# Patient Record
Sex: MALE | Race: BLACK OR AFRICAN AMERICAN | Employment: OTHER | URBAN - METROPOLITAN AREA
[De-identification: names, ages, dates, MRNs, and addresses within clinical notes are randomized per-mention and may not be internally consistent; named-entity substitution may affect disease eponyms.]

---

## 2017-05-16 ENCOUNTER — HOSPITAL ENCOUNTER (OUTPATIENT)
Dept: MRI IMAGING | Age: 72
Discharge: HOME | End: 2017-05-16
Attending: PHYSICAL MEDICINE & REHABILITATION
Payer: MEDICARE

## 2017-05-16 DIAGNOSIS — M47.816 LUMBAR SPONDYLOSIS: ICD-10-CM

## 2017-05-16 DIAGNOSIS — M54.41 ACUTE BACK PAIN WITH SCIATICA, RIGHT: ICD-10-CM

## 2017-05-16 PROCEDURE — 72148 MRI LUMBAR SPINE W/O DYE: CPT

## 2018-06-14 ENCOUNTER — OFFICE VISIT (OUTPATIENT)
Dept: SLEEP MEDICINE | Age: 73
End: 2018-06-14

## 2018-06-14 VITALS
WEIGHT: 262 LBS | HEART RATE: 72 BPM | DIASTOLIC BLOOD PRESSURE: 70 MMHG | BODY MASS INDEX: 38.8 KG/M2 | SYSTOLIC BLOOD PRESSURE: 132 MMHG | OXYGEN SATURATION: 97 % | HEIGHT: 69 IN

## 2018-06-14 DIAGNOSIS — G47.33 OSA (OBSTRUCTIVE SLEEP APNEA): Primary | ICD-10-CM

## 2018-06-14 PROBLEM — E66.01 SEVERE OBESITY (BMI 35.0-39.9): Status: ACTIVE | Noted: 2018-06-14

## 2018-06-14 RX ORDER — TRAMADOL HYDROCHLORIDE 200 MG/1
200 TABLET, EXTENDED RELEASE ORAL DAILY
COMMUNITY
End: 2018-09-04 | Stop reason: DRUGHIGH

## 2018-06-14 RX ORDER — FINASTERIDE 5 MG/1
TABLET, FILM COATED ORAL
COMMUNITY
Start: 2018-03-19 | End: 2018-09-04

## 2018-06-14 RX ORDER — METFORMIN HYDROCHLORIDE 850 MG/1
850 TABLET ORAL DAILY
COMMUNITY
Start: 2018-04-24 | End: 2018-12-07 | Stop reason: ALTCHOICE

## 2018-06-14 RX ORDER — GLIMEPIRIDE 4 MG/1
TABLET ORAL
COMMUNITY
Start: 2018-04-24 | End: 2018-09-04

## 2018-06-14 NOTE — PATIENT INSTRUCTIONS
Sleep Apnea: Care Instructions  Your Care Instructions    Sleep apnea means that you frequently stop breathing for 10 seconds or longer during sleep. It can be mild to severe, based on the number of times an hour that you stop breathing or have slowed breathing. Blocked or narrowed airways in your nose, mouth, or throat can cause sleep apnea. Your airway can become blocked when your throat muscles and tongue relax during sleep. You can treat sleep apnea at home by making lifestyle changes. You also can use a CPAP breathing machine that keeps tissues in the throat from blocking your airway. Or your doctor may suggest that you use a breathing device while you sleep. It helps keep your airway open. This could be a device that you put in your mouth. Other examples include strips or disks that you use on your nose. In some cases, surgery may be needed to remove enlarged tissues in the throat. Follow-up care is a key part of your treatment and safety. Be sure to make and go to all appointments, and call your doctor if you are having problems. It's also a good idea to know your test results and keep a list of the medicines you take. How can you care for yourself at home? · Lose weight, if needed. It may reduce the number of times you stop breathing or have slowed breathing. · Sleep on your side. It may stop mild apnea. If you tend to roll onto your back, sew a pocket in the back of your pajama top. Put a tennis ball into the pocket, and stitch the pocket shut. This will help keep you from sleeping on your back. · Avoid alcohol and medicines such as sleeping pills and sedatives before bed. · Do not smoke. Smoking can make sleep apnea worse. If you need help quitting, talk to your doctor about stop-smoking programs and medicines. These can increase your chances of quitting for good. · Prop up the head of your bed 4 to 6 inches by putting bricks under the legs of the bed.   · Treat breathing problems, such as a stuffy nose, caused by a cold or allergies. · Try a continuous positive airway pressure (CPAP) breathing machine if your doctor recommends it. The machine keeps your airway open when you sleep. · If CPAP does not work for you, ask your doctor if you can try other breathing machines. A bilevel positive airway pressure machine uses one type of air pressure for breathing in and another type for breathing out. Another device raises or lowers air pressure as needed while you breathe. · Talk to your doctor if:  ¨ Your nose feels dry or bleeds when you use one of these machines. You may need to increase moisture in the air. A humidifier may help. ¨ Your nose is runny or stuffy from using a breathing machine. Decongestants or a corticosteroid nasal spray may help. ¨ You are sleepy during the day and it gets in the way of the normal things you do. Do not drive when you are drowsy. When should you call for help? Watch closely for changes in your health, and be sure to contact your doctor if:  ? · You still have sleep apnea even though you have made lifestyle changes. ? · You are thinking of trying a device such as CPAP. ? · You are having problems using a CPAP or similar machine. Where can you learn more? Go to http://evita-everardo.info/. Enter H894 in the search box to learn more about \"Sleep Apnea: Care Instructions. \"  Current as of: May 12, 2017  Content Version: 11.4  © 6345-1035 [employer]. Care instructions adapted under license by [employer] (which disclaims liability or warranty for this information). If you have questions about a medical condition or this instruction, always ask your healthcare professional. [address] any warranty or liability for your use of this information.

## 2018-06-14 NOTE — PROGRESS NOTES
[address] [facility]. [place], [address]  Tel.  [phone]  Fax. [address] [facility]  [place], [address] [facility]  Tel.  [phone]  Fax. [phone] [address]  Tel.  [phone]  Fax. [phone]         Chief Complaint       Chief Complaint   Patient presents with    Sleep Problem     6 month follow up; pt had rashes due to mask and straps         HPI        Kimmy Keating is a right handed 74y.o. year old male seen for follow-up. He was evaluated with a sleep study which demonstrated obstructive sleep apnea characterized by an AHI of 74 per hour associated with arterial desaturations to 60%. Events more prominent in REM with the REM-related AHI of 102 per hour. CPAP increased to 16 cm with continued events. BIPAP study obtained. BIPAP started at 18/12 cm. He notes he had recently been experiencing a rash in the distribution of the mask. Had not been a problem in the past.    Compliance data downloaded and reviewed in detail with the patient today. During the past 30 days, BIPAP used during 18 days with the average daily use of 4.1 hours. CMS compliance criteria 30%. AHI 3.8 per hour. Elevated mask leak. He notes he is scheduled to have hip replacement surgery tomorrow. No Known Allergies    Current Outpatient Prescriptions   Medication Sig Dispense Refill    finasteride (PROSCAR) 5 mg tablet       glimepiride (AMARYL) 4 mg tablet       metFORMIN (GLUCOPHAGE) 850 mg tablet       traMADol (ULTRAM-ER) 200 mg tablet Take 200 mg by mouth daily. He  has no past medical history on file. He  has no past surgical history on file. He family history is not on file. He  reports that he has never smoked. He has never used smokeless tobacco. He reports that he does not drink alcohol or use illicit drugs.      Review of Systems:  Unchanged per patient      Objective:     Visit Vitals    /70    Pulse 72    Ht 5' 9\" (1.753 m)    Wt 262 lb (118.8 kg)    SpO2 97%    BMI 38.69 kg/m2     Body mass index is 38.69 kg/(m^2). Assessment:       ICD-10-CM ICD-9-CM    1. LUISANA (obstructive sleep apnea) G47.33 327.23       Severe sleep disordered breathing improving with BIPAP 18/12 cm. Reduced utilization secondary to recent mask-related rash. He will be fitted for an [address] [place] [address]. He will contact the office if rash continues. Plan:   No orders of the defined types were placed in this encounter. * Patient has a history and examination consistent with the diagnosis of sleep apnea. * Sleep testing was ordered for initial evaluation. * He was provided information on sleep apnea including coresponding risk factors and the importance of proper treatment. * Treatment options if indicated were reviewed today. Potential benefit of weight reduction was discussed. Weight reduction techniques reviewed.       Juanita Sanchez MD, Rufina Zapata  06/14/18

## 2018-08-14 ENCOUNTER — TELEPHONE (OUTPATIENT)
Dept: SLEEP MEDICINE | Age: 73
End: 2018-08-14

## 2018-08-20 NOTE — TELEPHONE ENCOUNTER
Patient called back. The reason he is not compliant is because he is sick since April. He said he as to get about 4/5 times due to diarreah. He said if he is not able to get supplies, then he will come and return device.

## 2018-08-22 ENCOUNTER — TELEPHONE (OUTPATIENT)
Dept: SLEEP MEDICINE | Age: 73
End: 2018-08-22

## 2018-08-22 ENCOUNTER — DOCUMENTATION ONLY (OUTPATIENT)
Dept: SLEEP MEDICINE | Age: 73
End: 2018-08-22

## 2018-08-22 DIAGNOSIS — G47.33 OBSTRUCTIVE SLEEP APNEA (ADULT) (PEDIATRIC): Primary | ICD-10-CM

## 2018-09-04 ENCOUNTER — HOSPITAL ENCOUNTER (INPATIENT)
Age: 73
LOS: 8 days | Discharge: HOME HEALTH | DRG: 330 | End: 2018-09-12
Attending: EMERGENCY MEDICINE | Admitting: SURGERY
Payer: MEDICARE

## 2018-09-04 ENCOUNTER — APPOINTMENT (OUTPATIENT)
Dept: CT IMAGING | Age: 73
DRG: 330 | End: 2018-09-04
Attending: PHYSICIAN ASSISTANT
Payer: MEDICARE

## 2018-09-04 DIAGNOSIS — D64.9 NORMOCYTIC ANEMIA: ICD-10-CM

## 2018-09-04 DIAGNOSIS — R10.9 ABDOMINAL PAIN, UNSPECIFIED ABDOMINAL LOCATION: Primary | ICD-10-CM

## 2018-09-04 DIAGNOSIS — N13.30 HYDRONEPHROSIS OF RIGHT KIDNEY: ICD-10-CM

## 2018-09-04 DIAGNOSIS — K59.1 FUNCTIONAL DIARRHEA: ICD-10-CM

## 2018-09-04 DIAGNOSIS — K63.89 COLONIC MASS: ICD-10-CM

## 2018-09-04 PROBLEM — R19.00 PELVIC MASS: Status: ACTIVE | Noted: 2018-09-04

## 2018-09-04 LAB
ALBUMIN SERPL-MCNC: 2.5 G/DL (ref 3.5–5)
ALBUMIN/GLOB SERPL: 0.5 {RATIO} (ref 1.1–2.2)
ALP SERPL-CCNC: 66 U/L (ref 45–117)
ALT SERPL-CCNC: 29 U/L (ref 12–78)
ANION GAP SERPL CALC-SCNC: 8 MMOL/L (ref 5–15)
AST SERPL-CCNC: 23 U/L (ref 15–37)
BASOPHILS # BLD: 0 K/UL (ref 0–0.1)
BASOPHILS NFR BLD: 0 % (ref 0–1)
BILIRUB SERPL-MCNC: 0.3 MG/DL (ref 0.2–1)
BUN SERPL-MCNC: 9 MG/DL (ref 6–20)
BUN/CREAT SERPL: 9 (ref 12–20)
CALCIUM SERPL-MCNC: 8.7 MG/DL (ref 8.5–10.1)
CHLORIDE SERPL-SCNC: 106 MMOL/L (ref 97–108)
CO2 SERPL-SCNC: 26 MMOL/L (ref 21–32)
CREAT SERPL-MCNC: 1.04 MG/DL (ref 0.7–1.3)
DIFFERENTIAL METHOD BLD: ABNORMAL
EOSINOPHIL # BLD: 0.1 K/UL (ref 0–0.4)
EOSINOPHIL NFR BLD: 1 % (ref 0–7)
ERYTHROCYTE [DISTWIDTH] IN BLOOD BY AUTOMATED COUNT: 14.6 % (ref 11.5–14.5)
GLOBULIN SER CALC-MCNC: 4.8 G/DL (ref 2–4)
GLUCOSE BLD STRIP.AUTO-MCNC: 97 MG/DL (ref 65–100)
GLUCOSE SERPL-MCNC: 123 MG/DL (ref 65–100)
HCT VFR BLD AUTO: 30.7 % (ref 36.6–50.3)
HGB BLD-MCNC: 9.4 G/DL (ref 12.1–17)
IMM GRANULOCYTES # BLD: 0 K/UL (ref 0–0.04)
IMM GRANULOCYTES NFR BLD AUTO: 1 % (ref 0–0.5)
LIPASE SERPL-CCNC: 68 U/L (ref 73–393)
LYMPHOCYTES # BLD: 1.1 K/UL (ref 0.8–3.5)
LYMPHOCYTES NFR BLD: 15 % (ref 12–49)
MCH RBC QN AUTO: 26.5 PG (ref 26–34)
MCHC RBC AUTO-ENTMCNC: 30.6 G/DL (ref 30–36.5)
MCV RBC AUTO: 86.5 FL (ref 80–99)
MONOCYTES # BLD: 0.8 K/UL (ref 0–1)
MONOCYTES NFR BLD: 11 % (ref 5–13)
NEUTS SEG # BLD: 5.1 K/UL (ref 1.8–8)
NEUTS SEG NFR BLD: 72 % (ref 32–75)
NRBC # BLD: 0 K/UL (ref 0–0.01)
NRBC BLD-RTO: 0 PER 100 WBC
PLATELET # BLD AUTO: 381 K/UL (ref 150–400)
PMV BLD AUTO: 9.6 FL (ref 8.9–12.9)
POTASSIUM SERPL-SCNC: 3.8 MMOL/L (ref 3.5–5.1)
PROT SERPL-MCNC: 7.3 G/DL (ref 6.4–8.2)
RBC # BLD AUTO: 3.55 M/UL (ref 4.1–5.7)
SERVICE CMNT-IMP: NORMAL
SODIUM SERPL-SCNC: 140 MMOL/L (ref 136–145)
WBC # BLD AUTO: 7.1 K/UL (ref 4.1–11.1)

## 2018-09-04 PROCEDURE — 74177 CT ABD & PELVIS W/CONTRAST: CPT

## 2018-09-04 PROCEDURE — 93970 EXTREMITY STUDY: CPT

## 2018-09-04 PROCEDURE — 82962 GLUCOSE BLOOD TEST: CPT

## 2018-09-04 PROCEDURE — 94762 N-INVAS EAR/PLS OXIMTRY CONT: CPT

## 2018-09-04 PROCEDURE — 85025 COMPLETE CBC W/AUTO DIFF WBC: CPT | Performed by: EMERGENCY MEDICINE

## 2018-09-04 PROCEDURE — 65270000029 HC RM PRIVATE

## 2018-09-04 PROCEDURE — 36415 COLL VENOUS BLD VENIPUNCTURE: CPT | Performed by: EMERGENCY MEDICINE

## 2018-09-04 PROCEDURE — 74011636320 HC RX REV CODE- 636/320: Performed by: RADIOLOGY

## 2018-09-04 PROCEDURE — 74011250636 HC RX REV CODE- 250/636: Performed by: PHYSICIAN ASSISTANT

## 2018-09-04 PROCEDURE — 83690 ASSAY OF LIPASE: CPT | Performed by: EMERGENCY MEDICINE

## 2018-09-04 PROCEDURE — 74011250636 HC RX REV CODE- 250/636: Performed by: EMERGENCY MEDICINE

## 2018-09-04 PROCEDURE — 99282 EMERGENCY DEPT VISIT SF MDM: CPT

## 2018-09-04 PROCEDURE — 81001 URINALYSIS AUTO W/SCOPE: CPT | Performed by: EMERGENCY MEDICINE

## 2018-09-04 PROCEDURE — 80053 COMPREHEN METABOLIC PANEL: CPT | Performed by: EMERGENCY MEDICINE

## 2018-09-04 RX ORDER — TRAMADOL HYDROCHLORIDE 50 MG/1
50 TABLET ORAL
COMMUNITY
End: 2018-09-12

## 2018-09-04 RX ORDER — FENTANYL CITRATE 50 UG/ML
50 INJECTION, SOLUTION INTRAMUSCULAR; INTRAVENOUS
Status: COMPLETED | OUTPATIENT
Start: 2018-09-04 | End: 2018-09-05

## 2018-09-04 RX ORDER — DEXTROSE 50 % IN WATER (D50W) INTRAVENOUS SYRINGE
25-50 AS NEEDED
Status: DISCONTINUED | OUTPATIENT
Start: 2018-09-04 | End: 2018-09-12 | Stop reason: HOSPADM

## 2018-09-04 RX ORDER — MAGNESIUM SULFATE 100 %
4 CRYSTALS MISCELLANEOUS AS NEEDED
Status: DISCONTINUED | OUTPATIENT
Start: 2018-09-04 | End: 2018-09-12 | Stop reason: HOSPADM

## 2018-09-04 RX ORDER — ASPIRIN 81 MG/1
81 TABLET ORAL DAILY
COMMUNITY
End: 2019-04-16

## 2018-09-04 RX ORDER — DIPHENHYDRAMINE HYDROCHLORIDE 50 MG/ML
12.5 INJECTION, SOLUTION INTRAMUSCULAR; INTRAVENOUS
Status: DISCONTINUED | OUTPATIENT
Start: 2018-09-04 | End: 2018-09-12 | Stop reason: HOSPADM

## 2018-09-04 RX ORDER — ACETAMINOPHEN 325 MG/1
650 TABLET ORAL
Status: DISCONTINUED | OUTPATIENT
Start: 2018-09-04 | End: 2018-09-12 | Stop reason: HOSPADM

## 2018-09-04 RX ORDER — HYDROMORPHONE HYDROCHLORIDE 2 MG/ML
0.5 INJECTION, SOLUTION INTRAMUSCULAR; INTRAVENOUS; SUBCUTANEOUS
Status: DISCONTINUED | OUTPATIENT
Start: 2018-09-04 | End: 2018-09-06

## 2018-09-04 RX ORDER — TAMSULOSIN HYDROCHLORIDE 0.4 MG/1
0.4 CAPSULE ORAL DAILY
COMMUNITY

## 2018-09-04 RX ORDER — FINASTERIDE 5 MG/1
5 TABLET, FILM COATED ORAL DAILY
Status: DISCONTINUED | OUTPATIENT
Start: 2018-09-05 | End: 2018-09-04

## 2018-09-04 RX ORDER — GLIMEPIRIDE 4 MG/1
4 TABLET ORAL
COMMUNITY
End: 2018-12-07 | Stop reason: ALTCHOICE

## 2018-09-04 RX ORDER — SODIUM CHLORIDE 9 MG/ML
100 INJECTION, SOLUTION INTRAVENOUS CONTINUOUS
Status: DISCONTINUED | OUTPATIENT
Start: 2018-09-04 | End: 2018-09-05

## 2018-09-04 RX ORDER — POTASSIUM CHLORIDE 20 MEQ/1
20 TABLET, EXTENDED RELEASE ORAL 2 TIMES DAILY
COMMUNITY
End: 2019-04-16

## 2018-09-04 RX ORDER — ENOXAPARIN SODIUM 100 MG/ML
40 INJECTION SUBCUTANEOUS EVERY 24 HOURS
Status: DISCONTINUED | OUTPATIENT
Start: 2018-09-04 | End: 2018-09-09

## 2018-09-04 RX ORDER — TAMSULOSIN HYDROCHLORIDE 0.4 MG/1
0.4 CAPSULE ORAL DAILY
Status: DISCONTINUED | OUTPATIENT
Start: 2018-09-05 | End: 2018-09-12 | Stop reason: HOSPADM

## 2018-09-04 RX ORDER — INSULIN LISPRO 100 [IU]/ML
INJECTION, SOLUTION INTRAVENOUS; SUBCUTANEOUS EVERY 6 HOURS
Status: DISCONTINUED | OUTPATIENT
Start: 2018-09-04 | End: 2018-09-12 | Stop reason: HOSPADM

## 2018-09-04 RX ORDER — NALOXONE HYDROCHLORIDE 0.4 MG/ML
0.4 INJECTION, SOLUTION INTRAMUSCULAR; INTRAVENOUS; SUBCUTANEOUS AS NEEDED
Status: DISCONTINUED | OUTPATIENT
Start: 2018-09-04 | End: 2018-09-12 | Stop reason: HOSPADM

## 2018-09-04 RX ORDER — SODIUM CHLORIDE 0.9 % (FLUSH) 0.9 %
5-10 SYRINGE (ML) INJECTION AS NEEDED
Status: DISCONTINUED | OUTPATIENT
Start: 2018-09-04 | End: 2018-09-12 | Stop reason: HOSPADM

## 2018-09-04 RX ORDER — SODIUM CHLORIDE 0.9 % (FLUSH) 0.9 %
5-10 SYRINGE (ML) INJECTION EVERY 8 HOURS
Status: DISCONTINUED | OUTPATIENT
Start: 2018-09-04 | End: 2018-09-12 | Stop reason: HOSPADM

## 2018-09-04 RX ADMIN — SODIUM CHLORIDE 1000 ML: 900 INJECTION, SOLUTION INTRAVENOUS at 17:25

## 2018-09-04 RX ADMIN — IOPAMIDOL 100 ML: 755 INJECTION, SOLUTION INTRAVENOUS at 16:37

## 2018-09-04 RX ADMIN — HYDROMORPHONE HYDROCHLORIDE 0.5 MG: 2 INJECTION, SOLUTION INTRAMUSCULAR; INTRAVENOUS; SUBCUTANEOUS at 17:36

## 2018-09-04 NOTE — ED NOTES
2:11 PM 
I have evaluated the patient as the Provider in Triage. I have reviewed His vital signs and the triage nurse assessment. I have talked with the patient and any available family and advised that I am the provider in triage and have ordered the appropriate study to initiate their work up based on the clinical presentation during my assessment. I have advised that the patient will be accommodated in the Main ED as soon as possible. I have also requested to contact the triage nurse or myself immediately if the patient experiences any changes in their condition during this brief waiting period. Pt with hx abdominal pain and dx with colon mass in august. Pain worsening,. No n/v. Dr Maria G Merino urologist tried to place stent this past week and cant see because they think mass swelled around kidney  Sent by dr Yuriy Traylor to admit Letty Muñiz MD

## 2018-09-04 NOTE — H&P
Surgery History and Physical 
 
Subjective:  
  
Mordecai Jeans is a 74 y.o. male who presented to the ED at direction of Dr. Mayank Arias with c/o diarrhea and abdominal pain. Patient has been having persistent diarrhea for 4-5 months. He was seen by GI and has had 2 colonoscopies which showed abnormal rectal and sigmoid mucosa and possible mass. Patient has been having worsening abdominal pain. Had CT which showed right hydronephrosis from extrinsic compression. His urologist, Dr. Evaristo Ochoa who was not able to pass ureteral stent. He called our office today reporting worsening symptoms and was directed here for further eval.  
 
He c/o lower abdominal pain that is worse with movement. Denies N/V. States if he eats \"it runs right through me\". He reports fecal incontinence. He estimates a 75# unintentional weight loss over the last 6 months. Past Medical History:  
Diagnosis Date  Diabetes mellitus ([facility]  Prostate cancer ([facility]  Sleep apnea No past surgical history on file. No family history on file. Social History Social History  Marital status: SINGLE Spouse name: N/A  
 Number of children: N/A  
 Years of education: N/A Social History Main Topics  Smoking status: Never Smoker  Smokeless tobacco: Never Used  Alcohol use No  
 Drug use: No  
 Sexual activity: Not on file Other Topics Concern  Not on file Social History Narrative Current Facility-Administered Medications Medication Dose Route Frequency  fentaNYL citrate (PF) injection 50 mcg  50 mcg IntraVENous NOW  sodium chloride 0.9 % bolus infusion 1,000 mL  1,000 mL IntraVENous NOW  sodium chloride (NS) flush 5-10 mL  5-10 mL IntraVENous Q8H  
 sodium chloride (NS) flush 5-10 mL  5-10 mL IntraVENous PRN  
 HYDROmorphone (PF) (DILAUDID) injection 0.5 mg  0.5 mg IntraVENous Q3H PRN  
 naloxone (NARCAN) injection 0.4 mg  0.4 mg IntraVENous PRN  
  diphenhydrAMINE (BENADRYL) injection 12.5 mg  12.5 mg IntraVENous Q4H PRN  
 acetaminophen (TYLENOL) tablet 650 mg  650 mg Oral Q4H PRN  
 enoxaparin (LOVENOX) injection 40 mg  40 mg SubCUTAneous Q24H  
 0.9% sodium chloride infusion  100 mL/hr IntraVENous CONTINUOUS  
 glucose chewable tablet 16 g  4 Tab Oral PRN  
 dextrose (D50W) injection syrg 12.5-25 g  25-50 mL IntraVENous PRN  
 glucagon (GLUCAGEN) injection 1 mg  1 mg IntraMUSCular PRN  
 insulin lispro (HUMALOG) injection   SubCUTAneous Q6H  
 [START ON 2018] finasteride (PROSCAR) tablet 5 mg  5 mg Oral DAILY Current Outpatient Prescriptions Medication Sig  
 finasteride (PROSCAR) 5 mg tablet  glimepiride (AMARYL) 4 mg tablet  metFORMIN (GLUCOPHAGE) 850 mg tablet  traMADol (ULTRAM-ER) 200 mg tablet Take 200 mg by mouth daily. No Known Allergies Review of Systems:REVIEW OF SYSTEMS:   
 []     Unable to obtain  ROS due to  []    mental status change  []    sedated   []    intubated 
 []    Total of 12 systems reviewed as follows: 
 
Constitutional: + weight loss, malaise neg for fevers, chills, Eyes: negative for blurry vision Ears, nose, mouth, throat, and face: negative for sore throat Respiratory: negative for SOB Cardiovascular: negative for CP Gastrointestinal: + abdominal pain, diarrhea, negative for nausea, vomiting, constipation, melena, hematochezia Genitourinary: negative for dysuria Integument/breast: neg for skin rash Hematologic/lymphatic: neg for bruising Musculoskeletal: negative for muscle aches Neurological: no dizziness or h/a 
 
Objective:  
 
  
Patient Vitals for the past 8 hrs: 
 BP Temp Pulse Resp SpO2 Weight 18 1410 130/62 98.4 °F (36.9 °C) 89 15 98 % 117.9 kg (260 lb) Temp (24hrs), Av.4 °F (36.9 °C), Min:98.4 °F (36.9 °C), Max:98.4 °F (36.9 °C) Physical Exam: 
General:  Alert, cooperative, no distress, appears stated age. Eyes:  Conjunctivae/corneas clear. Nose: Nares normal. Septum midline Mouth/Throat: Lips, mucosa, and tongue normal.   
Neck: Supple, symmetrical, trachea midline Lungs:   Clear to auscultation bilaterally. Heart:  Regular rate and rhythm Abdomen:   Soft, mild diffuse tenderness with rebound or guarding, non-distended, normal bowel sounds Extremities: Extremities normal, atraumatic, no cyanosis or edema. Skin: Skin color, texture, turgor normal. No rashes or lesions Neuro: Alert, oriented, speech clear Labs:  
Recent Labs  
   09/04/18 
 1423 WBC  7.1 HGB  9.4* HCT  30.7* PLT  381 Recent Labs  
   09/04/18 
 1423 NA  140  
K  3.8 CL  106 CO2  26 GLU  123* BUN  9  
CREA  1.04  
CA  8.7 ALB  2.5* TBILI  0.3 SGOT  23 ALT  29 No results for input(s): INR in the last 72 hours. No lab exists for component: INREXT, INREXT Assessment and Plan:  
 
Pelvic mass/Abdominal pain Concern is for malignancy causing ureteral obstruction and abdominal pain. At time of my exam CT abd/pelvis is pending in ED. Admit to Dr. Nguyễn Beatty service, NPO, IVF, pain control. Further plan pending review of CT. Dr. Nguyễn Beatty updated. Plan discussed. Signed By: CRISELDA Berry September 4, 2018

## 2018-09-04 NOTE — ED TRIAGE NOTES
Patient with known abdominal mass per family, was sent by surgery for admission and testing. Pt with c/o abdominal pain.

## 2018-09-04 NOTE — ED PROVIDER NOTES
HPI Comments: 74 y.o. male with past medical history significant for DM and chronic renal disease who presents from home with chief complaint of abdominal pain. Pt reports abdominal pain for 5 months. Pt notes recently his abdominal pain is worsening. Pt also c/o diarrhea, unexpected weight loss, and bilateral lower extremity edema. Pt's relative states he was sent to the ED after visiting Dr. Radha Tomlin yesterday, who recommended admission. Pt was diagnosed with a mass in his colon 1 month ago. Pt's relative states Dr. Charmayne Meter (Urology) tried to place a stent this past week and was unable to perform the procedure d/t the mass swelling around the Pt's kidney. Pt's relative states the Pt's mass is now pressing against his urethra, which is causing decreased urine and retention. Pt's relative reports the Pt's right kidney has degraded. Pt's relative notes the Pt was prescribed Tramadol for urinary pain, which did not provide relief. Pt denies fever, diaphoresis, N/V, and SOB. There are no other acute medical concerns at this time. PCP: Fabricio Peng MD 
 
Note written by Erwin Patel, as dictated by Anuja Ruiz, [address] 3:52 PM 
 
 
The history is provided by the patient and a relative. No past medical history on file. No past surgical history on file. No family history on file. Social History Social History  Marital status: SINGLE Spouse name: N/A  
 Number of children: N/A  
 Years of education: N/A Occupational History  Not on file. Social History Main Topics  Smoking status: Never Smoker  Smokeless tobacco: Never Used  Alcohol use No  
 Drug use: No  
 Sexual activity: Not on file Other Topics Concern  Not on file Social History Narrative  No narrative on file ALLERGIES: Review of patient's allergies indicates no known allergies. Review of Systems Constitutional: Positive for unexpected weight change.  Negative for diaphoresis and fever. Respiratory: Negative for shortness of breath. Cardiovascular: Positive for leg swelling. Gastrointestinal: Positive for abdominal pain and diarrhea. Negative for nausea and vomiting. Genitourinary: Positive for decreased urine volume. All other systems reviewed and are negative. Vitals:  
 09/04/18 1410 BP: 130/62 Pulse: 89 Resp: 15 Temp: 98.4 °F (36.9 °C) SpO2: 98% Weight: 117.9 kg (260 lb) Physical Exam  
Constitutional: He is oriented to person, place, and time. He appears well-developed and well-nourished. No distress. Well appearing AAM in NAD HENT:  
Head: Normocephalic and atraumatic. Right Ear: External ear normal.  
Left Ear: External ear normal.  
Nose: Nose normal.  
Mouth/Throat: Oropharynx is clear and moist. No oropharyngeal exudate. Eyes: Conjunctivae and EOM are normal. Pupils are equal, round, and reactive to light. Right eye exhibits no discharge. Left eye exhibits no discharge. Neck: Normal range of motion. Neck supple. Cardiovascular: Normal rate, regular rhythm and normal heart sounds. Pulmonary/Chest: Effort normal and breath sounds normal. He has no wheezes. He has no rales. Abdominal: Soft. Bowel sounds are normal. He exhibits no distension. There is tenderness (diffuse). There is no guarding. Musculoskeletal: Normal range of motion. Lymphadenopathy:  
  He has no cervical adenopathy. Neurological: He is alert and oriented to person, place, and time. No cranial nerve deficit. Skin: Skin is warm and dry. He is not diaphoretic. Psychiatric: He has a normal mood and affect. His behavior is normal.  
Nursing note and vitals reviewed. MDM Number of Diagnoses or Management Options Diagnosis management comments: 73 yo AAM with known abdominal mass referred to ED for admission. Basic labs reviewed. [facility] 
 
[facility] Hamida Concepcion in ED, surgery, in ED to eval and admit patient.  Anuja JC CRISELDA Guerrero Pt seen and evaluated independently by Dr. Denver Cherry. [facility], [address] Amount and/or Complexity of Data Reviewed Clinical lab tests: ordered and reviewed Tests in the radiology section of CPT®: ordered ED Course Procedures

## 2018-09-04 NOTE — ED NOTES
TRANSFER - OUT REPORT: 
 
Verbal report given to Breonna Berger(name) on Efren Montano  being transferred to [facility] (unit) for routine progression of care Report consisted of patients Situation, Background, Assessment and  
Recommendations(SBAR). Information from the following report(s) SBAR, ED Summary, [facility] and Recent Results was reviewed with the receiving nurse. Lines:  
Peripheral IV 09/04/18 Left Antecubital (Active) Site Assessment Clean, dry, & intact 9/4/2018  2:45 PM  
Phlebitis Assessment 0 9/4/2018  2:45 PM  
Infiltration Assessment 0 9/4/2018  2:45 PM  
Dressing Status Clean, dry, & intact 9/4/2018  2:45 PM  
Dressing Type Tape;Transparent 9/4/2018  2:45 PM  
Hub Color/Line Status Pink;Flushed;Patent 9/4/2018  2:45 PM  
Action Taken Blood drawn 9/4/2018  2:45 PM  
  
 
Opportunity for questions and clarification was provided. Patient transported with: 
 Monitor

## 2018-09-04 NOTE — IP AVS SNAPSHOT
303 [facility] [address] 
[phone] Patient: Anjali Villatoro MRN: [id number] LAP:1/60/3806 About your hospitalization You were admitted on:  September 4, 2018 You last received care in the:  SF 4M POST SURG ORT 1 You were discharged on:  September 12, 2018 Why you were hospitalized Your primary diagnosis was:  Not on File Your diagnoses also included:  Pelvic Mass Follow-up Information Follow up With Details Comments Contact Info Mj Weiss MD Go on 9/17/2018 appt at 10 am [address] [place], [place] [id number] [address] [phone] 2449 [facility], [address] [facility] [address] Skye Wagner MD Schedule an appointment as soon as possible for a visit in 2 weeks  2000 [facility] [address] [phone] Giovanni Morocho MD Schedule an appointment as soon as possible for a visit  [address] [place] [address] 
[phone] Teresa Villafana MD   55[address] [facility] [id number] [phone] Your Scheduled Appointments Monday September 17, 2018 10:00 AM EDT HOSPITAL FOLLOW-UP with Mj Weiss MD  
[facility] [address] [place] [address], [address] [place] [address] [address] [phone] Discharge Orders None A check nemo indicates which time of day the medication should be taken. My Medications START taking these medications Instructions Each Dose to Equal  
 Morning Noon Evening Bedtime HYDROcodone-acetaminophen 5-325 mg per tablet Commonly known as:  Rolinda Doles Your last dose was: Was not given in the hospital   
Your next dose is:  As needed Take 1 Tab by mouth every four (4) hours as needed for Pain.  Max Daily Amount: 6 Tabs. 1 Tab CONTINUE taking these medications Instructions Each Dose to Equal  
 Morning Noon Evening Bedtime  
 aspirin delayed-release 81 mg tablet Your last dose was: Was not given in the hospital   
   
 Take 81 mg by mouth daily. 81 mg  
    
   
   
   
  
 glimepiride 4 mg tablet Commonly known as:  AMARYL Your last dose was: Was not given in the hospital   
   
 Take 4 mg by mouth every morning. 4 mg  
    
   
   
   
  
 metFORMIN 850 mg tablet Commonly known as:  GLUCOPHAGE Your last dose was: Was not given in the hospital   
   
 Take 850 mg by mouth daily. 850 mg  
    
   
   
   
  
 potassium chloride 20 mEq tablet Commonly known as:  K-DUR, KLOR-CON Your next dose is:  Was not given in the hospital   
   
 Take 20 mEq by mouth two (2) times a day. 20 mEq  
    
   
   
   
  
 tamsulosin 0.4 mg capsule Commonly known as:  FLOMAX Your last dose was: Today at 8:13 am   
Your next dose is:  Tomorrow morning Take 0.4 mg by mouth daily. 0.4 mg  
    
   
   
   
  
  
STOP taking these medications   
 traMADol 50 mg tablet Commonly known as:  Damaris Henry your doctor about these medications Instructions Each Dose to Equal  
 Morning Noon Evening Bedtime  
 traMADol 200 mg tablet Commonly known as:  ULTRAM-ER Your last dose was: Was not given in the hospital   
Your next dose is:  As needed Take 200 mg by mouth daily. 200 mg Where to Get Your Medications Information on where to get these meds will be given to you by the nurse or doctor. ! Ask your nurse or doctor about these medications HYDROcodone-acetaminophen 5-325 mg per tablet Opioid Education  Prescription Opioids: What You Need to Know: 
 
Prescription opioids can be used to help relieve moderate-to-severe pain and are often prescribed following a surgery or injury, or for certain health conditions. These medications can be an important part of treatment but also come with serious risks. Opioids are strong pain medicines. Examples include hydrocodone, oxycodone, fentanyl, and morphine. Heroin is an example of an illegal opioid. It is important to work with your health care provider to make sure you are getting the safest, most effective care. WHAT ARE THE RISKS AND SIDE EFFECTS OF OPIOID USE? Prescription opioids carry serious risks of addiction and overdose, especially with prolonged use. An opioid overdose, often marked by slow breathing, can cause sudden death. The use of prescription opioids can have a number of side effects as well, even when taken as directed. · Tolerance-meaning you might need to take more of a medication for the same pain relief · Physical dependence-meaning you have symptoms of withdrawal when the medication is stopped. Withdrawal symptoms can include nausea, sweating, chills, diarrhea, stomach cramps, and muscle aches. Withdrawal can last up to several weeks, depending on which drug you took and how long you took it. · Increased sensitivity to pain · Constipation · Nausea, vomiting, and dry mouth · Sleepiness and dizziness · Confusion · Depression · Low levels of testosterone that can result in lower sex drive, energy, and strength · Itching and sweating RISKS ARE GREATER WITH:      
· History of drug misuse, substance use disorder, or overdose · Mental health conditions (such as depression or anxiety) · Sleep apnea · Older age (72 years or older) · Pregnancy Avoid alcohol while taking prescription opioids. Also, unless specifically advised by your health care provider, medications to avoid include: · Benzodiazepines (such as Xanax or Valium) · Muscle relaxants (such as Soma or Flexeril) · Hypnotics (such as Ambien or Lunesta) · Other prescription opioids KNOW YOUR OPTIONS Talk to your health care provider about ways to manage your pain that don't involve prescription opioids. Some of these options may actually work better and have fewer risks and side effects. Options may include: 
· Pain relievers such as acetaminophen, ibuprofen, and naproxen · Some medications that are also used for depression or seizures · Physical therapy and exercise · Counseling to help patients learn how to cope better with triggers of pain and stress. · Application of heat or cold compress · Massage therapy · Relaxation techniques Be Informed Make sure you know the name of your medication, how much and how often to take it, and its potential risks & side effects. IF YOU ARE PRESCRIBED OPIOIDS FOR PAIN: 
· Never take opioids in greater amounts or more often than prescribed. Remember the goal is not to be pain-free but to manage your pain at a tolerable level. · Follow up with your primary care provider to: · Work together to create a plan on how to manage your pain. · Talk about ways to help manage your pain that don't involve prescription opioids. · Talk about any and all concerns and side effects. · Help prevent misuse and abuse. · Never sell or share prescription opioids · Help prevent misuse and abuse. · Store prescription opioids in a secure place and out of reach of others (this may include visitors, children, friends, and family). · Safely dispose of unused/unwanted prescription opioids: Find your community drug take-back program or your pharmacy mail-back program, or flush them down the toilet, following guidance from the Food and Drug Administration (www.fda.gov/Drugs/ResourcesForYou). · Visit www.cdc.gov/drugoverdose to learn about the risks of opioid abuse and overdose. · If you believe you may be struggling with addiction, tell your health care provider and ask for guidance or call [employer] at 5-975-476-ZYCS. Discharge Instructions Patient Discharge Instructions Ramakrishna Gomes / [id number] : 1945 Admitted 2018 Discharged: 2018 Take Home Medications · It is important that you take the medication exactly as they are prescribed. · Keep your medication in the bottles provided by the pharmacist and keep a list of the medication names, dosages, and times to be taken in your wallet. · Do not take other medications without consulting your doctor. · Do not drive, drink alcohol, or operate machinery when taking sedating pain medication. · Take colace daily while on pain medication to help prevent constipation. You may take over the counter laxatives such as Dulcolax or Miralax as needed for constipation What to do at [facility] Recommended diet: Diabetic diet Recommended activity: No heavy lifting or strenuous activity for 6 weeks. You may shower. You may drive once pain has improved and you no longer require pain medication. Follow up with Dr. Bryce Ramirez in 2 weeks. Call [facility] to make an appointment. Follow up with Dr. Sapna Pepe as scheduled. Follow up with Dr. Edwardo Koenig. Call Dr. Bryce Ramirez or go to the ER if you develop worsening pain, fever, vomiting, or any other symptoms that concern you. Introducing [facility]! Eunice Mejia introduces [employer] patient portal. Now you can access parts of your medical record, email your doctor's office, and request medication refills online. 1. In your internet browser, go to [email or web address] 2. Click on the First Time User? Click Here link in the Sign In box. You will see the New Member Sign Up page. 3. Enter your [employer] Access Code exactly as it appears below. You will not need to use this code after youve completed the sign-up process.  If you do not sign up before the expiration date, you must request a new code. · [employer] Access Code: [email or web address] Expires: 12/3/2018  2:09 PM 
 
4. Enter the last four digits of your Social Security Number (xxxx) and Date of Birth (mm/dd/yyyy) as indicated and click Submit. You will be taken to the next sign-up page. 5. Create a [employer] ID. This will be your [employer] login ID and cannot be changed, so think of one that is secure and easy to remember. 6. Create a [employer] password. You can change your password at any time. 7. Enter your Password Reset Question and Answer. This can be used at a later time if you forget your password. 8. Enter your e-mail address. You will receive e-mail notification when new information is available in [address] 9. Click Sign Up. You can now view and download portions of your medical record. 10. Click the Download Summary menu link to download a portable copy of your medical information. If you have questions, please visit the Frequently Asked Questions section of the [employer] website. Remember, [employer] is NOT to be used for urgent needs. For medical emergencies, dial 911. Now available from your iPhone and Android! Introducing Jonh Knight As a [employer] patient, I wanted to make you aware of our electronic visit tool called Jonh Knight. [employer] 24/7 allows you to connect within minutes with a medical provider 24 hours a day, seven days a week via a mobile device or tablet or logging into a secure website from your computer. You can access Jonh Knight from anywhere in the [place].  
 
A virtual visit might be right for you when you have a simple condition and feel like you just dont want to get out of bed, or cant get away from work for an appointment, when your regular [employer] provider is not available (evenings, weekends or holidays), or when youre out of town and need minor care. Electronic visits cost only $49 and if the [email or web address] 24/7 provider determines a prescription is needed to treat your condition, one can be electronically transmitted to a nearby pharmacy*. Please take a moment to enroll today if you have not already done so. The enrollment process is free and takes just a few minutes. To enroll, please download the [employer] 24/7 adria to your tablet or phone, or visit [email or web address] to enroll on your computer. And, as an [address] [place] [address] patient with a [employer] account, the results of your visits will be scanned into your electronic medical record and your primary care provider will be able to view the scanned results. We urge you to continue to see your regular [employer] provider for your ongoing medical care. And while your primary care provider may not be the one available when you seek a [facility] virtual visit, the peace of mind you get from getting a real diagnosis real time can be priceless. For more information on [employer], view our Frequently Asked Questions (FAQs) at [email or web address]. Sincerely, 
 
Britney Lopez MD 
Chief Medical Officer [employer] *:  certain medications cannot be prescribed via [employer] Providers Seen During Your Hospitalization Provider Specialty Primary office phone Nadine Ng MD Emergency Medicine [phone] Zane Roy MD Emergency Medicine [phone] Skye Wagner MD General Surgery [phone] Your Primary Care Physician (PCP) Primary Care Physician Office Phone Office Fax Everardo Reed [phone] You are allergic to the following No active allergies Recent Documentation Height Weight BMI Smoking Status 1.753 m 98 kg 31.9 kg/m2 Never Smoker Emergency Contacts Name Discharge Info Relation Home Work Mobile Constantino Callahan  Child [2] [phone] SindyAretha DISCHARGE CAREGIVER [3] Daughter [21]   [phone] Patient Belongings The following personal items are in your possession at time of discharge: 
  Dental Appliances: None  Visual Aid: Glasses   Hearing Aids/Status: Does not own  Home Medications: None   Jewelry: None  Clothing: At bedside    Other Valuables: None Please provide this summary of care documentation to your next provider. Signatures-by signing, you are acknowledging that this After Visit Summary has been reviewed with you and you have received a copy. Patient Signature:  ____________________________________________________________ Date:  ____________________________________________________________  
  
[facility] Provider Signature:  ____________________________________________________________ Date:  ____________________________________________________________

## 2018-09-04 NOTE — PROGRESS NOTES
BSI: MED RECONCILIATION Comments/Recommendations: Discussed current PTA medication list with  patient and his daughter. Reviewed drug allergies and recent changes to PTA medications. The patient was questioned regarding recent use of other prescription and nonprescription medications not listed on their current medication list. 
? Patient reports that he has taken his daily medications this morning with the exception of aspirin 81 mg, metformin 850 mg, and glimepiride 4 mg. He was instructed to hold these medications prior to a procedure per his doctor. ? He fills his medications at [facility] 
? He does not report any medication allergies Medications added: · Aspirin 81 mg EC daily- Patient has not taken this medication in a week and reports this is on hold by his doctor prior to a procedure · Metformin 850 mg daily- Patient's daughter has confirmed the dose. This medication has been on hold for 1 week. · Glimepiride 4 mg daily- Patient reports that this is a current medication and that this medication has been on hold for 1 week · Tramadol 50 mg immediate release every 8 hours as needed for pain · Tamsulosin 0.4 mg daily- Patient was recently changed to this from finasteride 5 mg daily Medications removed: · Finasteride 5 mg daily Information obtained from: Patient, Daughter, Ebenezer Mason, Outpatient MD notes Allergies: Review of patient's allergies indicates no known allergies. Prior to Admission Medications:  
 
Medication Documentation Review Audit Reviewed by Yogesh Oneal PHARMD (Pharmacist) on 09/04/18 at 3683 Medication Sig Documenting Provider Last Dose Status Taking?  
 
 aspirin delayed-release 81 mg tablet Take 81 mg by mouth daily. Historical Provider 8/28/2018 Unknown time Active Yes  
 glimepiride (AMARYL) 4 mg tablet Take 4 mg by mouth every morning. Historical Provider 8/28/2018 Unknown time Active Yes metFORMIN (GLUCOPHAGE) 850 mg tablet Take 850 mg by mouth daily. Historical Provider 8/28/2018 Unknown time Active Yes Med Note (Zari Singh Sep 4, 2018  5:09 PM):   
  
 potassium chloride (K-DUR, KLOR-CON) 20 mEq tablet Take 20 mEq by mouth two (2) times a day. Historical Provider 9/4/2018 AM Active Yes  
 tamsulosin (FLOMAX) 0.4 mg capsule Take 0.4 mg by mouth daily. Historical Provider 9/4/2018 AM Active Yes  
 traMADol (ULTRAM) 50 mg tablet Take 50 mg by mouth every eight (8) hours as needed for Pain. Historical Provider 9/4/2018 AM Active Yes Trinity Parker, PHARMD   Contact: [phone]

## 2018-09-04 NOTE — PROCEDURES
[facility]  *** FINAL REPORT ***    Name: Shikha Dunne  MRN: [id number]    Inpatient  : 18 Aug 1945  HIS Order #: [id number]  [address] [facility] Visit #: [id number]  Date: 04 Sep 2018    TYPE OF TEST: Peripheral Venous Testing    REASON FOR TEST  Limb swelling    Right Leg:-  Deep venous thrombosis:           No  Superficial venous thrombosis:    No  Deep venous insufficiency:        Not examined  Superficial venous insufficiency: No    Left Leg:-  Deep venous thrombosis:           No  Superficial venous thrombosis:    No  Deep venous insufficiency:        No  Superficial venous insufficiency: No      INTERPRETATION/FINDINGS  PROCEDURE:  BILATERAL LE VENOUS DUPLEX. Evaluation of lower extremity veins with ultrasound (B-mode imaging,  pulsed Doppler, color Doppler). Includes the common femoral, deep  femoral, femoral, popliteal, posterior tibial, peroneal, and great  saphenous veins. FINDINGS:  Sydelle Boatman scale and color flow duplex images of the veins in  both lower extremities demonstrate normal compressibility, spontaneous   and augmented flow profiles, and absence of filling defects  throughout the deep and superficial veins in both lower extremities. CONCLUSION: Bilateral lower extremity venous duplex negative for deep  venous thrombosis or thrombophlebitis. Multiple enlarged lymph nodes  noted in the right groin, the largest measuring 3.86 x 1.08 cm. ADDITIONAL COMMENTS    I have personally reviewed the data relevant to the interpretation of  this  study.     TECHNOLOGIST: Jonny Grubbs RDCS  Signed: 2018 05:17 PM    PHYSICIAN: Kit Hatch MD  Signed: 2018 07:12 AM

## 2018-09-05 ENCOUNTER — APPOINTMENT (OUTPATIENT)
Dept: CT IMAGING | Age: 73
DRG: 330 | End: 2018-09-05
Attending: NURSE PRACTITIONER
Payer: MEDICARE

## 2018-09-05 LAB
ANION GAP SERPL CALC-SCNC: 8 MMOL/L (ref 5–15)
APPEARANCE UR: CLEAR
BACTERIA URNS QL MICRO: NEGATIVE /HPF
BILIRUB UR QL: NEGATIVE
BUN SERPL-MCNC: 8 MG/DL (ref 6–20)
BUN/CREAT SERPL: 8 (ref 12–20)
CALCIUM SERPL-MCNC: 8.6 MG/DL (ref 8.5–10.1)
CEA SERPL-MCNC: 1.1 NG/ML
CHLORIDE SERPL-SCNC: 111 MMOL/L (ref 97–108)
CO2 SERPL-SCNC: 27 MMOL/L (ref 21–32)
COLOR UR: NORMAL
CREAT SERPL-MCNC: 1 MG/DL (ref 0.7–1.3)
EPITH CASTS URNS QL MICRO: NORMAL /LPF
ERYTHROCYTE [DISTWIDTH] IN BLOOD BY AUTOMATED COUNT: 14.5 % (ref 11.5–14.5)
FERRITIN SERPL-MCNC: 213 NG/ML (ref 26–388)
FOLATE SERPL-MCNC: 9.5 NG/ML (ref 5–21)
GLUCOSE BLD STRIP.AUTO-MCNC: 107 MG/DL (ref 65–100)
GLUCOSE BLD STRIP.AUTO-MCNC: 205 MG/DL (ref 65–100)
GLUCOSE BLD STRIP.AUTO-MCNC: 90 MG/DL (ref 65–100)
GLUCOSE BLD STRIP.AUTO-MCNC: 97 MG/DL (ref 65–100)
GLUCOSE SERPL-MCNC: 94 MG/DL (ref 65–100)
GLUCOSE UR STRIP.AUTO-MCNC: NEGATIVE MG/DL
HCT VFR BLD AUTO: 30.4 % (ref 36.6–50.3)
HGB BLD-MCNC: 9.1 G/DL (ref 12.1–17)
HGB UR QL STRIP: NEGATIVE
HYALINE CASTS URNS QL MICRO: NORMAL /LPF (ref 0–5)
IRON SATN MFR SERPL: 15 % (ref 20–50)
IRON SERPL-MCNC: 25 UG/DL (ref 35–150)
KETONES UR QL STRIP.AUTO: NEGATIVE MG/DL
LEUKOCYTE ESTERASE UR QL STRIP.AUTO: NEGATIVE
MCH RBC QN AUTO: 26.1 PG (ref 26–34)
MCHC RBC AUTO-ENTMCNC: 29.9 G/DL (ref 30–36.5)
MCV RBC AUTO: 87.1 FL (ref 80–99)
NITRITE UR QL STRIP.AUTO: NEGATIVE
NRBC # BLD: 0 K/UL (ref 0–0.01)
NRBC BLD-RTO: 0 PER 100 WBC
PH UR STRIP: 5.5 [PH] (ref 5–8)
PLATELET # BLD AUTO: 381 K/UL (ref 150–400)
PMV BLD AUTO: 10.2 FL (ref 8.9–12.9)
POTASSIUM SERPL-SCNC: 3.4 MMOL/L (ref 3.5–5.1)
PROT UR STRIP-MCNC: NEGATIVE MG/DL
RBC # BLD AUTO: 3.49 M/UL (ref 4.1–5.7)
RBC #/AREA URNS HPF: NORMAL /HPF (ref 0–5)
RETICS # AUTO: 0.03 M/UL (ref 0.03–0.1)
RETICS/RBC NFR AUTO: 0.8 % (ref 0.7–2.1)
SERVICE CMNT-IMP: ABNORMAL
SERVICE CMNT-IMP: ABNORMAL
SERVICE CMNT-IMP: NORMAL
SERVICE CMNT-IMP: NORMAL
SODIUM SERPL-SCNC: 146 MMOL/L (ref 136–145)
SP GR UR REFRACTOMETRY: 1.01 (ref 1–1.03)
TIBC SERPL-MCNC: 172 UG/DL (ref 250–450)
UR CULT HOLD, URHOLD: NORMAL
UROBILINOGEN UR QL STRIP.AUTO: 1 EU/DL (ref 0.2–1)
VIT B12 SERPL-MCNC: 726 PG/ML (ref 193–986)
WBC # BLD AUTO: 6.1 K/UL (ref 4.1–11.1)
WBC URNS QL MICRO: NORMAL /HPF (ref 0–4)

## 2018-09-05 PROCEDURE — 83540 ASSAY OF IRON: CPT | Performed by: NURSE PRACTITIONER

## 2018-09-05 PROCEDURE — 74011636320 HC RX REV CODE- 636/320: Performed by: RADIOLOGY

## 2018-09-05 PROCEDURE — 71260 CT THORAX DX C+: CPT

## 2018-09-05 PROCEDURE — 94762 N-INVAS EAR/PLS OXIMTRY CONT: CPT

## 2018-09-05 PROCEDURE — 82378 CARCINOEMBRYONIC ANTIGEN: CPT | Performed by: NURSE PRACTITIONER

## 2018-09-05 PROCEDURE — 74011250637 HC RX REV CODE- 250/637: Performed by: PHYSICIAN ASSISTANT

## 2018-09-05 PROCEDURE — 82607 VITAMIN B-12: CPT | Performed by: NURSE PRACTITIONER

## 2018-09-05 PROCEDURE — 82728 ASSAY OF FERRITIN: CPT | Performed by: NURSE PRACTITIONER

## 2018-09-05 PROCEDURE — 82962 GLUCOSE BLOOD TEST: CPT

## 2018-09-05 PROCEDURE — 36415 COLL VENOUS BLD VENIPUNCTURE: CPT | Performed by: PHYSICIAN ASSISTANT

## 2018-09-05 PROCEDURE — 82746 ASSAY OF FOLIC ACID SERUM: CPT | Performed by: NURSE PRACTITIONER

## 2018-09-05 PROCEDURE — 80048 BASIC METABOLIC PNL TOTAL CA: CPT | Performed by: PHYSICIAN ASSISTANT

## 2018-09-05 PROCEDURE — 85045 AUTOMATED RETICULOCYTE COUNT: CPT | Performed by: NURSE PRACTITIONER

## 2018-09-05 PROCEDURE — 65270000029 HC RM PRIVATE

## 2018-09-05 PROCEDURE — 85027 COMPLETE CBC AUTOMATED: CPT | Performed by: PHYSICIAN ASSISTANT

## 2018-09-05 PROCEDURE — 74011250636 HC RX REV CODE- 250/636: Performed by: PHYSICIAN ASSISTANT

## 2018-09-05 PROCEDURE — 74011250636 HC RX REV CODE- 250/636: Performed by: EMERGENCY MEDICINE

## 2018-09-05 PROCEDURE — 74011250637 HC RX REV CODE- 250/637: Performed by: SPECIALIST

## 2018-09-05 PROCEDURE — 74011636637 HC RX REV CODE- 636/637: Performed by: PHYSICIAN ASSISTANT

## 2018-09-05 RX ORDER — MAGNESIUM CITRATE
296 SOLUTION, ORAL ORAL
Status: COMPLETED | OUTPATIENT
Start: 2018-09-06 | End: 2018-09-06

## 2018-09-05 RX ORDER — POTASSIUM CHLORIDE 750 MG/1
20 TABLET, FILM COATED, EXTENDED RELEASE ORAL
Status: COMPLETED | OUTPATIENT
Start: 2018-09-05 | End: 2018-09-05

## 2018-09-05 RX ORDER — MAGNESIUM CITRATE
296 SOLUTION, ORAL ORAL
Status: COMPLETED | OUTPATIENT
Start: 2018-09-05 | End: 2018-09-05

## 2018-09-05 RX ADMIN — MAGESIUM CITRATE 296 ML: 1.75 LIQUID ORAL at 20:57

## 2018-09-05 RX ADMIN — TAMSULOSIN HYDROCHLORIDE 0.4 MG: 0.4 CAPSULE ORAL at 08:37

## 2018-09-05 RX ADMIN — HYDROMORPHONE HYDROCHLORIDE 0.5 MG: 2 INJECTION, SOLUTION INTRAMUSCULAR; INTRAVENOUS; SUBCUTANEOUS at 17:10

## 2018-09-05 RX ADMIN — IOPAMIDOL 100 ML: 612 INJECTION, SOLUTION INTRAVENOUS at 17:26

## 2018-09-05 RX ADMIN — FENTANYL CITRATE 50 MCG: 50 INJECTION, SOLUTION INTRAMUSCULAR; INTRAVENOUS at 01:31

## 2018-09-05 RX ADMIN — POTASSIUM CHLORIDE 20 MEQ: 750 TABLET, EXTENDED RELEASE ORAL at 12:52

## 2018-09-05 RX ADMIN — INSULIN LISPRO 3 UNITS: 100 INJECTION, SOLUTION INTRAVENOUS; SUBCUTANEOUS at 12:51

## 2018-09-05 NOTE — PROGRESS NOTES
General Surgery Daily Progress Note Patient: Fran Beard MRN: [id number]  SSN: [id number] YOB: 1945  Age: 74 y.o. Sex: male Admit Date: 9/4/2018 Subjective:  
Symptoms unchanged. Continues to have frequent diarrhea and fecal incontinence. Current Facility-Administered Medications Medication Dose Route Frequency  sodium chloride (NS) flush 5-10 mL  5-10 mL IntraVENous Q8H  
 sodium chloride (NS) flush 5-10 mL  5-10 mL IntraVENous PRN  
 HYDROmorphone (PF) (DILAUDID) injection 0.5 mg  0.5 mg IntraVENous Q3H PRN  
 naloxone (NARCAN) injection 0.4 mg  0.4 mg IntraVENous PRN  
 diphenhydrAMINE (BENADRYL) injection 12.5 mg  12.5 mg IntraVENous Q4H PRN  
 acetaminophen (TYLENOL) tablet 650 mg  650 mg Oral Q4H PRN  
 enoxaparin (LOVENOX) injection 40 mg  40 mg SubCUTAneous Q24H  
 0.9% sodium chloride infusion  100 mL/hr IntraVENous CONTINUOUS  
 glucose chewable tablet 16 g  4 Tab Oral PRN  
 dextrose (D50W) injection syrg 12.5-25 g  25-50 mL IntraVENous PRN  
 glucagon (GLUCAGEN) injection 1 mg  1 mg IntraMUSCular PRN  
 insulin lispro (HUMALOG) injection   SubCUTAneous Q6H  
 tamsulosin (FLOMAX) capsule 0.4 mg  0.4 mg Oral DAILY Objective:  
09/05 0701 - 09/05 1900 In: 0 Out: 600 [Urine:600] 09/03 1901 - 09/05 0700 In: -  
Out: 8837 [facility] Patient Vitals for the past 8 hrs: 
 BP Temp Pulse Resp SpO2 Weight 09/05/18 0803 - - - - - 98.1 kg (216 lb 4.3 oz) 09/05/18 0402 147/82 97.8 °F (36.6 °C) 80 16 98 % - Physical Exam: 
General: Alert, cooperative, NAD Lungs: Unlabored Heart:  Regular rate and  rhythm Abdomen: Soft, non-tender, non-distended. Extremities: Warm, moves all, no edema Skin:  Warm and dry, no rash Labs: Recent Labs  
   09/05/18 
 7666 WBC  6.1 HGB  9.1*  
HCT  30.4* PLT  381 Recent Labs  
   09/05/18 
 0549  09/04/18 
 1423 NA  146*  140  
K  3.4*  3.8 CL  111*  106 CO2  27  26 GLU  94  123* BUN  8  9 CREA  1.00  1.04  
CA  8.6  8.7 ALB   --   2.5* TBILI   --   0.3 SGOT   --   23 ALT   --   29 Assessment / Plan: · Sigmoid mass · Right hydronephrosis · CT shows sigmoid mass involving posterior wall of bladder and causing right hydronephrosis · Urology and oncology consult · May have diet · SSI · UA pending · Further surgical plan to follow

## 2018-09-05 NOTE — CONSULTS
[facility]  MR#: [id number]  : 1945  ACCOUNT #: [de-identified]   DATE OF SERVICE: 2018    REFERRING PHYSICIAN:  Dave Ch MD    REASON FOR EVALUATION:  1. Right hydronephrosis. 2.  Colon mass encroaching upon posterior bladder wall. HISTORY OF PRESENT ILLNESS:  The patient is a 40-year-old black male who presented to the [facility] Emergency Department at the direction of Dr. Jessica Bartholomew after he has been having issues with diarrhea and abdominal pain for 4-5 months. GI had done 2 colonoscopies which showed abnormal rectal and sigmoid mucosa and possible mass. CT demonstrated the aforementioned mass as well as right hydronephrosis secondary to extrinsic compression of the ureter from said mass. Dr. Amrita Tobar, his urologist, performed cystoscopy and attempted right ureteral stent placement without success. He was admitted to Dr. Andrew Mg service for further evaluation and urology is consulted. The problem is right hydronephrosis, location is the right kidney. Timing is constant, duration is several months, it is moderate severity. Associated factors are that there is a sigmoid mass encroaching on the posterior wall of the bladder and causing external compression of the right ureter. PAST MEDICAL HISTORY:  Significant for prostate cancer, diabetes mellitus and sleep apnea. PAST SURGICAL HISTORY:  For surgery, he has had a radioactive seed implantation on the prostate. HOME MEDICATIONS:  Include finasteride 5 mg daily, Amaryl 4 mg daily, Glucophage 850 mg and tramadol 200 mg daily. REVIEW OF SYSTEMS:  Significant for abdominal pain and diarrhea. It is also positive for weight loss. Positive for nausea and vomiting as well. Negative for dysuria, negative for skin rash, negative for bruising, negative for muscle aches. Denied any dizziness or headaches. Denied any blurry vision.   No sore throat or cough or chest pain or shortness of breath. ALLERGIES:  HE DOES NOT APPEAR TO HAVE ANY ALLERGIES. PHYSICAL EXAMINATION:  GENERAL:  Exam reveals an elderly black male who appears his stated age. NEUROLOGIC:  He is alert and oriented x3. VITAL SIGNS:  His temperature is 97.8, blood pressure is 147/82, pulse 80, respiratory rate is 16. HEENT:  Normocephalic, atraumatic. Extraocular muscles are intact. NECK:  There is no jugular venous distention. Sclerae are anicteric. CARDIOVASCULAR:  Regular rate. PULMONARY:  Clear. ABDOMEN:  Soft. There is left lower quadrant tenderness. It is mildly distended. SKIN:  Warm and dry. EXTREMITIES:  Demonstrate no clubbing, cyanosis or edema. Muscular strength is 5/5. NEUROLOGIC:  Cranial nerves II-XII are grossly intact. LABORATORIES:  Show a white blood cell count of 6, H and H of 9 and 30, platelets of 726. BMP shows a sodium of 146, potassium 3.4, chloride 112, bicarb 27, BUN 8, creatinine 1.0 and glucose of 94. CT scan was personally reviewed. There is a sigmoid colon mass that is impinging upon the posterior wall of the bladder, but it is difficult to see if it is just abutting and causing impingement of the posterior wall of the bladder versus actual invasion. There does appear to be a plane between the two. It also demonstrates significant right-sided hydronephrosis. ASSESSMENT:  1. Right-sided hydronephrosis. 2.  Sigmoid mass encroaching upon the posterior wall of the bladder. PLAN:  Creatinine is not too compromised at the moment. However, if need be, a right percutaneous nephrostomy tube can be placed, possibly internal stent, by interventional radiology to aid with kidney function. In terms of the sigmoid mass, depending upon treatment plan. We can see about having one of the urologists who operates on a regular basis at [facility] be available if need be during the procedure if the mass is actually invasive into the bladder wall.   Can have the office kind of coordinate those schedules.       MD ANYI Logan / CONY  D: 09/05/2018 14:37     T: 09/05/2018 15:24  JOB #: [id number]  CC: Den Campbell MD

## 2018-09-05 NOTE — CONSULTS
Rocky Garcia. Cole Wilcox MD  [phone] office  [phone] voicemail   Gastroenterology Consultation Note      Admit Date: 9/4/2018  Consult Date: 9/5/2018   I greatly appreciate your asking me to see Mimi Egan, thank you very much for the opportunity to participate in his care. Narrative Assessment and Plan   · Abnormal colon - both colonoscopy done this year fail to reveal a definitive diagnosis - just described as an ulcer and biopsies non diagnostic but prep was very poor. CT suggests colon neoplasm. I've been asked to repeat colonoscopy for diagnosis and potentially stent the region if a stenosis is noted endoscopically. I've discussed the indications, risks, benefits and alternatives and he's agreed to proceed  Mag citrate tonight and in AM  Tap water enema until clear    Subjective:     Chief Complaint: Abdominal pain and incontinence. History of Present Illness: Abdominal pain, crampy diffuse, after meals, severe, associated with incontinence. Colon x2 shows diffuse rectal ulcer but biopsies non diagnostic. CT shows mass lesion in sigmoid with involvement of bladder and ureter. Need tissue diagnosis, need relief of pain, need resolution of incontinence. PCP:  Bard Kanner, MD    Past Medical History:   Diagnosis Date    Diabetes mellitus ([facility] [address]     Prostate cancer ([facility] [address]     Sleep apnea         History reviewed. No pertinent surgical history. Social History   Substance Use Topics    Smoking status: Never Smoker    Smokeless tobacco: Never Used    Alcohol use No        History reviewed. No pertinent family history. No Known Allergies         Home Medications:  Prior to Admission Medications   Prescriptions Last Dose Informant Patient Reported? Taking?   aspirin delayed-release 81 mg tablet 8/28/2018 at Unknown time Self Yes Yes   Sig: Take 81 mg by mouth daily.    glimepiride (AMARYL) 4 mg tablet 8/28/2018 at Unknown time Self Yes Yes   Sig: Take 4 mg by mouth every morning. metFORMIN (GLUCOPHAGE) 850 mg tablet 8/28/2018 at Unknown time Self Yes Yes   Sig: Take 850 mg by mouth daily. potassium chloride (K-DUR, KLOR-CON) 20 mEq tablet 9/4/2018 at AM Self Yes Yes   Sig: Take 20 mEq by mouth two (2) times a day. tamsulosin (FLOMAX) 0.4 mg capsule 9/4/2018 at AM Self Yes Yes   Sig: Take 0.4 mg by mouth daily. traMADol (ULTRAM) 50 mg tablet 9/4/2018 at AM Self Yes Yes   Sig: Take 50 mg by mouth every eight (8) hours as needed for Pain.       Facility-Administered Medications: None       Hospital Medications:  Current Facility-Administered Medications   Medication Dose Route Frequency    iopamidol (ISOVUE 300) 61 % contrast injection 100 mL  100 mL IntraVENous RAD ONCE    peg 3350-electrolytes (COLYTE) 4000 mL  2,000 mL Oral ONCE    [START ON 9/6/2018] peg 3350-electrolytes (COLYTE) 4000 mL  2,000 mL Oral ONCE    sodium chloride (NS) flush 5-10 mL  5-10 mL IntraVENous Q8H    sodium chloride (NS) flush 5-10 mL  5-10 mL IntraVENous PRN    HYDROmorphone (PF) (DILAUDID) injection 0.5 mg  0.5 mg IntraVENous Q3H PRN    naloxone (NARCAN) injection 0.4 mg  0.4 mg IntraVENous PRN    diphenhydrAMINE (BENADRYL) injection 12.5 mg  12.5 mg IntraVENous Q4H PRN    acetaminophen (TYLENOL) tablet 650 mg  650 mg Oral Q4H PRN    enoxaparin (LOVENOX) injection 40 mg  40 mg SubCUTAneous Q24H    glucose chewable tablet 16 g  4 Tab Oral PRN    dextrose (D50W) injection syrg 12.5-25 g  25-50 mL IntraVENous PRN    glucagon (GLUCAGEN) injection 1 mg  1 mg IntraMUSCular PRN    insulin lispro (HUMALOG) injection   SubCUTAneous Q6H    tamsulosin (FLOMAX) capsule 0.4 mg  0.4 mg Oral DAILY       Review of Systems: Admission ROS by Cindy Farr MD from 9/4/2018 were reviewed with the patient and changes (other than per HPI) include: none      Objective:     Physical Exam:  Visit Vitals    /62 (BP 1 Location: Right arm, BP Patient Position: At rest)    Pulse 75    Temp 97.8 °F (36.6 °C)    Resp 16    Wt 98.1 kg (216 lb 4.3 oz)    SpO2 95%    BMI 31.94 kg/m2     SpO2 Readings from Last 6 Encounters:   09/05/18 95%   06/14/18 97%          Intake/Output Summary (Last 24 hours) at 09/05/18 1704  Last data filed at 09/05/18 0839   Gross per 24 hour   Intake                0 ml   Output             1900 ml   Net            -1900 ml      General: no distress, comfortable  Skin:  No rash or palpable dermatologic mass lesions  HEENT: Pupils equal, sclera anicteric, oropharynx with no gross lesions  Cardiovascular: No abnormal audible heart sounds, well perfused, no edema  Respiratory:  No abnormal audible breath sounds, normal respiratory effort, no throacic deformity  GI:  Abdomen nondistended, diffuse mild tenderness, no mass, no free fluid, no rebound or guarding ++ umbilical hernia  Musculoskeletal:  No skeletal deformity nor acute arthritis noted.   Neurological:  Motor and sensory function intact in upper extremeties  Psychiatric:  Normal affect, memory intact, appears to have insight into current illness  Lymphatic:  No cervical, supraclavicular, or periumbilic lymphadenopathy    Laboratory:    Recent Results (from the past 24 hour(s))   GLUCOSE, POC    Collection Time: 09/04/18 10:06 PM   Result Value Ref Range    Glucose (POC) 97 65 - 100 mg/dL    Performed by Peter Keys (PCT)    GLUCOSE, POC    Collection Time: 09/05/18 12:45 AM   Result Value Ref Range    Glucose (POC) 97 65 - 100 mg/dL    Performed by Peter Keys (PCT)    CBC W/O DIFF    Collection Time: 09/05/18  5:49 AM   Result Value Ref Range    WBC 6.1 4.1 - 11.1 K/uL    RBC 3.49 (L) 4.10 - 5.70 M/uL    HGB 9.1 (L) 12.1 - 17.0 g/dL    HCT 30.4 (L) 36.6 - 50.3 %    MCV 87.1 80.0 - 99.0 FL    MCH 26.1 26.0 - 34.0 PG    MCHC 29.9 (L) 30.0 - 36.5 g/dL    RDW 14.5 11.5 - 14.5 %    PLATELET 436 404 - 565 K/uL    MPV 10.2 8.9 - 12.9 FL    NRBC 0.0 0  WBC    ABSOLUTE NRBC 0.00 0.00 - 0.92 K/uL   METABOLIC PANEL, BASIC    Collection Time: 09/05/18  5:49 AM   Result Value Ref Range    Sodium 146 (H) 136 - 145 mmol/L    Potassium 3.4 (L) 3.5 - 5.1 mmol/L    Chloride 111 (H) 97 - 108 mmol/L    CO2 27 21 - 32 mmol/L    Anion gap 8 5 - 15 mmol/L    Glucose 94 65 - 100 mg/dL    BUN 8 6 - 20 MG/DL    Creatinine 1.00 0.70 - 1.30 MG/DL    BUN/Creatinine ratio 8 (L) 12 - 20      GFR est AA >60 >60 ml/min/1.73m2    GFR est non-AA >60 >60 ml/min/1.73m2    Calcium 8.6 8.5 - 10.1 MG/DL   GLUCOSE, POC    Collection Time: 09/05/18  6:13 AM   Result Value Ref Range    Glucose (POC) 90 65 - 100 mg/dL    Performed by Adrienne Hernandez (PCT)    GLUCOSE, POC    Collection Time: 09/05/18 11:50 AM   Result Value Ref Range    Glucose (POC) 205 (H) 65 - 100 mg/dL    Performed by Mitchell Alvarado (PCT)    RETICULOCYTE COUNT    Collection Time: 09/05/18 12:54 PM   Result Value Ref Range    Reticulocyte count 0.8 0.7 - 2.1 %    Absolute Retic Cnt. 0.0303 0.0260 - 0.0950 M/ul         Assessment/Plan:     Active Problems:    Pelvic mass (9/4/2018)         See above narrative for full detail.

## 2018-09-05 NOTE — PROGRESS NOTES
Reason for Admission:   Pelvic Mass RRAT Score:     5 Plan for utilizing home health:     Has not had it in the past and should not need it at this time. Likelihood of Readmission:  Low/green Transition of Care Plan: MSW met with the patient who is alert and oriented. Address confirmed. He lives alone in a one story home with a ramp that was built for his wife. His wife passed away one year ago. They were  for 52 years. He verbalized how much he still misses her. He has a son/dtr-in-law that moved back to [place] to help him out. He also has a sister that is good support. He reported that he has been independent but been ill with diarrhea for the last 5 months. He lost 75lbs. He has had 2 hip replacements in the past so has 2 walkers, canes, portable commode and transport wheelchair but does not uses any medical equipment to get around. His son or sister has been getting his groceries for him and taking him to any appts. His PCP is Dr. Kavon Davies. He has a prescription plan and uses [facility] at [facility]. Plan is for the patient to go home. A family member can transport him. No known discharge needs at this time. Care Management Interventions PCP Verified by CM: Yes (Dr. Kavon Davies) Transition of Care Consult (CM Consult): Discharge Planning Current Support Network: Lives Alone Confirm Follow Up Transport: Family Plan discussed with Pt/Family/Caregiver: Yes Discharge Location Discharge Placement: Home TISH Martinez  Rei Bardales i

## 2018-09-05 NOTE — CONSULTS
[id number] [facility]cology at [facility]  [phone]    Hematology / Oncology Consult    Reason for Visit:   Fran Beard is a 74 y.o. male who is seen in consultation at the request of Dr. Sandra Chew for evaluation of sigmoid mass. History of Present Illness:   Mr. Filipe James is a 73 y/o male admitted with persistent diarrhea, weight loss now found to have a sigmoid colon mass. Patient reports that he has had diarrhea and weight loss for the past 4-5 months. He reports undergoing 2 recent colonoscopies (one in July and one in Aug 2018) by Dr. Daysi Abdi at [facility]. He states the first colonoscopy was not clear due to poor prep. The second colonoscopy reportedly showed abnormal rectal and sigmoid mucosa with a possible mass. This was biopsied and was benign. The patient then underwent a CT scan which was notable for R-sided hydronephrosis from extrinsic compression of the right ureter. The patient's urologist, Dr. Douglas Colvin, attempted to pass a ureteral stent but was unsuccessful. Now, the patient returns to the hospital with weakness, persistent diarrhea and lower abdominal pain. He states he has seen mild blood in urine after the recent procedures, but no significant blood loss in his stools. He reportedly has lost over 50-70 lbs in the past 4 months. He has not eaten much in the past 3 weeks due to the food running straight through him. He is up walking around at home, but gets tired easily which he attributes to his poor nutrition. Since admission, he underwent abd/pelvis CT which shows a distal sigmoid mass with possible invasion of the posterior bladder wall as well as R hydronephrosis. For type II DM, the patient takes oral medications. He also has a remote h/o prostate cancer treated with seed implants. Past Medical History:   Diagnosis Date    Diabetes mellitus ([facility] [address]     Prostate cancer ([facility] [address]     Sleep apnea       History reviewed. No pertinent surgical history.    Social History Substance Use Topics    Smoking status: Never Smoker    Smokeless tobacco: Never Used    Alcohol use No      History reviewed. No pertinent family history. Current Facility-Administered Medications   Medication Dose Route Frequency    potassium chloride SR (KLOR-CON 10) tablet 20 mEq  20 mEq Oral NOW    sodium chloride (NS) flush 5-10 mL  5-10 mL IntraVENous Q8H    sodium chloride (NS) flush 5-10 mL  5-10 mL IntraVENous PRN    HYDROmorphone (PF) (DILAUDID) injection 0.5 mg  0.5 mg IntraVENous Q3H PRN    naloxone (NARCAN) injection 0.4 mg  0.4 mg IntraVENous PRN    diphenhydrAMINE (BENADRYL) injection 12.5 mg  12.5 mg IntraVENous Q4H PRN    acetaminophen (TYLENOL) tablet 650 mg  650 mg Oral Q4H PRN    enoxaparin (LOVENOX) injection 40 mg  40 mg SubCUTAneous Q24H    glucose chewable tablet 16 g  4 Tab Oral PRN    dextrose (D50W) injection syrg 12.5-25 g  25-50 mL IntraVENous PRN    glucagon (GLUCAGEN) injection 1 mg  1 mg IntraMUSCular PRN    insulin lispro (HUMALOG) injection   SubCUTAneous Q6H    tamsulosin (FLOMAX) capsule 0.4 mg  0.4 mg Oral DAILY      No Known Allergies     Review of Systems: A complete review of systems was obtained, negative except as described above. Physical Exam:     Visit Vitals    /62 (BP 1 Location: Right arm, BP Patient Position: At rest)    Pulse 75    Temp 97.8 °F (36.6 °C)    Resp 16    Wt 216 lb 4.3 oz (98.1 kg)  Comment: patient stated he was 124 lbs when weighed at home    SpO2 95%    BMI 31.94 kg/m2     ECOG PS: 2  General: No distress  Eyes: PERRLA, anicteric sclerae  HENT: Atraumatic with normal appearance of ears and nose; OP clear  Neck: Supple; no thyromegaly   Lymphatic: No cervical, supraclavicular, or inguinal adenopathy  Respiratory: CTAB, normal respiratory effort  CV: Normal rate, regular rhythm, no murmurs, no peripheral edema  GI: Soft, nondistended, no masses, no hepatomegaly, no splenomegaly.  TTP in suprapubic region  MS: Normal gait and station. Digits without clubbing or cyanosis. Skin: No rashes, ecchymoses, or petechiae. Normal temperature, turgor, and texture. Neuro/Psych: Alert, oriented, appropriate affect, normal judgment/insight      Results:     Lab Results   Component Value Date/Time    WBC 6.1 09/05/2018 05:49 AM    HGB 9.1 (L) 09/05/2018 05:49 AM    HCT 30.4 (L) 09/05/2018 05:49 AM    PLATELET 334 59/76/4332 05:49 AM    MCV 87.1 09/05/2018 05:49 AM    ABS. NEUTROPHILS 5.1 09/04/2018 02:23 PM     Lab Results   Component Value Date/Time    Sodium 146 (H) 09/05/2018 05:49 AM    Potassium 3.4 (L) 09/05/2018 05:49 AM    Chloride 111 (H) 09/05/2018 05:49 AM    CO2 27 09/05/2018 05:49 AM    Glucose 94 09/05/2018 05:49 AM    BUN 8 09/05/2018 05:49 AM    Creatinine 1.00 09/05/2018 05:49 AM    GFR est AA >60 09/05/2018 05:49 AM    GFR est non-AA >60 09/05/2018 05:49 AM    Calcium 8.6 09/05/2018 05:49 AM    Glucose (POC) 205 (H) 09/05/2018 11:50 AM     Lab Results   Component Value Date/Time    Bilirubin, total 0.3 09/04/2018 02:23 PM    ALT (SGPT) 29 09/04/2018 02:23 PM    AST (SGOT) 23 09/04/2018 02:23 PM    Alk. phosphatase 66 09/04/2018 02:23 PM    Protein, total 7.3 09/04/2018 02:23 PM    Albumin 2.5 (L) 09/04/2018 02:23 PM    Globulin 4.8 (H) 09/04/2018 02:23 PM     9/4/18 CT abd/pelvis:  FINDINGS:   LUNG BASES: Clear. INCIDENTALLY IMAGED HEART AND MEDIASTINUM: Unremarkable. LIVER: Numerous hepatic cysts are noted with a reference 2.3 cm cyst in the  dome. No hepatic mass. GALLBLADDER: Unremarkable. SPLEEN: No mass. PANCREAS: No mass or ductal dilatation. ADRENALS: Unremarkable. KIDNEYS: There is moderate right hydroureteronephrosis to the level of the  distal ureter, related to the colon mass. There is a delayed nephrogram. There  is a 2.4 cm left renal cyst.  STOMACH: Unremarkable. SMALL BOWEL: No dilatation or wall thickening. COLON: There is a mass lesion involving the distal sigmoid colon.  There is  concern for involvement of the posterior bladder wall. APPENDIX: Unremarkable. PERITONEUM: No ascites or pneumoperitoneum. RETROPERITONEUM: No lymphadenopathy or aortic aneurysm. REPRODUCTIVE ORGANS: Prostate seed implants are noted. URINARY BLADDER: No mass or calculus. BONES: The patient is status post bilateral total hip replacement. Degenerative  changes are seen in the lumbar spine. ADDITIONAL COMMENTS: There is a small periumbilical hernia containing only fat.     IMPRESSION:  Distal sigmoid colon mass lesion with potential for involvement of the posterior  bladder wall. This is causing right hydroureteronephrosis and a delayed  nephrogram. Hepatic and left renal cysts. Small ventral hernia containing only  fat. Assessment and Recommendations:   Mordecai Jeans is a 74 y.o. male comes in with diarrhea and weight loss, found to have sigmoid colon mass, R hydronephrosis. 1. Sigmoid colon mass: Unclear why this was not see on colonoscopy. One consideration is to call GI here and perform a sigmoidoscopy tomorrow to confirm sigmoid mass and obtain biopsy. Also, it is unclear whether the sigmoid mass in invading the bladder or not. Dr. Mayank Arias in General Surgery has evaluated patient and is considering diverting loop colostomy with biopsy of sigmoid mass vs sigmoidectomy with colostomy placement. If adequate surgical resection of his mass cannot be performed at this time due to possible invasion of the bladder, it is reasonable to consider neoadjuvant chemotherapy (with FOLFOX regimen) for 2-3 months, followed by repeat imaging and proceeding with surgical resection at that time. -- Checking CEA  -- Chest CT ordered for staging  -- Discussing surgical resection with sigmoidectomy vs diverting loop colostomy with biopsy with Dr. Mayank Arias  -- Recommend sigmoidoscopy by GI here for possible biopsy of mass. Per discussion between Dr. Marvella Fabry and Dr. Mayank Arias, patient may benefit from sigmoid stent as well.     2. Right hydronephrosis: 2/2 extrinsic compression of the R distal ureter from the sigmoid mass. Prior attempt to place a stent was unsuccessful. Dr. Santos Robertson may call Urology here to discuss re-attempting stent vs placing percutaneously by IR. Creatinine is normal and if sigmoidectomy were performed, the obstruction would be cleared. 3. Persistent diarrhea / Weight loss: 2/2 sigmoid mass. Recommend nutrition consultation. 4. Normocytic anemia: Likely a combination of anemia of chronic disease and possible iron deficiency. -- Checking iron profile, ferritin, VitB12, folate, retic    5. Type II DM: On Glimepiride and Metformin. 6. H/o prostate cancer: s/p seed implants. Patient seen in conjunction with Matt Trent NP.     Signed By: Lamberto Yeager MD     September 5, 2018

## 2018-09-05 NOTE — WOUND CARE
Ostomy nurse visit to introduce ostomy nurse role. Will plan to meet with patient in AM to start education and nemo abdomen for surgery. Patient aware of plan of care.  
Rosalva Dumas RN [facility], [facility].

## 2018-09-05 NOTE — PROGRESS NOTES
Patient seen and examined and chart reviewed. Full consult dictated. Colon mass encroaching on the posterior wall of the bladder. Difficult to determine if it abutting the bladder or more invasive. Images reviewed. Delayed images do not include pelvis. If surgery planned can have office coordinate schedules with one of our physicians who operate at  [id number] on a regular basis.

## 2018-09-05 NOTE — PROGRESS NOTES
Bedside shift change report given to Castro Tan RN (oncoming nurse) by Anika Portillo RN (offgoing nurse). Report included the following information SBAR, Kardex and MAR.

## 2018-09-05 NOTE — PROGRESS NOTES
Bedside shift change report given to Linda Hartman RN (oncoming nurse) by Carrie Nobles RN (offgoing nurse). Report included the following information SBAR, Kardex and MAR.

## 2018-09-06 ENCOUNTER — ANESTHESIA (OUTPATIENT)
Dept: ENDOSCOPY | Age: 73
DRG: 330 | End: 2018-09-06
Payer: MEDICARE

## 2018-09-06 ENCOUNTER — ANESTHESIA EVENT (OUTPATIENT)
Dept: SURGERY | Age: 73
DRG: 330 | End: 2018-09-06
Payer: MEDICARE

## 2018-09-06 ENCOUNTER — ANESTHESIA EVENT (OUTPATIENT)
Dept: ENDOSCOPY | Age: 73
DRG: 330 | End: 2018-09-06
Payer: MEDICARE

## 2018-09-06 ENCOUNTER — APPOINTMENT (OUTPATIENT)
Dept: GENERAL RADIOLOGY | Age: 73
DRG: 330 | End: 2018-09-06
Attending: SPECIALIST
Payer: MEDICARE

## 2018-09-06 LAB
ABO + RH BLD: NORMAL
ANION GAP SERPL CALC-SCNC: 13 MMOL/L (ref 5–15)
BLOOD GROUP ANTIBODIES SERPL: NORMAL
BUN SERPL-MCNC: 10 MG/DL (ref 6–20)
BUN/CREAT SERPL: 11 (ref 12–20)
CALCIUM SERPL-MCNC: 8.4 MG/DL (ref 8.5–10.1)
CHLORIDE SERPL-SCNC: 108 MMOL/L (ref 97–108)
CO2 SERPL-SCNC: 24 MMOL/L (ref 21–32)
CREAT SERPL-MCNC: 0.94 MG/DL (ref 0.7–1.3)
ERYTHROCYTE [DISTWIDTH] IN BLOOD BY AUTOMATED COUNT: 14.7 % (ref 11.5–14.5)
GLUCOSE BLD STRIP.AUTO-MCNC: 116 MG/DL (ref 65–100)
GLUCOSE BLD STRIP.AUTO-MCNC: 127 MG/DL (ref 65–100)
GLUCOSE BLD STRIP.AUTO-MCNC: 131 MG/DL (ref 65–100)
GLUCOSE BLD STRIP.AUTO-MCNC: 132 MG/DL (ref 65–100)
GLUCOSE BLD STRIP.AUTO-MCNC: 151 MG/DL (ref 65–100)
GLUCOSE SERPL-MCNC: 128 MG/DL (ref 65–100)
HCT VFR BLD AUTO: 30.5 % (ref 36.6–50.3)
HGB BLD-MCNC: 9.2 G/DL (ref 12.1–17)
MCH RBC QN AUTO: 26.1 PG (ref 26–34)
MCHC RBC AUTO-ENTMCNC: 30.2 G/DL (ref 30–36.5)
MCV RBC AUTO: 86.4 FL (ref 80–99)
NRBC # BLD: 0 K/UL (ref 0–0.01)
NRBC BLD-RTO: 0 PER 100 WBC
PLATELET # BLD AUTO: 401 K/UL (ref 150–400)
PMV BLD AUTO: 10.2 FL (ref 8.9–12.9)
POTASSIUM SERPL-SCNC: 3.8 MMOL/L (ref 3.5–5.1)
RBC # BLD AUTO: 3.53 M/UL (ref 4.1–5.7)
SERVICE CMNT-IMP: ABNORMAL
SODIUM SERPL-SCNC: 145 MMOL/L (ref 136–145)
SPECIMEN EXP DATE BLD: NORMAL
WBC # BLD AUTO: 7.3 K/UL (ref 4.1–11.1)

## 2018-09-06 PROCEDURE — 36415 COLL VENOUS BLD VENIPUNCTURE: CPT | Performed by: PHYSICIAN ASSISTANT

## 2018-09-06 PROCEDURE — 76040000019: Performed by: SPECIALIST

## 2018-09-06 PROCEDURE — 88305 TISSUE EXAM BY PATHOLOGIST: CPT | Performed by: SPECIALIST

## 2018-09-06 PROCEDURE — 0DBP8ZX EXCISION OF RECTUM, VIA NATURAL OR ARTIFICIAL OPENING ENDOSCOPIC, DIAGNOSTIC: ICD-10-PCS | Performed by: SPECIALIST

## 2018-09-06 PROCEDURE — 88341 IMHCHEM/IMCYTCHM EA ADD ANTB: CPT | Performed by: SPECIALIST

## 2018-09-06 PROCEDURE — 74011250636 HC RX REV CODE- 250/636: Performed by: PHYSICIAN ASSISTANT

## 2018-09-06 PROCEDURE — 74011250636 HC RX REV CODE- 250/636: Performed by: SPECIALIST

## 2018-09-06 PROCEDURE — 94760 N-INVAS EAR/PLS OXIMETRY 1: CPT

## 2018-09-06 PROCEDURE — 74011250636 HC RX REV CODE- 250/636

## 2018-09-06 PROCEDURE — 82962 GLUCOSE BLOOD TEST: CPT

## 2018-09-06 PROCEDURE — 85027 COMPLETE CBC AUTOMATED: CPT | Performed by: PHYSICIAN ASSISTANT

## 2018-09-06 PROCEDURE — 65270000029 HC RM PRIVATE

## 2018-09-06 PROCEDURE — 76060000031 HC ANESTHESIA FIRST 0.5 HR: Performed by: SPECIALIST

## 2018-09-06 PROCEDURE — 86900 BLOOD TYPING SEROLOGIC ABO: CPT | Performed by: PHYSICIAN ASSISTANT

## 2018-09-06 PROCEDURE — 80048 BASIC METABOLIC PNL TOTAL CA: CPT | Performed by: PHYSICIAN ASSISTANT

## 2018-09-06 PROCEDURE — 88342 IMHCHEM/IMCYTCHM 1ST ANTB: CPT | Performed by: SPECIALIST

## 2018-09-06 PROCEDURE — 74011250637 HC RX REV CODE- 250/637: Performed by: SPECIALIST

## 2018-09-06 PROCEDURE — 74011250637 HC RX REV CODE- 250/637: Performed by: PHYSICIAN ASSISTANT

## 2018-09-06 PROCEDURE — 77030009426 HC FCPS BIOP ENDOSC BSC -B: Performed by: SPECIALIST

## 2018-09-06 RX ORDER — FENTANYL CITRATE 50 UG/ML
25 INJECTION, SOLUTION INTRAMUSCULAR; INTRAVENOUS AS NEEDED
Status: DISCONTINUED | OUTPATIENT
Start: 2018-09-06 | End: 2018-09-06 | Stop reason: HOSPADM

## 2018-09-06 RX ORDER — SODIUM CHLORIDE 9 MG/ML
INJECTION, SOLUTION INTRAVENOUS
Status: DISCONTINUED | OUTPATIENT
Start: 2018-09-06 | End: 2018-09-06 | Stop reason: HOSPADM

## 2018-09-06 RX ORDER — MIDAZOLAM HYDROCHLORIDE 1 MG/ML
.25-5 INJECTION, SOLUTION INTRAMUSCULAR; INTRAVENOUS AS NEEDED
Status: DISCONTINUED | OUTPATIENT
Start: 2018-09-06 | End: 2018-09-06 | Stop reason: HOSPADM

## 2018-09-06 RX ORDER — FLUMAZENIL 0.1 MG/ML
0.2 INJECTION INTRAVENOUS
Status: DISCONTINUED | OUTPATIENT
Start: 2018-09-06 | End: 2018-09-06 | Stop reason: HOSPADM

## 2018-09-06 RX ORDER — LIDOCAINE HYDROCHLORIDE 20 MG/ML
INJECTION, SOLUTION EPIDURAL; INFILTRATION; INTRACAUDAL; PERINEURAL AS NEEDED
Status: DISCONTINUED | OUTPATIENT
Start: 2018-09-06 | End: 2018-09-06 | Stop reason: HOSPADM

## 2018-09-06 RX ORDER — PROPOFOL 10 MG/ML
INJECTION, EMULSION INTRAVENOUS
Status: DISCONTINUED | OUTPATIENT
Start: 2018-09-06 | End: 2018-09-06 | Stop reason: HOSPADM

## 2018-09-06 RX ORDER — HYDROMORPHONE HYDROCHLORIDE 2 MG/ML
0.5 INJECTION, SOLUTION INTRAMUSCULAR; INTRAVENOUS; SUBCUTANEOUS
Status: DISCONTINUED | OUTPATIENT
Start: 2018-09-06 | End: 2018-09-12 | Stop reason: HOSPADM

## 2018-09-06 RX ORDER — PROPOFOL 10 MG/ML
INJECTION, EMULSION INTRAVENOUS AS NEEDED
Status: DISCONTINUED | OUTPATIENT
Start: 2018-09-06 | End: 2018-09-06 | Stop reason: HOSPADM

## 2018-09-06 RX ORDER — SODIUM CHLORIDE 9 MG/ML
50 INJECTION, SOLUTION INTRAVENOUS CONTINUOUS
Status: DISPENSED | OUTPATIENT
Start: 2018-09-06 | End: 2018-09-06

## 2018-09-06 RX ORDER — DEXTROMETHORPHAN/PSEUDOEPHED 2.5-7.5/.8
1.2 DROPS ORAL
Status: DISCONTINUED | OUTPATIENT
Start: 2018-09-06 | End: 2018-09-06 | Stop reason: HOSPADM

## 2018-09-06 RX ORDER — NALOXONE HYDROCHLORIDE 0.4 MG/ML
0.4 INJECTION, SOLUTION INTRAMUSCULAR; INTRAVENOUS; SUBCUTANEOUS
Status: DISCONTINUED | OUTPATIENT
Start: 2018-09-06 | End: 2018-09-06 | Stop reason: HOSPADM

## 2018-09-06 RX ORDER — SODIUM CHLORIDE 9 MG/ML
100 INJECTION, SOLUTION INTRAVENOUS CONTINUOUS
Status: DISCONTINUED | OUTPATIENT
Start: 2018-09-07 | End: 2018-09-08

## 2018-09-06 RX ORDER — HYDROMORPHONE HYDROCHLORIDE 1 MG/ML
0.5 INJECTION, SOLUTION INTRAMUSCULAR; INTRAVENOUS; SUBCUTANEOUS
Status: DISCONTINUED | OUTPATIENT
Start: 2018-09-06 | End: 2018-09-06 | Stop reason: CLARIF

## 2018-09-06 RX ADMIN — SODIUM CHLORIDE 50 ML/HR: 900 INJECTION, SOLUTION INTRAVENOUS at 14:49

## 2018-09-06 RX ADMIN — HYDROMORPHONE HYDROCHLORIDE 0.5 MG: 1 INJECTION, SOLUTION INTRAMUSCULAR; INTRAVENOUS; SUBCUTANEOUS at 12:39

## 2018-09-06 RX ADMIN — Medication 10 ML: at 00:32

## 2018-09-06 RX ADMIN — MAGESIUM CITRATE 296 ML: 1.75 LIQUID ORAL at 04:57

## 2018-09-06 RX ADMIN — Medication 10 ML: at 05:52

## 2018-09-06 RX ADMIN — LIDOCAINE HYDROCHLORIDE 60 MG: 20 INJECTION, SOLUTION EPIDURAL; INFILTRATION; INTRACAUDAL; PERINEURAL at 13:29

## 2018-09-06 RX ADMIN — Medication 10 ML: at 23:42

## 2018-09-06 RX ADMIN — PROPOFOL 100 MCG/KG/MIN: 10 INJECTION, EMULSION INTRAVENOUS at 13:29

## 2018-09-06 RX ADMIN — Medication 10 ML: at 14:49

## 2018-09-06 RX ADMIN — TAMSULOSIN HYDROCHLORIDE 0.4 MG: 0.4 CAPSULE ORAL at 08:57

## 2018-09-06 RX ADMIN — SODIUM CHLORIDE: 9 INJECTION, SOLUTION INTRAVENOUS at 13:22

## 2018-09-06 RX ADMIN — PROPOFOL 50 MG: 10 INJECTION, EMULSION INTRAVENOUS at 13:29

## 2018-09-06 NOTE — PERIOP NOTES
36 TRANSFER - IN REPORT: 
 
Verbal report received from Hermila Stanford rn on Betito Pickup  being received from [id number] for ordered procedure Report consisted of patients Situation, Background, Assessment and  
Recommendations(SBAR). Information from the following report(s) SBAR and MAR was reviewed with the receiving nurse. Opportunity for questions and clarification was provided. Assessment completed upon patients arrival to unit and care assumed. 700 [facility] Anesthesia staff at patient's bedside administering anesthesia and monitoring patients vital signs throughout procedure. See anesthesia note. 380 [facility] Endoscope was pre-cleaned at bedside immediately following procedure by naveen Holly tech. 1403 [facility] Patient tolerated procedure without problems. Abdomen soft and patient arousable and voices no complaints Report received from CRNA, see anesthesia note. Patient transported to endoscopy recovery area. Report given to [facility].  
 
 
9142 TRANSFER - OUT REPORT: 
 
Verbal report given to Betty Espinal rn on Betito Pickup  being transferred to [facility] for routine progression of care Report consisted of patients Situation, Background, Assessment and  
Recommendations(SBAR). Information from the following report(s) SBAR and Procedure Summary was reviewed with the receiving nurse. Lines:  
Peripheral IV 09/04/18 Left Antecubital (Active) Site Assessment Clean, dry, & intact 9/6/2018 12:11 PM  
Phlebitis Assessment 0 9/6/2018 12:11 PM  
Infiltration Assessment 0 9/6/2018 12:11 PM  
Dressing Status Clean, dry, & intact 9/6/2018 12:11 PM  
Dressing Type Other (comments); Tape 9/6/2018 12:11 PM  
Hub Color/Line Status Pink;Flushed; Infusing 9/6/2018 12:11 PM  
Action Taken Open ports on tubing capped 9/6/2018 12:11 PM  
Alcohol Cap Used Yes 9/6/2018 12:11 PM  
  
 
Opportunity for questions and clarification was provided. Patient transported with: 
 Pt chart.

## 2018-09-06 NOTE — PROGRESS NOTES
Bedside shift change report given to Brionna Roberts RN (oncoming nurse) by Ellen Grove RN (offgoing nurse). Report included the following information SBAR, Kardex and MAR.

## 2018-09-06 NOTE — CONSULTS
[id number] [facility] at [facility] [address]  [phone]    Hematology / Oncology Progress Note    Reason for Visit:   Onalee Nissen is a 74 y.o. male is being seen for f/u of sigmoid mass. History of Present Illness:   Mr. Darlyn Hightower is a 75 y/o male admitted with persistent diarrhea, weight loss now found to have a sigmoid colon mass. Patient reports that he has had diarrhea and weight loss for the past 4-5 months. He reports undergoing 2 recent colonoscopies (one in July and one in Aug 2018) by Dr. Ivette Jason at [facility]. He states the first colonoscopy was not clear due to poor prep. The second colonoscopy reportedly showed abnormal rectal and sigmoid mucosa with a possible mass. This was biopsied and was benign. The patient then underwent a CT scan which was notable for R-sided hydronephrosis from extrinsic compression of the right ureter. The patient's urologist, Dr. Reno Laurent, attempted to pass a ureteral stent but was unsuccessful. Now, the patient returns to the hospital with weakness, persistent diarrhea and lower abdominal pain. He states he has seen mild blood in urine after the recent procedures, but no significant blood loss in his stools. He reportedly has lost over 50-70 lbs in the past 4 months. He has not eaten much in the past 3 weeks due to the food running straight through him. He is up walking around at home, but gets tired easily which he attributes to his poor nutrition. Since admission, he underwent abd/pelvis CT which shows a distal sigmoid mass with possible invasion of the posterior bladder wall as well as R hydronephrosis. For type II DM, the patient takes oral medications. He also has a remote h/o prostate cancer treated with seed implants. Patient has been evaluated by Dr. Stefan Mercado in GI. He is planned for colonoscopy today. Patient has had several bowel movements last night and this morning.  He continues to have lower abdominal pain, but he does not request pain medicines even when asked. No n/v. No blood in the stool. Past Medical History:   Diagnosis Date    Diabetes mellitus ([facility]     Prostate cancer ([facility]     Sleep apnea       History reviewed. No pertinent surgical history. Social History   Substance Use Topics    Smoking status: Never Smoker    Smokeless tobacco: Never Used    Alcohol use No      History reviewed. No pertinent family history. Current Facility-Administered Medications   Medication Dose Route Frequency    sodium chloride (NS) flush 5-10 mL  5-10 mL IntraVENous Q8H    sodium chloride (NS) flush 5-10 mL  5-10 mL IntraVENous PRN    HYDROmorphone (PF) (DILAUDID) injection 0.5 mg  0.5 mg IntraVENous Q3H PRN    naloxone (NARCAN) injection 0.4 mg  0.4 mg IntraVENous PRN    diphenhydrAMINE (BENADRYL) injection 12.5 mg  12.5 mg IntraVENous Q4H PRN    acetaminophen (TYLENOL) tablet 650 mg  650 mg Oral Q4H PRN    enoxaparin (LOVENOX) injection 40 mg  40 mg SubCUTAneous Q24H    glucose chewable tablet 16 g  4 Tab Oral PRN    dextrose (D50W) injection syrg 12.5-25 g  25-50 mL IntraVENous PRN    glucagon (GLUCAGEN) injection 1 mg  1 mg IntraMUSCular PRN    insulin lispro (HUMALOG) injection   SubCUTAneous Q6H    tamsulosin (FLOMAX) capsule 0.4 mg  0.4 mg Oral DAILY      No Known Allergies     Review of Systems: A complete review of systems was obtained, negative except as described above.     Physical Exam:     Visit Vitals    /72 (BP 1 Location: Left arm, BP Patient Position: Sitting)    Pulse 92    Temp 98.3 °F (36.8 °C)    Resp 20    Wt 216 lb 4.3 oz (98.1 kg)  Comment: patient stated he was 124 lbs when weighed at home    SpO2 99%    BMI 31.94 kg/m2     ECOG PS: 2  General: No distress  Eyes: PERRLA, anicteric sclerae  HENT: Atraumatic with normal appearance of ears and nose; OP clear  Neck: Supple; no thyromegaly   Lymphatic: No cervical, supraclavicular, or inguinal adenopathy  Respiratory: CTAB, normal respiratory effort  CV: Normal rate, regular rhythm, no murmurs, no peripheral edema  GI: Soft, nondistended, no masses, no hepatomegaly, no splenomegaly. TTP in suprapubic region  MS: Normal gait and station. Digits without clubbing or cyanosis. Skin: No rashes, ecchymoses, or petechiae. Normal temperature, turgor, and texture. Neuro/Psych: Alert, oriented, appropriate affect, normal judgment/insight      Results:     Lab Results   Component Value Date/Time    WBC 7.3 09/06/2018 05:18 AM    HGB 9.2 (L) 09/06/2018 05:18 AM    HCT 30.5 (L) 09/06/2018 05:18 AM    PLATELET 809 (H) 34/13/8643 05:18 AM    MCV 86.4 09/06/2018 05:18 AM    ABS. NEUTROPHILS 5.1 09/04/2018 02:23 PM     Lab Results   Component Value Date/Time    Sodium 145 09/06/2018 05:18 AM    Potassium 3.8 09/06/2018 05:18 AM    Chloride 108 09/06/2018 05:18 AM    CO2 24 09/06/2018 05:18 AM    Glucose 128 (H) 09/06/2018 05:18 AM    BUN 10 09/06/2018 05:18 AM    Creatinine 0.94 09/06/2018 05:18 AM    GFR est AA >60 09/06/2018 05:18 AM    GFR est non-AA >60 09/06/2018 05:18 AM    Calcium 8.4 (L) 09/06/2018 05:18 AM    Glucose (POC) 127 (H) 09/06/2018 06:42 AM     Lab Results   Component Value Date/Time    Bilirubin, total 0.3 09/04/2018 02:23 PM    ALT (SGPT) 29 09/04/2018 02:23 PM    AST (SGOT) 23 09/04/2018 02:23 PM    Alk. phosphatase 66 09/04/2018 02:23 PM    Protein, total 7.3 09/04/2018 02:23 PM    Albumin 2.5 (L) 09/04/2018 02:23 PM    Globulin 4.8 (H) 09/04/2018 02:23 PM     9/4/18 CT abd/pelvis:  FINDINGS:   LUNG BASES: Clear. INCIDENTALLY IMAGED HEART AND MEDIASTINUM: Unremarkable. LIVER: Numerous hepatic cysts are noted with a reference 2.3 cm cyst in the  dome. No hepatic mass. GALLBLADDER: Unremarkable. SPLEEN: No mass. PANCREAS: No mass or ductal dilatation. ADRENALS: Unremarkable. KIDNEYS: There is moderate right hydroureteronephrosis to the level of the  distal ureter, related to the colon mass.  There is a delayed nephrogram. There  is a 2.4 cm left renal cyst.  STOMACH: Unremarkable. SMALL BOWEL: No dilatation or wall thickening. COLON: There is a mass lesion involving the distal sigmoid colon. There is  concern for involvement of the posterior bladder wall. APPENDIX: Unremarkable. PERITONEUM: No ascites or pneumoperitoneum. RETROPERITONEUM: No lymphadenopathy or aortic aneurysm. REPRODUCTIVE ORGANS: Prostate seed implants are noted. URINARY BLADDER: No mass or calculus. BONES: The patient is status post bilateral total hip replacement. Degenerative  changes are seen in the lumbar spine. ADDITIONAL COMMENTS: There is a small periumbilical hernia containing only fat.     IMPRESSION:  Distal sigmoid colon mass lesion with potential for involvement of the posterior  bladder wall. This is causing right hydroureteronephrosis and a delayed  nephrogram. Hepatic and left renal cysts. Small ventral hernia containing only  Fat. Chest CT 9/5/18:   FINDINGS:  THYROID: No nodule. MEDIASTINUM: No mass or lymphadenopathy. MICHELE: No mass or lymphadenopathy. THORACIC AORTA: No dissection or aneurysm. MAIN PULMONARY ARTERY: Normal in caliber. TRACHEA/BRONCHI: Patent. ESOPHAGUS: No wall thickening or dilatation. HEART: Normal in size. PLEURA: No effusion or pneumothorax. LUNGS: No nodule, mass, or airspace disease. INCIDENTALLY IMAGED UPPER ABDOMEN: Multiple low-attenuation liver lesions are  again noted. BONES: No destructive bone lesion. DISH throughout the thoracic spine. IMPRESSION:  No evidence of metastatic disease in the chest.       Assessment and Recommendations:   Rodolfo Dunne is a 74 y.o. male comes in with diarrhea and weight loss, found to have sigmoid colon mass, R hydronephrosis. 1. Sigmoid colon mass: Unclear why this was not see on colonoscopy. One consideration is to call GI here and perform a sigmoidoscopy tomorrow to confirm sigmoid mass and obtain biopsy.  Also, it is unclear whether the sigmoid mass in invading the bladder or not. Dr. Brandon De Los Santos in General Surgery has evaluated patient and is considering diverting loop colostomy with biopsy of sigmoid mass vs sigmoidectomy with colostomy placement. If adequate surgical resection of his mass cannot be performed at this time due to possible invasion of the bladder, it is reasonable to consider neoadjuvant chemotherapy (with FOLFOX regimen) for 2-3 months, followed by repeat imaging and proceeding with surgical resection at that time. However, we must obtain tissue for a pathological diagnosis first. CEA normal. Chest CT negative. -- Discussed surgical resection with sigmoidectomy vs diverting loop colostomy with biopsy with Dr. Brandon De Los Santos. My partner, Dr. Ebenezer Ball, will see patient tomorrow in my absence. -- Sigmoidoscopy vs colonoscopy today by Dr. Sutherland Payment:   -- If mass is identified and biopsied, will await pathology and if colorectal adenocarcinoma is confirmed, it is reasonable to plan on neoadjuvant chemotherapy in an effort to shrink sigmoid tumor away from the bladder wall. -- If no mass identified during endoscopy today, I recommend discussing with Urology whether patient needs cystoscopy vs pursuing a surgical biopsy of the sigmoid mass. 2. Right hydronephrosis: 2/2 extrinsic compression of the R distal ureter from the sigmoid mass. Prior attempt to place a stent was unsuccessful. Dr. Brandon De Los Santos may call Urology here to discuss re-attempting stent vs placing percutaneously by IR. Creatinine is normal and if sigmoidectomy were performed, the obstruction would be cleared. However, R ureter is at risk. 3. Persistent diarrhea / Weight loss: 2/2 sigmoid mass. Recommend nutrition consultation. 4. Normocytic anemia: Likely a combination of anemia of chronic disease due to underlying malignancy. No evidence of iron deficiency. Normal VitB12, folate. 5. Type II DM: On Glimepiride and Metformin.      6. H/o prostate cancer: Diagnosed 2-3 yrs ago per patient, s/p seed implants. Followed by Dr. Cesia Patel with PSA low in 1.1 range per patient at last follow up. Patient seen in conjunction with Amado Lewis NP.     Signed By: Gertrude Rose MD     September 6, 2018

## 2018-09-06 NOTE — ANESTHESIA POSTPROCEDURE EVALUATION
Post-Anesthesia Evaluation and Assessment Patient: Alberto Foster MRN: [id number]  SSN: [id number] YOB: 1945  Age: 74 y.o. Sex: male Cardiovascular Function/Vital Signs Visit Vitals  /75  Pulse 97  Temp 36.9 °C (98.4 °F)  Resp 14  
 Ht 5' 9\" (1.753 m)  Wt 98 kg (216 lb)  SpO2 100%  BMI 31.9 kg/m2 Patient is status post MAC anesthesia for Procedure(s): 
COLON BIOPSY COLONOSCOPY. Nausea/Vomiting: None Postoperative hydration reviewed and adequate. Pain: 
Pain Scale 1: Numeric (0 - 10) (09/06/18 1403) Pain Intensity 1: 0 (09/06/18 1403) Managed Neurological Status: At baseline Mental Status and Level of Consciousness: Arousable Pulmonary Status:  
O2 Device: Room air (09/06/18 1403) Adequate oxygenation and airway patent Complications related to anesthesia: None Post-anesthesia assessment completed. No concerns Signed By: Alex Gorman MD   
 September 6, 2018

## 2018-09-06 NOTE — PROGRESS NOTES
Mimi  8/51/6022 
[id number] Situation: 
Verbal report received from:   Gilda Ocasio Procedure: Procedure(s): 
COLONOSCOPY with fluro COLON BIOPSY Background: 
 
Preoperative diagnosis: colon mass Postoperative diagnosis: * No post-op diagnosis entered * :  Dr. Cole Wilcox Assistant(s): Endoscopy Technician-1: Gerardo Nicolas Endoscopy RN-1: Susan Arroyo RN Specimens:  
ID Type Source Tests Collected by Time Destination 1 : sigmoid ulcer biopsy Preservative Sigmoid  Kael Ott MD 9/6/2018 1332 Pathology H. Pylori  no Assessment: 
Intra-procedure medications Anesthesia gave intra-procedure sedation and medications, see anesthesia flow sheet yes Intravenous fluids: NS@ Jackie Goon Vital signs stable   yes Abdominal assessment: round and soft   yes Recommendation: 
Discharge patient per MD order  Inpatient . Return to floor  yes Family or Friend   None present Permission to share finding with family or friend yes

## 2018-09-06 NOTE — PROGRESS NOTES
General Surgery Daily Progress Note Patient: Gaye Xavier MRN: [id number]  SSN: [id number] YOB: 1945  Age: 74 y.o. Sex: male Admit Date: 9/4/2018 Subjective: No events overnight. Continues to have abdominal pain and diarrhea. Current Facility-Administered Medications Medication Dose Route Frequency  sodium chloride (NS) flush 5-10 mL  5-10 mL IntraVENous Q8H  
 sodium chloride (NS) flush 5-10 mL  5-10 mL IntraVENous PRN  
 HYDROmorphone (PF) (DILAUDID) injection 0.5 mg  0.5 mg IntraVENous Q3H PRN  
 naloxone (NARCAN) injection 0.4 mg  0.4 mg IntraVENous PRN  
 diphenhydrAMINE (BENADRYL) injection 12.5 mg  12.5 mg IntraVENous Q4H PRN  
 acetaminophen (TYLENOL) tablet 650 mg  650 mg Oral Q4H PRN  
 enoxaparin (LOVENOX) injection 40 mg  40 mg SubCUTAneous Q24H  
 glucose chewable tablet 16 g  4 Tab Oral PRN  
 dextrose (D50W) injection syrg 12.5-25 g  25-50 mL IntraVENous PRN  
 glucagon (GLUCAGEN) injection 1 mg  1 mg IntraMUSCular PRN  
 insulin lispro (HUMALOG) injection   SubCUTAneous Q6H  
 tamsulosin (FLOMAX) capsule 0.4 mg  0.4 mg Oral DAILY Objective:  
  
09/04 1901 - 09/06 0700 In: 0 Out: 1900 [PIW:[id number]] Patient Vitals for the past 8 hrs: 
 BP Temp Pulse Resp SpO2  
09/06/18 1142 135/72 98.3 °F (36.8 °C) 92 20 99 % 09/06/18 0748 147/68 98.1 °F (36.7 °C) 88 20 100 % Physical Exam: 
General: Alert, cooperative, NAD Lungs: Unlabored Heart:  Regular rate and  rhythm Abdomen: Soft, non-tender, non-distended. Extremities: Warm, moves all, no edema Skin:  Warm and dry, no rash Labs: Recent Labs  
   09/06/18 0518 WBC  7.3 HGB  9.2* HCT  30.5* PLT  401* Recent Labs  
   09/06/18 0518   09/04/18 
 1423 NA  145   < >  140  
K  3.8   < >  3.8 CL  108   < >  106 CO2  24   < >  26 GLU  128*   < >  123* BUN  10   < >  9  
CREA  0.94   < >  1.04  
CA  8.4*   < >  8.7 ALB   --    --   2.5* TBILI   --    --   0.3 SGOT   --    --   23 ALT   --    --   29  
 < > = values in this interval not displayed. Assessment / Plan: · Sigmoid colon mass · Plan for colonoscopy and possible stenting today by GI 
· Further plan pending results of colonoscopy and further discussion with oncology.

## 2018-09-06 NOTE — PROGRESS NOTES
Spiritual Care Partner Volunteer visited patient in [facility] on 9/6/18. Documented by: Trudi Zepeda [address] [place] [address] ([id number])

## 2018-09-06 NOTE — PROCEDURES
[address] [facility] ALEXEY Mcclednon MD  [phone]      2018    Colonoscopy Procedure Note  Ceil Pac  :    [facility] Medical Record Number: [id number]    Indications:     Abnormal CT scan  PCP:  Melanie Paredes MD  Anesthesia/Sedation: Conscious Sedation/Moderate Sedation  Endoscopist:  Dr. Armando Whatley  Complications:  None  Estimated Blood Loss:  None    Permit:  The indications, risks, benefits and alternatives were reviewed with the patient or their decision maker who was provided an opportunity to ask questions and all questions were answered. The specific risks of colonoscopy with conscious sedation were reviewed, including but not limited to anesthetic complication, bleeding, adverse drug reaction, missed lesion, infection, IV site reactions, and intestinal perforation which would lead to the need for surgical repair. Alternatives to colonoscopy including radiographic imaging, observation without testing, or laboratory testing were reviewed including the limitations of those alternatives. After considering the options and having all their questions answered, the patient or their decision maker provided both verbal and written consent to proceed. Procedure in Detail:  After obtaining informed consent, positioning of the patient in the left lateral decubitus position, and conduction of a pre-procedure pause or \"time out\" the endoscope was introduced into the anus and advanced to the descending colon. The quality of the colonic preparation was poor. A careful inspection was made as the colonoscope was withdrawn, findings and interventions are described below. Findings: On rectal exam there is palpable abnormal firm rectum at the anal verge. On endoscopic exam there is copious residual stool. There is a circumferential ulceration with heaped up neoplastic appearing edges. The ulcerated/abnormal appearing mucosa appears to arise at the distal rectum/anal verge and ends at about 20cm. I took numerous biopsies from the neoplastic appearing edge of the ulcer and several from the necrotic central portion. I discussed findings with Dr. Brandon De Los Santos. His continence is an issue, in my opinion, because this lesion is affecting the anal muscular function. I suggest colostomy for control of continence. Specimens:    See above    Complications:   None; patient tolerated the procedure well. 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 Recommendations:     - Await pathology.     -His continence is an issue, in my opinion, because this lesion is affecting the anal muscular function. I suggest colostomy for control of continence; while we await biopsy results that if positive would suggest oncologic therapy be considered. Thank you for entrusting me with this patient's care. Please do not hesitate to contact me with any questions or if I can be of assistance with any of your other patients' GI needs.     Signed By: Solange Quiroga MD                        September 6, 2018      Surgical assistant none

## 2018-09-06 NOTE — PERIOP NOTES
Anjali Shauna [birth year] 
[id number] Situation: 
 
Scheduled Procedure: Procedure(s): 
COLONOSCOPY with fluro Verbal report received from: Elisabeth Menon RN 
Preoperative diagnosis: colon mass Background: 
 
Procedure: Procedure(s): 
COLONOSCOPY with fluro Physician performing procedure; Dr. Sutherland Payment SBAR QUESTIONS FLOOR TO ENDO RN 
 
NPO Status/Last PO Intake: midnight Pregnancy Test:Not applicable If yes, result: none Is the patient taking Blood Thinners: NO If yes, list:  and last taken Is the patient diabetic:yes If yes, what was the last BS:  127  Time taken?  0642  Anything given? no          
Does the patient have a Pacemaker/Defibrillator in place?: no  
Does the patient need antibiotics before/during/after procedure: no If the patient is having a colon, How much prep was drank? all What were the Colon prep results? Liquid. Tap water enema to be administered prior to procedure. Does the patient have SCD in place:no Is patient on CONTACT precautions:no If yes, what kind of CONTACT precautions:  
 
Assessment: 
Are the vital signs stable prior to patient coming to ENDO?  yes Is the patient alert/oriented and able to sign consent for the procedures:yes Does the patient have a patient IV in place? yes Recommendation: 
Family or Friend present no Permission to share finding with Family or Friend n/a

## 2018-09-06 NOTE — PROGRESS NOTES
Bedside shift change report given to Carlyle Grover RN (oncoming nurse) by Radha Manrique RN (offgoing nurse). Report included the following information SBAR, Kardex and MAR.

## 2018-09-06 NOTE — ANESTHESIA PREPROCEDURE EVALUATION
Anesthetic History No history of anesthetic complications Review of Systems / Medical History Patient summary reviewed, nursing notes reviewed and pertinent labs reviewed Pulmonary Within defined limits Neuro/Psych Within defined limits Cardiovascular Within defined limits GI/Hepatic/Renal 
Within defined limits Endo/Other Within defined limits Diabetes Other Findings Physical Exam 
 
Airway Mallampati: II 
TM Distance: 4 - 6 cm Neck ROM: normal range of motion Mouth opening: Normal 
 
 Cardiovascular Rhythm: regular Rate: normal 
 
 
 
 Dental 
No notable dental hx Pulmonary Breath sounds clear to auscultation Abdominal 
 
 
 
 Other Findings Anesthetic Plan ASA: 3 Anesthesia type: MAC Anesthetic plan and risks discussed with: Patient

## 2018-09-06 NOTE — WOUND CARE
Ostomy nurse follow up to nemo patient for possible colostomy placement. Patient currently being prepped for colonoscopy. Discussed with Poornima ABURTO - will hold off on education and marking until definitive plans are made. Will follow Reconsult if needed.  
Christina Hilton

## 2018-09-07 ENCOUNTER — ANESTHESIA (OUTPATIENT)
Dept: SURGERY | Age: 73
DRG: 330 | End: 2018-09-07
Payer: MEDICARE

## 2018-09-07 ENCOUNTER — APPOINTMENT (OUTPATIENT)
Dept: INTERVENTIONAL RADIOLOGY/VASCULAR | Age: 73
DRG: 330 | End: 2018-09-07
Attending: PHYSICIAN ASSISTANT
Payer: MEDICARE

## 2018-09-07 ENCOUNTER — APPOINTMENT (OUTPATIENT)
Dept: ULTRASOUND IMAGING | Age: 73
DRG: 330 | End: 2018-09-07
Attending: RADIOLOGY
Payer: MEDICARE

## 2018-09-07 LAB
ANION GAP SERPL CALC-SCNC: 8 MMOL/L (ref 5–15)
BUN SERPL-MCNC: 10 MG/DL (ref 6–20)
BUN/CREAT SERPL: 10 (ref 12–20)
CALCIUM SERPL-MCNC: 8.1 MG/DL (ref 8.5–10.1)
CHLORIDE SERPL-SCNC: 110 MMOL/L (ref 97–108)
CO2 SERPL-SCNC: 29 MMOL/L (ref 21–32)
CREAT SERPL-MCNC: 1 MG/DL (ref 0.7–1.3)
ERYTHROCYTE [DISTWIDTH] IN BLOOD BY AUTOMATED COUNT: 15 % (ref 11.5–14.5)
GLUCOSE BLD STRIP.AUTO-MCNC: 108 MG/DL (ref 65–100)
GLUCOSE BLD STRIP.AUTO-MCNC: 117 MG/DL (ref 65–100)
GLUCOSE BLD STRIP.AUTO-MCNC: 129 MG/DL (ref 65–100)
GLUCOSE BLD STRIP.AUTO-MCNC: 153 MG/DL (ref 65–100)
GLUCOSE BLD STRIP.AUTO-MCNC: 164 MG/DL (ref 65–100)
GLUCOSE BLD STRIP.AUTO-MCNC: 96 MG/DL (ref 65–100)
GLUCOSE SERPL-MCNC: 120 MG/DL (ref 65–100)
HCT VFR BLD AUTO: 30.4 % (ref 36.6–50.3)
HGB BLD-MCNC: 9.2 G/DL (ref 12.1–17)
MCH RBC QN AUTO: 26.5 PG (ref 26–34)
MCHC RBC AUTO-ENTMCNC: 30.3 G/DL (ref 30–36.5)
MCV RBC AUTO: 87.6 FL (ref 80–99)
NRBC # BLD: 0 K/UL (ref 0–0.01)
NRBC BLD-RTO: 0 PER 100 WBC
PLATELET # BLD AUTO: 362 K/UL (ref 150–400)
PMV BLD AUTO: 9.8 FL (ref 8.9–12.9)
POTASSIUM SERPL-SCNC: 3.4 MMOL/L (ref 3.5–5.1)
RBC # BLD AUTO: 3.47 M/UL (ref 4.1–5.7)
SERVICE CMNT-IMP: ABNORMAL
SERVICE CMNT-IMP: NORMAL
SODIUM SERPL-SCNC: 147 MMOL/L (ref 136–145)
WBC # BLD AUTO: 8.1 K/UL (ref 4.1–11.1)

## 2018-09-07 PROCEDURE — 0T9330Z DRAINAGE OF RIGHT KIDNEY PELVIS WITH DRAINAGE DEVICE, PERCUTANEOUS APPROACH: ICD-10-PCS | Performed by: RADIOLOGY

## 2018-09-07 PROCEDURE — 76210000016 HC OR PH I REC 1 TO 1.5 HR: Performed by: SURGERY

## 2018-09-07 PROCEDURE — 74011000250 HC RX REV CODE- 250: Performed by: SURGERY

## 2018-09-07 PROCEDURE — 77030011640 HC PAD GRND REM COVD -A: Performed by: SURGERY

## 2018-09-07 PROCEDURE — 82962 GLUCOSE BLOOD TEST: CPT

## 2018-09-07 PROCEDURE — 74011250636 HC RX REV CODE- 250/636: Performed by: ANESTHESIOLOGY

## 2018-09-07 PROCEDURE — C1769 GUIDE WIRE: HCPCS

## 2018-09-07 PROCEDURE — 77030035048 HC TRCR ENDOSC OPTCL COVD -B: Performed by: SURGERY

## 2018-09-07 PROCEDURE — 77030034628 HC LIGASURE LAP SEAL DIV MD COVD -F: Performed by: SURGERY

## 2018-09-07 PROCEDURE — 77030010541: Performed by: SURGERY

## 2018-09-07 PROCEDURE — 85027 COMPLETE CBC AUTOMATED: CPT | Performed by: PHYSICIAN ASSISTANT

## 2018-09-07 PROCEDURE — 77030003666 HC NDL SPINAL BD -A

## 2018-09-07 PROCEDURE — 77030013079 HC BLNKT BAIR HGGR 3M -A: Performed by: NURSE ANESTHETIST, CERTIFIED REGISTERED

## 2018-09-07 PROCEDURE — 77030026438 HC STYL ET INTUB CARD -A: Performed by: NURSE ANESTHETIST, CERTIFIED REGISTERED

## 2018-09-07 PROCEDURE — 76010000153 HC OR TIME 1.5 TO 2 HR: Performed by: SURGERY

## 2018-09-07 PROCEDURE — 74011250636 HC RX REV CODE- 250/636

## 2018-09-07 PROCEDURE — 77030039266 HC ADH SKN EXOFIN S2SG -A: Performed by: SURGERY

## 2018-09-07 PROCEDURE — 77030018836 HC SOL IRR NACL ICUM -A: Performed by: SURGERY

## 2018-09-07 PROCEDURE — 74011250636 HC RX REV CODE- 250/636: Performed by: PHYSICIAN ASSISTANT

## 2018-09-07 PROCEDURE — 77030035051: Performed by: SURGERY

## 2018-09-07 PROCEDURE — 74011250637 HC RX REV CODE- 250/637: Performed by: PHYSICIAN ASSISTANT

## 2018-09-07 PROCEDURE — 77030002986 HC SUT PROL J&J -A

## 2018-09-07 PROCEDURE — 99152 MOD SED SAME PHYS/QHP 5/>YRS: CPT

## 2018-09-07 PROCEDURE — 77030008771 HC TU NG SALEM SUMP -A: Performed by: NURSE ANESTHETIST, CERTIFIED REGISTERED

## 2018-09-07 PROCEDURE — 77030020263 HC SOL INJ SOD CL0.9% LFCR 1000ML: Performed by: SURGERY

## 2018-09-07 PROCEDURE — 76060000034 HC ANESTHESIA 1.5 TO 2 HR: Performed by: SURGERY

## 2018-09-07 PROCEDURE — 74011250636 HC RX REV CODE- 250/636: Performed by: SURGERY

## 2018-09-07 PROCEDURE — 76775 US EXAM ABDO BACK WALL LIM: CPT

## 2018-09-07 PROCEDURE — 77030005208 HC CATH HLDR GLWC -A

## 2018-09-07 PROCEDURE — C1894 INTRO/SHEATH, NON-LASER: HCPCS

## 2018-09-07 PROCEDURE — 74011636320 HC RX REV CODE- 636/320

## 2018-09-07 PROCEDURE — C1729 CATH, DRAINAGE: HCPCS

## 2018-09-07 PROCEDURE — 77030008756 HC TU IRR SUC STRY -B: Performed by: SURGERY

## 2018-09-07 PROCEDURE — 0D1N4Z4 BYPASS SIGMOID COLON TO CUTANEOUS, PERCUTANEOUS ENDOSCOPIC APPROACH: ICD-10-PCS | Performed by: SURGERY

## 2018-09-07 PROCEDURE — 65270000029 HC RM PRIVATE

## 2018-09-07 PROCEDURE — 77030032490 HC SLV COMPR SCD KNE COVD -B: Performed by: SURGERY

## 2018-09-07 PROCEDURE — 77030008684 HC TU ET CUF COVD -B: Performed by: NURSE ANESTHETIST, CERTIFIED REGISTERED

## 2018-09-07 PROCEDURE — 77030019908 HC STETH ESOPH SIMS -A: Performed by: NURSE ANESTHETIST, CERTIFIED REGISTERED

## 2018-09-07 PROCEDURE — 74011000250 HC RX REV CODE- 250

## 2018-09-07 PROCEDURE — 36415 COLL VENOUS BLD VENIPUNCTURE: CPT | Performed by: PHYSICIAN ASSISTANT

## 2018-09-07 PROCEDURE — 77030031139 HC SUT VCRL2 J&J -A: Performed by: SURGERY

## 2018-09-07 PROCEDURE — 77030003580 HC NDL INSUF VERES J&J -B: Performed by: SURGERY

## 2018-09-07 PROCEDURE — 77030020747 HC TU INSUF ENDOSC TELE -A: Performed by: SURGERY

## 2018-09-07 PROCEDURE — 77030002933 HC SUT MCRYL J&J -A: Performed by: SURGERY

## 2018-09-07 PROCEDURE — 74011250636 HC RX REV CODE- 250/636: Performed by: RADIOLOGY

## 2018-09-07 PROCEDURE — 80048 BASIC METABOLIC PNL TOTAL CA: CPT | Performed by: PHYSICIAN ASSISTANT

## 2018-09-07 RX ORDER — SODIUM CHLORIDE, SODIUM LACTATE, POTASSIUM CHLORIDE, CALCIUM CHLORIDE 600; 310; 30; 20 MG/100ML; MG/100ML; MG/100ML; MG/100ML
INJECTION, SOLUTION INTRAVENOUS
Status: DISCONTINUED | OUTPATIENT
Start: 2018-09-07 | End: 2018-09-07 | Stop reason: HOSPADM

## 2018-09-07 RX ORDER — GLYCOPYRROLATE 0.2 MG/ML
INJECTION INTRAMUSCULAR; INTRAVENOUS AS NEEDED
Status: DISCONTINUED | OUTPATIENT
Start: 2018-09-07 | End: 2018-09-07 | Stop reason: HOSPADM

## 2018-09-07 RX ORDER — PEPPERMINT OIL
SPIRIT ORAL ONCE
Status: DISCONTINUED | OUTPATIENT
Start: 2018-09-07 | End: 2018-09-07 | Stop reason: HOSPADM

## 2018-09-07 RX ORDER — FENTANYL CITRATE 50 UG/ML
12.5-2 INJECTION, SOLUTION INTRAMUSCULAR; INTRAVENOUS
Status: DISCONTINUED | OUTPATIENT
Start: 2018-09-07 | End: 2018-09-12 | Stop reason: HOSPADM

## 2018-09-07 RX ORDER — SODIUM CHLORIDE, SODIUM LACTATE, POTASSIUM CHLORIDE, CALCIUM CHLORIDE 600; 310; 30; 20 MG/100ML; MG/100ML; MG/100ML; MG/100ML
125 INJECTION, SOLUTION INTRAVENOUS CONTINUOUS
Status: DISCONTINUED | OUTPATIENT
Start: 2018-09-07 | End: 2018-09-07 | Stop reason: HOSPADM

## 2018-09-07 RX ORDER — SODIUM CHLORIDE 0.9 % (FLUSH) 0.9 %
5-10 SYRINGE (ML) INJECTION EVERY 8 HOURS
Status: DISCONTINUED | OUTPATIENT
Start: 2018-09-07 | End: 2018-09-07 | Stop reason: HOSPADM

## 2018-09-07 RX ORDER — FENTANYL CITRATE 50 UG/ML
INJECTION, SOLUTION INTRAMUSCULAR; INTRAVENOUS AS NEEDED
Status: DISCONTINUED | OUTPATIENT
Start: 2018-09-07 | End: 2018-09-07 | Stop reason: HOSPADM

## 2018-09-07 RX ORDER — CEFAZOLIN SODIUM/WATER 2 G/20 ML
2 SYRINGE (ML) INTRAVENOUS ONCE
Status: ACTIVE | OUTPATIENT
Start: 2018-09-07 | End: 2018-09-08

## 2018-09-07 RX ORDER — HYDROMORPHONE HYDROCHLORIDE 1 MG/ML
.25-1 INJECTION, SOLUTION INTRAMUSCULAR; INTRAVENOUS; SUBCUTANEOUS
Status: DISCONTINUED | OUTPATIENT
Start: 2018-09-07 | End: 2018-09-07 | Stop reason: HOSPADM

## 2018-09-07 RX ORDER — ROCURONIUM BROMIDE 10 MG/ML
INJECTION, SOLUTION INTRAVENOUS AS NEEDED
Status: DISCONTINUED | OUTPATIENT
Start: 2018-09-07 | End: 2018-09-07 | Stop reason: HOSPADM

## 2018-09-07 RX ORDER — PROPOFOL 10 MG/ML
INJECTION, EMULSION INTRAVENOUS AS NEEDED
Status: DISCONTINUED | OUTPATIENT
Start: 2018-09-07 | End: 2018-09-07 | Stop reason: HOSPADM

## 2018-09-07 RX ORDER — ONDANSETRON 2 MG/ML
INJECTION INTRAMUSCULAR; INTRAVENOUS AS NEEDED
Status: DISCONTINUED | OUTPATIENT
Start: 2018-09-07 | End: 2018-09-07 | Stop reason: HOSPADM

## 2018-09-07 RX ORDER — SUCCINYLCHOLINE CHLORIDE 20 MG/ML
INJECTION INTRAMUSCULAR; INTRAVENOUS AS NEEDED
Status: DISCONTINUED | OUTPATIENT
Start: 2018-09-07 | End: 2018-09-07 | Stop reason: HOSPADM

## 2018-09-07 RX ORDER — FLUMAZENIL 0.1 MG/ML
0.2 INJECTION INTRAVENOUS
Status: DISCONTINUED | OUTPATIENT
Start: 2018-09-07 | End: 2018-09-07 | Stop reason: HOSPADM

## 2018-09-07 RX ORDER — BUPIVACAINE HYDROCHLORIDE 5 MG/ML
INJECTION, SOLUTION EPIDURAL; INTRACAUDAL AS NEEDED
Status: DISCONTINUED | OUTPATIENT
Start: 2018-09-07 | End: 2018-09-07 | Stop reason: HOSPADM

## 2018-09-07 RX ORDER — MIDAZOLAM HYDROCHLORIDE 1 MG/ML
INJECTION, SOLUTION INTRAMUSCULAR; INTRAVENOUS AS NEEDED
Status: DISCONTINUED | OUTPATIENT
Start: 2018-09-07 | End: 2018-09-07 | Stop reason: HOSPADM

## 2018-09-07 RX ORDER — SODIUM CHLORIDE 0.9 % (FLUSH) 0.9 %
5-10 SYRINGE (ML) INJECTION AS NEEDED
Status: DISCONTINUED | OUTPATIENT
Start: 2018-09-07 | End: 2018-09-07 | Stop reason: HOSPADM

## 2018-09-07 RX ORDER — LIDOCAINE HYDROCHLORIDE 20 MG/ML
INJECTION, SOLUTION EPIDURAL; INFILTRATION; INTRACAUDAL; PERINEURAL AS NEEDED
Status: DISCONTINUED | OUTPATIENT
Start: 2018-09-07 | End: 2018-09-07 | Stop reason: HOSPADM

## 2018-09-07 RX ORDER — CEFAZOLIN SODIUM/WATER 2 G/20 ML
2 SYRINGE (ML) INTRAVENOUS ONCE
Status: COMPLETED | OUTPATIENT
Start: 2018-09-07 | End: 2018-09-07

## 2018-09-07 RX ORDER — LIDOCAINE HYDROCHLORIDE 10 MG/ML
0.1 INJECTION, SOLUTION EPIDURAL; INFILTRATION; INTRACAUDAL; PERINEURAL AS NEEDED
Status: DISCONTINUED | OUTPATIENT
Start: 2018-09-07 | End: 2018-09-07 | Stop reason: HOSPADM

## 2018-09-07 RX ORDER — LIDOCAINE HYDROCHLORIDE 10 MG/ML
100 INJECTION INFILTRATION; PERINEURAL
Status: DISCONTINUED | OUTPATIENT
Start: 2018-09-07 | End: 2018-09-07

## 2018-09-07 RX ORDER — NALOXONE HYDROCHLORIDE 0.4 MG/ML
0.2 INJECTION, SOLUTION INTRAMUSCULAR; INTRAVENOUS; SUBCUTANEOUS
Status: DISCONTINUED | OUTPATIENT
Start: 2018-09-07 | End: 2018-09-07 | Stop reason: HOSPADM

## 2018-09-07 RX ORDER — CEFAZOLIN SODIUM/WATER 2 G/20 ML
SYRINGE (ML) INTRAVENOUS
Status: COMPLETED
Start: 2018-09-07 | End: 2018-09-07

## 2018-09-07 RX ORDER — LIDOCAINE HYDROCHLORIDE 10 MG/ML
10-20 INJECTION INFILTRATION; PERINEURAL
Status: DISCONTINUED | OUTPATIENT
Start: 2018-09-07 | End: 2018-09-12 | Stop reason: HOSPADM

## 2018-09-07 RX ORDER — MIDAZOLAM HYDROCHLORIDE 1 MG/ML
.5-1 INJECTION, SOLUTION INTRAMUSCULAR; INTRAVENOUS
Status: DISCONTINUED | OUTPATIENT
Start: 2018-09-07 | End: 2018-09-12 | Stop reason: HOSPADM

## 2018-09-07 RX ORDER — DIPHENHYDRAMINE HYDROCHLORIDE 50 MG/ML
12.5 INJECTION, SOLUTION INTRAMUSCULAR; INTRAVENOUS AS NEEDED
Status: DISCONTINUED | OUTPATIENT
Start: 2018-09-07 | End: 2018-09-07 | Stop reason: HOSPADM

## 2018-09-07 RX ORDER — NEOSTIGMINE METHYLSULFATE 1 MG/ML
INJECTION INTRAVENOUS AS NEEDED
Status: DISCONTINUED | OUTPATIENT
Start: 2018-09-07 | End: 2018-09-07 | Stop reason: HOSPADM

## 2018-09-07 RX ADMIN — FENTANYL CITRATE 25 MCG: 50 INJECTION, SOLUTION INTRAMUSCULAR; INTRAVENOUS at 12:13

## 2018-09-07 RX ADMIN — MIDAZOLAM 1 MG: 1 INJECTION INTRAMUSCULAR; INTRAVENOUS at 15:38

## 2018-09-07 RX ADMIN — FENTANYL CITRATE 150 MCG: 50 INJECTION, SOLUTION INTRAMUSCULAR; INTRAVENOUS at 10:49

## 2018-09-07 RX ADMIN — SUCCINYLCHOLINE CHLORIDE 100 MG: 20 INJECTION INTRAMUSCULAR; INTRAVENOUS at 10:49

## 2018-09-07 RX ADMIN — LIDOCAINE HYDROCHLORIDE 60 MG: 20 INJECTION, SOLUTION EPIDURAL; INFILTRATION; INTRACAUDAL; PERINEURAL at 10:49

## 2018-09-07 RX ADMIN — SODIUM CHLORIDE 100 ML/HR: 900 INJECTION, SOLUTION INTRAVENOUS at 01:58

## 2018-09-07 RX ADMIN — Medication 2 G: at 15:37

## 2018-09-07 RX ADMIN — SODIUM CHLORIDE 100 ML/HR: 900 INJECTION, SOLUTION INTRAVENOUS at 05:46

## 2018-09-07 RX ADMIN — Medication 2 G: at 11:00

## 2018-09-07 RX ADMIN — IOPAMIDOL 15 ML: 755 INJECTION, SOLUTION INTRAVENOUS at 15:48

## 2018-09-07 RX ADMIN — FENTANYL CITRATE 25 MCG: 50 INJECTION, SOLUTION INTRAMUSCULAR; INTRAVENOUS at 15:44

## 2018-09-07 RX ADMIN — Medication 10 ML: at 21:24

## 2018-09-07 RX ADMIN — ENOXAPARIN SODIUM 40 MG: 40 INJECTION, SOLUTION INTRAVENOUS; SUBCUTANEOUS at 21:24

## 2018-09-07 RX ADMIN — FENTANYL CITRATE 25 MCG: 50 INJECTION, SOLUTION INTRAMUSCULAR; INTRAVENOUS at 15:38

## 2018-09-07 RX ADMIN — FENTANYL CITRATE 100 MCG: 50 INJECTION, SOLUTION INTRAMUSCULAR; INTRAVENOUS at 11:40

## 2018-09-07 RX ADMIN — SODIUM CHLORIDE, SODIUM LACTATE, POTASSIUM CHLORIDE, AND CALCIUM CHLORIDE: 600; 310; 30; 20 INJECTION, SOLUTION INTRAVENOUS at 11:53

## 2018-09-07 RX ADMIN — ROCURONIUM BROMIDE 40 MG: 10 INJECTION, SOLUTION INTRAVENOUS at 11:16

## 2018-09-07 RX ADMIN — SODIUM CHLORIDE, SODIUM LACTATE, POTASSIUM CHLORIDE, CALCIUM CHLORIDE: 600; 310; 30; 20 INJECTION, SOLUTION INTRAVENOUS at 10:49

## 2018-09-07 RX ADMIN — FENTANYL CITRATE 25 MCG: 50 INJECTION, SOLUTION INTRAMUSCULAR; INTRAVENOUS at 12:11

## 2018-09-07 RX ADMIN — NEOSTIGMINE METHYLSULFATE 3 MG: 1 INJECTION INTRAVENOUS at 11:59

## 2018-09-07 RX ADMIN — FENTANYL CITRATE 50 MCG: 50 INJECTION, SOLUTION INTRAMUSCULAR; INTRAVENOUS at 12:03

## 2018-09-07 RX ADMIN — LIDOCAINE HYDROCHLORIDE 8 ML: 10 INJECTION, SOLUTION INFILTRATION; PERINEURAL at 15:38

## 2018-09-07 RX ADMIN — TAMSULOSIN HYDROCHLORIDE 0.4 MG: 0.4 CAPSULE ORAL at 08:43

## 2018-09-07 RX ADMIN — MIDAZOLAM 1 MG: 1 INJECTION INTRAMUSCULAR; INTRAVENOUS at 15:45

## 2018-09-07 RX ADMIN — MIDAZOLAM HYDROCHLORIDE 2 MG: 1 INJECTION, SOLUTION INTRAMUSCULAR; INTRAVENOUS at 10:42

## 2018-09-07 RX ADMIN — ROCURONIUM BROMIDE 10 MG: 10 INJECTION, SOLUTION INTRAVENOUS at 10:49

## 2018-09-07 RX ADMIN — HYDROMORPHONE HYDROCHLORIDE 0.5 MG: 1 INJECTION, SOLUTION INTRAMUSCULAR; INTRAVENOUS; SUBCUTANEOUS at 13:27

## 2018-09-07 RX ADMIN — SODIUM CHLORIDE, SODIUM LACTATE, POTASSIUM CHLORIDE, AND CALCIUM CHLORIDE 125 ML/HR: 600; 310; 30; 20 INJECTION, SOLUTION INTRAVENOUS at 09:00

## 2018-09-07 RX ADMIN — PROPOFOL 150 MG: 10 INJECTION, EMULSION INTRAVENOUS at 10:49

## 2018-09-07 RX ADMIN — FENTANYL CITRATE 25 MCG: 50 INJECTION, SOLUTION INTRAMUSCULAR; INTRAVENOUS at 12:06

## 2018-09-07 RX ADMIN — ONDANSETRON 4 MG: 2 INJECTION INTRAMUSCULAR; INTRAVENOUS at 12:23

## 2018-09-07 RX ADMIN — GLYCOPYRROLATE 0.4 MG: 0.2 INJECTION INTRAMUSCULAR; INTRAVENOUS at 11:59

## 2018-09-07 RX ADMIN — FENTANYL CITRATE 25 MCG: 50 INJECTION, SOLUTION INTRAMUSCULAR; INTRAVENOUS at 12:09

## 2018-09-07 RX ADMIN — LIDOCAINE HYDROCHLORIDE 100 MG: 20 INJECTION, SOLUTION EPIDURAL; INFILTRATION; INTRACAUDAL; PERINEURAL at 12:35

## 2018-09-07 RX ADMIN — FENTANYL CITRATE 100 MCG: 50 INJECTION, SOLUTION INTRAMUSCULAR; INTRAVENOUS at 11:15

## 2018-09-07 NOTE — BRIEF OP NOTE
BRIEF OPERATIVE NOTE Date of Procedure: 9/7/2018 Preoperative Diagnosis: COLON MASS Postoperative Diagnosis: COLON MASS Procedure(s): LAPAROSCOPY, sigmoid loop COLOSTOMY Surgeon(s) and Role: Jody Bentley MD - Primary Surgical Assistant:  
 
Surgical Staff: 
Circ-1: Sam Vazquez RN Scrub Tech-1: Santa Postal Surg Asst-1: Lynette Pena Staff: Jennyfer Bell Event Time In Incision Start 1119 Incision Close Anesthesia: General  
Estimated Blood Loss: minimal 
Specimens: * No specimens in log * Findings: sigmoid colon adherent to the pelvic side wall Complications: none Implants: * No implants in log *

## 2018-09-07 NOTE — PROGRESS NOTES
9/7/2018 
7:59 PM 
CM reviewed EMR. No discharge service needs indicated at this time, but pt may potentially require HH. CM will continue to monitor.

## 2018-09-07 NOTE — PROGRESS NOTES
GI Note Patient currently off floor in preop holding Colonoscopy 9/6/18 (Dr. Laura Blair) Impression: 
Ulcerated malignant appearing lesion in the rectum and distal sigmoid. Path pending Appears that he is scheduled for colostomy today. Oncology involved and also waiting on path results for final recommendations. GI on call available as needed over weekend. Genny Munroe PA-C 
09/07/18 
9:28 AM 
 
----- 
I'm interested to see path results which are as of yet not available. Call if needed this weekend. I return Monday.  
Noe Alan MD

## 2018-09-07 NOTE — PERIOP NOTES
TRANSFER - OUT REPORT: 
 
Verbal report given to [address], Rn on Lucita Gamez  being transferred to [facility] for routine post - op Report consisted of patients Situation, Background, Assessment and  
Recommendations(SBAR). Information from the following report(s) SBAR, Intake/Output and MAR was reviewed with the receiving nurse. Lines:  
Peripheral IV 09/07/18 Left Wrist (Active) Site Assessment Clean, dry, & intact 9/7/2018  9:00 AM  
Phlebitis Assessment 0 9/7/2018  9:00 AM  
Infiltration Assessment 0 9/7/2018  9:00 AM  
Dressing Status Clean, dry, & intact 9/7/2018  9:00 AM  
Dressing Type Transparent 9/7/2018  9:00 AM  
Hub Color/Line Status Pink 9/7/2018  9:00 AM  
Alcohol Cap Used Yes 9/7/2018  9:00 AM  
  
 
Opportunity for questions and clarification was provided. Patient transported with: 
 Registered Nurse

## 2018-09-07 NOTE — PROGRESS NOTES
[address] at [address] [place] [address] [phone] Hematology / Oncology Progress Note Reason for Visit:  
Marianne Lane is a 74 y.o. male is being seen for f/u of sigmoid mass. History of Present Illness: Mr. Tammie Johnson is a 75 y/o male admitted with persistent diarrhea, weight loss now found to have a sigmoid colon mass. Patient reports that he has had diarrhea and weight loss for the past 4-5 months. He reports undergoing 2 recent colonoscopies (one in July and one in Aug 2018) by Dr. Charliene Homans at [facility]. He states the first colonoscopy was not clear due to poor prep. The second colonoscopy reportedly showed abnormal rectal and sigmoid mucosa with a possible mass. This was biopsied and was benign. The patient then underwent a CT scan which was notable for R-sided hydronephrosis from extrinsic compression of the right ureter. The patient's urologist, Dr. Shiva Quiroz, attempted to pass a ureteral stent but was unsuccessful. Now, the patient returns to the hospital with weakness, persistent diarrhea and lower abdominal pain. He states he has seen mild blood in urine after the recent procedures, but no significant blood loss in his stools. He reportedly has lost over 50-70 lbs in the past 4 months. He has not eaten much in the past 3 weeks due to the food running straight through him. He is up walking around at home, but gets tired easily which he attributes to his poor nutrition. Since admission, he underwent abd/pelvis CT which shows a distal sigmoid mass with possible invasion of the posterior bladder wall as well as R hydronephrosis. For type II DM, the patient takes oral medications. He also has a remote h/o prostate cancer treated with seed implants. Patient has been evaluated by Dr. Nayely Walton in GI. He is planned for colonoscopy today. Patient has had several bowel movements last night and this morning.  He continues to have lower abdominal pain, but he does not request pain medicines even when asked. No n/v. No blood in the stool. Interval History: On his way down for procedure; was up a lot during the night with stool incontinence. Denies any N/V. Denies any SOB. No family at bedside. Past Medical History:  
Diagnosis Date  Diabetes mellitus ([facility]  Prostate cancer ([facility]  Sleep apnea Past Surgical History:  
Procedure Laterality Date 2021 Luis Orona N/A 9/6/2018 SIGMOIDOSCOPY FLEXIBLE performed by Gavin Sierra MD at [address] [place] [address] Social History Substance Use Topics  Smoking status: Never Smoker  Smokeless tobacco: Never Used  Alcohol use No  
  
History reviewed. No pertinent family history. Current Facility-Administered Medications Medication Dose Route Frequency  lidocaine (PF) (XYLOCAINE) 10 mg/mL (1 %) injection 0.1 mL  0.1 mL SubCUTAneous PRN  
 lactated Ringers infusion  125 mL/hr IntraVENous CONTINUOUS  
 sodium chloride (NS) flush 5-10 mL  5-10 mL IntraVENous Q8H  
 sodium chloride (NS) flush 5-10 mL  5-10 mL IntraVENous PRN  
 naloxone (NARCAN) injection 0.2 mg  0.2 mg IntraVENous EVERY 2 MINUTES AS NEEDED  flumazenil (ROMAZICON) 0.1 mg/mL injection 0.2 mg  0.2 mg IntraVENous Multiple  ceFAZolin (ANCEF) 2 g/20 mL in sterile water IV syringe  2 g IntraVENous ONCE  
 HYDROmorphone (PF) (DILAUDID) injection 0.5 mg  0.5 mg IntraVENous Q3H PRN  
 0.9% sodium chloride infusion  100 mL/hr IntraVENous CONTINUOUS  
 sodium chloride (NS) flush 5-10 mL  5-10 mL IntraVENous Q8H  
 sodium chloride (NS) flush 5-10 mL  5-10 mL IntraVENous PRN  
 naloxone (NARCAN) injection 0.4 mg  0.4 mg IntraVENous PRN  
 diphenhydrAMINE (BENADRYL) injection 12.5 mg  12.5 mg IntraVENous Q4H PRN  
 acetaminophen (TYLENOL) tablet 650 mg  650 mg Oral Q4H PRN  
 enoxaparin (LOVENOX) injection 40 mg  40 mg SubCUTAneous Q24H  
 glucose chewable tablet 16 g  4 Tab Oral PRN  
  dextrose (D50W) injection syrg 12.5-25 g  25-50 mL IntraVENous PRN  
 glucagon (GLUCAGEN) injection 1 mg  1 mg IntraMUSCular PRN  
 insulin lispro (HUMALOG) injection   SubCUTAneous Q6H  
 tamsulosin (FLOMAX) capsule 0.4 mg  0.4 mg Oral DAILY No Known Allergies Review of Systems: A complete review of systems was obtained, negative except as described above. Physical Exam:  
 
Visit Vitals  /76 (BP 1 Location: Right arm, BP Patient Position: At rest)  Pulse (!) 105  Temp 97.9 °F (36.6 °C)  Resp 16  
 Ht 5' 9\" (1.753 m)  Wt 98 kg (216 lb)  SpO2 99%  BMI 31.9 kg/m2 ECOG PS: 2 General: No distress Eyes: PERRLA, anicteric sclerae HENT: Atraumatic with normal appearance of ears and nose; OP clear Neck: Supple; no thyromegaly Respiratory: CTAB, normal respiratory effort CV: Normal rate, regular rhythm, no murmurs, no peripheral edema GI: Soft, nondistended, no masses, no hepatomegaly, no splenomegaly. TTP in suprapubic region MS: Normal gait and station. Digits without clubbing or cyanosis. Skin: No rashes, ecchymoses, or petechiae. Normal temperature, turgor, and texture. Neuro/Psych: Alert, oriented, appropriate affect, normal judgment/insight Results:  
 
Lab Results Component Value Date/Time WBC 8.1 09/07/2018 08:50 AM  
 HGB 9.2 (L) 09/07/2018 08:50 AM  
 HCT 30.4 (L) 09/07/2018 08:50 AM  
 PLATELET 638 29/58/8235 08:50 AM  
 MCV 87.6 09/07/2018 08:50 AM  
 ABS. NEUTROPHILS 5.1 09/04/2018 02:23 PM  
 
Lab Results Component Value Date/Time  Sodium 147 (H) 09/07/2018 08:50 AM  
 Potassium 3.4 (L) 09/07/2018 08:50 AM  
 Chloride 110 (H) 09/07/2018 08:50 AM  
 CO2 29 09/07/2018 08:50 AM  
 Glucose 120 (H) 09/07/2018 08:50 AM  
 BUN 10 09/07/2018 08:50 AM  
 Creatinine 1.00 09/07/2018 08:50 AM  
 GFR est AA >60 09/07/2018 08:50 AM  
 GFR est non-AA >60 09/07/2018 08:50 AM  
 Calcium 8.1 (L) 09/07/2018 08:50 AM  
 Glucose (POC) 108 (H) 09/07/2018 05:48 AM  
 
Lab Results Component Value Date/Time Bilirubin, total 0.3 09/04/2018 02:23 PM  
 ALT (SGPT) 29 09/04/2018 02:23 PM  
 AST (SGOT) 23 09/04/2018 02:23 PM  
 Alk. phosphatase 66 09/04/2018 02:23 PM  
 Protein, total 7.3 09/04/2018 02:23 PM  
 Albumin 2.5 (L) 09/04/2018 02:23 PM  
 Globulin 4.8 (H) 09/04/2018 02:23 PM  
 
9/4/18 CT abd/pelvis: 
FINDINGS:  
LUNG BASES: Clear. INCIDENTALLY IMAGED HEART AND MEDIASTINUM: Unremarkable. LIVER: Numerous hepatic cysts are noted with a reference 2.3 cm cyst in the 
dome. No hepatic mass. GALLBLADDER: Unremarkable. SPLEEN: No mass. PANCREAS: No mass or ductal dilatation. ADRENALS: Unremarkable. KIDNEYS: There is moderate right hydroureteronephrosis to the level of the 
distal ureter, related to the colon mass. There is a delayed nephrogram. There 
is a 2.4 cm left renal cyst. 
STOMACH: Unremarkable. SMALL BOWEL: No dilatation or wall thickening. COLON: There is a mass lesion involving the distal sigmoid colon. There is 
concern for involvement of the posterior bladder wall. APPENDIX: Unremarkable. PERITONEUM: No ascites or pneumoperitoneum. RETROPERITONEUM: No lymphadenopathy or aortic aneurysm. REPRODUCTIVE ORGANS: Prostate seed implants are noted. URINARY BLADDER: No mass or calculus. BONES: The patient is status post bilateral total hip replacement. Degenerative 
changes are seen in the lumbar spine. ADDITIONAL COMMENTS: There is a small periumbilical hernia containing only fat. 
  
IMPRESSION: 
Distal sigmoid colon mass lesion with potential for involvement of the posterior 
bladder wall. This is causing right hydroureteronephrosis and a delayed 
nephrogram. Hepatic and left renal cysts. Small ventral hernia containing only Fat. Chest CT 9/5/18:  
FINDINGS: 
THYROID: No nodule. MEDIASTINUM: No mass or lymphadenopathy. MICHELE: No mass or lymphadenopathy. THORACIC AORTA: No dissection or aneurysm. MAIN PULMONARY ARTERY: Normal in caliber. TRACHEA/BRONCHI: Patent. ESOPHAGUS: No wall thickening or dilatation. HEART: Normal in size. PLEURA: No effusion or pneumothorax. LUNGS: No nodule, mass, or airspace disease. INCIDENTALLY IMAGED UPPER ABDOMEN: Multiple low-attenuation liver lesions are 
again noted. BONES: No destructive bone lesion. DISH throughout the thoracic spine. IMPRESSION: 
No evidence of metastatic disease in the chest. 
  
9/06/2018 Colonoscopy/ Aj Mandujano Impression: 
Ulcerated malignant appearing lesion in the rectum and distal sigmoid: path pending Assessment and Recommendations:  
Milind Matta is a 74 y.o. male comes in with diarrhea and weight loss, found to have sigmoid colon mass, R hydronephrosis. 1. Sigmoid colon mass Consistent on CT and colonoscopy (9/6) with locally advanced rectal cancer: path pending Planning for diverting loop colostomy for symptom management due to possible invasion of the bladder, then  neoadjuvant chemotherapy (with FOLFOX regimen) for 2-3 months, followed by repeat imaging and proceeding with surgical resection at that time. However, we must obtain tissue for a pathological diagnosis first. CEA normal. Chest CT negative.  
-- (9/7) Scheduled for diverting loop colostomy for symptom management with biopsy /Dr. Wally Liu. --  will await pathology and if colorectal adenocarcinoma is confirmed,  plan on neoadjuvant chemotherapy in an effort to shrink sigmoid tumor away from the bladder wall. --will need port placed 2. Right hydronephrosis: 2/2 extrinsic compression of the R distal ureter from the sigmoid mass. Prior attempt to place a stent was unsuccessful. / Del President; concerned about loss of renal function if hydronephrosis not addressed: recommending placement of percutaneous nephrostomy with IR  
 
 
3. Persistent diarrhea / Weight loss: 2/2 sigmoid mass. --General surgery planning for diverting ostomy today (9/7) for symptom control 4. Normocytic anemia: Likely a combination of anemia of chronic disease due to underlying malignancy. No evidence of iron deficiency. Normal VitB12, folate. 5. Type II DM: On Glimepiride and Metformin. 6. H/o prostate cancer: Diagnosed 2-3 yrs ago per patient, s/p seed implants. Followed by Dr. Alfredo Heath with PSA low in 1.1 range per patient at last follow up. Plan reviewed with Dr Bel Carney Signed By: Alessandro Quiroga NP September 7, 2018

## 2018-09-07 NOTE — ANESTHESIA PREPROCEDURE EVALUATION
Anesthetic History No history of anesthetic complications Review of Systems / Medical History Patient summary reviewed, nursing notes reviewed and pertinent labs reviewed Pulmonary Sleep apnea: CPAP Comments: Unable to use CPAP Neuro/Psych Within defined limits Cardiovascular Within defined limits Exercise tolerance: >4 METS 
  
GI/Hepatic/Renal 
  
 
 
Renal disease: CRI Comments: Colon Mass Endo/Other Diabetes: well controlled, type 2 Obesity and cancer Comments: Prostate cancer Other Findings Comments: 75 lb unintentional weight loss over six months Physical Exam 
 
Airway Mallampati: II 
TM Distance: 4 - 6 cm Neck ROM: normal range of motion Mouth opening: Normal 
 
 Cardiovascular Rhythm: regular Rate: normal 
 
 
 
 Dental 
No notable dental hx Pulmonary Breath sounds clear to auscultation Abdominal 
 
 
 
 Other Findings Anesthetic Plan ASA: 3 Anesthesia type: general 
 
 
 
 
Induction: Intravenous Anesthetic plan and risks discussed with: Patient Informed consent obtained.

## 2018-09-07 NOTE — PROGRESS NOTES
Bedside shift change report given to SARITA Sandhu (oncoming nurse) by Celestine Nicholson RN (offgoing nurse). Report included the following information SBAR, Kardex and MAR.

## 2018-09-07 NOTE — OP NOTES
[address] REPORT    Yi Preciado  MR#: [id number]  : 1945  ACCOUNT #: [de-identified]   DATE OF SERVICE: 2018    PREOPERATIVE DIAGNOSIS:  Rectosigmoid carcinoma. POSTOPERATIVE DIAGNOSIS:  Rectosigmoid carcinoma. PROCEDURE PERFORMED:  Laparoscopy, sigmoid loop colostomy. SURGEON:  Mayank Palomares MD.    ASSISTANTS:  SA    ANESTHESIA:  General.    ANESTHESIOLOGIST:  Dr. Jodi Loera. ANTIBIOTICS:  The patient was given 2 grams of Ancef at the time of anesthetic induction. FINDINGS:  The patient had a loop of bowel that was adherent to the left lateral pelvic wall. This was easily mobilized and brought up to the anterior abdominal wall. Patient also had an adherent omentum in the umbilical hernia that was freed. COMPLICATIONS:  none         IMPLANTS:  none     PROCEDURE:  Under general anesthesia and the patient supine on the operating table, the abdomen was cleaned, prepped and draped. The laparoscopic camera and CO2 insufflation apparatus affixed to the drapes in the usual fashion. Through the right upper quadrant incision using Ba's point and a 5 mm Optiview trocar, the peritoneal cavity was entered under vision. Pneumoperitoneum was created and maintained at 15 mmHg. Under direct vision, two 5 mm ports were positioned, 1 in the right lower quadrant, another in the right flank. The patient was positioned in Trendelenburg with a tilt to the right. The left lower quadrant was examined and the sigmoid colon was clearly visualized. This was mobilized off from the lateral pelvic wall. The avascular line of Toldt was noted and mobilization of the sigmoid colon medially and laterally was performed using the LigaSure. This allowed the sigmoid colon to be brought up to the anterior abdominal wall with relative ease. A circular incision was made in the left lower quadrant and deepened through subcutaneous fat.   The fascia was then opened with a cruciate incision, the rectus muscle was divided and the loop of sigmoid colon was retrieved through this incision. The colonic serosa was sutured to the fascia with interrupted 2-0 Vicryl. A Penrose drain was brought through the mesentery and sutured in place to hold the colon. The colostomy was then matured by opening the colon along the tenia coli. The colon was then sutured to the skin with interrupted 3-0 Monocryl. Proximal and distal loops of bowel were clearly visualized and a finger inserted. Lumen was patent. The colostomy bag was applied over the stoma. Port sites were closed with subcuticular 4-0 Monocryl and Dermabond. Marcaine, lidocaine and sodium bicarbonate was injected at each port site and the left lower quadrant to a total volume of 50 mL. ESTIMATED BLOOD LOSS:  Minimal.    SPECIMENS REMOVED:   None. Counts were correct at the completion of surgery.       MD MILAN Yanez / CONY  D: 09/07/2018 12:18     T: 09/07/2018 13:03  JOB #: [id number]

## 2018-09-07 NOTE — PROGRESS NOTES
Bedside shift change report given to Nicole Muller RN (oncoming nurse) by Adwoa Mackay RN (offgoing nurse). Report included the following information SBAR, Kardex and MAR.

## 2018-09-07 NOTE — PROGRESS NOTES
Shift Summary [id number] [address] Bedside and Verbal shift change report given to Mian Pineda RN (oncoming nurse) by Lizett Jeter RN (offgoing nurse). Report included the following information SBAR, Kardex, Procedure Summary, MAR and Recent Results. TRANSFER - IN REPORT: 
 
Verbal report received from Garth NVAA RN(name) on Doll Sammy  being received from [facility]) for routine post - op Report consisted of patients Situation, Background, Assessment and  
Recommendations(SBAR). Information from the following report(s) SBAR, Kardex, Procedure Summary, MAR and Recent Results was reviewed with the receiving nurse. Opportunity for questions and clarification was provided. Assessment completed upon patients arrival to unit and care assumed. Patient back to OR for nephrostomy placement. PA at bedside. Consent received. TRANSFER - OUT REPORT: 
 
Verbal report given to ELIAZAR Decker(name) on Doll Sammy  being transferred to OR(unit) for ordered procedure Report consisted of patients Situation, Background, Assessment and  
Recommendations(SBAR). Information from the following report(s) SBAR, Kardex, [facility], Recent Results and Cardiac Rhythm   was reviewed with the receiving nurse. Lines:  
Peripheral IV 09/07/18 Left Wrist (Active) Site Assessment Clean, dry, & intact 9/7/2018  8:47 PM  
Phlebitis Assessment 0 9/7/2018  8:47 PM  
Infiltration Assessment 0 9/7/2018  8:47 PM  
Dressing Status Clean, dry, & intact 9/7/2018  8:47 PM  
Dressing Type Transparent 9/7/2018  8:47 PM  
Hub Color/Line Status Pink;Capped 9/7/2018  8:47 PM  
Action Taken Open ports on tubing capped 9/7/2018  6:00 PM  
Alcohol Cap Used Yes 9/7/2018  8:47 PM  
   
Peripheral IV 09/07/18 Right Hand (Active) Site Assessment Clean, dry, & intact 9/7/2018  8:47 PM  
Phlebitis Assessment 0 9/7/2018  8:47 PM  
Infiltration Assessment 0 9/7/2018  8:47 PM  
Dressing Status Clean, dry, & intact 9/7/2018  8:47 PM  
 Dressing Type Transparent 9/7/2018  8:47 PM  
Hub Color/Line Status Green;Capped 9/7/2018  8:47 PM  
Action Taken Open ports on tubing capped 9/7/2018  6:00 PM  
Alcohol Cap Used Yes 9/7/2018  8:47 PM  
  
 
Opportunity for questions and clarification was provided. Patient transported with: 
 Registered Nurse TRANSFER - IN REPORT: 
 
Verbal report received from BECKIE Archibald RN(name) on [place]  being received from [facility]) for routine post - op Report consisted of patients Situation, Background, Assessment and  
Recommendations(SBAR). Information from the following report(s) SBAR, Kardex, Procedure Summary and Recent Results was reviewed with the receiving nurse. Opportunity for questions and clarification was provided. Assessment completed upon patients arrival to unit and care assumed. Patient back to room A&O. Requested clears. No Nausea. [address] Bedside and Verbal shift change report given to GERONIMO Amaya (oncoming nurse) by Armando Hill RN (offgoing nurse). Report included the following information SBAR, Kardex, Procedure Summary, MAR and Recent Results.

## 2018-09-07 NOTE — PROGRESS NOTES
1555 
 
TRANSFER - IN REPORT: 
 
Verbal report received from [facility] (name) on Gaye Xavier  being received from angio (unit) for routine progression of care Report consisted of patients Situation, Background, Assessment and  
Recommendations(SBAR). Information from the following report(s) SBAR was reviewed with the receiving nurse. Opportunity for questions and clarification was provided. Assessment completed upon patients arrival to unit and care assumed. [address] Pt voices understanding of post procedure bedrest instructions. [address] TRANSFER - OUT REPORT: 
 
Verbal report given to Richa Braden 4f RN(name) on Gaye Xavier  being transferred to 409(unit) for routine progression of care Report consisted of patients Situation, Background, Assessment and  
Recommendations(SBAR). Information from the following report(s) SBAR was reviewed with the receiving nurse. Lines:  
Peripheral IV 09/07/18 Left Wrist (Active) Site Assessment Clean, dry, & intact 9/7/2018  2:00 PM  
Phlebitis Assessment 0 9/7/2018  2:00 PM  
Infiltration Assessment 0 9/7/2018  2:00 PM  
Dressing Status Clean, dry, & intact 9/7/2018  2:00 PM  
Dressing Type Transparent;Tape 9/7/2018  2:00 PM  
Hub Color/Line Status Patent;Capped;Pink 9/7/2018  2:00 PM  
Alcohol Cap Used Yes 9/7/2018  2:00 PM  
   
Peripheral IV 09/07/18 Right Hand (Active) Site Assessment Clean, dry, & intact 9/7/2018  2:00 PM  
Phlebitis Assessment 0 9/7/2018  2:00 PM  
Infiltration Assessment 0 9/7/2018  2:00 PM  
Dressing Status Clean, dry, & intact 9/7/2018  2:00 PM  
Dressing Type Transparent;Tape 9/7/2018  2:00 PM  
Hub Color/Line Status Patent;Capped;Green 9/7/2018  2:00 PM  
Alcohol Cap Used Yes 9/7/2018  2:00 PM  
  
 
Opportunity for questions and clarification was provided. Patient transported with: 
 Registered Nurse

## 2018-09-07 NOTE — ANESTHESIA POSTPROCEDURE EVALUATION
Post-Anesthesia Evaluation and Assessment Patient: Mae Mccrary MRN: [id number]  SSN: [id number] YOB: 1945  Age: 74 y.o. Sex: male Cardiovascular Function/Vital Signs Visit Vitals  /72  Pulse 88  Temp 36.7 °C (98 °F)  Resp 16  
 Ht 5' 9\" (1.753 m)  Wt 98 kg (216 lb)  SpO2 98%  BMI 31.9 kg/m2 Patient is status post general anesthesia for Procedure(s): LAPAROSCOPY, POSSIBLE COLOSTOMY. Nausea/Vomiting: None Postoperative hydration reviewed and adequate. Pain: 
Pain Scale 1: Numeric (0 - 10) (09/07/18 1326) Pain Intensity 1: 4 (09/07/18 1326) Managed Neurological Status:  
Neuro (WDL): Exceptions to WDL (09/07/18 1246) Neuro Neurologic State: Drowsy; Eyes open to stimulus (09/07/18 1258) LUE Motor Response: Purposeful (09/07/18 1258) LLE Motor Response: Purposeful (09/07/18 1258) RUE Motor Response: Purposeful (09/07/18 1258) RLE Motor Response: Purposeful (09/07/18 1258) At baseline Mental Status and Level of Consciousness: Arousable Pulmonary Status:  
O2 Device: Room air (09/07/18 1258) Adequate oxygenation and airway patent Complications related to anesthesia: None Post-anesthesia assessment completed. No concerns Signed By: Dov Smalls MD   
 September 7, 2018

## 2018-09-07 NOTE — PROGRESS NOTES
[facility] 
[phone] Full note to follow. Briefly, my recommendations are as follows: 
 
1) Rectal Cancer CT and colonoscopy are consistent with a locally advanced rectal cancer. Pathology from colonoscopy is pending. Tentative plan is for neoadjuvant chemotherapy with FOLFOX, followed by a definitive resection, pending pathology results. He will need a port for chemotherapy, we will ask surgery to place. Chemotherapy will be administered as an outpatient once he has recovered from his hospital stay. 2) Incontinence Discussed with GI (Dr. Lis Larose). He recommends a diverting ostomy, based on his colonoscopy findings. Surgery is following, await their recommendations regarding timing of surgery. 3) Hydronephrosis Discussed with  (Dr. Rin Barroso). Renal function is ok currently, but he is concerned about loss of renal function if the hydro is left unaddressed for several months while administering chemotherapy. He recommends we proceed soon with PCN with IR. Will need to figure out timing in relation to diverting ostomy surgery.

## 2018-09-08 LAB
ANION GAP SERPL CALC-SCNC: 9 MMOL/L (ref 5–15)
BUN SERPL-MCNC: 7 MG/DL (ref 6–20)
BUN/CREAT SERPL: 7 (ref 12–20)
CALCIUM SERPL-MCNC: 8.2 MG/DL (ref 8.5–10.1)
CHLORIDE SERPL-SCNC: 112 MMOL/L (ref 97–108)
CO2 SERPL-SCNC: 27 MMOL/L (ref 21–32)
CREAT SERPL-MCNC: 1.03 MG/DL (ref 0.7–1.3)
ERYTHROCYTE [DISTWIDTH] IN BLOOD BY AUTOMATED COUNT: 15 % (ref 11.5–14.5)
GLUCOSE BLD STRIP.AUTO-MCNC: 112 MG/DL (ref 65–100)
GLUCOSE BLD STRIP.AUTO-MCNC: 124 MG/DL (ref 65–100)
GLUCOSE BLD STRIP.AUTO-MCNC: 185 MG/DL (ref 65–100)
GLUCOSE BLD STRIP.AUTO-MCNC: 95 MG/DL (ref 65–100)
GLUCOSE SERPL-MCNC: 115 MG/DL (ref 65–100)
HCT VFR BLD AUTO: 30.7 % (ref 36.6–50.3)
HGB BLD-MCNC: 9.2 G/DL (ref 12.1–17)
MCH RBC QN AUTO: 26.1 PG (ref 26–34)
MCHC RBC AUTO-ENTMCNC: 30 G/DL (ref 30–36.5)
MCV RBC AUTO: 87.2 FL (ref 80–99)
NRBC # BLD: 0 K/UL (ref 0–0.01)
NRBC BLD-RTO: 0 PER 100 WBC
PLATELET # BLD AUTO: 363 K/UL (ref 150–400)
PMV BLD AUTO: 9.7 FL (ref 8.9–12.9)
POTASSIUM SERPL-SCNC: 3.5 MMOL/L (ref 3.5–5.1)
RBC # BLD AUTO: 3.52 M/UL (ref 4.1–5.7)
SERVICE CMNT-IMP: ABNORMAL
SERVICE CMNT-IMP: NORMAL
SODIUM SERPL-SCNC: 148 MMOL/L (ref 136–145)
WBC # BLD AUTO: 8.6 K/UL (ref 4.1–11.1)

## 2018-09-08 PROCEDURE — 74011250637 HC RX REV CODE- 250/637: Performed by: PHYSICIAN ASSISTANT

## 2018-09-08 PROCEDURE — 36415 COLL VENOUS BLD VENIPUNCTURE: CPT | Performed by: PHYSICIAN ASSISTANT

## 2018-09-08 PROCEDURE — 80048 BASIC METABOLIC PNL TOTAL CA: CPT | Performed by: PHYSICIAN ASSISTANT

## 2018-09-08 PROCEDURE — 74011250636 HC RX REV CODE- 250/636: Performed by: PHYSICIAN ASSISTANT

## 2018-09-08 PROCEDURE — 85027 COMPLETE CBC AUTOMATED: CPT | Performed by: PHYSICIAN ASSISTANT

## 2018-09-08 PROCEDURE — 65270000029 HC RM PRIVATE

## 2018-09-08 PROCEDURE — 82962 GLUCOSE BLOOD TEST: CPT

## 2018-09-08 PROCEDURE — 74011636637 HC RX REV CODE- 636/637: Performed by: PHYSICIAN ASSISTANT

## 2018-09-08 RX ADMIN — TAMSULOSIN HYDROCHLORIDE 0.4 MG: 0.4 CAPSULE ORAL at 09:21

## 2018-09-08 RX ADMIN — Medication 10 ML: at 22:00

## 2018-09-08 RX ADMIN — ENOXAPARIN SODIUM 40 MG: 40 INJECTION, SOLUTION INTRAVENOUS; SUBCUTANEOUS at 21:35

## 2018-09-08 RX ADMIN — Medication 10 ML: at 13:51

## 2018-09-08 RX ADMIN — ACETAMINOPHEN 650 MG: 325 TABLET ORAL at 15:54

## 2018-09-08 RX ADMIN — INSULIN LISPRO 2 UNITS: 100 INJECTION, SOLUTION INTRAVENOUS; SUBCUTANEOUS at 12:40

## 2018-09-08 RX ADMIN — HYDROMORPHONE HYDROCHLORIDE 0.5 MG: 2 INJECTION, SOLUTION INTRAMUSCULAR; INTRAVENOUS; SUBCUTANEOUS at 07:08

## 2018-09-08 RX ADMIN — HYDROMORPHONE HYDROCHLORIDE 0.5 MG: 2 INJECTION, SOLUTION INTRAMUSCULAR; INTRAVENOUS; SUBCUTANEOUS at 16:51

## 2018-09-08 RX ADMIN — HYDROMORPHONE HYDROCHLORIDE 0.5 MG: 2 INJECTION, SOLUTION INTRAMUSCULAR; INTRAVENOUS; SUBCUTANEOUS at 21:35

## 2018-09-08 RX ADMIN — HYDROMORPHONE HYDROCHLORIDE 0.5 MG: 2 INJECTION, SOLUTION INTRAMUSCULAR; INTRAVENOUS; SUBCUTANEOUS at 13:45

## 2018-09-08 RX ADMIN — Medication 10 ML: at 06:18

## 2018-09-08 NOTE — PROGRESS NOTES
Bedside shift change report given to Holly Hawley (oncoming nurse) by Rosa Elena Flores (offgoing nurse). Report included the following information SBAR, Kardex, Procedure Summary, Intake/Output, MAR and Recent Results.

## 2018-09-08 NOTE — PROGRESS NOTES
Problem: Falls - Risk of 
Goal: *Absence of Falls Document Itzel Myrna Fall Risk and appropriate interventions in the flowsheet. Outcome: Progressing Towards Goal 
Fall Risk Interventions: 
  
Call bell is with in reach, side rails are up x 3, bed wheels are locked and bed is in its lowest position. Hourly rounds performed for patient safety. Medication Interventions: Teach patient to arise slowly Elimination Interventions: Call light in reach Problem: Pressure Injury - Risk of 
Goal: *Prevention of pressure injury Document Horacio Scale and appropriate interventions in the flowsheet. Outcome: Progressing Towards Goal 
Pressure Injury Interventions: Will continue to monitor skin integrity during shift and encourage patient to turn every two hours. Moisture Interventions: Absorbent underpads Activity Interventions: Pressure redistribution bed/mattress(bed type), PT/OT evaluation, Increase time out of bed Mobility Interventions: HOB 30 degrees or less, PT/OT evaluation Nutrition Interventions: Document food/fluid/supplement intake

## 2018-09-08 NOTE — PROGRESS NOTES
Progress Note Patient: Milind Matta MRN: [id number]  SSN: [id number] YOB: 1945  Age: 74 y.o. Sex: male ADMITTED:  2018 to Cindy Farr MD  for Pelvic mass 
colon mass COLON MASS 
COLON CANCER Milind Matta was admitted for Pelvic mass 
colon mass COLON MASS 
COLON CANCER . R hydro Vitals:  Temp (24hrs), Av.3 °F (36.8 °C), Min:97.6 °F (36.4 °C), Max:98.7 °F (37.1 °C) Blood pressure 157/72, pulse 86, temperature 97.6 °F (36.4 °C), resp. rate 16, height 5' 9\" (1.753 m), weight 98 kg (216 lb), SpO2 96 %. I&O's:   1901 -  0700 In: 5448.3 [P.O.:650; I.V.:4798.3] Out:  [Urine:] Exam:   NAD. Labs:  
Recent Labs  
   18 
 0340  18 
 0850  18 
 0518 WBC  8.6  8.1  7.3 HGB  9.2*  9.2*  9.2* HCT  30.7*  30.4*  30.5* PLT  363  362  401* Recent Labs  
   18 
 0340  18 
 0850  18 
 0518 NA  148*  147*  145  
K  3.5  3.4*  3.8 CL  112*  110*  108 CO2  27  29  24 GLU  115*  120*  128* BUN  7  10  10 CREA  1.03  1.00  0.94  
CA  8.2*  8.1*  8.4* Cultures:     
Imaging:    
 
Assessment: - Active Problems: 
  Pelvic mass (2018) Plan: - R PCN Monday. Will need to hold lovenox  Signed By: Nikole Fuller MD - 2018

## 2018-09-08 NOTE — PROGRESS NOTES
General Surgery Daily Progress Note Patient: Mimi Egan MRN: [id number]  SSN: [id number] YOB: 1945  Age: 74 y.o. Sex: male Admit Date: 9/4/2018 Subjective:  
Patient feels better and his pain is controlled. He does not have any fecal incontinence now. Jose Beauchamp Current Facility-Administered Medications Medication Dose Route Frequency  fentaNYL citrate (PF) injection 12.5-200 mcg  12.5-200 mcg IntraVENous Multiple  midazolam (VERSED) injection 0.5-10 mg  0.5-10 mg IntraVENous Multiple  lidocaine (XYLOCAINE) 10 mg/mL (1 %) injection 10-20 mL  10-20 mL IntraDERMal Multiple  HYDROmorphone (PF) (DILAUDID) injection 0.5 mg  0.5 mg IntraVENous Q3H PRN  
 0.9% sodium chloride infusion  100 mL/hr IntraVENous CONTINUOUS  
 sodium chloride (NS) flush 5-10 mL  5-10 mL IntraVENous Q8H  
 sodium chloride (NS) flush 5-10 mL  5-10 mL IntraVENous PRN  
 naloxone (NARCAN) injection 0.4 mg  0.4 mg IntraVENous PRN  
 diphenhydrAMINE (BENADRYL) injection 12.5 mg  12.5 mg IntraVENous Q4H PRN  
 acetaminophen (TYLENOL) tablet 650 mg  650 mg Oral Q4H PRN  
 enoxaparin (LOVENOX) injection 40 mg  40 mg SubCUTAneous Q24H  
 glucose chewable tablet 16 g  4 Tab Oral PRN  
 dextrose (D50W) injection syrg 12.5-25 g  25-50 mL IntraVENous PRN  
 glucagon (GLUCAGEN) injection 1 mg  1 mg IntraMUSCular PRN  
 insulin lispro (HUMALOG) injection   SubCUTAneous Q6H  
 tamsulosin (FLOMAX) capsule 0.4 mg  0.4 mg Oral DAILY Objective:  
  
09/06 1901 - 09/08 0700 In: 5448.3 [P.O.:650; I.V.:4798.3] Out: 2050 [Urine:2050] Patient Vitals for the past 8 hrs: 
 BP Temp Pulse Resp SpO2  
09/08/18 0740 157/72 97.6 °F (36.4 °C) 86 16 96 % 09/08/18 0437 143/75 98.7 °F (37.1 °C) 86 16 97 % Physical Exam: 
General: Alert, cooperative, no distress, appears stated age.  
Neck:  Supple, symmetrical, trachea midline, no adenopathy, thyroid: no enlargement/tenderness/nodules, no carotid bruit and no JVD. Lungs: Clear to auscultation bilaterally. Heart:  Regular rate and rhythm, S1, S2 normal, no murmur, click, rub or gallop. Abdomen: Stoma healthy Soft, non-tender. Bowel sounds normal. No masses,  No organomegaly. Extremities: Extremities normal, atraumatic, no cyanosis or edema. Skin:  Skin color, texture, turgor normal. No rashes or lesions Labs: Recent Labs  
   09/08/18 
 0340 WBC  8.6 HGB  9.2* HCT  30.7* PLT  363 Recent Labs  
   09/08/18 
 0340 NA  148* K  3.5 CL  112* CO2  27 GLU  115* BUN  7  
CREA  1.03  
CA  8.2* X-ray Assessment / Plan: · S/p colostomy · Advance to full liquids Active Problems: 
  Pelvic mass (9/4/2018)

## 2018-09-08 NOTE — PROGRESS NOTES
Bedside and Verbal shift change report given to [facility] (oncoming nurse) by Zina Campbell RN (offgoing nurse). Report included the following information SBAR, Kardex, ED Summary, OR Summary, Intake/Output, MAR and Recent Results.

## 2018-09-09 LAB
ANION GAP SERPL CALC-SCNC: 8 MMOL/L (ref 5–15)
BUN SERPL-MCNC: 7 MG/DL (ref 6–20)
BUN/CREAT SERPL: 8 (ref 12–20)
CALCIUM SERPL-MCNC: 8.3 MG/DL (ref 8.5–10.1)
CHLORIDE SERPL-SCNC: 109 MMOL/L (ref 97–108)
CO2 SERPL-SCNC: 28 MMOL/L (ref 21–32)
CREAT SERPL-MCNC: 0.9 MG/DL (ref 0.7–1.3)
ERYTHROCYTE [DISTWIDTH] IN BLOOD BY AUTOMATED COUNT: 15 % (ref 11.5–14.5)
GLUCOSE BLD STRIP.AUTO-MCNC: 114 MG/DL (ref 65–100)
GLUCOSE BLD STRIP.AUTO-MCNC: 122 MG/DL (ref 65–100)
GLUCOSE BLD STRIP.AUTO-MCNC: 125 MG/DL (ref 65–100)
GLUCOSE BLD STRIP.AUTO-MCNC: 146 MG/DL (ref 65–100)
GLUCOSE SERPL-MCNC: 108 MG/DL (ref 65–100)
HCT VFR BLD AUTO: 29.3 % (ref 36.6–50.3)
HGB BLD-MCNC: 8.9 G/DL (ref 12.1–17)
MCH RBC QN AUTO: 26.5 PG (ref 26–34)
MCHC RBC AUTO-ENTMCNC: 30.4 G/DL (ref 30–36.5)
MCV RBC AUTO: 87.2 FL (ref 80–99)
NRBC # BLD: 0 K/UL (ref 0–0.01)
NRBC BLD-RTO: 0 PER 100 WBC
PLATELET # BLD AUTO: 347 K/UL (ref 150–400)
PMV BLD AUTO: 9.7 FL (ref 8.9–12.9)
POTASSIUM SERPL-SCNC: 3 MMOL/L (ref 3.5–5.1)
RBC # BLD AUTO: 3.36 M/UL (ref 4.1–5.7)
SERVICE CMNT-IMP: ABNORMAL
SODIUM SERPL-SCNC: 145 MMOL/L (ref 136–145)
WBC # BLD AUTO: 7.8 K/UL (ref 4.1–11.1)

## 2018-09-09 PROCEDURE — 77030027138 HC INCENT SPIROMETER -A

## 2018-09-09 PROCEDURE — 74011250637 HC RX REV CODE- 250/637: Performed by: PHYSICIAN ASSISTANT

## 2018-09-09 PROCEDURE — 82962 GLUCOSE BLOOD TEST: CPT

## 2018-09-09 PROCEDURE — 74011250636 HC RX REV CODE- 250/636: Performed by: UROLOGY

## 2018-09-09 PROCEDURE — 65270000029 HC RM PRIVATE

## 2018-09-09 PROCEDURE — 85027 COMPLETE CBC AUTOMATED: CPT | Performed by: SURGERY

## 2018-09-09 PROCEDURE — 74011250636 HC RX REV CODE- 250/636: Performed by: PHYSICIAN ASSISTANT

## 2018-09-09 PROCEDURE — 74011636637 HC RX REV CODE- 636/637: Performed by: PHYSICIAN ASSISTANT

## 2018-09-09 PROCEDURE — 80048 BASIC METABOLIC PNL TOTAL CA: CPT | Performed by: SURGERY

## 2018-09-09 PROCEDURE — 36415 COLL VENOUS BLD VENIPUNCTURE: CPT | Performed by: SURGERY

## 2018-09-09 RX ORDER — ENOXAPARIN SODIUM 100 MG/ML
40 INJECTION SUBCUTANEOUS EVERY 24 HOURS
Status: DISCONTINUED | OUTPATIENT
Start: 2018-09-09 | End: 2018-09-10

## 2018-09-09 RX ADMIN — ENOXAPARIN SODIUM 40 MG: 40 INJECTION, SOLUTION INTRAVENOUS; SUBCUTANEOUS at 09:40

## 2018-09-09 RX ADMIN — Medication 10 ML: at 13:08

## 2018-09-09 RX ADMIN — TAMSULOSIN HYDROCHLORIDE 0.4 MG: 0.4 CAPSULE ORAL at 08:51

## 2018-09-09 RX ADMIN — INSULIN LISPRO 2 UNITS: 100 INJECTION, SOLUTION INTRAVENOUS; SUBCUTANEOUS at 13:04

## 2018-09-09 RX ADMIN — HYDROMORPHONE HYDROCHLORIDE 0.5 MG: 2 INJECTION, SOLUTION INTRAMUSCULAR; INTRAVENOUS; SUBCUTANEOUS at 13:05

## 2018-09-09 NOTE — PROGRESS NOTES
Bedside and Verbal shift change report given to [facility] (oncoming nurse) by Celestina Garcia RN (offgoing nurse). Report included the following information SBAR, Kardex, ED Summary, OR Summary, Procedure Summary, Intake/Output, MAR and Recent Results.

## 2018-09-09 NOTE — PROGRESS NOTES
General Surgery Daily Progress Note Patient: Alli Garza MRN: [id number]  SSN: [id number] YOB: 1945  Age: 74 y.o. Sex: male Admit Date: 9/4/2018 Subjective:  
Patient pain improving no nausea or vomiting. . 
 
Current Facility-Administered Medications Medication Dose Route Frequency  enoxaparin (LOVENOX) injection 40 mg  40 mg SubCUTAneous Q24H  
 fentaNYL citrate (PF) injection 12.5-200 mcg  12.5-200 mcg IntraVENous Multiple  midazolam (VERSED) injection 0.5-10 mg  0.5-10 mg IntraVENous Multiple  lidocaine (XYLOCAINE) 10 mg/mL (1 %) injection 10-20 mL  10-20 mL IntraDERMal Multiple  HYDROmorphone (PF) (DILAUDID) injection 0.5 mg  0.5 mg IntraVENous Q3H PRN  
 sodium chloride (NS) flush 5-10 mL  5-10 mL IntraVENous Q8H  
 sodium chloride (NS) flush 5-10 mL  5-10 mL IntraVENous PRN  
 naloxone (NARCAN) injection 0.4 mg  0.4 mg IntraVENous PRN  
 diphenhydrAMINE (BENADRYL) injection 12.5 mg  12.5 mg IntraVENous Q4H PRN  
 acetaminophen (TYLENOL) tablet 650 mg  650 mg Oral Q4H PRN  
 glucose chewable tablet 16 g  4 Tab Oral PRN  
 dextrose (D50W) injection syrg 12.5-25 g  25-50 mL IntraVENous PRN  
 glucagon (GLUCAGEN) injection 1 mg  1 mg IntraMUSCular PRN  
 insulin lispro (HUMALOG) injection   SubCUTAneous Q6H  
 tamsulosin (FLOMAX) capsule 0.4 mg  0.4 mg Oral DAILY Objective:  
  
09/07 1901 - 09/09 0700 In: 2648.3 [I.V.:2648.3] Out: 5762 [Urine:2550] Patient Vitals for the past 8 hrs: 
 BP Temp Pulse Resp SpO2  
09/09/18 0803 142/81 98.3 °F (36.8 °C) 78 16 97 % 09/09/18 0411 149/79 98 °F (36.7 °C) 80 15 97 % Physical Exam: 
General: Alert, cooperative, no distress, appears stated age. Neck:  Supple, symmetrical, trachea midline, no adenopathy, thyroid: no                           enlargement/tenderness/nodules, no carotid bruit and no JVD. Lungs: Clear to auscultation bilaterally. Heart:  Regular rate and rhythm, S1, S2 normal, no murmur, click, rub or gallop. Abdomen: Stoma working Soft, non-tender. Bowel sounds normal. No masses,  No organomegaly. Extremities: Extremities normal, atraumatic, no cyanosis or edema. Skin:  Skin color, texture, turgor normal. No rashes or lesions Labs: Recent Labs  
   09/09/18 
 0534 WBC  7.8 HGB  8.9* HCT  29.3*  
PLT  347 Recent Labs  
   09/09/18 
 0534 NA  145  
K  3.0*  
CL  109* CO2  28 GLU  108* BUN  7  
CREA  0.90  
CA  8.3* X-ray Assessment / Plan:  
· Plan for port on Tuesday · Ureteral stent on Monday · Stoma education · Dc plan and then start chemo as out patient · Discussed all of this with patient and family Active Problems: 
  Pelvic mass (9/4/2018)

## 2018-09-09 NOTE — PROGRESS NOTES
Discussed with jonathan Pt had r pcn placed fri evening, he plans port Tuesday IR may internalize stent tomorrow. Will make npo after midnight in case IR plans procedure

## 2018-09-09 NOTE — PROGRESS NOTES
Bedside and Verbal shift change report given to Bertin Araiza (oncoming nurse) by Georgia Horta (offgoing nurse). Report included the following information SBAR, Kardex, OR Summary, Intake/Output, MAR and Recent Results.

## 2018-09-10 LAB
ANION GAP SERPL CALC-SCNC: 7 MMOL/L (ref 5–15)
BUN SERPL-MCNC: 8 MG/DL (ref 6–20)
BUN/CREAT SERPL: 9 (ref 12–20)
CALCIUM SERPL-MCNC: 8.1 MG/DL (ref 8.5–10.1)
CHLORIDE SERPL-SCNC: 106 MMOL/L (ref 97–108)
CO2 SERPL-SCNC: 29 MMOL/L (ref 21–32)
CREAT SERPL-MCNC: 0.91 MG/DL (ref 0.7–1.3)
ERYTHROCYTE [DISTWIDTH] IN BLOOD BY AUTOMATED COUNT: 14.8 % (ref 11.5–14.5)
GLUCOSE BLD STRIP.AUTO-MCNC: 129 MG/DL (ref 65–100)
GLUCOSE BLD STRIP.AUTO-MCNC: 135 MG/DL (ref 65–100)
GLUCOSE BLD STRIP.AUTO-MCNC: 136 MG/DL (ref 65–100)
GLUCOSE BLD STRIP.AUTO-MCNC: 165 MG/DL (ref 65–100)
GLUCOSE SERPL-MCNC: 133 MG/DL (ref 65–100)
HCT VFR BLD AUTO: 29.5 % (ref 36.6–50.3)
HGB BLD-MCNC: 9.1 G/DL (ref 12.1–17)
MAGNESIUM SERPL-MCNC: 2.1 MG/DL (ref 1.6–2.4)
MCH RBC QN AUTO: 26.5 PG (ref 26–34)
MCHC RBC AUTO-ENTMCNC: 30.8 G/DL (ref 30–36.5)
MCV RBC AUTO: 86 FL (ref 80–99)
NRBC # BLD: 0 K/UL (ref 0–0.01)
NRBC BLD-RTO: 0 PER 100 WBC
PLATELET # BLD AUTO: 334 K/UL (ref 150–400)
PMV BLD AUTO: 9.5 FL (ref 8.9–12.9)
POTASSIUM SERPL-SCNC: 3.3 MMOL/L (ref 3.5–5.1)
RBC # BLD AUTO: 3.43 M/UL (ref 4.1–5.7)
SERVICE CMNT-IMP: ABNORMAL
SODIUM SERPL-SCNC: 142 MMOL/L (ref 136–145)
WBC # BLD AUTO: 7.2 K/UL (ref 4.1–11.1)

## 2018-09-10 PROCEDURE — 77030012856

## 2018-09-10 PROCEDURE — 82962 GLUCOSE BLOOD TEST: CPT

## 2018-09-10 PROCEDURE — 85027 COMPLETE CBC AUTOMATED: CPT | Performed by: PHYSICIAN ASSISTANT

## 2018-09-10 PROCEDURE — 83735 ASSAY OF MAGNESIUM: CPT | Performed by: PHYSICIAN ASSISTANT

## 2018-09-10 PROCEDURE — 74011250636 HC RX REV CODE- 250/636: Performed by: PHYSICIAN ASSISTANT

## 2018-09-10 PROCEDURE — 36415 COLL VENOUS BLD VENIPUNCTURE: CPT | Performed by: PHYSICIAN ASSISTANT

## 2018-09-10 PROCEDURE — 77030010541

## 2018-09-10 PROCEDURE — 80048 BASIC METABOLIC PNL TOTAL CA: CPT | Performed by: PHYSICIAN ASSISTANT

## 2018-09-10 PROCEDURE — 77030011641 HC PASTE OST ADH BMS -A

## 2018-09-10 PROCEDURE — 65270000029 HC RM PRIVATE

## 2018-09-10 PROCEDURE — 74011250637 HC RX REV CODE- 250/637: Performed by: PHYSICIAN ASSISTANT

## 2018-09-10 PROCEDURE — 77030010522

## 2018-09-10 PROCEDURE — 74011636637 HC RX REV CODE- 636/637: Performed by: PHYSICIAN ASSISTANT

## 2018-09-10 RX ORDER — POTASSIUM CHLORIDE 750 MG/1
40 TABLET, FILM COATED, EXTENDED RELEASE ORAL
Status: COMPLETED | OUTPATIENT
Start: 2018-09-10 | End: 2018-09-10

## 2018-09-10 RX ADMIN — TAMSULOSIN HYDROCHLORIDE 0.4 MG: 0.4 CAPSULE ORAL at 10:27

## 2018-09-10 RX ADMIN — HYDROMORPHONE HYDROCHLORIDE 0.5 MG: 2 INJECTION, SOLUTION INTRAMUSCULAR; INTRAVENOUS; SUBCUTANEOUS at 16:17

## 2018-09-10 RX ADMIN — ACETAMINOPHEN 650 MG: 325 TABLET ORAL at 16:18

## 2018-09-10 RX ADMIN — INSULIN LISPRO 2 UNITS: 100 INJECTION, SOLUTION INTRAVENOUS; SUBCUTANEOUS at 14:10

## 2018-09-10 RX ADMIN — POTASSIUM CHLORIDE 40 MEQ: 750 TABLET, FILM COATED, EXTENDED RELEASE ORAL at 18:40

## 2018-09-10 RX ADMIN — HYDROMORPHONE HYDROCHLORIDE 0.5 MG: 2 INJECTION, SOLUTION INTRAMUSCULAR; INTRAVENOUS; SUBCUTANEOUS at 03:57

## 2018-09-10 NOTE — ROUTINE PROCESS
Received consult for PCN. Dr. Maurice Carrillo is IR physician today, does not perform PCN. Dr. Fede Rob will perform tomorrow. Please keep patient NPO after 12 M and hold lovenox in AM. Notified Anahi Hilton RN of all the above. Notes indicate that patient needs port placement. Will just need order for port placement and that can also be done tomorrow in IR with Dr. Fede Rob.

## 2018-09-10 NOTE — PROGRESS NOTES
RN spoke with Afshan Lopez in IR who stated the procedure scheduled for today (nephro stent placement) will occur tomorrow. Patient can eat today, will be NPO after midnight tonight.

## 2018-09-10 NOTE — PROGRESS NOTES
General Surgery Daily Progress Note Patient: Dae Dumas MRN: [id number]  SSN: [id number] YOB: 1945  Age: 74 y.o. Sex: male Admit Date: 9/4/2018 Subjective:  
Feeling ok, pain controlled, tolerating diet. Ostomy functioning. Current Facility-Administered Medications Medication Dose Route Frequency  fentaNYL citrate (PF) injection 12.5-200 mcg  12.5-200 mcg IntraVENous Multiple  midazolam (VERSED) injection 0.5-10 mg  0.5-10 mg IntraVENous Multiple  lidocaine (XYLOCAINE) 10 mg/mL (1 %) injection 10-20 mL  10-20 mL IntraDERMal Multiple  HYDROmorphone (PF) (DILAUDID) injection 0.5 mg  0.5 mg IntraVENous Q3H PRN  
 sodium chloride (NS) flush 5-10 mL  5-10 mL IntraVENous Q8H  
 sodium chloride (NS) flush 5-10 mL  5-10 mL IntraVENous PRN  
 naloxone (NARCAN) injection 0.4 mg  0.4 mg IntraVENous PRN  
 diphenhydrAMINE (BENADRYL) injection 12.5 mg  12.5 mg IntraVENous Q4H PRN  
 acetaminophen (TYLENOL) tablet 650 mg  650 mg Oral Q4H PRN  
 glucose chewable tablet 16 g  4 Tab Oral PRN  
 dextrose (D50W) injection syrg 12.5-25 g  25-50 mL IntraVENous PRN  
 glucagon (GLUCAGEN) injection 1 mg  1 mg IntraMUSCular PRN  
 insulin lispro (HUMALOG) injection   SubCUTAneous Q6H  
 tamsulosin (FLOMAX) capsule 0.4 mg  0.4 mg Oral DAILY Objective:  
09/10 0701 - 09/10 1900 In: 240 [P.O.:240] Out: 375 [Urine:375] 09/08 1901 - 09/10 0700 In: -  
Out: 2280 [Urine:2250] Patient Vitals for the past 8 hrs: 
 BP Temp Pulse Resp SpO2  
09/10/18 1230 155/86 98.2 °F (36.8 °C) 82 16 100 % 09/10/18 0750 146/72 98.3 °F (36.8 °C) 78 16 97 % Physical Exam: 
General: Alert, cooperative, NAD Lungs: Unlabored Heart:  Regular rate and  rhythm Abdomen: Soft, ATTP, ostomy functioning. Incisions c/d/i Extremities: Warm, moves all, no edema Skin:  Warm and dry, no rash Labs:  
Recent Labs  
   09/10/18 
 1048 WBC  7.2 HGB  9.1*  
HCT  29.5*  
PLT  334 Recent Labs 09/10/18 
 1048 NA  142  
K  3.3*  
CL  106 CO2  29 GLU  133* BUN  8  
CREA  0.91  
CA  8.1*  
MG  2.1 Assessment / Plan: · Sigmoid colon mass · POD#3 lap loop sigmoid colostomy · Diet as tolerated · Wound care for ostomy teaching · Plan for IR to internalize R ureteral stent tomorrow. Possible port at the same time if indicated.

## 2018-09-10 NOTE — PROGRESS NOTES
Bedside and Verbal shift change report given to [id number] [facility] (oncoming nurse) by Amanda Artis (offgoing nurse). Report included the following information SBAR, Kardex, OR Summary, Intake/Output, MAR and Recent Results.

## 2018-09-10 NOTE — PROGRESS NOTES
[employer] [facility] at [facility] [phone] Hematology / Oncology Progress Note Reason for Visit:  
Mordecai Jeans is a 74 y.o. male is being seen for f/u of sigmoid mass. History of Present Illness: Mr. Mohamud Jung is a 73 y/o male admitted with persistent diarrhea, weight loss now found to have a sigmoid colon mass. Patient reports that he has had diarrhea and weight loss for the past 4-5 months. He reports undergoing 2 recent colonoscopies (one in July and one in Aug 2018) by Dr. Jennifer Patel at [facility]. He states the first colonoscopy was not clear due to poor prep. The second colonoscopy reportedly showed abnormal rectal and sigmoid mucosa with a possible mass. This was biopsied and was benign. The patient then underwent a CT scan which was notable for R-sided hydronephrosis from extrinsic compression of the right ureter. The patient's urologist, Dr. Evaristo Ochoa, attempted to pass a ureteral stent but was unsuccessful. Now, the patient returns to the hospital with weakness, persistent diarrhea and lower abdominal pain. He states he has seen mild blood in urine after the recent procedures, but no significant blood loss in his stools. He reportedly has lost over 50-70 lbs in the past 4 months. He has not eaten much in the past 3 weeks due to the food running straight through him. He is up walking around at home, but gets tired easily which he attributes to his poor nutrition. Since admission, he underwent abd/pelvis CT which shows a distal sigmoid mass with possible invasion of the posterior bladder wall as well as R hydronephrosis. For type II DM, the patient takes oral medications. He also has a remote h/o prostate cancer treated with seed implants. Patient has been evaluated by Dr. Marvella Fabry in GI. He is planned for colonoscopy today. Patient has had several bowel movements last night and this morning.  He continues to have lower abdominal pain, but he does not request pain medicines even when asked. No n/v. No blood in the stool. Interval History:  
 Denies any pain; tolerating diet; no complaints at present. Waiting for stent placement tomorrow. Hoping to get up later and walk in the hallways. No family at bedside. Past Medical History:  
Diagnosis Date  Diabetes mellitus ([facility] [address]  Prostate cancer ([facility] [address]  Sleep apnea Past Surgical History:  
Procedure Laterality Date 2021 Luis Orona N/A 9/6/2018 SIGMOIDOSCOPY FLEXIBLE performed by Joao Payne MD at [address] [place] [address] Social History Substance Use Topics  Smoking status: Never Smoker  Smokeless tobacco: Never Used  Alcohol use No  
  
History reviewed. No pertinent family history. Current Facility-Administered Medications Medication Dose Route Frequency  enoxaparin (LOVENOX) injection 40 mg  40 mg SubCUTAneous Q24H  
 fentaNYL citrate (PF) injection 12.5-200 mcg  12.5-200 mcg IntraVENous Multiple  midazolam (VERSED) injection 0.5-10 mg  0.5-10 mg IntraVENous Multiple  lidocaine (XYLOCAINE) 10 mg/mL (1 %) injection 10-20 mL  10-20 mL IntraDERMal Multiple  HYDROmorphone (PF) (DILAUDID) injection 0.5 mg  0.5 mg IntraVENous Q3H PRN  
 sodium chloride (NS) flush 5-10 mL  5-10 mL IntraVENous Q8H  
 sodium chloride (NS) flush 5-10 mL  5-10 mL IntraVENous PRN  
 naloxone (NARCAN) injection 0.4 mg  0.4 mg IntraVENous PRN  
 diphenhydrAMINE (BENADRYL) injection 12.5 mg  12.5 mg IntraVENous Q4H PRN  
 acetaminophen (TYLENOL) tablet 650 mg  650 mg Oral Q4H PRN  
 glucose chewable tablet 16 g  4 Tab Oral PRN  
 dextrose (D50W) injection syrg 12.5-25 g  25-50 mL IntraVENous PRN  
 glucagon (GLUCAGEN) injection 1 mg  1 mg IntraMUSCular PRN  
 insulin lispro (HUMALOG) injection   SubCUTAneous Q6H  
 tamsulosin (FLOMAX) capsule 0.4 mg  0.4 mg Oral DAILY No Known Allergies Review of Systems: A complete review of systems was obtained, negative except as described above. Physical Exam:  
 
Visit Vitals  /86 (BP 1 Location: Left arm, BP Patient Position: Head of bed elevated (Comment degrees))  Pulse 82  Temp 98.2 °F (36.8 °C)  Resp 16  
 Ht 5' 9\" (1.753 m)  Wt 98 kg (216 lb)  SpO2 100%  BMI 31.9 kg/m2 ECOG PS: 2 General: No distress Eyes: PERRLA, anicteric sclerae HENT: Atraumatic with normal appearance of ears and nose; OP clear Neck: Supple; no thyromegaly Respiratory: CTAB, normal respiratory effort CV: Normal rate, regular rhythm, no murmurs, no peripheral edema GI: colostomy noted; Soft, nondistended, no masses, no hepatomegaly, no splenomegaly. : Rt PCN noted MS:  Digits without clubbing or cyanosis. Skin: No rashes, ecchymoses, or petechiae. Normal temperature, turgor, and texture. Neuro/Psych: Alert, oriented, appropriate affect, normal judgment/insight Results:  
 
Lab Results Component Value Date/Time WBC 7.2 09/10/2018 10:48 AM  
 HGB 9.1 (L) 09/10/2018 10:48 AM  
 HCT 29.5 (L) 09/10/2018 10:48 AM  
 PLATELET 753 53/80/7843 10:48 AM  
 MCV 86.0 09/10/2018 10:48 AM  
 ABS. NEUTROPHILS 5.1 09/04/2018 02:23 PM  
 
Lab Results Component Value Date/Time Sodium 142 09/10/2018 10:48 AM  
 Potassium 3.3 (L) 09/10/2018 10:48 AM  
 Chloride 106 09/10/2018 10:48 AM  
 CO2 29 09/10/2018 10:48 AM  
 Glucose 133 (H) 09/10/2018 10:48 AM  
 BUN 8 09/10/2018 10:48 AM  
 Creatinine 0.91 09/10/2018 10:48 AM  
 GFR est AA >60 09/10/2018 10:48 AM  
 GFR est non-AA >60 09/10/2018 10:48 AM  
 Calcium 8.1 (L) 09/10/2018 10:48 AM  
 Glucose (POC) 165 (H) 09/10/2018 12:27 PM  
 
Lab Results Component Value Date/Time Bilirubin, total 0.3 09/04/2018 02:23 PM  
 ALT (SGPT) 29 09/04/2018 02:23 PM  
 AST (SGOT) 23 09/04/2018 02:23 PM  
 Alk.  phosphatase 66 09/04/2018 02:23 PM  
 Protein, total 7.3 09/04/2018 02:23 PM  
 Albumin 2.5 (L) 09/04/2018 02:23 PM  
 Globulin 4.8 (H) 09/04/2018 02:23 PM  
 
 9/4/18 CT abd/pelvis: 
FINDINGS:  
LUNG BASES: Clear. INCIDENTALLY IMAGED HEART AND MEDIASTINUM: Unremarkable. LIVER: Numerous hepatic cysts are noted with a reference 2.3 cm cyst in the 
dome. No hepatic mass. GALLBLADDER: Unremarkable. SPLEEN: No mass. PANCREAS: No mass or ductal dilatation. ADRENALS: Unremarkable. KIDNEYS: There is moderate right hydroureteronephrosis to the level of the 
distal ureter, related to the colon mass. There is a delayed nephrogram. There 
is a 2.4 cm left renal cyst. 
STOMACH: Unremarkable. SMALL BOWEL: No dilatation or wall thickening. COLON: There is a mass lesion involving the distal sigmoid colon. There is 
concern for involvement of the posterior bladder wall. APPENDIX: Unremarkable. PERITONEUM: No ascites or pneumoperitoneum. RETROPERITONEUM: No lymphadenopathy or aortic aneurysm. REPRODUCTIVE ORGANS: Prostate seed implants are noted. URINARY BLADDER: No mass or calculus. BONES: The patient is status post bilateral total hip replacement. Degenerative 
changes are seen in the lumbar spine. ADDITIONAL COMMENTS: There is a small periumbilical hernia containing only fat. 
  
IMPRESSION: 
Distal sigmoid colon mass lesion with potential for involvement of the posterior 
bladder wall. This is causing right hydroureteronephrosis and a delayed 
nephrogram. Hepatic and left renal cysts. Small ventral hernia containing only Fat. Chest CT 9/5/18:  
FINDINGS: 
THYROID: No nodule. MEDIASTINUM: No mass or lymphadenopathy. MICHELE: No mass or lymphadenopathy. THORACIC AORTA: No dissection or aneurysm. MAIN PULMONARY ARTERY: Normal in caliber. TRACHEA/BRONCHI: Patent. ESOPHAGUS: No wall thickening or dilatation. HEART: Normal in size. PLEURA: No effusion or pneumothorax. LUNGS: No nodule, mass, or airspace disease. INCIDENTALLY IMAGED UPPER ABDOMEN: Multiple low-attenuation liver lesions are 
again noted. BONES: No destructive bone lesion. DISH throughout the thoracic spine. IMPRESSION: 
No evidence of metastatic disease in the chest. 
  
9/06/2018 Colonoscopy/ Birder Hemming Impression: 
Ulcerated malignant appearing lesion in the rectum and distal sigmoid: path pending Assessment and Recommendations:  
Doris Avendano is a 74 y.o. male comes in with diarrhea and weight loss, found to have sigmoid colon mass, R hydronephrosis. 1. Sigmoid colon mass: Was suggestive of locally advanced colon cancer based on CT and colonoscopy 9/6/18. However, preliminary path is suggestive of prostate cancer. If this is confirmed, unclear whether this is surgically resectable or not. Given persistent diarrhea and risk of obstruction, patient underwent diverting loop colostomy for symptom management. CEA normal. Chest CT negative. Will await final pathology. -- Will await final pathology (prelim is more consistent with prostate cancer than colon cancer. ) -- -- Will cancel port for now and decide after reviewing pathology with patient and team.  
-- If prostate cancer confirmed, patient will need bone scan. 2. Right hydronephrosis: 2/2 extrinsic compression of the R distal ureter from the sigmoid mass. Prior attempt to place a stent was unsuccessful. 9/7 IR placed PCN. -- IR to place ureteral stent tomorrow (9/11) 3. Persistent diarrhea / Weight loss: 2/2 sigmoid mass. Improved with placement of diverting ostomy (9/7) for symptom control by general surgery 4. Normocytic anemia: Likely a combination of anemia of chronic disease due to underlying malignancy. No evidence of iron deficiency. Normal VitB12, folate. 5. Type II DM: On Glimepiride and Metformin. 6. H/o prostate cancer: Diagnosed 2-3 yrs ago per patient, s/p seed implants. Followed by Dr. Nany Abarca with PSA low in 1.1 range per patient at last follow up. Patient seen in conjunction with Capri Moore NP.  
 
Signed By: Paulina Tapia MD   
 September 10, 2018

## 2018-09-11 ENCOUNTER — APPOINTMENT (OUTPATIENT)
Dept: NUCLEAR MEDICINE | Age: 73
DRG: 330 | End: 2018-09-11
Attending: UROLOGY
Payer: MEDICARE

## 2018-09-11 LAB
ANION GAP SERPL CALC-SCNC: 6 MMOL/L (ref 5–15)
BUN SERPL-MCNC: 8 MG/DL (ref 6–20)
BUN/CREAT SERPL: 10 (ref 12–20)
CALCIUM SERPL-MCNC: 8.3 MG/DL (ref 8.5–10.1)
CHLORIDE SERPL-SCNC: 106 MMOL/L (ref 97–108)
CO2 SERPL-SCNC: 27 MMOL/L (ref 21–32)
CREAT SERPL-MCNC: 0.8 MG/DL (ref 0.7–1.3)
ERYTHROCYTE [DISTWIDTH] IN BLOOD BY AUTOMATED COUNT: 14.7 % (ref 11.5–14.5)
GLUCOSE BLD STRIP.AUTO-MCNC: 116 MG/DL (ref 65–100)
GLUCOSE BLD STRIP.AUTO-MCNC: 121 MG/DL (ref 65–100)
GLUCOSE BLD STRIP.AUTO-MCNC: 130 MG/DL (ref 65–100)
GLUCOSE BLD STRIP.AUTO-MCNC: 135 MG/DL (ref 65–100)
GLUCOSE SERPL-MCNC: 114 MG/DL (ref 65–100)
HCT VFR BLD AUTO: 29 % (ref 36.6–50.3)
HGB BLD-MCNC: 8.8 G/DL (ref 12.1–17)
MCH RBC QN AUTO: 25.9 PG (ref 26–34)
MCHC RBC AUTO-ENTMCNC: 30.3 G/DL (ref 30–36.5)
MCV RBC AUTO: 85.3 FL (ref 80–99)
NRBC # BLD: 0 K/UL (ref 0–0.01)
NRBC BLD-RTO: 0 PER 100 WBC
PLATELET # BLD AUTO: 344 K/UL (ref 150–400)
PMV BLD AUTO: 9.8 FL (ref 8.9–12.9)
POTASSIUM SERPL-SCNC: 3.4 MMOL/L (ref 3.5–5.1)
PSA SERPL-MCNC: 1.7 NG/ML (ref 0.01–4)
RBC # BLD AUTO: 3.4 M/UL (ref 4.1–5.7)
SERVICE CMNT-IMP: ABNORMAL
SODIUM SERPL-SCNC: 139 MMOL/L (ref 136–145)
WBC # BLD AUTO: 6.6 K/UL (ref 4.1–11.1)

## 2018-09-11 PROCEDURE — 84153 ASSAY OF PSA TOTAL: CPT | Performed by: NURSE PRACTITIONER

## 2018-09-11 PROCEDURE — 77030010541

## 2018-09-11 PROCEDURE — 82962 GLUCOSE BLOOD TEST: CPT

## 2018-09-11 PROCEDURE — 94762 N-INVAS EAR/PLS OXIMTRY CONT: CPT

## 2018-09-11 PROCEDURE — 65270000029 HC RM PRIVATE

## 2018-09-11 PROCEDURE — 77030010522

## 2018-09-11 PROCEDURE — 85027 COMPLETE CBC AUTOMATED: CPT | Performed by: PHYSICIAN ASSISTANT

## 2018-09-11 PROCEDURE — 36415 COLL VENOUS BLD VENIPUNCTURE: CPT | Performed by: PHYSICIAN ASSISTANT

## 2018-09-11 PROCEDURE — 74011250637 HC RX REV CODE- 250/637: Performed by: PHYSICIAN ASSISTANT

## 2018-09-11 PROCEDURE — 80048 BASIC METABOLIC PNL TOTAL CA: CPT | Performed by: PHYSICIAN ASSISTANT

## 2018-09-11 PROCEDURE — A9503 TC99M MEDRONATE: HCPCS

## 2018-09-11 PROCEDURE — 74011250636 HC RX REV CODE- 250/636: Performed by: PHYSICIAN ASSISTANT

## 2018-09-11 RX ORDER — SODIUM CHLORIDE 0.9 % (FLUSH) 0.9 %
5-10 SYRINGE (ML) INJECTION EVERY 8 HOURS
Status: DISCONTINUED | OUTPATIENT
Start: 2018-09-11 | End: 2018-09-11

## 2018-09-11 RX ORDER — POTASSIUM CHLORIDE 750 MG/1
40 TABLET, FILM COATED, EXTENDED RELEASE ORAL
Status: COMPLETED | OUTPATIENT
Start: 2018-09-11 | End: 2018-09-11

## 2018-09-11 RX ORDER — SODIUM CHLORIDE 0.9 % (FLUSH) 0.9 %
5-10 SYRINGE (ML) INJECTION AS NEEDED
Status: DISCONTINUED | OUTPATIENT
Start: 2018-09-11 | End: 2018-09-11

## 2018-09-11 RX ORDER — NALOXONE HYDROCHLORIDE 0.4 MG/ML
0.2 INJECTION, SOLUTION INTRAMUSCULAR; INTRAVENOUS; SUBCUTANEOUS
Status: DISCONTINUED | OUTPATIENT
Start: 2018-09-11 | End: 2018-09-11

## 2018-09-11 RX ORDER — LIDOCAINE HYDROCHLORIDE 10 MG/ML
0.1 INJECTION, SOLUTION EPIDURAL; INFILTRATION; INTRACAUDAL; PERINEURAL AS NEEDED
Status: DISCONTINUED | OUTPATIENT
Start: 2018-09-11 | End: 2018-09-11

## 2018-09-11 RX ORDER — DIPHENHYDRAMINE HYDROCHLORIDE 50 MG/ML
12.5 INJECTION, SOLUTION INTRAMUSCULAR; INTRAVENOUS AS NEEDED
Status: DISCONTINUED | OUTPATIENT
Start: 2018-09-11 | End: 2018-09-11

## 2018-09-11 RX ORDER — FLUMAZENIL 0.1 MG/ML
0.2 INJECTION INTRAVENOUS
Status: DISCONTINUED | OUTPATIENT
Start: 2018-09-11 | End: 2018-09-11

## 2018-09-11 RX ORDER — SODIUM CHLORIDE, SODIUM LACTATE, POTASSIUM CHLORIDE, CALCIUM CHLORIDE 600; 310; 30; 20 MG/100ML; MG/100ML; MG/100ML; MG/100ML
125 INJECTION, SOLUTION INTRAVENOUS CONTINUOUS
Status: DISCONTINUED | OUTPATIENT
Start: 2018-09-11 | End: 2018-09-11

## 2018-09-11 RX ORDER — HYDROMORPHONE HYDROCHLORIDE 1 MG/ML
.25-1 INJECTION, SOLUTION INTRAMUSCULAR; INTRAVENOUS; SUBCUTANEOUS
Status: DISCONTINUED | OUTPATIENT
Start: 2018-09-11 | End: 2018-09-11

## 2018-09-11 RX ADMIN — HYDROMORPHONE HYDROCHLORIDE 0.5 MG: 2 INJECTION, SOLUTION INTRAMUSCULAR; INTRAVENOUS; SUBCUTANEOUS at 07:10

## 2018-09-11 RX ADMIN — POTASSIUM CHLORIDE 40 MEQ: 750 TABLET, FILM COATED, EXTENDED RELEASE ORAL at 10:07

## 2018-09-11 RX ADMIN — TAMSULOSIN HYDROCHLORIDE 0.4 MG: 0.4 CAPSULE ORAL at 10:07

## 2018-09-11 NOTE — PROGRESS NOTES
[address] [facility] at [facility] [phone] Hematology / Oncology Progress Note Reason for Visit:  
Mordecai Jeans is a 74 y.o. male is being seen for f/u of sigmoid mass. History of Present Illness: Mr. Mohamud Jung is a 75 y/o male admitted with persistent diarrhea, weight loss now found to have a sigmoid colon mass. Patient reports that he has had diarrhea and weight loss for the past 4-5 months. He reports undergoing 2 recent colonoscopies (one in July and one in Aug 2018) by Dr. Jennifer Patel at [facility]. He states the first colonoscopy was not clear due to poor prep. The second colonoscopy reportedly showed abnormal rectal and sigmoid mucosa with a possible mass. This was biopsied and was benign. The patient then underwent a CT scan which was notable for R-sided hydronephrosis from extrinsic compression of the right ureter. The patient's urologist, Dr. Evaristo Ochoa, attempted to pass a ureteral stent but was unsuccessful. Now, the patient returns to the hospital with weakness, persistent diarrhea and lower abdominal pain. He states he has seen mild blood in urine after the recent procedures, but no significant blood loss in his stools. He reportedly has lost over 50-70 lbs in the past 4 months. He has not eaten much in the past 3 weeks due to the food running straight through him. He is up walking around at home, but gets tired easily which he attributes to his poor nutrition. Since admission, he underwent abd/pelvis CT which shows a distal sigmoid mass with possible invasion of the posterior bladder wall as well as R hydronephrosis. For type II DM, the patient takes oral medications. He also has a remote h/o prostate cancer treated with seed implants. Patient has been evaluated by Dr. Marvella Fabry in GI. He is planned for colonoscopy today. Patient has had several bowel movements last night and this morning.  He continues to have lower abdominal pain, but he does not request pain medicines even when asked. No n/v. No blood in the stool. Interval History:  
 Denies any pain; no complaints at present. Voices needs to follow up with urologist.  
 
No family at bedside. Past Medical History:  
Diagnosis Date  Diabetes mellitus ([facility]  Prostate cancer ([facility]  Sleep apnea Past Surgical History:  
Procedure Laterality Date 2021 Luis Orona N/A 9/6/2018 SIGMOIDOSCOPY FLEXIBLE performed by Itz Medina MD at [address] [place] [address] Social History Substance Use Topics  Smoking status: Never Smoker  Smokeless tobacco: Never Used  Alcohol use No  
  
History reviewed. No pertinent family history. Current Facility-Administered Medications Medication Dose Route Frequency  lidocaine (PF) (XYLOCAINE) 10 mg/mL (1 %) injection 0.1 mL  0.1 mL SubCUTAneous PRN  
 lactated Ringers infusion  125 mL/hr IntraVENous CONTINUOUS  
 sodium chloride (NS) flush 5-10 mL  5-10 mL IntraVENous Q8H  
 sodium chloride (NS) flush 5-10 mL  5-10 mL IntraVENous PRN  
 naloxone (NARCAN) injection 0.2 mg  0.2 mg IntraVENous EVERY 2 MINUTES AS NEEDED  flumazenil (ROMAZICON) 0.1 mg/mL injection 0.2 mg  0.2 mg IntraVENous Multiple  lactated Ringers infusion  125 mL/hr IntraVENous CONTINUOUS  
 sodium chloride (NS) flush 5-10 mL  5-10 mL IntraVENous PRN  
 HYDROmorphone (PF) (DILAUDID) injection 0.25-1 mg  0.25-1 mg IntraVENous Q10MIN PRN  
 diphenhydrAMINE (BENADRYL) injection 12.5 mg  12.5 mg IntraVENous PRN  
 fentaNYL citrate (PF) injection 12.5-200 mcg  12.5-200 mcg IntraVENous Multiple  midazolam (VERSED) injection 0.5-10 mg  0.5-10 mg IntraVENous Multiple  lidocaine (XYLOCAINE) 10 mg/mL (1 %) injection 10-20 mL  10-20 mL IntraDERMal Multiple  HYDROmorphone (PF) (DILAUDID) injection 0.5 mg  0.5 mg IntraVENous Q3H PRN  
 sodium chloride (NS) flush 5-10 mL  5-10 mL IntraVENous Q8H  
  sodium chloride (NS) flush 5-10 mL  5-10 mL IntraVENous PRN  
 naloxone (NARCAN) injection 0.4 mg  0.4 mg IntraVENous PRN  
 diphenhydrAMINE (BENADRYL) injection 12.5 mg  12.5 mg IntraVENous Q4H PRN  
 acetaminophen (TYLENOL) tablet 650 mg  650 mg Oral Q4H PRN  
 glucose chewable tablet 16 g  4 Tab Oral PRN  
 dextrose (D50W) injection syrg 12.5-25 g  25-50 mL IntraVENous PRN  
 glucagon (GLUCAGEN) injection 1 mg  1 mg IntraMUSCular PRN  
 insulin lispro (HUMALOG) injection   SubCUTAneous Q6H  
 tamsulosin (FLOMAX) capsule 0.4 mg  0.4 mg Oral DAILY No Known Allergies Review of Systems: A complete review of systems was obtained, negative except as described above. Physical Exam:  
 
Visit Vitals  /76 (BP 1 Location: Left arm, BP Patient Position: At rest)  Pulse 82  Temp 98 °F (36.7 °C)  Resp 19  
 Ht 5' 9\" (1.753 m)  Wt 98 kg (216 lb)  SpO2 97%  BMI 31.9 kg/m2 ECOG PS: 2 General: No distress Eyes: PERRLA, anicteric sclerae HENT: Atraumatic with normal appearance of ears and nose; OP clear Neck: Supple; no thyromegaly Respiratory: CTAB, normal respiratory effort CV: Normal rate, regular rhythm, no murmurs, no peripheral edema GI: colostomy noted; Soft, nondistended, no masses, no hepatomegaly, no splenomegaly. : Rt PCN noted MS:  Digits without clubbing or cyanosis. Skin: No rashes, ecchymoses, or petechiae. Normal temperature, turgor, and texture. Neuro/Psych: Alert, oriented, appropriate affect, normal judgment/insight Results:  
 
Lab Results Component Value Date/Time WBC 6.6 09/11/2018 04:32 AM  
 HGB 8.8 (L) 09/11/2018 04:32 AM  
 HCT 29.0 (L) 09/11/2018 04:32 AM  
 PLATELET 859 01/86/0705 04:32 AM  
 MCV 85.3 09/11/2018 04:32 AM  
 ABS. NEUTROPHILS 5.1 09/04/2018 02:23 PM  
 
Lab Results Component Value Date/Time  Sodium 139 09/11/2018 04:32 AM  
 Potassium 3.4 (L) 09/11/2018 04:32 AM  
 Chloride 106 09/11/2018 04:32 AM  
 CO2 27 09/11/2018 04:32 AM  
 Glucose 114 (H) 09/11/2018 04:32 AM  
 BUN 8 09/11/2018 04:32 AM  
 Creatinine 0.80 09/11/2018 04:32 AM  
 GFR est AA >60 09/11/2018 04:32 AM  
 GFR est non-AA >60 09/11/2018 04:32 AM  
 Calcium 8.3 (L) 09/11/2018 04:32 AM  
 Glucose (POC) 121 (H) 09/11/2018 11:34 AM  
 
Lab Results Component Value Date/Time Bilirubin, total 0.3 09/04/2018 02:23 PM  
 ALT (SGPT) 29 09/04/2018 02:23 PM  
 AST (SGOT) 23 09/04/2018 02:23 PM  
 Alk. phosphatase 66 09/04/2018 02:23 PM  
 Protein, total 7.3 09/04/2018 02:23 PM  
 Albumin 2.5 (L) 09/04/2018 02:23 PM  
 Globulin 4.8 (H) 09/04/2018 02:23 PM  
 
9/4/18 CT abd/pelvis: 
FINDINGS:  
LUNG BASES: Clear. INCIDENTALLY IMAGED HEART AND MEDIASTINUM: Unremarkable. LIVER: Numerous hepatic cysts are noted with a reference 2.3 cm cyst in the 
dome. No hepatic mass. GALLBLADDER: Unremarkable. SPLEEN: No mass. PANCREAS: No mass or ductal dilatation. ADRENALS: Unremarkable. KIDNEYS: There is moderate right hydroureteronephrosis to the level of the 
distal ureter, related to the colon mass. There is a delayed nephrogram. There 
is a 2.4 cm left renal cyst. 
STOMACH: Unremarkable. SMALL BOWEL: No dilatation or wall thickening. COLON: There is a mass lesion involving the distal sigmoid colon. There is 
concern for involvement of the posterior bladder wall. APPENDIX: Unremarkable. PERITONEUM: No ascites or pneumoperitoneum. RETROPERITONEUM: No lymphadenopathy or aortic aneurysm. REPRODUCTIVE ORGANS: Prostate seed implants are noted. URINARY BLADDER: No mass or calculus. BONES: The patient is status post bilateral total hip replacement. Degenerative 
changes are seen in the lumbar spine. ADDITIONAL COMMENTS: There is a small periumbilical hernia containing only fat. 
  
IMPRESSION: 
Distal sigmoid colon mass lesion with potential for involvement of the posterior 
bladder wall. This is causing right hydroureteronephrosis and a delayed nephrogram. Hepatic and left renal cysts. Small ventral hernia containing only Fat. Chest CT 9/5/18:  
FINDINGS: 
THYROID: No nodule. MEDIASTINUM: No mass or lymphadenopathy. MICHELE: No mass or lymphadenopathy. THORACIC AORTA: No dissection or aneurysm. MAIN PULMONARY ARTERY: Normal in caliber. TRACHEA/BRONCHI: Patent. ESOPHAGUS: No wall thickening or dilatation. HEART: Normal in size. PLEURA: No effusion or pneumothorax. LUNGS: No nodule, mass, or airspace disease. INCIDENTALLY IMAGED UPPER ABDOMEN: Multiple low-attenuation liver lesions are 
again noted. BONES: No destructive bone lesion. DISH throughout the thoracic spine. IMPRESSION: 
No evidence of metastatic disease in the chest. 
  
9/06/2018 Colonoscopy/ Rosie Armijo Impression: 
Ulcerated malignant appearing lesion in the rectum and distal sigmoid: path pending Assessment and Recommendations:  
Fran Beard is a 74 y.o. male comes in with diarrhea and weight loss, found to have sigmoid colon mass, R hydronephrosis. 1. Sigmoid colon mass: Was suggestive of locally advanced colon cancer based on CT and colonoscopy 9/6/18. However, preliminary path is suggestive of prostate cancer. If this is confirmed, unclear whether this is surgically resectable or not. Given persistent diarrhea and risk of obstruction, patient underwent diverting loop colostomy for symptom management. CEA normal. Chest CT negative. Will await final pathology. -- Will await final pathology (prelim is more consistent with prostate cancer than colon cancer. ) -- -- Will cancel port for now and decide after reviewing pathology with patient and team.  
-- 9/11 Bone scan pending 
--Follow up with Dr Vincent Winslow 99/17) established and placed in discharge summary. 2. Right hydronephrosis: 2/2 extrinsic compression of the R distal ureter from the sigmoid mass. Prior attempt to place a stent was unsuccessful. 9/7 IR placed PCN. - Urology holding on placement of stent pending final path report 3. Persistent diarrhea / Weight loss: 2/2 sigmoid mass. Improved with placement of diverting ostomy (9/7) for symptom control by general surgery 4. Normocytic anemia: Likely a combination of anemia of chronic disease due to underlying malignancy. No evidence of iron deficiency. Normal VitB12, folate. 5. Type II DM: On Glimepiride and Metformin. 6. H/o prostate cancer: Diagnosed 2-3 yrs ago per patient, s/p seed implants. Followed by Dr. Alfredo Heath with PSA low in 1.1 range per patient at last follow up. Repeat PSA pending:  
 
Plan reviewed with Dr Nile Humphries. Signed By: Alessandro Quiroga NP September 11, 2018

## 2018-09-11 NOTE — PROGRESS NOTES
GI note Awaiting pathology results. Hgb stable S/P colostomy It seems pathology may reveal this rectal and pelvic process is prostate adenocarcinoma but stains and confirmatory evaluation are still pending. This might explain the unusual presentation and the difficulty in obtaining a histologic diagnosis. Treatment of this condition - local extension of prostate adenoCA is outside my realm of experience. I'll see again if needed, don't hesitate to call if so. For now, GI will sign off.  
Kayli Redding MD

## 2018-09-11 NOTE — PROGRESS NOTES
See oncology note. If path is prostate cancer, I would not place stent. May cause pain / bleeding Ordered bone scan, bones normal on recent ct C/A/P 
 
PSA low according to Dr. Anthony Dia his urologist. Will get records in am 
 
Consider hormonal blockade. Will discuss with our urologic oncologist / Dr. Anthony Dia next best line of therapy.

## 2018-09-11 NOTE — PROGRESS NOTES
General Surgery Daily Progress Note Patient: Morgan Perkins MRN: [id number]  SSN: [id number] YOB: 1945  Age: 74 y.o. Sex: male Admit Date: 9/4/2018 Subjective:  
Feeling ok, pain controlled, tolerating diet. Ostomy functioning. Current Facility-Administered Medications Medication Dose Route Frequency  lidocaine (PF) (XYLOCAINE) 10 mg/mL (1 %) injection 0.1 mL  0.1 mL SubCUTAneous PRN  
 lactated Ringers infusion  125 mL/hr IntraVENous CONTINUOUS  
 sodium chloride (NS) flush 5-10 mL  5-10 mL IntraVENous Q8H  
 sodium chloride (NS) flush 5-10 mL  5-10 mL IntraVENous PRN  
 naloxone (NARCAN) injection 0.2 mg  0.2 mg IntraVENous EVERY 2 MINUTES AS NEEDED  flumazenil (ROMAZICON) 0.1 mg/mL injection 0.2 mg  0.2 mg IntraVENous Multiple  lactated Ringers infusion  125 mL/hr IntraVENous CONTINUOUS  
 sodium chloride (NS) flush 5-10 mL  5-10 mL IntraVENous PRN  
 HYDROmorphone (PF) (DILAUDID) injection 0.25-1 mg  0.25-1 mg IntraVENous Q10MIN PRN  
 diphenhydrAMINE (BENADRYL) injection 12.5 mg  12.5 mg IntraVENous PRN  
 fentaNYL citrate (PF) injection 12.5-200 mcg  12.5-200 mcg IntraVENous Multiple  midazolam (VERSED) injection 0.5-10 mg  0.5-10 mg IntraVENous Multiple  lidocaine (XYLOCAINE) 10 mg/mL (1 %) injection 10-20 mL  10-20 mL IntraDERMal Multiple  HYDROmorphone (PF) (DILAUDID) injection 0.5 mg  0.5 mg IntraVENous Q3H PRN  
 sodium chloride (NS) flush 5-10 mL  5-10 mL IntraVENous Q8H  
 sodium chloride (NS) flush 5-10 mL  5-10 mL IntraVENous PRN  
 naloxone (NARCAN) injection 0.4 mg  0.4 mg IntraVENous PRN  
 diphenhydrAMINE (BENADRYL) injection 12.5 mg  12.5 mg IntraVENous Q4H PRN  
 acetaminophen (TYLENOL) tablet 650 mg  650 mg Oral Q4H PRN  
 glucose chewable tablet 16 g  4 Tab Oral PRN  
 dextrose (D50W) injection syrg 12.5-25 g  25-50 mL IntraVENous PRN  
 glucagon (GLUCAGEN) injection 1 mg  1 mg IntraMUSCular PRN  
  insulin lispro (HUMALOG) injection   SubCUTAneous Q6H  
 tamsulosin (FLOMAX) capsule 0.4 mg  0.4 mg Oral DAILY Objective:  
09/11 0701 - 09/11 1900 In: -  
Out: 825 [Urine:825] 09/09 1901 - 09/11 0700 In: 240 [P.O.:240] Out: 945 [Urine:925] Patient Vitals for the past 8 hrs: 
 BP Temp Pulse Resp SpO2  
09/11/18 1206 141/70 98.7 °F (37.1 °C) 82 18 98 % 09/11/18 0750 137/76 98 °F (36.7 °C) 82 19 97 % Physical Exam: 
General: Alert, cooperative, NAD Lungs: Unlabored Heart:  Regular rate and  rhythm Abdomen: Soft, ATTP, ostomy functioning. Incisions c/d/i. Nephrostomy right side. Extremities: Warm, moves all, no edema Skin:  Warm and dry, no rash Labs:  
Recent Labs  
   09/11/18 
 8529 WBC  6.6 HGB  8.8* HCT  29.0*  
PLT  344 Recent Labs  
   09/11/18 
 0432  09/10/18 
 1048 NA  139  142  
K  3.4*  3.3*  
CL  106  106 CO2  27  29 GLU  114*  133* BUN  8  8 CREA  0.80  0.91  
CA  8.3*  8.1*  
MG   --   2.1 Assessment / Plan: · Sigmoid colon mass · POD#4 lap loop sigmoid colostomy · Diet as tolerated · Preliminary path from biopsy shows prostate cancer. Port and IR ureteral stent canceled. He is getting bone scan today per urology. · Wound care for ostomy teaching · Anticipate discharge tomorrow.

## 2018-09-11 NOTE — PROGRESS NOTES
Received call from Dr Rosalinda Rebolledo, Interventional Radiologist regarding placing a ureteral stent and a port. Dr Rosalinda Rebolledo had spoken to Dr Yamilka Cheung and both procedures are no longer needed. Called floor and spoke to [facility] to inform her of changes.

## 2018-09-11 NOTE — PROGRESS NOTES
Nutrition Assessment: 
 
RECOMMENDATIONS/INTERVENTION(S):  
Continue CCD diet- changed to low fiber, 2200 kcal. 
Monitor PO intakes, BG, weight, ostomy output No ONS needed at this time. ASSESSMENT:  
9/11: Pt postop day #4 lap loop sigmoid colostomy. Pt screened for LOS. Pt diet education given. MNT for ostomy- low fiber diet. Pt tolerating PO currently. Eating small frequent meals. Pt states his stomach has shrunk 2/2 not being able to eat much with constant diarrhea. MST not completed, no consult. Pt has lost 46 lbs (18%) in the last < 3 months. States he's lost 75 lbs total, likely closer to 6-10 months, significant for time frame. Pt had hip replacement in July and couldn't finish PT due to diarrhea keeping him at home. Nausea but no vomiting. Pt feeling better at this time. BG controlled. Pt asking appropriate questions. Pt states he's likely to d/c tomorrow. Meets Criteria for Severe Acute Malnutrition as evidenced by: 
 [] Moderate muscle wasting, loss of subcutaneous fat 
 [x] Nutritional intake of <50% of recommended intake for >5 days [x] Weight loss of >1-2% in 1 week, >5% in 1 month, >7.5% in 3 months, or >10% in 6 months 
 [] Moderate-severe edema SUBJECTIVE/OBJECTIVE:  
Diet Order:  CCD 2400 kcal.  
% Eaten:  Patient Vitals for the past 168 hrs: 
 % Diet Eaten 09/10/18 1042 100 % 09/06/18 1956 25 % Pertinent Medications: [x] Reviewed Past Medical History:  
Diagnosis Date  Diabetes mellitus ([facility] [address]  Prostate cancer ([facility] [address] [address]  Sleep apnea Chemistries: 
Lab Results Component Value Date/Time  Sodium 139 09/11/2018 04:32 AM  
 Potassium 3.4 (L) 09/11/2018 04:32 AM  
 Chloride 106 09/11/2018 04:32 AM  
 CO2 27 09/11/2018 04:32 AM  
 Anion gap 6 09/11/2018 04:32 AM  
 Glucose 114 (H) 09/11/2018 04:32 AM  
 BUN 8 09/11/2018 04:32 AM  
 Creatinine 0.80 09/11/2018 04:32 AM  
 BUN/Creatinine ratio 10 (L) 09/11/2018 04:32 AM  
 GFR est AA >60 09/11/2018 04:32 AM  
 GFR est non-AA >60 09/11/2018 04:32 AM  
 Calcium 8.3 (L) 09/11/2018 04:32 AM  
 AST (SGOT) 23 09/04/2018 02:23 PM  
 Alk. phosphatase 66 09/04/2018 02:23 PM  
 Protein, total 7.3 09/04/2018 02:23 PM  
 Albumin 2.5 (L) 09/04/2018 02:23 PM  
 Globulin 4.8 (H) 09/04/2018 02:23 PM  
 A-G Ratio 0.5 (L) 09/04/2018 02:23 PM  
 ALT (SGPT) 29 09/04/2018 02:23 PM  
  
Anthropometrics: Height: 5' 9\" (175.3 cm) Weight: 98 kg (216 lb) IBW (%IBW):   ( ) UBW (%UBW):   (  %) BMI: Body mass index is 31.9 kg/(m^2). This BMI is indicative of: 
 
 [] Underweight    [] Normal    [] Overweight    [x]  Obesity    []  Extreme Obesity (BMI>40) Estimated Nutrition Needs (Based on): 2217 kcal BMR(1706x1.3) ,   98-118g/day (1.0-1.2g/kg) Protein Carbohydrate: At Least 130 g/day  Fluids: 2200 mL/day (1mL/kg rounded to 50 mL) Last BM: 9/11- watery  [x]Active     []Hyperactive  []Hypoactive       [] Absent   BS Skin:    [x] Intact   [] Incision  [] Breakdown   [] DTI   [] Tears/Excoriation/Abrasion  []Edema [] Other: Wt Readings from Last 30 Encounters:  
09/06/18 98 kg (216 lb)  
06/14/18 118.8 kg (262 lb) NUTRITION DIAGNOSES:  
Problem:      altered GI function Etiology: related to     alteration in Gi tract structure/function Signs/Symptoms: as evidenced by    new colostomy, mass removal, diarrhea x 4-5 months PTA. NUTRITION INTERVENTIONS: 
              general nutrition management, Oral nutrition supplements GOAL:  
 Pt will consume >50% of meals without difficulty with 3-5 days Cultural, Tenriism, or Ethnic Dietary Needs:   none LEARNING NEEDS (Diet, Food/Nutrient-Drug Interaction):  
 [] None Identified 
 [] Identified and Education Provided/Documented 
 [] Identified and Pt declined/was not appropriate [x] Interdisciplinary Care Plan Reviewed/Documented  
 [x] Discharge Needs:    TBD [] No Nutrition Related Discharge Needs NUTRITION RISK:  
Pt Is At Nutrition Risk  [x] No Nutrition Risk Identified  [] PT SEEN FOR:  
 []  MD Consult: []Calorie Count []Diabetic Diet Education []Diet Education []Electrolyte Management []General Nutrition Management and Supplements []Management of Tube Feeding []TPN Recommendations []  RN Referral:  []MST score >=2 
   []Enteral/Parenteral Nutrition PTA []Pregnant: Gestational DM or Multigestation  
              [] Pressure Ulcer 
   
[]  Low BMI      [x]  Length of Stay       [] Dysphagia Diet     [] Ventilator      [] Follow-Up Previous Recommendations: 
 [] Implemented          [] Not Implemented          [x] Not Applicable Previous Goal: 
 [] Met              [] Progressing Towards Goal              [] Not Progressing Towards Goal   [x] Not Applicable Pradeep Bray RD Pager: [phone] Office: [phone]

## 2018-09-11 NOTE — WOUND CARE
Ostomy nurse follow up- plans to discharge tomorrow with [facility] to follow. Discussed plans to change appliance at bedside with daughter in law in the am prior to discharge. Patient will review education this pm. 
MD order to remove the red june prior to release. Ostomy appliance intact with small amount of brown liquid, passing gas. Follow up 9/12 to change appliance prior to discharge.  
Wendy Peters

## 2018-09-11 NOTE — PROGRESS NOTES
9/11/2018 2:15 PM 
CM consult for home health noted. CM completed referral to [facility], who accepted.

## 2018-09-11 NOTE — PROGRESS NOTES
Dr. Alexandra Smart called through pager system to clarify orders on whether or not the patient needs to remain NPO due to the procedures being cancelled today.

## 2018-09-12 VITALS
WEIGHT: 216 LBS | RESPIRATION RATE: 18 BRPM | OXYGEN SATURATION: 95 % | TEMPERATURE: 98.4 F | HEIGHT: 69 IN | DIASTOLIC BLOOD PRESSURE: 70 MMHG | BODY MASS INDEX: 31.99 KG/M2 | HEART RATE: 90 BPM | SYSTOLIC BLOOD PRESSURE: 142 MMHG

## 2018-09-12 LAB
GLUCOSE BLD STRIP.AUTO-MCNC: 130 MG/DL (ref 65–100)
GLUCOSE BLD STRIP.AUTO-MCNC: 139 MG/DL (ref 65–100)
GLUCOSE BLD STRIP.AUTO-MCNC: 151 MG/DL (ref 65–100)
SERVICE CMNT-IMP: ABNORMAL

## 2018-09-12 PROCEDURE — 74011250637 HC RX REV CODE- 250/637: Performed by: PHYSICIAN ASSISTANT

## 2018-09-12 PROCEDURE — 77030011641 HC PASTE OST ADH BMS -A

## 2018-09-12 PROCEDURE — 77030012856

## 2018-09-12 PROCEDURE — 74011250636 HC RX REV CODE- 250/636: Performed by: PHYSICIAN ASSISTANT

## 2018-09-12 PROCEDURE — 82962 GLUCOSE BLOOD TEST: CPT

## 2018-09-12 PROCEDURE — 77030010541

## 2018-09-12 RX ORDER — HYDROCODONE BITARTRATE AND ACETAMINOPHEN 5; 325 MG/1; MG/1
1 TABLET ORAL
Qty: 30 TAB | Refills: 0 | Status: SHIPPED | OUTPATIENT
Start: 2018-09-12 | End: 2018-12-07 | Stop reason: ALTCHOICE

## 2018-09-12 RX ADMIN — TAMSULOSIN HYDROCHLORIDE 0.4 MG: 0.4 CAPSULE ORAL at 08:13

## 2018-09-12 RX ADMIN — ACETAMINOPHEN 650 MG: 325 TABLET ORAL at 08:12

## 2018-09-12 RX ADMIN — Medication 5 ML: at 06:00

## 2018-09-12 RX ADMIN — HYDROMORPHONE HYDROCHLORIDE 0.5 MG: 2 INJECTION, SOLUTION INTRAMUSCULAR; INTRAVENOUS; SUBCUTANEOUS at 08:14

## 2018-09-12 NOTE — DISCHARGE SUMMARY
Surgery Discharge Summary     Patient ID:  Maggi Shoemaker  [id number]  30 y.o.  1945    Admit date: 9/4/2018    Discharge date and time: 9/12/2018  3:50 PM     Admission Diagnoses:    Patient Active Problem List   Diagnosis Code    Severe obesity (BMI 35.0-39.9) ([facility] E66.01    Pelvic mass R19.00       Discharge Diagnoses: There are no discharge diagnoses documented for the most recent discharge. Patient Active Problem List   Diagnosis Code    Severe obesity (BMI 35.0-39.9) ([facility] E66.01    Pelvic mass R19.00       Procedures for this admission: Procedure(s):  LAPAROSCOPY, POSSIBLE COLOSTOMY    Hospital Course: Mr. Clarita Portillo presented to the ED on DOA with c/o abdominal pain, diarrhea, and fecal incontinence. CT showed evidence of sigmoid mass with resulting right hydronephrosis. He was admitted. GI, urology, and oncology were consulted. Patient underwent colonoscopy which showed large sigmoid mass. Initial plan was for neoadjuvant chemotherapy for presumed colon ca so patient underwent diverting loop colostomy for symptoms control. IR placed perc nephrostomy tube to address hydronephrosis at recommendation of urology as prior cystoscopy and stent placement had failed. Preliminary pathology showed sigmoid mass was likely prostate malignancy so plan for port placement and neoadjuvant chemo therapy is now on hold. Patient was discharged home with nephrostomy in place and [facility] care arranged for ostomy care. He will follow up with urology, oncology, and surgery as outpatient for continued management of his malignancy.      Disposition: d/c home    Discharged Condition : stable                  Signed:  CRISELDA Whitaker  9/12/2018  4:41 PM

## 2018-09-12 NOTE — ROUTINE PROCESS
Verbal shift change report given to [id number] (oncoming nurse) by Senia Draper  
rn (offgoing nurse). Report included the following information SBAR, Kardex, Intake/Output, MAR and Recent Results.

## 2018-09-12 NOTE — PROGRESS NOTES
CRISELDA Tena clarified that patient is to go home with nephro tube in place. Patient demonstrated understanding: to empty the nephrostomy tube collection bag frequently and at least whenever it is half full or more, to keep the bag clean and off of the floor, below the level of his bladder and free of kinks and dependent loops. Patient's sister who lives with him was at the bedside as well. Site is c/d/i at discharge. He verbalized understanding to call his physician if there is no or decreased output out of the tube or if the output changes in color or clarity.

## 2018-09-12 NOTE — PROGRESS NOTES
9/12/2018 
1:31 PM 
Pt discharge noted. CM reviewed EMR. Pt will be followed by [facility]. Pt's family will provide transport upon discharge. No additional DC service needs indicated.

## 2018-09-12 NOTE — WOUND CARE
Ostomy nurse follow up to continue ostomy education with patient and sister who will assist with care at home. [facility] to follow at home. All educational material and appliances reviewed, step by step process reviewed and questions answered. Patient able to visualize with mirror, sister involved with hands on application. Ostomy appliance removed - small amount tan drainage in appliance. Stoma is red and budded- red rubber catheter removed per MD order. Evelyn stomal skin intact. 2 piece appliance 2 3/4 Janet applied, skin prep and small amount of stoma paste. Tolerated well. Supplies gathered for discharge. Plans to DC today.   
Fermin Amor RN [facility]

## 2018-09-12 NOTE — DISCHARGE INSTRUCTIONS
Patient Discharge Instructions    Mushtaq Farley / [id number] : 1945    Admitted 2018 Discharged: 2018     Take Home Medications       · It is important that you take the medication exactly as they are prescribed. · Keep your medication in the bottles provided by the pharmacist and keep a list of the medication names, dosages, and times to be taken in your wallet. · Do not take other medications without consulting your doctor. · Do not drive, drink alcohol, or operate machinery when taking sedating pain medication. · Take colace daily while on pain medication to help prevent constipation. You may take over the counter laxatives such as Dulcolax or Miralax as needed for constipation      What to do at Home    Recommended diet: Diabetic diet     Recommended activity: No heavy lifting or strenuous activity for 6 weeks. You may shower. You may drive once pain has improved and you no longer require pain medication. Follow up with Dr. Levada Brittle in 2 weeks. Call [facility] to make an appointment. Follow up with Dr. Jack Gutiérrez as scheduled. Follow up with Dr. Km Salas. Call Dr. Levada Brittle or go to the ER if you develop worsening pain, fever, vomiting, or any other symptoms that concern you.

## 2018-09-12 NOTE — PROGRESS NOTES
General Surgery Daily Progress Note Patient: Efren Montano MRN: [id number]  SSN: [id number] YOB: 1945  Age: 74 y.o. Sex: male Admit Date: 9/4/2018 Subjective:  
Feeling ok, pain controlled, tolerating diet. Ostomy functioning. Current Facility-Administered Medications Medication Dose Route Frequency  fentaNYL citrate (PF) injection 12.5-200 mcg  12.5-200 mcg IntraVENous Multiple  midazolam (VERSED) injection 0.5-10 mg  0.5-10 mg IntraVENous Multiple  lidocaine (XYLOCAINE) 10 mg/mL (1 %) injection 10-20 mL  10-20 mL IntraDERMal Multiple  HYDROmorphone (PF) (DILAUDID) injection 0.5 mg  0.5 mg IntraVENous Q3H PRN  
 sodium chloride (NS) flush 5-10 mL  5-10 mL IntraVENous Q8H  
 sodium chloride (NS) flush 5-10 mL  5-10 mL IntraVENous PRN  
 naloxone (NARCAN) injection 0.4 mg  0.4 mg IntraVENous PRN  
 diphenhydrAMINE (BENADRYL) injection 12.5 mg  12.5 mg IntraVENous Q4H PRN  
 acetaminophen (TYLENOL) tablet 650 mg  650 mg Oral Q4H PRN  
 glucose chewable tablet 16 g  4 Tab Oral PRN  
 dextrose (D50W) injection syrg 12.5-25 g  25-50 mL IntraVENous PRN  
 glucagon (GLUCAGEN) injection 1 mg  1 mg IntraMUSCular PRN  
 insulin lispro (HUMALOG) injection   SubCUTAneous Q6H  
 tamsulosin (FLOMAX) capsule 0.4 mg  0.4 mg Oral DAILY Objective:  
09/12 0701 - 09/12 1900 In: 120 [P.O.:120] Out: 0  
09/10 1901 - 09/12 0700 In: -  
Out: [address] [[facility]:2754] Patient Vitals for the past 8 hrs: 
 BP Temp Pulse Resp SpO2  
09/12/18 1133 169/88 98.4 °F (36.9 °C) 90 18 95 % 09/12/18 0757 - - - - 95 % 09/12/18 0752 139/66 98.6 °F (37 °C) 88 19 95 % Physical Exam: 
General: Alert, cooperative, NAD Lungs: Unlabored Heart:  Regular rate and  rhythm Abdomen: Soft, ATTP, ostomy functioning. Incisions c/d/i. Nephrostomy right side. Extremities: Warm, moves all, no edema Skin:  Warm and dry, no rash Labs:  
Recent Labs  
   09/11/18 
 5239 WBC  6.6 HGB  8.8*  
 HCT  29.0*  
PLT  344 Recent Labs  
   09/11/18 
 0432  09/10/18 
 1048 NA  139  142  
K  3.4*  3.3*  
CL  106  106 CO2  27  29 GLU  114*  133* BUN  8  8 CREA  0.80  0.91  
CA  8.3*  8.1*  
MG   --   2.1 Assessment / Plan: · Sigmoid colon mass · POD#5 lap loop sigmoid colostomy · S/p right nephrostomy · Diet as tolerated · Preliminary path shows prostate cancer. · Wound care for ostomy teaching · Discharge today. F/u with urology, oncology, surgery

## 2018-09-17 ENCOUNTER — DOCUMENTATION ONLY (OUTPATIENT)
Dept: ONCOLOGY | Age: 73
End: 2018-09-17

## 2018-09-17 ENCOUNTER — OFFICE VISIT (OUTPATIENT)
Dept: ONCOLOGY | Age: 73
End: 2018-09-17

## 2018-09-17 VITALS
SYSTOLIC BLOOD PRESSURE: 117 MMHG | OXYGEN SATURATION: 92 % | TEMPERATURE: 101.3 F | DIASTOLIC BLOOD PRESSURE: 90 MMHG | HEIGHT: 69 IN | BODY MASS INDEX: 32.47 KG/M2 | WEIGHT: 219.2 LBS | HEART RATE: 85 BPM

## 2018-09-17 DIAGNOSIS — D63.8 ANEMIA OF CHRONIC DISEASE: ICD-10-CM

## 2018-09-17 DIAGNOSIS — C61 PROSTATE CANCER (HCC): Primary | ICD-10-CM

## 2018-09-17 DIAGNOSIS — Z93.3 COLOSTOMY IN PLACE (HCC): ICD-10-CM

## 2018-09-17 DIAGNOSIS — N13.30 HYDRONEPHROSIS, RIGHT: ICD-10-CM

## 2018-09-17 RX ORDER — CEPHALEXIN 500 MG/1
CAPSULE ORAL
Refills: 0 | COMMUNITY
Start: 2018-08-17 | End: 2018-12-07 | Stop reason: ALTCHOICE

## 2018-09-17 NOTE — MR AVS SNAPSHOT
Robinson Peters 
 
 
 [address] [place] [address], [address] [facility] 
[phone] Patient: Marisela Brody MRN: [id number] FNK:[id number] Visit Information Date & Time Provider Department Dept. Phone Encounter #  
 9/17/2018 10:00 AM MD Luis Carlos Martins Oncology at [address] [facility] [id number] Follow-up Instructions Return for Prostate cancer f/u, Cherri. Your Appointments 10/29/2018 11:00 AM  
ESTABLISHED PATIENT with MD Luis Carlos Martins Oncology at [address] [facility]) Appt Note: 6 wk fuCherri [address] [place] [address], [address] [place] [address]  
[phone]  
  
   
 [address] [place] [address], [address] [place] [address]  
  
    
 12/13/2018 10:20 AM  
Any with Liz Adams MD  
[address] [employer] [facility] ([facility]) Appt Note: yearly f/ up  
 [address] [facility] [address] [address] [facility] [address] Upcoming Health Maintenance Date Due DTaP/Tdap/Td series (1 - Tdap) 8/18/1964 FOBT Q 1 YEAR AGE 50-75 8/18/1993 ZOSTER VACCINE AGE 60> 6/18/2003 GLAUCOMA SCREENING Q2Y 8/18/2008 Pneumococcal 65+ High/Highest Risk (1 of 2 - PCV13) 8/18/2008 MEDICARE YEARLY EXAM 3/20/2018 Influenza Age 5 to Adult 8/1/2018 Allergies as of 9/17/2018  Review Complete On: 9/17/2018 By: Tania Toledo RN No Known Allergies Current Immunizations  Never Reviewed No immunizations on file. Not reviewed this visit Vitals BP Pulse Temp Height(growth percentile) Weight(growth percentile) SpO2  
 117/90 (BP 1 Location: Left arm, BP Patient Position: Sitting) 85 (!) 101.3 °F (38.5 °C) (Oral) 5' 9\" (1.753 m) 219 lb 3.2 oz (99.4 kg) 92% BMI Smoking Status 32.37 kg/m2 Never Smoker BMI and BSA Data Body Mass Index Body Surface Area  
 32.37 kg/m 2 2.2 m 2 Preferred Pharmacy Pharmacy Name Phone [facility] [address] [place] [address] [phone] Your Updated Medication List  
  
   
This list is accurate as of 9/17/18 11:51 AM.  Always use your most recent med list.  
  
  
  
  
 aspirin delayed-release 81 mg tablet Take 81 mg by mouth daily. cephALEXin 500 mg capsule Commonly known as:  Donovan Bless TK 1 C PO BID  
  
 glimepiride 4 mg tablet Commonly known as:  AMARYL Take 4 mg by mouth every morning. HYDROcodone-acetaminophen 5-325 mg per tablet Commonly known as:  Eusebio Dolphin Take 1 Tab by mouth every four (4) hours as needed for Pain. Max Daily Amount: 6 Tabs. metFORMIN 850 mg tablet Commonly known as:  GLUCOPHAGE Take 850 mg by mouth daily. potassium chloride 20 mEq tablet Commonly known as:  K-DUR, KLOR-CON Take 20 mEq by mouth two (2) times a day. tamsulosin 0.4 mg capsule Commonly known as:  FLOMAX Take 0.4 mg by mouth daily. Follow-up Instructions Return for Prostate cancer f/uCherri. To-Do List   
 09/29/2018 11:30 AM  
  Appointment with [facility] MRI 2 at [address] [place] [address] MRI Department ([phone]) 1. Please bring a list or a bag of your current medications to your appointment 2. Please be sure to remove ALL hair clips, pins, extensions, etc., prior to arriving for your MRI procedure. 3. If you have any medical implants or devices, please bring associated medical card with you. 4. Bring any non [facility] films or CDs pertaining to the area being imaged with you on the day of appointment. 5. A written order with a valid diagnosis and Physicians  signature is required for all scheduled tests. 6. Check in at registration 30min before your appointment time unless you were instructed to do otherwise. Introducing [facility]! Lis Fulton introduces [employer] patient portal. Now you can access parts of your medical record, email your doctor's office, and request medication refills online. 1. In your internet browser, go to [email or web address]. [email or web address] 2. Click on the First Time User? Click Here link in the Sign In box. You will see the New Member Sign Up page. 3. Enter your [employer] Access Code exactly as it appears below. You will not need to use this code after youve completed the sign-up process. If you do not sign up before the expiration date, you must request a new code. · [employer] Access Code: [email or web address] Expires: 12/3/2018  2:09 PM 
 
4. Enter the last four digits of your Social Security Number (xxxx) and Date of Birth (mm/dd/yyyy) as indicated and click Submit. You will be taken to the next sign-up page. 5. Create a [employer] ID. This will be your [employer] login ID and cannot be changed, so think of one that is secure and easy to remember. 6. Create a [employer] password. You can change your password at any time. 7. Enter your Password Reset Question and Answer. This can be used at a later time if you forget your password. 8. Enter your e-mail address. You will receive e-mail notification when new information is available in [address] 9. Click Sign Up. You can now view and download portions of your medical record. 10. Click the Download Summary menu link to download a portable copy of your medical information. If you have questions, please visit the Frequently Asked Questions section of the [employer] website. Remember, [employer] is NOT to be used for urgent needs. For medical emergencies, dial 911. Now available from your iPhone and Android! Please provide this summary of care documentation to your next provider. Your primary care clinician is listed as Everardo Bain. If you have any questions after today's visit, please call [phone].

## 2018-09-17 NOTE — PROGRESS NOTES
[address] [facility] Oncology at [facility]  [phone]    Hematology / Oncology Established Visit    Reason for Visit:   Doris Avendano is a 74 y.o. male who comes in for hospital follow up of sigmoid mass / prostate cancer. Hematology Oncology Treatment History:     Diagnosis: Prosate cancer, diagnosed 1. Stage: Pending    Pathology: 9/6/18 Sigmoid colon ulcer, biopsy:   Adenocarcinoma, strongly favor prostate primary (see Comment). Prior Treatment: Brachytherapy and EBRT in 12/2015. Current Treatment: pending    History of Present Illness:   Mr. Bender is a 75 y/o male with h/o prostate cancer who comes in for hospital follow up after recently admission for persistent diarrhea, evidence of a possible sigmoid mass. He had been suffering with persistent diarrhea, 50-70-lb weight loss for approx 4-5 months. He underwent 2 recent colonoscopies (one in July and one in Aug 2018) by Dr. Deepthi Vazquez at [facility]. He states the first colonoscopy was not clear due to poor prep. The second colonoscopy reportedly showed abnormal rectal and sigmoid mucosa with a possible mass. This was biopsied and was benign. The patient then underwent a CT scan which was notable for R-sided hydronephrosis from extrinsic compression of the right ureter. The patient's urologist, Dr. Nany Abarca, attempted to pass a ureteral stent but was unsuccessful. The patient was then admitted to [facility] for weakness, persistent diarrhea and lower abdominal pain. Abd/pelvis CT on 9/4/18 showed a distal sigmoid mass with possible invasion of the posterior bladder wall as well as R hydronephrosis. He was evaluated by Dr. Alicia Almanzar in general surgery and underwent diverting loop colostomy given the symptoms caused by the sigmoid mass. He underwent colonoscopy by Dr. Sho Antonio in GI with finding of a large sigmoid mass, which was biopsied.  Pathology revealed an adenocarcinoma, but morphology was more consistent with prostate cancer than colorectal cancer. He was seen by Dr. Puneet Lassiter in Urology during hospital stay who recommended percutaneous nephrostomy tube, placed by IR on 9/7. Given the biopsy findings, Dr. Puneet Lassiter recommended further evaluation with prostate/pelvic MRI. Nuclear med bone scan negative for any suspicious bony lesions. For type II DM, the patient takes oral medications.      Today, patient states he has pain with urination. He also reports a significant amount of stool per rectum as well as colostomy bag the past 2 days. No fevers, chills, sweats. Past Medical History:   Diagnosis Date    Diabetes mellitus ([facility]     Prostate cancer ([facility]     Sleep apnea       Past Surgical History:   Procedure Laterality Date    FLEXIBLE SIGMOIDOSCOPY N/A 9/6/2018    SIGMOIDOSCOPY FLEXIBLE performed by Solange Quiroga MD [facility] History   Substance Use Topics    Smoking status: Never Smoker    Smokeless tobacco: Never Used    Alcohol use No      No family history on file. Current Outpatient Prescriptions   Medication Sig    HYDROcodone-acetaminophen (NORCO) 5-325 mg per tablet Take 1 Tab by mouth every four (4) hours as needed for Pain. Max Daily Amount: 6 Tabs.  aspirin delayed-release 81 mg tablet Take 81 mg by mouth daily.  potassium chloride (K-DUR, KLOR-CON) 20 mEq tablet Take 20 mEq by mouth two (2) times a day.  tamsulosin (FLOMAX) 0.4 mg capsule Take 0.4 mg by mouth daily.  glimepiride (AMARYL) 4 mg tablet Take 4 mg by mouth every morning.  metFORMIN (GLUCOPHAGE) 850 mg tablet Take 850 mg by mouth daily. No current facility-administered medications for this visit. No Known Allergies     Review of Systems: A complete review of systems was obtained, negative except as described above.     Physical Exam:     Visit Vitals    /90 (BP 1 Location: Left arm, BP Patient Position: Sitting)    Pulse 85    Temp (!) 101.3 °F (38.5 °C) (Oral)    Ht 5' 9\" (1.753 m)    Wt 219 lb 3.2 oz (99.4 kg)    SpO2 92%    BMI 32.37 kg/m2   Just drank hot coffee - therefore measured temp is inaccurate    ECOG PS: 0  General: Well developed, no acute distress  Eyes: PERRLA, EOMI, anicteric sclerae  HENT: Atraumatic, OP clear, TMs intact without erythema  Neck: Supple  Lymphatic: No cervical, supraclavicular, axillary or inguinal adenopathy  Respiratory: CTAB, normal respiratory effort  CV: Normal rate, regular rhythm, no murmurs, no peripheral edema  GI / : Soft, nontender, nondistended, no masses, no hepatomegaly, no splenomegaly. Has colostomy in LLQ - c/d/i. Nephrostomy tube in R lower back - c/d/i  MS: Slow gait and normal station. Digits without clubbing or cyanosis. Skin: No rashes, ecchymoses, or petechiae. Normal temperature, turgor, and texture. Neuro/Psych: Alert, oriented. 5/5 strength in all 4 extremities. Appropriate affect, normal judgment/insight. Results:     Lab Results   Component Value Date/Time    WBC 6.6 09/11/2018 04:32 AM    HGB 8.8 (L) 09/11/2018 04:32 AM    HCT 29.0 (L) 09/11/2018 04:32 AM    PLATELET 958 76/88/9358 04:32 AM    MCV 85.3 09/11/2018 04:32 AM    ABS. NEUTROPHILS 5.1 09/04/2018 02:23 PM     Lab Results   Component Value Date/Time    Sodium 139 09/11/2018 04:32 AM    Potassium 3.4 (L) 09/11/2018 04:32 AM    Chloride 106 09/11/2018 04:32 AM    CO2 27 09/11/2018 04:32 AM    Glucose 114 (H) 09/11/2018 04:32 AM    BUN 8 09/11/2018 04:32 AM    Creatinine 0.80 09/11/2018 04:32 AM    GFR est AA >60 09/11/2018 04:32 AM    GFR est non-AA >60 09/11/2018 04:32 AM    Calcium 8.3 (L) 09/11/2018 04:32 AM    Glucose (POC) 151 (H) 09/12/2018 01:54 PM     Lab Results   Component Value Date/Time    Bilirubin, total 0.3 09/04/2018 02:23 PM    ALT (SGPT) 29 09/04/2018 02:23 PM    AST (SGOT) 23 09/04/2018 02:23 PM    Alk.  phosphatase 66 09/04/2018 02:23 PM    Protein, total 7.3 09/04/2018 02:23 PM    Albumin 2.5 (L) 09/04/2018 02:23 PM    Globulin 4.8 (H) 09/04/2018 02:23 PM Lab Results   Component Value Date/Time    Iron 25 (L) 09/05/2018 12:54 PM    TIBC 172 (L) 09/05/2018 12:54 PM    Iron % saturation 15 (L) 09/05/2018 12:54 PM    Ferritin 213 09/05/2018 12:54 PM       Lab Results   Component Value Date/Time    Vitamin B12 726 09/05/2018 12:54 PM    Folate 9.5 09/05/2018 12:54 PM     No results found for: TSH, TSH2, TSH3, TSHP, TSHEXT    Lab Results   Component Value Date/Time    Prostate Specific Ag 1.7 09/11/2018 04:44 AM         Imaging:     Abd/pelvis CT 9/4/18:  FINDINGS:   LUNG BASES: Clear. INCIDENTALLY IMAGED HEART AND MEDIASTINUM: Unremarkable. LIVER: Numerous hepatic cysts are noted with a reference 2.3 cm cyst in the  dome. No hepatic mass. GALLBLADDER: Unremarkable. SPLEEN: No mass. PANCREAS: No mass or ductal dilatation. ADRENALS: Unremarkable. KIDNEYS: There is moderate right hydroureteronephrosis to the level of the  distal ureter, related to the colon mass. There is a delayed nephrogram. There  is a 2.4 cm left renal cyst.  STOMACH: Unremarkable. SMALL BOWEL: No dilatation or wall thickening. COLON: There is a mass lesion involving the distal sigmoid colon. There is  concern for involvement of the posterior bladder wall. APPENDIX: Unremarkable. PERITONEUM: No ascites or pneumoperitoneum. RETROPERITONEUM: No lymphadenopathy or aortic aneurysm. REPRODUCTIVE ORGANS: Prostate seed implants are noted. URINARY BLADDER: No mass or calculus. BONES: The patient is status post bilateral total hip replacement. Degenerative  changes are seen in the lumbar spine. ADDITIONAL COMMENTS: There is a small periumbilical hernia containing only fat. IMPRESSION:  Distal sigmoid colon mass lesion with potential for involvement of the posterior  bladder wall. This is causing right hydroureteronephrosis and a delayed  nephrogram. Hepatic and left renal cysts. Small ventral hernia containing only  fat. Bone scan:  IMPRESSION:   1.  No definite evidence of bony metastatic disease. Urinary bladder is distended  which limits evaluation of the pelvis. Patient is status post bilateral hip  replacements. There is focal increased activity adjacent to right femoral neck  may represent loosening and correlation would be helpful.      There is increased bony activity in the shoulders and sternoclavicular joints  and mid thoracic spine most likely degenerative.       Assessment & Plan:   Petr Mack is a 74 y.o. male with h/o prostate cancer comes in for hospital follow up.     1. Sigmoid mass / Prostate cancer recurrence: Was suggestive of locally advanced colon cancer based on CT and colonoscopy 9/6/18. However, path is suggestive of prostate cancer. PSA and PSAP stains negative and therefore unable to prove whether this is a colon cancer or prostate cancer. Given the fact that no clear mass was seen on first 2 colonoscopies and there was one biopsy which was normal, the overall clinical picture is concerning for a prostate cancer recurrence, causing extrinsice compression on the sigmoid colon and possibly invading posterior bladder wall. CEA normal. Chest CT negative. Patient clinically feels better after diverting loop colostomy on 9/7/18.  -- Pelvic MRI at [facility]. Carla's (9/291) and see Dr. Renato Felix in Uro-Oncology. Patient will likely need prostate biopsy. If prostate cancer confirmed, I will defer to initial management to Urology given local recurrence with invasion of colon and possible invasion in bladder. I agree that he may benefit from Lupron injections are first and then evaluation for surgery. I am happy to assist in anyway as needed. -- Return in 6 weeks for follow up.      2. Right hydronephrosis: 2/2 extrinsic compression of the R distal ureter from the prostate vs sigmoid mass. IR placed percutaneous nephrostomy tube on 9/7/18.   -- Follow up with Dr. Сергей Hall and Dr. Renato Felix     3. Persistent diarrhea / Weight loss: 2/2 prostate cancer invasion into sigmoid colon. Symptoms had initially improved with placement of diverting ostomy (9/7) for symptom control by Dr. Jt Flores in general surgery. Pt continues to have some loose stools through rectum, which is normal per Dr. Jt Flores. -- Follow up with Dr. Jt Flores as needed. Patient also has home health in place.      4. Normocytic anemia: Likely a combination of anemia of chronic disease due to underlying malignancy. No evidence of iron deficiency. Normal VitB12, folate.      5. Type II DM: On Glimepiride and Metformin.      6. H/o prostate cancer: Diagnosed 2015 with PSA 27 at time of diagnosis. Treated with brachytherapy and EBRT. PSA was 0.8 (6/2017), 1.5 (10/2017), then 1.9 (3/2018). Last saw Dr. Dwain Bull on 8/23/18. Repeat PSA was 1.7 on 9/11/18. I appreciate the opportunity to participate in Mr. Prosper Callahan's care. Signed By: Calvin Reis MD     September 17, 2018      [phone] - daughter-in-law.

## 2018-09-18 ENCOUNTER — TELEPHONE (OUTPATIENT)
Dept: ONCOLOGY | Age: 73
End: 2018-09-18

## 2018-09-18 NOTE — PROGRESS NOTES
Oncology Navigator  Psychosocial Assessment    Reason for Assessment:    []Depression  []Anxiety  []Caregiver [place]  []Maladaptive Coping with Serious Illness   [x]Other: initial assessment newly diagnosed sigmoid mass suggestive of prostate cancer    Sources of Information:    [x]Patient  [x]Family  [x]Staff  [x]Medical Record    Advance Care Planning:   Advance Care Planning 9/5/2018   Patient's Healthcare Decision Maker is: Legal Next of Kin   Confirm Advance Directive None       Mental Status:    [x]Alert  []Lethargic  []Unresponsive  Oriented to:  [x]Person  [x]Place  [x]Time  [x]Situation      Barriers to Learning:    []Language  []Developmental  [x]Cognitive  []Altered Mental Status  []Visual/Hearing Impairment  []Unable to Read/Write  []Motivational   []No Barriers Identified  []Other:     Relationship Status:  []Single  []  []Significant Other/Life Partner  []  []  [x]      Living Circumstances:  [x]Lives Alone  []Family/Significant Other in Household  []Roommates  []Children in the Home  []Paid Caregivers  []Assisted Living Facility/Group Home  []Skilled [address]  []Homeless  []Incarcerated  []Environmental/Care Concerns  []Other:    Support System:    []Strong  [x]Fair  []Limited    Financial/Legal Concerns:    []Uninsured  []Limited Income/Resources  []Non-Citizen  [x]No Concerns Identified  []Financial POA:    []Other:    Lutheran/Spiritual/Existential:  []Strong Sense of Spirituality  []Involved in [employer]  []Request  Visit  []Expressing Yaniv Cohen  [x]No Concerns Identified    Coping with Illness:         Patient: Family/Caregiver:   Understanding and Acceptance of Illness/Prognosis  [x] [x]   Strong Sense of Resilience [] []   Self Reflection [x] []   Engaged Support System [] []   Does not Readily Discuss Illness [] []   Denial of Terminal Status [] []   Anger [] []   Depression [x] []   Anxiety/Fear [] []   Bargaining [] [] Recent Diagnosis/Prognosis [x] []   Difficulties with Body Image [] []   Loss of Identity [] []   Excessive Substance Use [] []   Mental Health History [] []   Enmeshed Relationships [] []   History of Loss [x] []   Anticipatory Grief [] []   Concern for Complicated Grief [] []   Suicidal Ideation or Plan [] []   Unable to assess [] []                  Narrative: Patient here for initial assessment with Dr. Michiel Lefort. Meet with patient, his son and daughter in-law to introduce social work navigator role and supports. Patient lives alone in his private residence. Much of the conversation centered on caregiver resources as patient needs more support at home with ADLs. Patients daughter in-law and sister are his primary support at home however they advised they have having a difficult time balancing patient's needs with other responsibilities. They each live about 20 minutes from patient. Family reports patient is unable to manage colostomy, has some confusion, fall risk bc of weakness. Provided information about private duty caregiver agencies. Explained Medicare does not cover the cost of caregiver services. Discussed Medicaid long-term care. Offered supportive counseling as patient shared with me during his hospitalization he felt hopeless and wondered if he was \"worth\" the effort as he realized he was going to require a lot of assistance from his family. He tells me he no longer feels this way because of the love and support he has received form them. His son/daughter are very supportive of patients feelings and vocalize their love and care of patient. Patient tells me he agrees he has some confusion and is willing to accept help but is unrealistic with the level of help his family is able to offer. Called placed to [employer]ee[employer], patients nurse with [facility] [phone]. She tells me pt is receiving nursing visits, PT, OT, and social work visit scheduled for 9/19.  Pt does not RTC until 10/29 as such [facility] social work will assist with immediate caregiver resources. Referrals:     I. Transportation    Medicaid ([employer]) []   [facility] []                                    Regional organization  []                                      Financial Assistance/Medication Access    Patient assistance program (Care Card) []   Co-pay assistance  []                                    Leukemia & Lymphoma Society []   [address]  []   [address] []   CancerCare  []     Emotional support    Peer support group []   Local counseling []                                    Online support group []   Coordination of psychiatry consult []     Goals/Plan:  1. Supportive counseling provided to patient as he engages in self reflection re diagnosis. 2. Reviewed caregiver resources with patient and family including private duty and Medicaid long-term care   3. Dr. Lerma Balloon aware of ADL needs and concerns expressed by patient/family  4. [facility] as pt does not RTC until 10/29 and patient/family could benefit from additional community support.    5. Ongoing support as needed    -TISH Dee

## 2018-09-18 NOTE — TELEPHONE ENCOUNTER
[facility] DISTRICT  Social Work Navigator Encounter     Follow-up call placed to pt's daughter in-law, Radah Porter, to provide additional private duty care resources and advised that the  with [facility] will be out to see patient on 9/19. Radha Porter tells me patient had a fall yesterday afternoon when getting out of her car. Patient hit his head. They called EMS and he was transported to [facility] where he was admitted.  Advised this MSW would update Dr. Ye Hayes and team.

## 2018-09-29 ENCOUNTER — HOSPITAL ENCOUNTER (OUTPATIENT)
Dept: MRI IMAGING | Age: 73
Discharge: HOME | End: 2018-09-29
Attending: UROLOGY
Payer: MEDICARE

## 2018-09-29 DIAGNOSIS — C61 PROSTATE CANCER (HCC): ICD-10-CM

## 2018-09-29 PROCEDURE — 74011000258 HC RX REV CODE- 258: Performed by: UROLOGY

## 2018-09-29 PROCEDURE — A9585 GADOBUTROL INJECTION: HCPCS | Performed by: UROLOGY

## 2018-09-29 PROCEDURE — 74011250636 HC RX REV CODE- 250/636: Performed by: UROLOGY

## 2018-09-29 PROCEDURE — 72197 MRI PELVIS W/O & W/DYE: CPT

## 2018-09-29 RX ORDER — SODIUM CHLORIDE 0.9 % (FLUSH) 0.9 %
10 SYRINGE (ML) INJECTION
Status: COMPLETED | OUTPATIENT
Start: 2018-09-29 | End: 2018-09-29

## 2018-09-29 RX ADMIN — Medication 10 ML: at 12:00

## 2018-09-29 RX ADMIN — GADOBUTROL 10 ML: 604.72 INJECTION INTRAVENOUS at 12:00

## 2018-09-29 RX ADMIN — SODIUM CHLORIDE 100 ML: 900 INJECTION, SOLUTION INTRAVENOUS at 12:00

## 2018-10-29 ENCOUNTER — TELEPHONE (OUTPATIENT)
Dept: ONCOLOGY | Age: 73
End: 2018-10-29

## 2018-10-29 NOTE — TELEPHONE ENCOUNTER
Pts daughter called to cancel the appointment with Dr. Joshua Garcia today. She stated pt is seeing a different oncologist. Informed pts daughter I would let Dr. Joshua Garcia know.

## 2018-12-07 ENCOUNTER — OFFICE VISIT (OUTPATIENT)
Dept: ONCOLOGY | Age: 73
End: 2018-12-07

## 2018-12-07 VITALS
WEIGHT: 212.2 LBS | OXYGEN SATURATION: 99 % | SYSTOLIC BLOOD PRESSURE: 118 MMHG | HEART RATE: 70 BPM | TEMPERATURE: 96.8 F | BODY MASS INDEX: 31.43 KG/M2 | DIASTOLIC BLOOD PRESSURE: 56 MMHG | HEIGHT: 69 IN

## 2018-12-07 DIAGNOSIS — D63.8 ANEMIA OF CHRONIC DISEASE: ICD-10-CM

## 2018-12-07 DIAGNOSIS — C61 PROSTATE CANCER (HCC): Primary | ICD-10-CM

## 2018-12-07 DIAGNOSIS — N13.30 HYDRONEPHROSIS, RIGHT: ICD-10-CM

## 2018-12-07 DIAGNOSIS — Z93.3 COLOSTOMY IN PLACE (HCC): ICD-10-CM

## 2018-12-07 DIAGNOSIS — R60.0 BILATERAL LOWER EXTREMITY EDEMA: ICD-10-CM

## 2018-12-07 RX ORDER — TRAMADOL HYDROCHLORIDE 50 MG/1
50 TABLET ORAL
COMMUNITY
Start: 2017-10-03 | End: 2019-04-05

## 2018-12-07 RX ORDER — PREDNISONE 5 MG/1
5 TABLET ORAL DAILY
Qty: 90 TAB | Refills: 3 | Status: SHIPPED | OUTPATIENT
Start: 2018-12-07 | End: 2019-04-16

## 2018-12-07 RX ORDER — ABIRATERONE ACETATE 250 MG/1
1000 TABLET ORAL DAILY
Qty: 30 TAB | Refills: 3 | Status: SHIPPED | OUTPATIENT
Start: 2018-12-07 | End: 2018-12-12 | Stop reason: SDUPTHER

## 2018-12-07 RX ORDER — TRAZODONE HYDROCHLORIDE 50 MG/1
TABLET ORAL
Refills: 0 | COMMUNITY
Start: 2018-11-08 | End: 2019-04-05

## 2018-12-07 RX ORDER — BICALUTAMIDE 50 MG/1
TABLET, FILM COATED ORAL
Refills: 5 | COMMUNITY
Start: 2018-11-08 | End: 2019-04-05

## 2018-12-07 NOTE — PATIENT INSTRUCTIONS
Instructions on Prostate cancer medication: 1. Take Abirateron (Zytiga) 1000mg by mouth once daily which will be 4 of the 250mg tablets once daily 2. Take Prednisone 5mg by mouth once daily.

## 2018-12-07 NOTE — PROGRESS NOTES
[facility] DISTRICT Medical Oncology at [facility] [phone] Hematology / Oncology Established Visit Reason for Visit:  
Dami Diamond is a 74 y.o. male who comes in for hospital follow up of sigmoid mass / prostate cancer. Hematology Oncology Treatment History:  
 
Diagnosis: Prosate cancer, diagnosed 1. Stage: now metastatic Pathology: 9/6/18 Sigmoid colon ulcer, biopsy:  
Adenocarcinoma, strongly favor prostate primary (see Comment). Stains weakly for PSAP. Prior Treatment: Brachytherapy and EBRT in 12/2015. Current Treatment: Starting on Zytiga soon History of Present Illness: Mr. Mell Arroyo is a 73 y/o male with h/o prostate cancer who comes in for hospital follow up after recently admission for persistent diarrhea, evidence of a possible sigmoid mass. He had been suffering with persistent diarrhea, 50-70-lb weight loss for approx 4-5 months. He underwent 2 recent colonoscopies (one in July and one in Aug 2018) by Dr. Doris Mims at [facility]. He states the first colonoscopy was not clear due to poor prep. The second colonoscopy reportedly showed abnormal rectal and sigmoid mucosa with a possible mass. This was biopsied and was benign. The patient then underwent a CT scan which was notable for R-sided hydronephrosis from extrinsic compression of the right ureter. The patient's urologist, Dr. Hyacinth Blood, attempted to pass a ureteral stent but was unsuccessful. The patient was then admitted to [facility] for weakness, persistent diarrhea and lower abdominal pain. Abd/pelvis CT on 9/4/18 showed a distal sigmoid mass with possible invasion of the posterior bladder wall as well as R hydronephrosis. He was evaluated by Dr. Alice Camarena in general surgery and underwent diverting loop colostomy given the symptoms caused by the sigmoid mass. He underwent colonoscopy by Dr. Honey Grider in GI with finding of a large sigmoid mass, which was biopsied.  Pathology revealed an adenocarcinoma, but morphology was more consistent with prostate cancer than colorectal cancer. He was seen by Dr. Vera Ni in Urology during hospital stay who recommended percutaneous nephrostomy tube, placed by IR on 9/7. Given the biopsy findings, Dr. Vera Ni recommended further evaluation with prostate/pelvic MRI. Nuclear med bone scan negative for any suspicious bony lesions. After hospital discharge, he fell and was admitted to /[facility]. He stayed there for 3 days and was discharged to rehab, where he stayed for 4 weeks. Today, patient comes in for follow up. Since the last visit, he has met with Dr. Justyna Bojorquez from [facility]. Reportedly, patient had repeat prostate biopsy. Per his note, the pathology was reviewed by [facility]. It is felt that the biopsy is consistent with prostate cancer. Patient was started on bicalutamide and Lupron injection on approx 10/4/18. His PSA has declines from 3.7 down to 0.57 thought to be related to treatment response. He also had decreased in pelvic pressure, c/w with response. Dr. Morgan Bang plans to reimage in January to compare to prior films. The patient states the mucus from his rectum is persistent but now intermittent. He was working with PT up until 2 weeks ago, is doing well and no longer requires PT. He states he is up on his feet approx half the day, doing chores like laundry. He states his appetite is decreased but he is eating anyway. For type II DM, the patient takes oral medications. No fevers, chills, sweats. Past Medical History:  
Diagnosis Date  Diabetes mellitus ([address]  Prostate cancer ([address]  Sleep apnea Past Surgical History:  
Procedure Laterality Date 2021 [place] N/A 9/6/2018 SIGMOIDOSCOPY FLEXIBLE performed by Cheyanne Marcos MD at [address] [facility] [place] Left  HX HIP REPLACEMENT Right  HX OTHER SURGICAL    
 bladder stent Social History Tobacco Use  
  Smoking status: Never Smoker  Smokeless tobacco: Never Used Substance Use Topics  Alcohol use: No  
  
Family History Problem Relation Age of Onset  Cancer Mother   
     colon  Hypertension Mother  Heart Disease Mother  Cancer Father  Diabetes Sister  Cancer Brother  Diabetes Sister Current Outpatient Medications Medication Sig  
 bicalutamide (CASODEX) 50 mg tablet TK 1 T PO  D  
 traMADol (ULTRAM) 50 mg tablet Take  mg by mouth.  traZODone (DESYREL) 50 mg tablet TK 1 T PO  QHS  PRN  potassium chloride (K-DUR, KLOR-CON) 20 mEq tablet Take 20 mEq by mouth two (2) times a day.  tamsulosin (FLOMAX) 0.4 mg capsule Take 0.4 mg by mouth daily.  aspirin delayed-release 81 mg tablet Take 81 mg by mouth daily. No current facility-administered medications for this visit. No Known Allergies Review of Systems: A complete review of systems was obtained, negative except as described above. Physical Exam:  
 
Visit Vitals /56 (BP 1 Location: Left arm, BP Patient Position: Sitting) Pulse 70 Temp 96.8 °F (36 °C) (Temporal) Ht 5' 9\" (1.753 m) Wt 212 lb 3.2 oz (96.3 kg) SpO2 99% BMI 31.34 kg/m² ECOG PS: 0 General: Well developed, no acute distress Eyes: PERRLA, EOMI, anicteric sclerae HENT: Atraumatic, OP clear, TMs intact without erythema Neck: Supple Lymphatic: No cervical, supraclavicular, axillary or inguinal adenopathy Respiratory: CTAB, normal respiratory effort CV: Normal rate, regular rhythm, no murmurs, no peripheral edema GI / : Soft, nontender, nondistended, no masses, no hepatomegaly, no splenomegaly. Has colostomy in LLQ - c/d/i. Nephrostomy tube in R lower back - c/d/i 
MS: Slow gait and normal station. Digits without clubbing or cyanosis. Skin: No rashes, ecchymoses, or petechiae. Normal temperature, turgor, and texture. Neuro/Psych: Alert, oriented. 5/5 strength in all 4 extremities. Appropriate affect, normal judgment/insight. Results:  
 
Lab Results Component Value Date/Time WBC 6.6 09/11/2018 04:32 AM  
 HGB 8.8 (L) 09/11/2018 04:32 AM  
 HCT 29.0 (L) 09/11/2018 04:32 AM  
 PLATELET 775 96/61/8750 04:32 AM  
 MCV 85.3 09/11/2018 04:32 AM  
 ABS. NEUTROPHILS 5.1 09/04/2018 02:23 PM  
 
Lab Results Component Value Date/Time Sodium 139 09/11/2018 04:32 AM  
 Potassium 3.4 (L) 09/11/2018 04:32 AM  
 Chloride 106 09/11/2018 04:32 AM  
 CO2 27 09/11/2018 04:32 AM  
 Glucose 114 (H) 09/11/2018 04:32 AM  
 BUN 8 09/11/2018 04:32 AM  
 Creatinine 0.80 09/11/2018 04:32 AM  
 GFR est AA >60 09/11/2018 04:32 AM  
 GFR est non-AA >60 09/11/2018 04:32 AM  
 Calcium 8.3 (L) 09/11/2018 04:32 AM  
 Glucose (POC) 151 (H) 09/12/2018 01:54 PM  
 
Lab Results Component Value Date/Time Bilirubin, total 0.3 09/04/2018 02:23 PM  
 ALT (SGPT) 29 09/04/2018 02:23 PM  
 AST (SGOT) 23 09/04/2018 02:23 PM  
 Alk. phosphatase 66 09/04/2018 02:23 PM  
 Protein, total 7.3 09/04/2018 02:23 PM  
 Albumin 2.5 (L) 09/04/2018 02:23 PM  
 Globulin 4.8 (H) 09/04/2018 02:23 PM  
 
Lab Results Component Value Date/Time Iron 25 (L) 09/05/2018 12:54 PM  
 TIBC 172 (L) 09/05/2018 12:54 PM  
 Iron % saturation 15 (L) 09/05/2018 12:54 PM  
 Ferritin 213 09/05/2018 12:54 PM  
 
 
Lab Results Component Value Date/Time Vitamin B12 726 09/05/2018 12:54 PM  
 Folate 9.5 09/05/2018 12:54 PM  
 
No results found for: TSH, TSH2, TSH3, TSHP, TSHEXT, TSHEXT Lab Results Component Value Date/Time  
 Prostate Specific Ag 1.7 09/11/2018 04:44 AM  
 
 
 
Imaging:  
 
Abd/pelvis CT 9/4/18: 
FINDINGS:  
LUNG BASES: Clear. INCIDENTALLY IMAGED HEART AND MEDIASTINUM: Unremarkable. LIVER: Numerous hepatic cysts are noted with a reference 2.3 cm cyst in the 
dome. No hepatic mass. GALLBLADDER: Unremarkable. SPLEEN: No mass. PANCREAS: No mass or ductal dilatation. ADRENALS: Unremarkable. KIDNEYS: There is moderate right hydroureteronephrosis to the level of the 
distal ureter, related to the colon mass. There is a delayed nephrogram. There 
is a 2.4 cm left renal cyst. 
STOMACH: Unremarkable. SMALL BOWEL: No dilatation or wall thickening. COLON: There is a mass lesion involving the distal sigmoid colon. There is 
concern for involvement of the posterior bladder wall. APPENDIX: Unremarkable. PERITONEUM: No ascites or pneumoperitoneum. RETROPERITONEUM: No lymphadenopathy or aortic aneurysm. REPRODUCTIVE ORGANS: Prostate seed implants are noted. URINARY BLADDER: No mass or calculus. BONES: The patient is status post bilateral total hip replacement. Degenerative 
changes are seen in the lumbar spine. ADDITIONAL COMMENTS: There is a small periumbilical hernia containing only fat. IMPRESSION: 
Distal sigmoid colon mass lesion with potential for involvement of the posterior 
bladder wall. This is causing right hydroureteronephrosis and a delayed 
nephrogram. Hepatic and left renal cysts. Small ventral hernia containing only 
fat. Bone scan: 
IMPRESSION:  
1. No definite evidence of bony metastatic disease. Urinary bladder is distended which limits evaluation of the pelvis. Patient is status post bilateral hip 
replacements. There is focal increased activity adjacent to right femoral neck may represent loosening and correlation would be helpful. There is increased bony activity in the shoulders and sternoclavicular joints and mid thoracic spine most likely degenerative. 
  
Pelvic MRI 9/29/18: 
FINDINGS: The prostate gland measures 2.6 x 3.1 x 3.4 cm there are seeds in the prostate gland. The region of the prostate gland is somewhat obscured because of 
artifact from bilateral prosthetic hips. . A distinct peripheral zone is not identified. No evidence of prostate gland mass. No pathologic enhancement of the prostate gland. Diffusion imaging is markedly compromised because of the adjacent metallic implants and paramagnetic effect. . The seminal vesicles are present. The tissue plane between the seminal vesicles and the sigmoid mass are effaced. This may be involving the seminal vesicles. No 
definite involvement of the prostate gland itself is identified,.. Urinary bladder is displaced anteriorly by the bulky sigmoid mass. . Fat plane between 
the bulky mass and the posterior wall the urinary bladder not well defined. No lymphadenopathy. Bone marrow signal is within normal limits. Muscle signal is within normal limits. IMPRESSION:  
1. Bulky mass of the sigmoid colon which is again visualized. This has been described on the previous CT. Fat planes with the seminal vesicles as well as 
the posterior wall of the urinary bladder cannot be defined and there may be direct extension. 2. Prostate gland contains seeds. Fat planes surrounding the prostate are intact. There is no evidence of mass extending beyond the capsule. No definite 
mass is seen within the prostate gland, however evaluation is limited because of technical factors as described above. Assessment & Plan:  
Shira Tabor is a 74 y.o. male with h/o prostate cancer comes in for hospital follow up. 
  
1. Metastatic prostate cancer: Was localized at diagnosis, treated with brachytherapy in 12/2015. Now with metastatic disease which likely started in seminal vesicles and progressed with local extension into sigmoid colon. The pathology is more consistent with prostate adenocarcinoma than colon cancer and no mass seen on first 2 colonoscopies. CEA normal. Chest CT negative. Given large sigmoid mass at risk of causing obstruction, patient underwent diverting loop colostomy on 9/7/18. Although I defer to Urology on surgical management, I agree that attempting a resection of this metastatic disease is too extensive for this patient and has low likelihood of leading to a cure.  As patient has a bulky sigmoid mass causing hydronephrosis requiring utereral stent and colon obstruction, I feel that adding Zytiga to the Lupron may help obtain better and quicker response. Also based on the STAMPEDE trial, adding Zytiga to treatment in the first line setting improves overall survival. In the future, patient may be able to undergo ureteral stent placement and colostomy reversal. 
-- Continue Lupron every 3 months 
-- Could discontinue Bicalutamide now if Dr. Saroj Lawrence agrees. -- Start Zytiga (Abiraterone) 1000mg PO daily with Prednisone 5mg PO daily - it will likely take several weeks to obtain this medication 
-- CT scheduled for early Jan 2019 by Dr. Saroj Lawrence. This would be good to establish a baseline prior to starting on the [facility] -- Return in 4-6 weeks with CBC, CMP, PSA 
  
2. Right hydronephrosis: 2/2 extrinsic compression of the R distal ureter from the prostate vs sigmoid mass. IR placed percutaneous nephrostomy tube on 9/7/18.  
-- Follow up with Dr. Saroj Lawrence  
-- Repeat CT scheduled in early Jan 2019 
  
3. Persistent diarrhea / Weight loss: 2/2 prostate cancer invasion into sigmoid colon. Symptoms had initially improved with placement of diverting ostomy (9/7) for symptom control by Dr. Emerson Arroyo in general surgery. Pt continues to have some loose stools through rectum, which is normal per Dr. Emerson Arroyo. -- Follow up with Dr. Emerson Arroyo as needed. Patient also has home health in place.  
  
4. Normocytic anemia: Likely a combination of anemia of chronic disease due to underlying malignancy. No evidence of iron deficiency. Normal VitB12, folate.  
  
5. Type II DM: On Glimepiride and Metformin.  
  
6. H/o localized prostate cancer: Diagnosed 2015 with PSA 27 at time of diagnosis. Treated with brachytherapy and EBRT. PSA was 0.8 (6/2017), 1.5 (10/2017), then 1.9 (3/2018). Last saw Dr. Meri Sky on 8/23/18. Repeat PSA was 1.7 on 9/11/18. PSA 3.7 in Oct 2018 and 0.57 on 11/29/18. 7. BLE edema: Referring to lymphedema clinic. May need diuretic in the future as well. I appreciate the opportunity to participate in Mr. Thomas Callahan's care. Signed By: Osbaldo Scott MD   
 December 7, 2018   
 
[phone] - daughter-in-law.

## 2018-12-12 DIAGNOSIS — C61 PROSTATE CANCER (HCC): ICD-10-CM

## 2018-12-12 RX ORDER — ABIRATERONE ACETATE 250 MG/1
1000 TABLET ORAL DAILY
Qty: 120 TAB | Refills: 3 | Status: SHIPPED | OUTPATIENT
Start: 2018-12-12 | End: 2019-04-16

## 2018-12-12 NOTE — TELEPHONE ENCOUNTER
[address] at [place]  [phone]      12/12/18 8:18 AM Re-escribed zytiga to [facility] after provider review. [facility] had not received initial prescription that had been sent.

## 2018-12-20 ENCOUNTER — OFFICE VISIT (OUTPATIENT)
Dept: SLEEP MEDICINE | Age: 73
End: 2018-12-20

## 2018-12-20 ENCOUNTER — HOSPITAL ENCOUNTER (OUTPATIENT)
Dept: SLEEP MEDICINE | Age: 73
Discharge: HOME | End: 2018-12-20
Payer: MEDICARE

## 2018-12-20 VITALS
OXYGEN SATURATION: 100 % | RESPIRATION RATE: 16 BRPM | HEIGHT: 69 IN | WEIGHT: 207 LBS | DIASTOLIC BLOOD PRESSURE: 70 MMHG | SYSTOLIC BLOOD PRESSURE: 135 MMHG | HEART RATE: 68 BPM | BODY MASS INDEX: 30.66 KG/M2

## 2018-12-20 DIAGNOSIS — G47.33 OSA (OBSTRUCTIVE SLEEP APNEA): Primary | ICD-10-CM

## 2018-12-20 PROCEDURE — 95806 SLEEP STUDY UNATT&RESP EFFT: CPT | Performed by: SPECIALIST

## 2018-12-20 NOTE — PROGRESS NOTES
[address] [facility]. [place], [address]  Tel.  [phone]  Fax. 100 [facility] 60  [place], [address] [facility]  Tel.  [phone]  Fax. [phone] [address] [facility] [place]   Tel.  [phone]  Fax. [phone]         Chief Complaint       Chief Complaint   Patient presents with    Sleep Problem         HPI        Gio Gallegos is a  74 y.o.  male seen for follow-up. He was [facility] at [facility] with a sleep study which demonstrated obstructive sleep apnea characterized by an AHI of 74 per hour associated with arterial desaturations to 60%. Events more prominent in REM with the REM-related AHI of 102 per hour. CPAP increased to 16 cm with continued events. BIPAP study obtained. BIPAP started at 18/12 cm. Compliance data downloaded and reviewed in detail with the patient today. During the past 90 days, BIPAP used during 23 days with the average daily use of 2.85 hours. CMS compliance criteria 4%. AHI 3 per hour. He has continued to lose weight: History of prostatic and colon CA. No Known Allergies    Current Outpatient Medications   Medication Sig Dispense Refill    bicalutamide (CASODEX) 50 mg tablet TK 1 T PO  D  5    traMADol (ULTRAM) 50 mg tablet Take  mg by mouth.  traZODone (DESYREL) 50 mg tablet TK 1 T PO  QHS  PRN  0    potassium chloride (K-DUR, KLOR-CON) 20 mEq tablet Take 20 mEq by mouth two (2) times a day.  tamsulosin (FLOMAX) 0.4 mg capsule Take 0.4 mg by mouth daily.  abiraterone (ZYTIGA) 250 mg tab Take 4 Tabs by mouth daily. 120 Tab 3    predniSONE (DELTASONE) 5 mg tablet Take 1 Tab by mouth daily. 90 Tab 3    aspirin delayed-release 81 mg tablet Take 81 mg by mouth daily. He  has a past medical history of Diabetes mellitus ([address] Prostate cancer ([facility] [address] and Sleep apnea.     He  has a past surgical history that includes hx hip replacement (Left); hx hip replacement (Right); hx other surgical; LAPAROSCOPY, POSSIBLE COLOSTOMY (N/A, 9/7/2018); COLON BIOPSY (N/A, 9/6/2018); and SIGMOIDOSCOPY FLEXIBLE (N/A, 9/6/2018). He family history includes Cancer in his brother, father, and mother; Diabetes in his sister and sister; Heart Disease in his mother; Hypertension in his mother. He  reports that  has never smoked. he has never used smokeless tobacco. He reports that he does not drink alcohol or use drugs. Review of Systems:  Unchanged per patient      Objective:     Visit Vitals  /70 (BP 1 Location: Left arm, BP Patient Position: Sitting)   Pulse 68   Resp 16   Ht 5' 9\" (1.753 m)   Wt 207 lb (93.9 kg)   SpO2 100%   BMI 30.57 kg/m²     Body mass index is 30.57 kg/m². Assessment:       ICD-10-CM ICD-9-CM    1. LUISANA (obstructive sleep apnea) G47.33 327.23 SLEEP STUDY UNATTENDED, 4 CHANNEL     History of severe sleep disordered breathing. Ongoing weight loss with history of colon and prostatic CA. Varying utilization due to current medical issues. He will be reevaluated with a home sleep test.  The results will be reviewed with him. Plan:     Orders Placed This Encounter    SLEEP STUDY UNATTENDED, 4 CHANNEL     Order Specific Question:   Reason for Exam     Answer:   Severe sleep apnea: Reevaluation due to significant weight loss       * Patient has a history and examination consistent with the diagnosis of sleep apnea. *Home sleep testing was ordered for reevaluation. * He was provided information on sleep apnea including coresponding risk factors and the importance of proper treatment. * Treatment options if indicated were reviewed today. Potential benefit of weight reduction was discussed. Weight reduction techniques reviewed. Jina Russo MD, FAASM  Electronically signed 12/20/18        This note was created using voice recognition software. Despite editing, there may be syntax errors. This note will not be viewable in [address]

## 2018-12-20 NOTE — PATIENT INSTRUCTIONS

## 2018-12-20 NOTE — PROGRESS NOTES
[address] [place]. [place], [address]  Tel.  [phone]  Fax. 100 [facility] 60  [place], [address] [place]  Tel.  [phone]  Fax. [phone] [address] [place] [address]  Tel.  [phone]  Fax. [phone]       S>Yariel Beard is a 74 y.o. male seen today to receive a home sleep testing unit (HST). · Patient was educated on proper hookup and operation of the HST. · Instruction forms and documentation were reviewed and signed. · The patient demonstrated good understanding of the HST. O>    Visit Vitals  /70 (BP 1 Location: Left arm, BP Patient Position: Sitting)   Pulse 68   Resp 16   Ht 5' 9\" (1.753 m)   Wt 207 lb (93.9 kg)   SpO2 100%   BMI 30.57 kg/m²         A>  1. LUISANA (obstructive sleep apnea)          P>  · General information regarding operations and maintenance of the device was provided. · He was provided information on sleep apnea including coresponding risk factors and the importance of proper treatment. · Follow-up appointment was made to return the HST. He will be contacted once the results have been reviewed. · He was asked to contact our office for any problems regarding his home sleep test study.

## 2018-12-21 ENCOUNTER — DOCUMENTATION ONLY (OUTPATIENT)
Dept: SLEEP MEDICINE | Age: 73
End: 2018-12-21

## 2018-12-27 ENCOUNTER — TELEPHONE (OUTPATIENT)
Dept: SLEEP MEDICINE | Age: 73
End: 2018-12-27

## 2019-01-02 NOTE — TELEPHONE ENCOUNTER
HSAT obtained to re certify for CPAP. HSAT demonstrated sleep disordered breathing characterized by an AHI of 12.5/h associated with minimal SaO2 of 76%. Snoring during 5% of the study. Patient had past sleep study demonstrating AHI 74/h associated with arterial desaturations to 60%. Events more prominent in rem sleep with a REM-related AHI of 102/h.  REM. Recent compliance review did demonstrate AHI of 3/h with BiPAP. Recommendation: Would continue BiPAP at present settings. Consider HSAT reevaluation if patient's weight loss stabilizes. Follow-up appointment in 6 months. Chief Technologist: please review results with patient. Follow-up appointment in 6 months.

## 2019-01-09 ENCOUNTER — TELEPHONE (OUTPATIENT)
Dept: ONCOLOGY | Age: 74
End: 2019-01-09

## 2019-01-09 NOTE — TELEPHONE ENCOUNTER
Left message requesting a return call from patient to discuss denial decision from [id number] [facility] Patient [facility] for his Zytiga. Patient was denied due to his income being too high. Per [employer], patient or provider may submit a letter of hardship explaining his hardship for affording Zytiga. Since patient is uninsured, his estimated co-pay without any assistance is $11,296.31. Need to confirm patient's income for this year before submitting hardship letter.

## 2019-01-11 ENCOUNTER — HOSPITAL ENCOUNTER (OUTPATIENT)
Dept: INFUSION THERAPY | Age: 74
Discharge: HOME | End: 2019-01-11
Payer: MEDICARE

## 2019-01-11 ENCOUNTER — OFFICE VISIT (OUTPATIENT)
Dept: ONCOLOGY | Age: 74
End: 2019-01-11

## 2019-01-11 VITALS
SYSTOLIC BLOOD PRESSURE: 109 MMHG | HEIGHT: 69 IN | OXYGEN SATURATION: 99 % | HEART RATE: 62 BPM | BODY MASS INDEX: 31.9 KG/M2 | TEMPERATURE: 97.1 F | WEIGHT: 215.4 LBS | DIASTOLIC BLOOD PRESSURE: 61 MMHG

## 2019-01-11 VITALS
TEMPERATURE: 97.1 F | OXYGEN SATURATION: 99 % | SYSTOLIC BLOOD PRESSURE: 109 MMHG | DIASTOLIC BLOOD PRESSURE: 61 MMHG | HEART RATE: 62 BPM | RESPIRATION RATE: 18 BRPM

## 2019-01-11 DIAGNOSIS — R60.0 BILATERAL LOWER EXTREMITY EDEMA: ICD-10-CM

## 2019-01-11 DIAGNOSIS — Z93.3 COLOSTOMY IN PLACE (HCC): ICD-10-CM

## 2019-01-11 DIAGNOSIS — N13.30 HYDRONEPHROSIS, RIGHT: ICD-10-CM

## 2019-01-11 DIAGNOSIS — D63.8 ANEMIA OF CHRONIC DISEASE: ICD-10-CM

## 2019-01-11 DIAGNOSIS — C61 PROSTATE CANCER (HCC): Primary | ICD-10-CM

## 2019-01-11 LAB
ALBUMIN SERPL-MCNC: 3.4 G/DL (ref 3.5–5)
ALBUMIN/GLOB SERPL: 0.8 {RATIO} (ref 1.1–2.2)
ALP SERPL-CCNC: 70 U/L (ref 45–117)
ALT SERPL-CCNC: 18 U/L (ref 12–78)
ANION GAP SERPL CALC-SCNC: 10 MMOL/L (ref 5–15)
AST SERPL-CCNC: 20 U/L (ref 15–37)
BASOPHILS # BLD: 0 K/UL (ref 0–0.1)
BASOPHILS NFR BLD: 1 % (ref 0–1)
BILIRUB SERPL-MCNC: 0.4 MG/DL (ref 0.2–1)
BUN SERPL-MCNC: 22 MG/DL (ref 6–20)
BUN/CREAT SERPL: 26 (ref 12–20)
CALCIUM SERPL-MCNC: 9.3 MG/DL (ref 8.5–10.1)
CHLORIDE SERPL-SCNC: 105 MMOL/L (ref 97–108)
CO2 SERPL-SCNC: 27 MMOL/L (ref 21–32)
CREAT SERPL-MCNC: 0.85 MG/DL (ref 0.7–1.3)
DIFFERENTIAL METHOD BLD: ABNORMAL
EOSINOPHIL # BLD: 0.1 K/UL (ref 0–0.4)
EOSINOPHIL NFR BLD: 2 % (ref 0–7)
ERYTHROCYTE [DISTWIDTH] IN BLOOD BY AUTOMATED COUNT: 16.9 % (ref 11.5–14.5)
GLOBULIN SER CALC-MCNC: 4.3 G/DL (ref 2–4)
GLUCOSE SERPL-MCNC: 100 MG/DL (ref 65–100)
HCT VFR BLD AUTO: 32.3 % (ref 36.6–50.3)
HGB BLD-MCNC: 10 G/DL (ref 12.1–17)
IMM GRANULOCYTES # BLD AUTO: 0 K/UL (ref 0–0.04)
IMM GRANULOCYTES NFR BLD AUTO: 0 % (ref 0–0.5)
LYMPHOCYTES # BLD: 1.1 K/UL (ref 0.8–3.5)
LYMPHOCYTES NFR BLD: 24 % (ref 12–49)
MCH RBC QN AUTO: 26.1 PG (ref 26–34)
MCHC RBC AUTO-ENTMCNC: 31 G/DL (ref 30–36.5)
MCV RBC AUTO: 84.3 FL (ref 80–99)
MONOCYTES # BLD: 0.6 K/UL (ref 0–1)
MONOCYTES NFR BLD: 12 % (ref 5–13)
NEUTS SEG # BLD: 3 K/UL (ref 1.8–8)
NEUTS SEG NFR BLD: 62 % (ref 32–75)
NRBC # BLD: 0 K/UL (ref 0–0.01)
NRBC BLD-RTO: 0 PER 100 WBC
PLATELET # BLD AUTO: 257 K/UL (ref 150–400)
PMV BLD AUTO: 10.4 FL (ref 8.9–12.9)
POTASSIUM SERPL-SCNC: 3.9 MMOL/L (ref 3.5–5.1)
PROT SERPL-MCNC: 7.7 G/DL (ref 6.4–8.2)
PSA SERPL-MCNC: 2.1 NG/ML (ref 0.01–4)
RBC # BLD AUTO: 3.83 M/UL (ref 4.1–5.7)
SODIUM SERPL-SCNC: 142 MMOL/L (ref 136–145)
WBC # BLD AUTO: 4.8 K/UL (ref 4.1–11.1)

## 2019-01-11 PROCEDURE — 84153 ASSAY OF PSA TOTAL: CPT

## 2019-01-11 PROCEDURE — 85025 COMPLETE CBC W/AUTO DIFF WBC: CPT

## 2019-01-11 PROCEDURE — 36415 COLL VENOUS BLD VENIPUNCTURE: CPT

## 2019-01-11 PROCEDURE — 80053 COMPREHEN METABOLIC PANEL: CPT

## 2019-01-11 NOTE — PROGRESS NOTES
Your anemia is improving compared to prior. Your Hgb improved from 8.8 range to now 10, which is great!

## 2019-01-11 NOTE — TELEPHONE ENCOUNTER
2:01 PM 19  Verified patient's name and . Notified patient his anemia is improving, his hgb went from an 8-10 which is great. Patient verbalized understanding. No further questions at this time.

## 2019-01-11 NOTE — PROGRESS NOTES
[id number] [facility] at [facility]  [phone]    Hematology / Oncology Established Visit    Reason for Visit:   Preston Tan is a 74 y.o. male who comes in for hospital follow up of sigmoid mass / prostate cancer. Hematology Oncology Treatment History:     Diagnosis: Prosate cancer, diagnosed 1. Stage: now metastatic    Pathology: 9/6/18 Sigmoid colon ulcer, biopsy:   Adenocarcinoma, strongly favor prostate primary (see Comment). Stains weakly for PSAP. Prior Treatment: Brachytherapy and EBRT in 12/2015. Current Treatment: Starting on Zytiga soon    History of Present Illness:   Mr. Frances Gee is a 73 y/o male with h/o prostate cancer who comes in for hospital follow up after recently admission for persistent diarrhea, evidence of a possible sigmoid mass. He had been suffering with persistent diarrhea, 50-70-lb weight loss for approx 4-5 months. He underwent 2 recent colonoscopies (one in July and one in Aug 2018) by Dr. Usha Morris at [facility]. He states the first colonoscopy was not clear due to poor prep. The second colonoscopy reportedly showed abnormal rectal and sigmoid mucosa with a possible mass. This was biopsied and was benign. The patient then underwent a CT scan which was notable for R-sided hydronephrosis from extrinsic compression of the right ureter. The patient's urologist, Dr. Eloy Light, attempted to pass a ureteral stent but was unsuccessful. The patient was then admitted to [facility] for weakness, persistent diarrhea and lower abdominal pain. Abd/pelvis CT on 9/4/18 showed a distal sigmoid mass with possible invasion of the posterior bladder wall as well as R hydronephrosis. He was evaluated by Dr. Patricio Moon in general surgery and underwent diverting loop colostomy given the symptoms caused by the sigmoid mass. He underwent colonoscopy by Dr. Adam Priest in GI with finding of a large sigmoid mass, which was biopsied.  Pathology revealed an adenocarcinoma, but morphology was more consistent with prostate cancer than colorectal cancer. He was seen by Dr. Anupama Gold in Urology during hospital stay who recommended percutaneous nephrostomy tube, placed by IR on 9/7. Given the biopsy findings, Dr. Anupama Gold recommended further evaluation with prostate/pelvic MRI. Nuclear med bone scan negative for any suspicious bony lesions. After hospital discharge, he fell and was admitted to [facility]. He stayed there for 3 days and was discharged to rehab, where he stayed for 4 weeks. Interval history: Today, patient comes in for follow up. Since the last visit, patient has been doing quite well. He reports a good energy level. The patient states the mucus from his rectum is persistent but now intermittent. Saw one \"spot\" of blood in his depends, but otherwise no blood in stools. His prior loose stools have resolved. He states he is up on his feet approx half the day, doing chores like laundry. He is working out, walking, stationary bike and stretching. He states his appetite is decreased but he is eating anyway. He is eating at least 2 meals day, has increased weight from 207-215 lb. For type II DM, the patient was taken off metformin and glipizide because Dr. Shayan Park said his blood sugar was fine. No fevers, chills, sweats. Denies SOB or chest pain. No complaints of pain. Patient wondered if he should continue taking his aspirin, stopped taking it after the hospital admission.      Past Medical History:   Diagnosis Date    Diabetes mellitus ([facility]     Prostate cancer ([facility]     Sleep apnea       Past Surgical History:   Procedure Laterality Date    FLEXIBLE SIGMOIDOSCOPY N/A 9/6/2018    SIGMOIDOSCOPY FLEXIBLE performed by Delisa Sewell MD at [address] [facility] HX HIP REPLACEMENT Left     HX HIP REPLACEMENT Right     HX OTHER SURGICAL      bladder stent      Social History     Tobacco Use    Smoking status: Never Smoker    Smokeless tobacco: Never Used   Substance Use Topics  Alcohol use: No      Family History   Problem Relation Age of Onset   [facility] Cancer Mother         colon    Hypertension Mother     Heart Disease Mother     Cancer Father     Diabetes Sister     Cancer Brother     Diabetes Sister      Current Outpatient Medications   Medication Sig    abiraterone (ZYTIGA) 250 mg tab Take 4 Tabs by mouth daily.  bicalutamide (CASODEX) 50 mg tablet TK 1 T PO  D    traMADol (ULTRAM) 50 mg tablet Take  mg by mouth.  traZODone (DESYREL) 50 mg tablet TK 1 T PO  QHS  PRN    predniSONE (DELTASONE) 5 mg tablet Take 1 Tab by mouth daily.  aspirin delayed-release 81 mg tablet Take 81 mg by mouth daily.  potassium chloride (K-DUR, KLOR-CON) 20 mEq tablet Take 20 mEq by mouth two (2) times a day.  tamsulosin (FLOMAX) 0.4 mg capsule Take 0.4 mg by mouth daily. No current facility-administered medications for this visit. No Known Allergies     Review of Systems: A complete review of systems was obtained, negative except as described above. Physical Exam:     Visit Vitals  /61   Pulse 62   Temp 97.1 °F (36.2 °C)   Ht 5' 9\" (1.753 m)   Wt 215 lb 6.4 oz (97.7 kg)   SpO2 99%   BMI 31.81 kg/m²       ECOG PS: 0  General: Well developed, no acute distress  Eyes: PERRLA, EOMI, anicteric sclerae  HENT: Atraumatic, OP clear, TMs intact without erythema  Neck: Supple  Lymphatic: No cervical, supraclavicular, axillary or inguinal adenopathy  Respiratory: CTAB, normal respiratory effort  CV: Normal rate, regular rhythm, no murmurs, 1+ edema bilateral LE   GI / : Soft, nontender, nondistended, no masses, no hepatomegaly, no splenomegaly. Has colostomy in LLQ - c/d/i. Nephrostomy tube in R lower back - c/d/i  MS: Slow gait and normal station. Digits without clubbing or cyanosis. Skin: No rashes, ecchymoses, or petechiae. Normal temperature, turgor, and texture. Neuro/Psych: Alert, oriented. 5/5 strength in all 4 extremities.  Appropriate affect, normal judgment/insight. Results:     Lab Results   Component Value Date/Time    WBC 4.8 01/11/2019 09:59 AM    HGB 10.0 (L) 01/11/2019 09:59 AM    HCT 32.3 (L) 01/11/2019 09:59 AM    PLATELET 475 07/11/2131 09:59 AM    MCV 84.3 01/11/2019 09:59 AM    ABS. NEUTROPHILS 3.0 01/11/2019 09:59 AM     Lab Results   Component Value Date/Time    Sodium 142 01/11/2019 09:59 AM    Potassium 3.9 01/11/2019 09:59 AM    Chloride 105 01/11/2019 09:59 AM    CO2 27 01/11/2019 09:59 AM    Glucose 100 01/11/2019 09:59 AM    BUN 22 (H) 01/11/2019 09:59 AM    Creatinine 0.85 01/11/2019 09:59 AM    GFR est AA >60 01/11/2019 09:59 AM    GFR est non-AA >60 01/11/2019 09:59 AM    Calcium 9.3 01/11/2019 09:59 AM    Glucose (POC) 151 (H) 09/12/2018 01:54 PM     Lab Results   Component Value Date/Time    Bilirubin, total 0.4 01/11/2019 09:59 AM    ALT (SGPT) 18 01/11/2019 09:59 AM    AST (SGOT) 20 01/11/2019 09:59 AM    Alk. phosphatase 70 01/11/2019 09:59 AM    Protein, total 7.7 01/11/2019 09:59 AM    Albumin 3.4 (L) 01/11/2019 09:59 AM    Globulin 4.3 (H) 01/11/2019 09:59 AM     Lab Results   Component Value Date/Time    Iron 25 (L) 09/05/2018 12:54 PM    TIBC 172 (L) 09/05/2018 12:54 PM    Iron % saturation 15 (L) 09/05/2018 12:54 PM    Ferritin 213 09/05/2018 12:54 PM       Lab Results   Component Value Date/Time    Vitamin B12 726 09/05/2018 12:54 PM    Folate 9.5 09/05/2018 12:54 PM     No results found for: TSH, TSH2, TSH3, TSHP, TSHEXT, TSHEXT    Lab Results   Component Value Date/Time    Prostate Specific Ag 1.7 09/11/2018 04:44 AM     Date:   PSA:  2015 27 6/2017  0.8  10/2017 1.5  3/2018  1.9  9/11/18 1.7  10/2018  3.7  11/29/18 0.57      Imaging:     Abd/pelvis CT 9/4/18:  FINDINGS:   LUNG BASES: Clear. INCIDENTALLY IMAGED HEART AND MEDIASTINUM: Unremarkable. LIVER: Numerous hepatic cysts are noted with a reference 2.3 cm cyst in the  dome. No hepatic mass. GALLBLADDER: Unremarkable. SPLEEN: No mass.   PANCREAS: No mass or ductal dilatation. ADRENALS: Unremarkable. KIDNEYS: There is moderate right hydroureteronephrosis to the level of the  distal ureter, related to the colon mass. There is a delayed nephrogram. There  is a 2.4 cm left renal cyst.  STOMACH: Unremarkable. SMALL BOWEL: No dilatation or wall thickening. COLON: There is a mass lesion involving the distal sigmoid colon. There is  concern for involvement of the posterior bladder wall. APPENDIX: Unremarkable. PERITONEUM: No ascites or pneumoperitoneum. RETROPERITONEUM: No lymphadenopathy or aortic aneurysm. REPRODUCTIVE ORGANS: Prostate seed implants are noted. URINARY BLADDER: No mass or calculus. BONES: The patient is status post bilateral total hip replacement. Degenerative  changes are seen in the lumbar spine. ADDITIONAL COMMENTS: There is a small periumbilical hernia containing only fat. IMPRESSION:  Distal sigmoid colon mass lesion with potential for involvement of the posterior  bladder wall. This is causing right hydroureteronephrosis and a delayed  nephrogram. Hepatic and left renal cysts. Small ventral hernia containing only  fat. Bone scan:  IMPRESSION:   1. No definite evidence of bony metastatic disease. Urinary bladder is distended which limits evaluation of the pelvis. Patient is status post bilateral hip  replacements. There is focal increased activity adjacent to right femoral neck may represent loosening and correlation would be helpful. There is increased bony activity in the shoulders and sternoclavicular joints and mid thoracic spine most likely degenerative.     Pelvic MRI 9/29/18:  FINDINGS: The prostate gland measures 2.6 x 3.1 x 3.4 cm there are seeds in the prostate gland. The region of the prostate gland is somewhat obscured because of  artifact from bilateral prosthetic hips. . A distinct peripheral zone is not identified. No evidence of prostate gland mass. No pathologic enhancement of the prostate gland.  Diffusion imaging is markedly compromised because of the adjacent  metallic implants and paramagnetic effect. . The seminal vesicles are present. The tissue plane between the seminal vesicles and the sigmoid mass are effaced. This may be involving the seminal vesicles. No  definite involvement of the prostate gland itself is identified,.. Urinary bladder is displaced anteriorly by the bulky sigmoid mass. . Fat plane between  the bulky mass and the posterior wall the urinary bladder not well defined. No lymphadenopathy. Bone marrow signal is within normal limits. Muscle signal is within normal limits. IMPRESSION:   1. Bulky mass of the sigmoid colon which is again visualized. This has been described on the previous CT. Fat planes with the seminal vesicles as well as  the posterior wall of the urinary bladder cannot be defined and there may be direct extension. 2. Prostate gland contains seeds. Fat planes surrounding the prostate are intact. There is no evidence of mass extending beyond the capsule. No definite  mass is seen within the prostate gland, however evaluation is limited because of technical factors as described above. Assessment & Plan:   Ariana Louis is a 74 y.o. male with h/o prostate cancer comes in for hospital follow up.     1. Metastatic prostate cancer: Was localized at diagnosis, treated with brachytherapy in 12/2015. Now with metastatic disease which likely started in seminal vesicles and progressed with local extension into sigmoid colon. The pathology is more consistent with prostate adenocarcinoma than colon cancer and no mass seen on first 2 colonoscopies. CEA normal. Chest CT negative. Given large sigmoid mass at risk of causing obstruction, patient underwent diverting loop colostomy on 9/7/18. Although I defer to Urology on surgical management, I agree that attempting a resection of this metastatic disease is too extensive for this patient and has low likelihood of leading to a cure.  As patient has a bulky sigmoid mass causing hydronephrosis requiring utereral stent and colon obstruction, I feel that adding Zytiga to the Lupron may help obtain better and quicker response. Also based on the STAMPEDE trial, adding Zytiga to treatment in the first line setting improves overall survival (3yr OS 83% vs 76%). In the future, patient may be able to undergo ureteral stent placement and colostomy reversal.  -- Continue Lupron every 6 months, next injection scheduled for May.   -- Could discontinue Bicalutamide if Dr. Saadia Kaur agrees. -- Attempting to start patient on Zytiga (Abiraterone) 1000mg PO daily with Prednisone 5mg PO daily - but it comes with extremely high co-pay over $11,000. Denied assistance by [employer]ita Teran and [employer] Teran because his income is too high. He needs a letter of hardship to qualify, which we are assisting with. If he is unable to obtain Zytiga, the next option would be Docetaxel chemotherapy (CHAARTED trial). -- CT scheduled for 1/16/2019 by Dr. Saadia Kaur. This would be good to establish a baseline prior to starting on the Zytiga  -- Return in 4-6 weeks approx 2/8/19 CBC, CMP, PSA     2. Right hydronephrosis: 2/2 extrinsic compression of the R distal ureter from the prostate vs sigmoid mass. IR placed percutaneous nephrostomy tube on 9/7/18.   -- Follow up with Dr. Saadia Kaur   -- Repeat CT scheduled in early Jan 1/16/19.      3. Normocytic anemia: Improving, possibly due to response of his prostate cancer. Likely a combination of anemia of chronic disease due to underlying malignancy. No evidence of iron deficiency. Normal VitB12, folate.      4. Type II DM: Dr. Herrera Harsh his primary care doctor and was taken off all diabetes medication about 2 months ago. -- Can restart ASA      5. H/o localized prostate cancer: Diagnosed 2015 with PSA 27 at time of diagnosis. Treated with brachytherapy and EBRT. PSA was 0.8 (6/2017), 1.5 (10/2017), then 1.9 (3/2018). Last saw Dr. Laura Kramer on 8/23/18. Repeat PSA was 1.7 on 9/11/18.  PSA 3.7 in Oct 2018 and 0.57 on 11/29/18. 7. BLE edema: Referring to lymphedema clinic. May need diuretic in the future as well. I appreciate the opportunity to participate in Mr. Armando Callahan's care. Signed By: Nadege Jones MD     January 11, 2019      [phone] - daughter-in-law.

## 2019-01-11 NOTE — PROGRESS NOTES
[facility] Lab Visit: 
 
5040 Pt arrived ambulatory and in no distress, labs drawn in left AC 1 stick. per EW, . Departed [facility] ambulatory  At 1000 and in no distress. Total time in the lab was 10 min. Pt accidentally went upstairs to see the  Instead of coming to the lab. Visit Vitals /61 Pulse 62 Temp 97.1 °F (36.2 °C) Resp 18 SpO2 99% Labs available in CC once resulted.

## 2019-01-21 ENCOUNTER — TELEPHONE (OUTPATIENT)
Dept: ONCOLOGY | Age: 74
End: 2019-01-21

## 2019-01-21 NOTE — TELEPHONE ENCOUNTER
Left message requesting a return call from patient's daughter, Bayard Epley, to provide update on patient's [employer] Patient Assistance application for [employer]. I need to let her know that to date, [employer] is still reviewing patient's appeal and they are processing the appeals in the order they are received. No turnaround time was provided.

## 2019-01-25 ENCOUNTER — TELEPHONE (OUTPATIENT)
Dept: ONCOLOGY | Age: 74
End: 2019-01-25

## 2019-01-25 NOTE — TELEPHONE ENCOUNTER
Called patient's daughter, Alessio Munoz, to inform that Mr. Magnus Hines has been approved to receive Zytiga free of charge through [employer] and the prescription has been sent to their pharmacy, [facility] for processing and they will contact the patient for delivery soon. Provided [facility]'s phone number for Alessio Munoz to call to check status of order. Advised Aretha to call if [facility] needs anything from our office. She verbalized understanding.

## 2019-01-25 NOTE — TELEPHONE ENCOUNTER
[address] at [facility]  [phone]      01/25/19 1:42 PM Spoke with patient's daughter-in-law, Elza Marc (okay per PHI on file). Advised that patient should start Zytiga when he receives it tomorrow. Also reminded her that patient should also take prednisone 5mg once daily as well. Aretha verbalized understanding. No further questions or concerns at this time.

## 2019-01-25 NOTE — TELEPHONE ENCOUNTER
Pts daughter in law called stating pt is supposed to received his [employer] tomorrow and they would like to know when he is supposed to start it.  Call back number for Frankie Bolden is [phone]

## 2019-01-28 ENCOUNTER — HOSPITAL ENCOUNTER (OUTPATIENT)
Dept: PHYSICAL THERAPY | Age: 74
Discharge: HOME | End: 2019-01-28

## 2019-01-28 NOTE — PROGRESS NOTES
[address]  [address] [facility]  [place] [address]      Lymphedema Therapy      1/28/2019:  Kamilla Mazariegos was not seen on this date for physical therapy for the following reasons:    []         Patient called to cancel the visit for the following reasons:   [x]         Patient missed the visit and did not call to cancel.  No show for initial evaluation    BARRINGTON WhitlockT

## 2019-01-31 NOTE — PROGRESS NOTES
[id number] [facility] [place]  [phone]    Hematology / Oncology Established Visit    Reason for Visit:   Anabella Mccray is a 74 y.o. male who comes in for hospital follow up of sigmoid mass / prostate cancer. Hematology Oncology Treatment History:     Diagnosis: Prosate cancer, diagnosed 1. Stage: now metastatic    Pathology: 9/6/18 Sigmoid colon ulcer, biopsy:   Adenocarcinoma, strongly favor prostate primary (see Comment). Stains weakly for PSAP. Prior Treatment: Brachytherapy and EBRT in 12/2015. Current Treatment: Veldon Book started on 1/28/19. History of Present Illness:   Mr. Genoveva López is a 75 y/o male with h/o prostate cancer who comes in for hospital follow up after recently admission for persistent diarrhea, evidence of a possible sigmoid mass. He had been suffering with persistent diarrhea, 50-70-lb weight loss for approx 4-5 months. He underwent 2 recent colonoscopies (one in July and one in Aug 2018) by Dr. Anne-Marie Fregoso at [facility]. He states the first colonoscopy was not clear due to poor prep. The second colonoscopy reportedly showed abnormal rectal and sigmoid mucosa with a possible mass. This was biopsied and was benign. The patient then underwent a CT scan which was notable for R-sided hydronephrosis from extrinsic compression of the right ureter. The patient's urologist, Dr. Roger Esparza, attempted to pass a ureteral stent but was unsuccessful. The patient was then admitted to [place] for weakness, persistent diarrhea and lower abdominal pain. Abd/pelvis CT on 9/4/18 showed a distal sigmoid mass with possible invasion of the posterior bladder wall as well as R hydronephrosis. He was evaluated by Dr. Angelica Sloan in general surgery and underwent diverting loop colostomy given the symptoms caused by the sigmoid mass. He underwent colonoscopy by Dr. Shira Louis in GI with finding of a large sigmoid mass, which was biopsied.  Pathology revealed an adenocarcinoma, but morphology was more consistent with prostate cancer than colorectal cancer. He was seen by Dr. Moe Navarro in Urology during hospital stay who recommended percutaneous nephrostomy tube, placed by IR on 9/7. Given the biopsy findings, Dr. Moe Navarro recommended further evaluation with prostate/pelvic MRI. Nuclear med bone scan negative for any suspicious bony lesions. After hospital discharge, he fell and was admitted to /[facility]. He stayed there for 3 days and was discharged to rehab, where he stayed for 4 weeks. Interval history: Today, patient comes in for follow up. Since the last visit, patient has been doing quite well. He reports a good energy level. Nephrostomy tube replaced 2/1/19, which patient reports was a painful procedure. The patient states the mucus from his rectum is persistent but now intermittent. Reports \"spots\" of blood in his depends, but otherwise no blood in stools. He states he is up on his feet approx half the day, doing chores like laundry. He is working out, walking, stationary bike and stretching. He states his appetite is decreased but he is eating anyway. He is eating at least 2 meals day, has increased weight from 226 lb. He reports hot flashes and feels down. No fevers, chills, sweats. Denies SOB or chest pain. No complaints of pain.      Past Medical History:   Diagnosis Date    Diabetes mellitus ([address]     Prostate cancer [address] [address]     Sleep apnea       Past Surgical History:   Procedure Laterality Date    FLEXIBLE SIGMOIDOSCOPY N/A 9/6/2018    SIGMOIDOSCOPY FLEXIBLE performed by Asaf Burton MD at [address] [facility] HX HIP REPLACEMENT Left     HX HIP REPLACEMENT Right     HX OTHER SURGICAL      bladder stent      Social History     Tobacco Use    Smoking status: Never Smoker    Smokeless tobacco: Never Used   Substance Use Topics    Alcohol use: No      Family History   Problem Relation Age of Onset    Cancer Mother         colon    Hypertension Mother     Heart Disease Mother     Cancer Father     Diabetes Sister     Cancer Brother     Diabetes Sister      Current Outpatient Medications   Medication Sig    abiraterone (ZYTIGA) 250 mg tab Take 4 Tabs by mouth daily.  bicalutamide (CASODEX) 50 mg tablet TK 1 T PO  D    traMADol (ULTRAM) 50 mg tablet Take  mg by mouth.  traZODone (DESYREL) 50 mg tablet TK 1 T PO  QHS  PRN    predniSONE (DELTASONE) 5 mg tablet Take 1 Tab by mouth daily.  aspirin delayed-release 81 mg tablet Take 81 mg by mouth daily.  potassium chloride (K-DUR, KLOR-CON) 20 mEq tablet Take 20 mEq by mouth two (2) times a day.  tamsulosin (FLOMAX) 0.4 mg capsule Take 0.4 mg by mouth daily. No current facility-administered medications for this visit. No Known Allergies     Review of Systems: A complete review of systems was obtained, negative except as described above. Physical Exam:     Visit Vitals  /67   Pulse 71   Temp 98.2 °F (36.8 °C)   Ht 5' 9\" (1.753 m)   SpO2 100%   BMI 33.37 kg/m²       ECOG PS: 0  General: Well developed, no acute distress  Eyes: PERRLA, EOMI, anicteric sclerae  HENT: Atraumatic, OP clear, TMs intact without erythema  Neck: Supple  Lymphatic: No cervical, supraclavicular, axillary or inguinal adenopathy  Respiratory: CTAB, normal respiratory effort  CV: Normal rate, regular rhythm, no murmurs, 1+ edema bilateral LE   GI / : Soft, nontender, nondistended, no masses, no hepatomegaly, no splenomegaly. Has colostomy in LLQ pink stoma, mild protrusion of stoma and surrounding abdomen, non-tender to palpation - c/d/i. Nephrostomy tube in R lower back - c/d/i  MS: Slow gait and normal station. Digits without clubbing or cyanosis. Skin: No rashes, ecchymoses, or petechiae. Normal temperature, turgor, and texture. Neuro/Psych: Alert, oriented. 5/5 strength in all 4 extremities. Appropriate affect, normal judgment/insight.     Results:     Lab Results   Component Value Date/Time    WBC 4.8 01/11/2019 09:59 AM    HGB 10.0 (L) 01/11/2019 09:59 AM    HCT 32.3 (L) 01/11/2019 09:59 AM    PLATELET 046 35/01/1612 09:59 AM    MCV 84.3 01/11/2019 09:59 AM    ABS. NEUTROPHILS 3.0 01/11/2019 09:59 AM     Lab Results   Component Value Date/Time    Sodium 142 01/11/2019 09:59 AM    Potassium 3.9 01/11/2019 09:59 AM    Chloride 105 01/11/2019 09:59 AM    CO2 27 01/11/2019 09:59 AM    Glucose 100 01/11/2019 09:59 AM    BUN 22 (H) 01/11/2019 09:59 AM    Creatinine 0.85 01/11/2019 09:59 AM    GFR est AA >60 01/11/2019 09:59 AM    GFR est non-AA >60 01/11/2019 09:59 AM    Calcium 9.3 01/11/2019 09:59 AM    Glucose (POC) 151 (H) 09/12/2018 01:54 PM     Lab Results   Component Value Date/Time    Bilirubin, total 0.4 01/11/2019 09:59 AM    ALT (SGPT) 18 01/11/2019 09:59 AM    AST (SGOT) 20 01/11/2019 09:59 AM    Alk. phosphatase 70 01/11/2019 09:59 AM    Protein, total 7.7 01/11/2019 09:59 AM    Albumin 3.4 (L) 01/11/2019 09:59 AM    Globulin 4.3 (H) 01/11/2019 09:59 AM     Lab Results   Component Value Date/Time    Iron 25 (L) 09/05/2018 12:54 PM    TIBC 172 (L) 09/05/2018 12:54 PM    Iron % saturation 15 (L) 09/05/2018 12:54 PM    Ferritin 213 09/05/2018 12:54 PM       Lab Results   Component Value Date/Time    Vitamin B12 726 09/05/2018 12:54 PM    Folate 9.5 09/05/2018 12:54 PM     No results found for: TSH, TSH2, TSH3, TSHP, TSHEXT, TSHEXT    Lab Results   Component Value Date/Time    Prostate Specific Ag 2.1 01/11/2019 09:59 AM    Prostate Specific Ag 1.7 09/11/2018 04:44 AM     Date:   PSA:  2015 27 6/2017  0.8  10/2017 1.5  3/2018  1.9  9/11/18 1.7  10/2018  3.7  11/29/18 0.57  1/24/19:           2.9      Imaging:     Abd/pelvis CT 9/4/18:  FINDINGS:   LUNG BASES: Clear. INCIDENTALLY IMAGED HEART AND MEDIASTINUM: Unremarkable. LIVER: Numerous hepatic cysts are noted with a reference 2.3 cm cyst in the  dome. No hepatic mass. GALLBLADDER: Unremarkable. SPLEEN: No mass.   PANCREAS: No mass or ductal dilatation. ADRENALS: Unremarkable. KIDNEYS: There is moderate right hydroureteronephrosis to the level of the  distal ureter, related to the colon mass. There is a delayed nephrogram. There  is a 2.4 cm left renal cyst.  STOMACH: Unremarkable. SMALL BOWEL: No dilatation or wall thickening. COLON: There is a mass lesion involving the distal sigmoid colon. There is  concern for involvement of the posterior bladder wall. APPENDIX: Unremarkable. PERITONEUM: No ascites or pneumoperitoneum. RETROPERITONEUM: No lymphadenopathy or aortic aneurysm. REPRODUCTIVE ORGANS: Prostate seed implants are noted. URINARY BLADDER: No mass or calculus. BONES: The patient is status post bilateral total hip replacement. Degenerative  changes are seen in the lumbar spine. ADDITIONAL COMMENTS: There is a small periumbilical hernia containing only fat. IMPRESSION:  Distal sigmoid colon mass lesion with potential for involvement of the posterior  bladder wall. This is causing right hydroureteronephrosis and a delayed  nephrogram. Hepatic and left renal cysts. Small ventral hernia containing only  fat. Bone scan:  IMPRESSION:   1. No definite evidence of bony metastatic disease. Urinary bladder is distended which limits evaluation of the pelvis. Patient is status post bilateral hip  replacements. There is focal increased activity adjacent to right femoral neck may represent loosening and correlation would be helpful. There is increased bony activity in the shoulders and sternoclavicular joints and mid thoracic spine most likely degenerative.     Pelvic MRI 9/29/18:  FINDINGS: The prostate gland measures 2.6 x 3.1 x 3.4 cm there are seeds in the prostate gland. The region of the prostate gland is somewhat obscured because of  artifact from bilateral prosthetic hips. . A distinct peripheral zone is not identified. No evidence of prostate gland mass. No pathologic enhancement of the prostate gland.  Diffusion imaging is markedly compromised because of the adjacent  metallic implants and paramagnetic effect. . The seminal vesicles are present. The tissue plane between the seminal vesicles and the sigmoid mass are effaced. This may be involving the seminal vesicles. No  definite involvement of the prostate gland itself is identified,.. Urinary bladder is displaced anteriorly by the bulky sigmoid mass. . Fat plane between  the bulky mass and the posterior wall the urinary bladder not well defined. No lymphadenopathy. Bone marrow signal is within normal limits. Muscle signal is within normal limits. IMPRESSION:   1. Bulky mass of the sigmoid colon which is again visualized. This has been described on the previous CT. Fat planes with the seminal vesicles as well as  the posterior wall of the urinary bladder cannot be defined and there may be direct extension. 2. Prostate gland contains seeds. Fat planes surrounding the prostate are intact. There is no evidence of mass extending beyond the capsule. No definite  mass is seen within the prostate gland, however evaluation is limited because of technical factors as described above. CT 1/15/19:   Interval increase in concentric, masslike thickening involving the mid to distal sigmoid colon and rectum, with extracolonic extension and evidence of bladder wall invasion. Mild bilateral hydroureter, without significant hydronephrosis. Right nephrostomy tube remains in satisfactory position. Unchanged hepatic and renal cysts. Assessment & Plan:   Tono Haddad is a 74 y.o. male with h/o prostate cancer comes in for follow up.     1. Metastatic prostate cancer: Was localized at diagnosis, treated with brachytherapy in 12/2015. Now with metastatic disease which likely started in seminal vesicles and progressed with local extension into sigmoid colon. The pathology is more consistent with prostate adenocarcinoma than colon cancer and no mass seen on first 2 colonoscopies. CEA normal. Chest CT negative. Given large sigmoid mass at risk of causing obstruction, patient underwent diverting loop colostomy on 9/7/18. Although I defer to Urology on surgical management, I agree that attempting a resection of this metastatic disease is too extensive for this patient and has low likelihood of leading to a cure. As patient has a bulky sigmoid mass causing hydronephrosis requiring utereral stent and colon obstruction, I feel that adding Zytiga to the Lupron may help obtain better and quicker response. Also based on the STAMPEDE trial, adding Zytiga to treatment in the first line setting improves overall survival (3yr OS 83% vs 76%). In the future, patient may be able to undergo ureteral stent placement and colostomy reversal. Recent CT 1/15/19 shows evidence of castration resistant prostate cancer based on disease progression on Lupron and Bicalutamide. -- Continue Lupron every 6 months, next injection scheduled for May.   -- Discontinue Bicalutamide if ok with Dr. Ying Gavin. -- Continue Zytiga (Abiraterone) 1000mg PO daily (started 1/28/19) with Prednisone 5mg PO daily   -- Return in 4 weeks with CBC, CMP, PSA  -- Request CT imaging to be pushed to our location if possible  -- Plan on repeat CT and bone scan in 3 months (must ask for comparison with prior outside imaging in comments when ordering)     2. Right hydronephrosis: 2/2 extrinsic compression of the R distal ureter from the prostate vs sigmoid mass. IR placed percutaneous nephrostomy tube on 9/7/18.   -- Follow up with Dr. Ying Gavin   -- Replaced 2/1/19  -- Repeat CT scheduled in early Jan 1/16/19.      3. Normocytic anemia: Improving, possibly due to response of his prostate cancer. Likely a combination of anemia of chronic disease due to underlying malignancy. No evidence of iron deficiency. Normal VitB12, folate.      4. Type II DM: Dr. Rachna Olvera his primary care doctor and was taken off all diabetes medication about 2 months ago.   -- Can restart ASA      5. H/o localized prostate cancer: Diagnosed 2015 with PSA 27 at time of diagnosis. Treated with brachytherapy and EBRT. PSA was 0.8 (6/2017), 1.5 (10/2017), then 1.9 (3/2018). Last saw Dr. Kathryn Galdamez on 8/23/18. Repeat PSA was 1.7 on 9/11/18. PSA 3.7 in Oct 2018 and 0.57 on 11/29/18. 7. BLE edema: Referring to lymphedema clinic. May need diuretic in the future as well. Lymphedema specialist fitted for compression stockings. 8. Mood: Feeling a bit down per the patient. Daughter present and is trying to get patient to start support group. Will continue to monitor. Patient declined medication or meeting with Mark Betts this visit. I appreciate the opportunity to participate in Mr. Earle Callahan's care. Signed By: Roberta Armas MD     January 31, 2019      [phone] - daughter-in-law.

## 2019-02-01 ENCOUNTER — HOSPITAL ENCOUNTER (OUTPATIENT)
Dept: INTERVENTIONAL RADIOLOGY/VASCULAR | Age: 74
Discharge: HOME | End: 2019-02-01
Attending: UROLOGY | Admitting: UROLOGY
Payer: MEDICARE

## 2019-02-01 VITALS
RESPIRATION RATE: 11 BRPM | HEART RATE: 78 BPM | HEIGHT: 69 IN | OXYGEN SATURATION: 98 % | SYSTOLIC BLOOD PRESSURE: 121 MMHG | BODY MASS INDEX: 32.49 KG/M2 | TEMPERATURE: 98.3 F | DIASTOLIC BLOOD PRESSURE: 74 MMHG | WEIGHT: 219.36 LBS

## 2019-02-01 DIAGNOSIS — N13.30 HYDRONEPHROSIS: ICD-10-CM

## 2019-02-01 PROCEDURE — 74011636320 HC RX REV CODE- 636/320: Performed by: STUDENT IN AN ORGANIZED HEALTH CARE EDUCATION/TRAINING PROGRAM

## 2019-02-01 PROCEDURE — 74011000258 HC RX REV CODE- 258: Performed by: STUDENT IN AN ORGANIZED HEALTH CARE EDUCATION/TRAINING PROGRAM

## 2019-02-01 PROCEDURE — 74011250636 HC RX REV CODE- 250/636

## 2019-02-01 PROCEDURE — C1769 GUIDE WIRE: HCPCS

## 2019-02-01 PROCEDURE — 77030018719 HC DRSG PTCH ANTIMIC J&J -A

## 2019-02-01 PROCEDURE — 74011250636 HC RX REV CODE- 250/636: Performed by: STUDENT IN AN ORGANIZED HEALTH CARE EDUCATION/TRAINING PROGRAM

## 2019-02-01 PROCEDURE — 77030002986 HC SUT PROL J&J -A

## 2019-02-01 PROCEDURE — C1729 CATH, DRAINAGE: HCPCS

## 2019-02-01 PROCEDURE — 50435 EXCHANGE NEPHROSTOMY CATH: CPT

## 2019-02-01 RX ORDER — LIDOCAINE HYDROCHLORIDE 10 MG/ML
20 INJECTION INFILTRATION; PERINEURAL
Status: DISCONTINUED | OUTPATIENT
Start: 2019-02-01 | End: 2019-02-01 | Stop reason: HOSPADM

## 2019-02-01 RX ORDER — FENTANYL CITRATE 50 UG/ML
25-200 INJECTION, SOLUTION INTRAMUSCULAR; INTRAVENOUS
Status: DISCONTINUED | OUTPATIENT
Start: 2019-02-01 | End: 2019-02-01 | Stop reason: HOSPADM

## 2019-02-01 RX ORDER — MIDAZOLAM HYDROCHLORIDE 1 MG/ML
.5-1 INJECTION, SOLUTION INTRAMUSCULAR; INTRAVENOUS
Status: DISCONTINUED | OUTPATIENT
Start: 2019-02-01 | End: 2019-02-01 | Stop reason: HOSPADM

## 2019-02-01 RX ADMIN — LIDOCAINE HYDROCHLORIDE 20 ML: 10 INJECTION, SOLUTION INFILTRATION; PERINEURAL at 14:10

## 2019-02-01 RX ADMIN — IOPAMIDOL 8 ML: 755 INJECTION, SOLUTION INTRAVENOUS at 14:14

## 2019-02-01 RX ADMIN — FENTANYL CITRATE 50 MCG: 50 INJECTION, SOLUTION INTRAMUSCULAR; INTRAVENOUS at 14:14

## 2019-02-01 RX ADMIN — CEFTRIAXONE SODIUM 1 G: 1 INJECTION, POWDER, FOR SOLUTION INTRAMUSCULAR; INTRAVENOUS at 13:52

## 2019-02-01 RX ADMIN — FENTANYL CITRATE 50 MCG: 50 INJECTION, SOLUTION INTRAMUSCULAR; INTRAVENOUS at 14:09

## 2019-02-01 NOTE — PROGRESS NOTES
1155    Patient arrived. ID and allergies verified verbally with patient. Pt voices understanding of procedure to be performed. Consent obtained. Pt prepped for procedure. Nephrotube exchange     [id number]    TRANSFER - OUT REPORT:    Verbal report given to [address] RN(name) on [facility]  being transferred to angio (unit) for routine progression of care       Report consisted of patients Situation, Background, Assessment and   Recommendations(SBAR). Information from the following report(s) SBAR was reviewed with the receiving nurse. Lines:   Peripheral IV 02/01/19 Right Antecubital (Active)        Opportunity for questions and clarification was provided. Patient transported with:   Registered Nurse        [id number] Antonella Tripp REPORT:    Verbal report received from [facility] THE [facility] RT(name) on [facility]  being received from angio (unit) for routine progression of care      Report consisted of patients Situation, Background, Assessment and   Recommendations(SBAR). Information from the following report(s) SBAR was reviewed with the receiving nurse. Opportunity for questions and clarification was provided. Assessment completed upon patients arrival to unit and care assumed. 1455    Pt discharged via wheeled chair with family. Personal belongings with patient upon discharge.

## 2019-02-01 NOTE — PROGRESS NOTES
TRANSFER - OUT REPORT:    Verbal report given to Tamika Jin RN on [facility] being transferred to [facility] for routine progression of care       Report consisted of patient's Situation, Background, Assessment and   Recommendations(SBAR). Information from the following report(s) Procedure Summary was reviewed with the receiving nurse. Opportunity for questions and clarification was provided.       Patient transported with:   Registered Nurse  Tech

## 2019-02-07 ENCOUNTER — HOSPITAL ENCOUNTER (OUTPATIENT)
Dept: INFUSION THERAPY | Age: 74
Discharge: HOME | End: 2019-02-07
Payer: MEDICARE

## 2019-02-07 ENCOUNTER — HOSPITAL ENCOUNTER (OUTPATIENT)
Dept: PHYSICAL THERAPY | Age: 74
Discharge: HOME | End: 2019-02-07
Payer: MEDICARE

## 2019-02-07 ENCOUNTER — OFFICE VISIT (OUTPATIENT)
Dept: ONCOLOGY | Age: 74
End: 2019-02-07

## 2019-02-07 VITALS
WEIGHT: 226 LBS | HEART RATE: 82 BPM | SYSTOLIC BLOOD PRESSURE: 122 MMHG | OXYGEN SATURATION: 99 % | DIASTOLIC BLOOD PRESSURE: 64 MMHG | BODY MASS INDEX: 33.47 KG/M2 | HEIGHT: 69 IN

## 2019-02-07 VITALS
HEIGHT: 69 IN | OXYGEN SATURATION: 100 % | DIASTOLIC BLOOD PRESSURE: 67 MMHG | BODY MASS INDEX: 33.37 KG/M2 | TEMPERATURE: 98.2 F | SYSTOLIC BLOOD PRESSURE: 148 MMHG | HEART RATE: 71 BPM

## 2019-02-07 VITALS
OXYGEN SATURATION: 100 % | DIASTOLIC BLOOD PRESSURE: 67 MMHG | HEART RATE: 71 BPM | SYSTOLIC BLOOD PRESSURE: 148 MMHG | TEMPERATURE: 98.2 F

## 2019-02-07 DIAGNOSIS — D63.8 ANEMIA OF CHRONIC DISEASE: ICD-10-CM

## 2019-02-07 DIAGNOSIS — Z93.3 COLOSTOMY IN PLACE (HCC): ICD-10-CM

## 2019-02-07 DIAGNOSIS — C61 PROSTATE CANCER (HCC): Primary | ICD-10-CM

## 2019-02-07 DIAGNOSIS — N13.30 HYDRONEPHROSIS, RIGHT: ICD-10-CM

## 2019-02-07 DIAGNOSIS — Z51.11 CHEMOTHERAPY MANAGEMENT, ENCOUNTER FOR: ICD-10-CM

## 2019-02-07 DIAGNOSIS — R60.0 BILATERAL LOWER EXTREMITY EDEMA: ICD-10-CM

## 2019-02-07 LAB
ALBUMIN SERPL-MCNC: 3 G/DL (ref 3.5–5)
ALBUMIN/GLOB SERPL: 0.7 {RATIO} (ref 1.1–2.2)
ALP SERPL-CCNC: 70 U/L (ref 45–117)
ALT SERPL-CCNC: 12 U/L (ref 12–78)
ANION GAP SERPL CALC-SCNC: 9 MMOL/L (ref 5–15)
AST SERPL-CCNC: 26 U/L (ref 15–37)
BASOPHILS # BLD: 0 K/UL (ref 0–0.1)
BASOPHILS NFR BLD: 0 % (ref 0–1)
BILIRUB SERPL-MCNC: 0.4 MG/DL (ref 0.2–1)
BUN SERPL-MCNC: 18 MG/DL (ref 6–20)
BUN/CREAT SERPL: 12 (ref 12–20)
CALCIUM SERPL-MCNC: 8.9 MG/DL (ref 8.5–10.1)
CHLORIDE SERPL-SCNC: 103 MMOL/L (ref 97–108)
CO2 SERPL-SCNC: 30 MMOL/L (ref 21–32)
COMMENT, HOLDF: NORMAL
CREAT SERPL-MCNC: 1.45 MG/DL (ref 0.7–1.3)
DIFFERENTIAL METHOD BLD: ABNORMAL
EOSINOPHIL # BLD: 0.1 K/UL (ref 0–0.4)
EOSINOPHIL NFR BLD: 2 % (ref 0–7)
ERYTHROCYTE [DISTWIDTH] IN BLOOD BY AUTOMATED COUNT: 15.9 % (ref 11.5–14.5)
GLOBULIN SER CALC-MCNC: 4.4 G/DL (ref 2–4)
GLUCOSE SERPL-MCNC: 95 MG/DL (ref 65–100)
HCT VFR BLD AUTO: 30.3 % (ref 36.6–50.3)
HGB BLD-MCNC: 9.5 G/DL (ref 12.1–17)
IMM GRANULOCYTES # BLD AUTO: 0 K/UL (ref 0–0.04)
IMM GRANULOCYTES NFR BLD AUTO: 0 % (ref 0–0.5)
LYMPHOCYTES # BLD: 0.8 K/UL (ref 0.8–3.5)
LYMPHOCYTES NFR BLD: 13 % (ref 12–49)
MCH RBC QN AUTO: 27.1 PG (ref 26–34)
MCHC RBC AUTO-ENTMCNC: 31.4 G/DL (ref 30–36.5)
MCV RBC AUTO: 86.6 FL (ref 80–99)
MONOCYTES # BLD: 0.7 K/UL (ref 0–1)
MONOCYTES NFR BLD: 11 % (ref 5–13)
NEUTS SEG # BLD: 4.9 K/UL (ref 1.8–8)
NEUTS SEG NFR BLD: 75 % (ref 32–75)
NRBC # BLD: 0 K/UL (ref 0–0.01)
NRBC BLD-RTO: 0 PER 100 WBC
PLATELET # BLD AUTO: 248 K/UL (ref 150–400)
PMV BLD AUTO: 9.8 FL (ref 8.9–12.9)
POTASSIUM SERPL-SCNC: 3.7 MMOL/L (ref 3.5–5.1)
PROT SERPL-MCNC: 7.4 G/DL (ref 6.4–8.2)
RBC # BLD AUTO: 3.5 M/UL (ref 4.1–5.7)
SAMPLES BEING HELD,HOLD: NORMAL
SODIUM SERPL-SCNC: 142 MMOL/L (ref 136–145)
WBC # BLD AUTO: 6.5 K/UL (ref 4.1–11.1)

## 2019-02-07 PROCEDURE — 97530 THERAPEUTIC ACTIVITIES: CPT

## 2019-02-07 PROCEDURE — 80053 COMPREHEN METABOLIC PANEL: CPT

## 2019-02-07 PROCEDURE — 85025 COMPLETE CBC W/AUTO DIFF WBC: CPT

## 2019-02-07 PROCEDURE — 97161 PT EVAL LOW COMPLEX 20 MIN: CPT

## 2019-02-07 PROCEDURE — 36415 COLL VENOUS BLD VENIPUNCTURE: CPT

## 2019-02-07 NOTE — PROGRESS NOTES
[address]  [address]  [place] [address]      INITIAL EVALUATION    NAME: Rosalia Donaldson AGE: 74 y.o. GENDER: male  DATE: 2/7/2019  REFERRING PHYSICIAN: Naz Schroeder MD  HISTORY AND BACKGROUND:  Patient referred to clinic for evaluation of LE swelling. Patient has history of prostate cancer diagnosed in 2015, treated with brachytherapy 12/2015. Metastatic disease into sigmoid colon, with pathology more consistent with prostates adenocarcinoma than colon cancer. Decision has been made that surgical management would be too extensive for this patient when patient was last seen by oncologist 1/11/2019. Sigmoid mass causing hydronephrosis requiring ureteral stent, percutaneous nephrostomy tube placed 9/7/18 R lower back; colostomy LLQ. L THR 10 years ago, R THR last year. See further medical/surgical history as noted below. Primary Diagnosis:  · BLE lymphedema, secondary (I89.0)  Other Treatment Diagnoses:   Abnormality of gait (other abnormalities of gait and mobility R26.89)   Swelling not relieved by elevation ( R60.9 edema)   Prostate cancer, metastatic (C61)  Morbid Obesity (E66.01)  Date of Onset: 9/6/2018  Present Symptoms and Functional Limitations: overal reduced activity, lack of understanding regarding management of LE swelling.    Lymmphedema Life Impact Scale: 6/72; 9%; CI  Past Medical History:   Past Medical History:   Diagnosis Date    Diabetes mellitus ([address]     Prostate cancer ([address]     Sleep apnea      Past Surgical History:   Procedure Laterality Date    FLEXIBLE SIGMOIDOSCOPY N/A 9/6/2018    SIGMOIDOSCOPY FLEXIBLE performed by Reid Davis MD at [address] [facility] HX HIP REPLACEMENT Left     HX HIP REPLACEMENT Right     HX OTHER SURGICAL      bladder stent    IR CHANGE NEPHROSTOMY PYELOS TUBE RT  2/1/2019     Current Medications:    Current Outpatient Medications   Medication Sig    abiraterone (ZYTIGA) 250 mg tab Take 4 Tabs by mouth daily.  bicalutamide (CASODEX) 50 mg tablet TK 1 T PO  D    traMADol (ULTRAM) 50 mg tablet Take  mg by mouth.  traZODone (DESYREL) 50 mg tablet TK 1 T PO  QHS  PRN    predniSONE (DELTASONE) 5 mg tablet Take 1 Tab by mouth daily.  aspirin delayed-release 81 mg tablet Take 81 mg by mouth daily.  potassium chloride (K-DUR, KLOR-CON) 20 mEq tablet Take 20 mEq by mouth two (2) times a day.  tamsulosin (FLOMAX) 0.4 mg capsule Take 0.4 mg by mouth daily. No current facility-administered medications for this encounter. Allergies: No Known Allergies   Prior Level of Function/Social/Work History/Personal factors and/or comorbidities impacting plan of care: Patient reports previously active, exercising every day including outdoor walk at the park. States he has not been walking recently due to feeling weaker. Patient has cancer history as noted above. Reports he lost his wife last year. Living Situation: lives alone. Trainable Caregiver?: daughter in law brings patient to appt today. Self-care/ADLs: mod I, shower seat as needed. Dresses indep, cooks meals. Mobility: ambulates into clinic with cane, slow apolonia. Sleeping Arrangement:  bed   Adaptive Equipment Owned: cane, rolling walker, shower seat. Previous Therapy:  PT s/p hip replacement, s/p fall 9/2018. No previous treatment regarding LE edema. Compression/Lymphedema Equipment:  none    SUBJECTIVE:   \"I don't notice that my legs are swollen. \"  Patient reports that he feels weaker overall since cancer progression diagnosed. States he lives alone, with family members driving him to doctors appts. Does report that he does drive to the grocery, and is able to bring groceries into his home. Questions whether he can walk to and at the park. Pt reports he uses the cane \"because the doctors tell me to. \"  States he has started new medication, no reported side effects. Patients goals for therapy: evaluation to be completed regarding LE swelling. OBJECTIVE DATA SUMMARY:   EXAMINATION/PRESENTATION/DECISION MAKING:   Pain:  Pain Scale 1: Numeric (0 - 10)  Pain Intensity 1: 0     Self-Care and ADLs:       LB Dressing: Modified independent, additional time   Don/Doff Shoes/Socks: Modified independent, additional time. Skin and Tissue Assessment:  Dermal Status:  [x]   Intact []  Dry   []  Tenuous [] Flaky   []  Wound/lesion present []  Scars   []  Dermatitis Skin shiny, mildly taut lower legs. Texture/Consistency:  [x]  Boggy: mild lower legs, dorsal foot, L>R [x]  Pitting Edema: +2 L dorsal foot, +1 R dorsal foot. []  Brawny []  Combination   []  Fibrotic/Woody    Pigmentation/Color Change:  [x]  Normal []  Hemosiderin   []  Red []  Erythematous   []  Hyperpigmented []  Hyperlipodermatosclerosis   Anomalies:  []  Lymphorrhea []  Vesicles   []  Petechiae []  Warty Vercusis   []  Bullae []  Papilloma   Circulatory:  []  ANGELES [x]  Varicosities:   [x]  Pulses: dorsal pedal, posterior tibialis located with doppler. []  Vascular studies ruled out DVT in past   []  DVT History    Nails:  []   Normal  [x]   Fungus  Stemmers Sign: negative      Height:  Height: 5' 9\" (175.3 cm)  Weight:  Weight: 102.5 kg (226 lb)   BMI:  BMI (calculated): 33.4  (36 or greater: adversely affecting lymphedema)  Volumetric Measurements:   Right:  7910.64 mL Left:  7623.68 mL   % Difference: 3.63, R>L    (See scanned graph)  Range of Motion: bilateral LE grossly WFL AROM. Strength: bilateral LE grossly 5/5 with MMT. Sensation:  Intact to light touch  Mobility:  Bed/Chair Mobility:  Independent  Transfers: independent    Sitting Balance:  good Standing Balance:  Fair +   Gait:  Modified independent  Wheelchair Mobility:  (Other) na   Endurance:  Intact 100 feet in clinic with straight cane. Stairs:  Not assessed.          Safety:  Patient is alert and oriented:  yes   Safety awareness:  Patient resistant to recommendation that he have supervision for outdoor walking at park near his home. Otherwise, uses cane for ambulation as recommended. 1 fall reported in past 6 months. Fall Risk?:  Moderate, Kanchan 23/28. Patient given written fall prevention handout: Yes   Precautions:  Standard lymphedema precautions to include avoiding blood pressure readings, injections and IVs or other procedures/acts that could lead to broken skin on affected area, and avoiding excessive heat, resistive activity or altitude without compression garment    Functional Measure:   LLIS      Physical Therapy Evaluation Charge Determination   History Examination Presentation Decision-Making   HIGH Complexity :3+ comorbidities / personal factors will impact the outcome/ POC  MEDIUM Complexity : 3 Standardized tests and measures addressing body structure, function, activity limitation and / or participation in recreation  MEDIUM Complexity : Evolving with changing characteristics  Other outcome measures LLIS  LOW       Based on the above components, the patient evaluation is determined to be of the following complexity level: LOW     Evaluation Time: 9:25-9:45 am  20 minutes      TREATMENT PROVIDED:   1. Treatment description:  The patient was educated regarding the role and function of the lymphatic system, pathophysiology of secondary lymphedema, and instructed in the lymphedema management protocol of complete decongestive therapy (CDT). This includes skin care to prevent breakdown or infection, lymphedema exercises, custom compression therapy options (bandaging, compression garments, compression pump, [place] [place], [employer], [employer], etc. as needed), and decongestion with manual lymphatic drainage as indicated. We discussed the need for consistent compression system for lymphedema management.   Skin care education was performed,applying low pH lotion to extremity using upward strokes to stimulate lymphatic vessels. Discussed compression options with patient, and, due to mild edema noted today, decision made to proceed with order for RM Juzo 3511 knee high stockings, size II regular length. Discussed with patient that we will start with CCL 1 stocking for ease of donning, with potential need to increase to CCL 2 stocking as needed based on severity of swelling. Patient verbalizes understanding of instruction provided. Advised to return to clinic upon receiving stockings to allow garments to be fit in clinic, and for instruction in appropriate don/doff of garments. Patient advised that walking would be beneficial for him, however, recommend that he walk with a friend/family member, and use his cane for safety and to reduce fall risk. Patient resistant to considering not walking alone, with patient advised to discuss this with Dr. Alex Sanon. Advised that he can return to LE strengthening exercise program that he performed s/p THR, which he states he has at home. Patient is in agreement with plan at this time regarding management of LE lymphedema/edema. Treatment time:  9:46-10:20 am  Minutes: 34 minutes   Therapeutic activity 2    2    ASSESSMENT:   David Mancuso is a 74 y.o. male who presents with stage I lymphedema, mild, with pitting noted bilateral dorsal foot region, L>R, however, overall limb volume larger R LE. Patient expresses not noticing LE swelling, however, is able to see pitting both feet when identified by therapist.  Due to mild involvement noted, decision made to order and fit CCL 1 knee high stockings, Juzo 3511, size II. Patient may benefit from MLD in clinic has indicated based on response to daily wear of compression stockings. Patient may require assistance for don/doff of compression stockings, and donning aids to be demonstrated at time of garment fitting.  Patient will benefit from complete decongestive therapy (CDT) including manual lymphatic drainage (MLD) technique, short-stretch textile bandages/compression system to decongest limb, and kinesiotaping as appropriate. Patient will receive instruction in proper skin care to recognize signs/symptoms of and prevent infection, therapeutic exercise, and self-MLD for independent home program and restorative lymphatic performance. This care is medically necessary due to the infection risk with lymphedema, and to improve functional activities. CDT is necessary to resolve swelling to allow patient to return to wearing normal clothes/footwear, and prevent worsening of symptoms, such as venous stasis ulcerations, infections, or hospitalizations. Patient will be independent with home program strategies to allow improved ADL ability and mobility and to allow patient to return to greatest functional independence. Rehabilitation potential is considered to be good/fair. Factors which may influence rehabilitation potential include metastatic cancer with patient reporting overall reduction in mobility, moderate fall risk, potential need for assistance to don/doff compression garments and patient lives alone. Patient will benefit from 4-10 prn physical therapy visits over 12 weeks to optimize improvement in these areas. PLAN OF CARE:   Recommendations and Planned Interventions:  Manual lymph drainage/complete decongestive therapy  Multi-layer compression bandaging (short-stretch)  Compression garment fitting/provision  Lymphedema therapeutic exercise  AROM/PROM/Strength/Coordination  Self-care training  Functional mobility training  Education in skin care and lymphedema precautions  Self-MLD education per home program  Caregiver education as needed     GOALS  12 weeks  1. Instruct the patient to be independent with proper skin care to prevent future skin breakdown and decrease the potential risk for infections that are associated with Lymphedema.   2. Patient will be independent with a personal lymphedema exercise program to assist with the lymphatic flow and reduce limb volume  mL each LE.  3. Patient will understand the signs and symptoms of acute infection. 4.   Patient will have knowledge of the compression options and acquire a safe and   appropriate daytime and night time compression system to prevent    re-accumulation of fluid. 2/7/2019 order to be placed for RM Su[address] 3511 size II knee high compression stockings. 5.  Patient or family will be able to don/doff garments independently. Garment   system effectiveness will be evaluated prior to discharge for better long-term   management and outcomes. 6.   To transition patient to independent restorative phase of CDT. Patient has participated in goal setting and agrees to work toward plan of care. Patient was instructed to call if questions or concerns arise. Thank you for this referral.  David Espinoza, PT, CLT   Time Calculation: 55 mins    TREATMENT PLAN EFFECTIVE DATES:   2/7/2019 TO 4/30/2019  I have read the above plan of care for Mena Becker. I certify the above prescribed services are required by this patient and are medically necessary.   The above plan of care has been developed in conjunction with the lymphedema/physical therapist.       Physician Signature: ____________________________________Date:______________

## 2019-02-07 NOTE — PROGRESS NOTES
[facility] Progress Note Date: 2019 Name: Jonn Bloch MRN: [id number] : 1945 Mr. Callahan Arrived ambulatory and in no distress for lab draw. Mr. Delilah Paz vitals were reviewed. Visit Vitals /67 (BP 1 Location: Right arm, BP Patient Position: At rest;Sitting) Pulse 71 Temp 98.2 °F (36.8 °C) SpO2 100% Lab results were obtained and reviewed. Recent Results (from the past 12 hour(s)) METABOLIC PANEL, COMPREHENSIVE Collection Time: 19 10:45 AM  
Result Value Ref Range Sodium 142 136 - 145 mmol/L Potassium 3.7 3.5 - 5.1 mmol/L Chloride 103 97 - 108 mmol/L  
 CO2 30 21 - 32 mmol/L Anion gap 9 5 - 15 mmol/L Glucose 95 65 - 100 mg/dL BUN 18 6 - 20 MG/DL Creatinine 1.45 (H) 0.70 - 1.30 MG/DL  
 BUN/Creatinine ratio 12 12 - 20 GFR est AA 58 (L) >60 ml/min/1.73m2 GFR est non-AA 47 (L) >60 ml/min/1.73m2 Calcium 8.9 8.5 - 10.1 MG/DL Bilirubin, total 0.4 0.2 - 1.0 MG/DL  
 ALT (SGPT) 12 12 - 78 U/L  
 AST (SGOT) 26 15 - 37 U/L Alk. phosphatase 70 45 - 117 U/L Protein, total 7.4 6.4 - 8.2 g/dL Albumin 3.0 (L) 3.5 - 5.0 g/dL Globulin 4.4 (H) 2.0 - 4.0 g/dL A-G Ratio 0.7 (L) 1.1 - 2.2    
CBC WITH AUTOMATED DIFF Collection Time: 19 11:19 AM  
Result Value Ref Range WBC 6.5 4.1 - 11.1 K/uL  
 RBC 3.50 (L) 4.10 - 5.70 M/uL HGB 9.5 (L) 12.1 - 17.0 g/dL HCT 30.3 (L) 36.6 - 50.3 % MCV 86.6 80.0 - 99.0 FL  
 MCH 27.1 26.0 - 34.0 PG  
 MCHC 31.4 30.0 - 36.5 g/dL  
 RDW 15.9 (H) 11.5 - 14.5 % PLATELET 211 856 - 773 K/uL MPV 9.8 8.9 - 12.9 FL  
 NRBC 0.0 0  WBC ABSOLUTE NRBC 0.00 0.00 - 0.01 K/uL NEUTROPHILS 75 32 - 75 % LYMPHOCYTES 13 12 - 49 % MONOCYTES 11 5 - 13 % EOSINOPHILS 2 0 - 7 % BASOPHILS 0 0 - 1 % IMMATURE GRANULOCYTES 0 0.0 - 0.5 % ABS. NEUTROPHILS 4.9 1.8 - 8.0 K/UL  
 ABS. LYMPHOCYTES 0.8 0.8 - 3.5 K/UL  
 ABS. MONOCYTES 0.7 0.0 - 1.0 K/UL ABS. EOSINOPHILS 0.1 0.0 - 0.4 K/UL  
 ABS. BASOPHILS 0.0 0.0 - 0.1 K/UL  
 ABS. IMM. GRANS. 0.0 0.00 - 0.04 K/UL  
 DF AUTOMATED    
SAMPLES BEING HELD Collection Time: 02/07/19 11:19 AM  
Result Value Ref Range SAMPLES BEING HELD 1sst   
 COMMENT Add-on orders for these samples will be processed based on acceptable specimen integrity and analyte stability, which may vary by analyte. Mr. Sade Mclean was discharged from [place] [address] in stable condition at 1125. Keily Dave RN February 7, 2019

## 2019-02-15 DIAGNOSIS — R79.89 ELEVATED SERUM CREATININE: ICD-10-CM

## 2019-02-15 DIAGNOSIS — C61 PROSTATE CANCER (HCC): Primary | ICD-10-CM

## 2019-02-15 NOTE — TELEPHONE ENCOUNTER
02/15/19 3:35 PM Called patient and daughter to discuss patient's recent creatinine. No answer. Will try again on Monday morning.

## 2019-02-18 ENCOUNTER — TELEPHONE (OUTPATIENT)
Dept: ONCOLOGY | Age: 74
End: 2019-02-18

## 2019-02-18 NOTE — TELEPHONE ENCOUNTER
02/18/19 11:52 AM Verified ID x 2. Called patient to inform of elevated creatinine level, patient notified me he is currently inpatient. Will follow up with the patient later this week. No further questions at this time.

## 2019-03-07 ENCOUNTER — HOSPITAL ENCOUNTER (OUTPATIENT)
Dept: INFUSION THERAPY | Age: 74
Discharge: HOME | End: 2019-03-07
Payer: MEDICARE

## 2019-03-07 ENCOUNTER — HOSPITAL ENCOUNTER (OUTPATIENT)
Dept: PHYSICAL THERAPY | Age: 74
Discharge: HOME | End: 2019-03-07
Payer: MEDICARE

## 2019-03-07 ENCOUNTER — OFFICE VISIT (OUTPATIENT)
Dept: ONCOLOGY | Age: 74
End: 2019-03-07

## 2019-03-07 VITALS
BODY MASS INDEX: 34.36 KG/M2 | SYSTOLIC BLOOD PRESSURE: 155 MMHG | TEMPERATURE: 97.9 F | HEART RATE: 74 BPM | DIASTOLIC BLOOD PRESSURE: 76 MMHG | WEIGHT: 232 LBS | HEIGHT: 69 IN | OXYGEN SATURATION: 99 % | RESPIRATION RATE: 16 BRPM

## 2019-03-07 VITALS
HEART RATE: 74 BPM | DIASTOLIC BLOOD PRESSURE: 76 MMHG | RESPIRATION RATE: 18 BRPM | TEMPERATURE: 97.9 F | SYSTOLIC BLOOD PRESSURE: 155 MMHG | OXYGEN SATURATION: 100 %

## 2019-03-07 DIAGNOSIS — N13.30 HYDRONEPHROSIS, RIGHT: ICD-10-CM

## 2019-03-07 DIAGNOSIS — D63.8 ANEMIA OF CHRONIC DISEASE: ICD-10-CM

## 2019-03-07 DIAGNOSIS — R30.0 DYSURIA: ICD-10-CM

## 2019-03-07 DIAGNOSIS — C61 PROSTATE CANCER (HCC): Primary | ICD-10-CM

## 2019-03-07 DIAGNOSIS — Z51.11 CHEMOTHERAPY MANAGEMENT, ENCOUNTER FOR: ICD-10-CM

## 2019-03-07 DIAGNOSIS — Z93.3 COLOSTOMY IN PLACE (HCC): ICD-10-CM

## 2019-03-07 DIAGNOSIS — R60.0 BILATERAL LOWER EXTREMITY EDEMA: ICD-10-CM

## 2019-03-07 LAB
ALBUMIN SERPL-MCNC: 3.2 G/DL (ref 3.5–5)
ALBUMIN/GLOB SERPL: 0.7 {RATIO} (ref 1.1–2.2)
ALP SERPL-CCNC: 70 U/L (ref 45–117)
ALT SERPL-CCNC: 14 U/L (ref 12–78)
ANION GAP SERPL CALC-SCNC: 7 MMOL/L (ref 5–15)
AST SERPL-CCNC: 27 U/L (ref 15–37)
BASOPHILS # BLD: 0 K/UL (ref 0–0.1)
BASOPHILS NFR BLD: 0 % (ref 0–1)
BILIRUB SERPL-MCNC: 0.3 MG/DL (ref 0.2–1)
BUN SERPL-MCNC: 10 MG/DL (ref 6–20)
BUN/CREAT SERPL: 11 (ref 12–20)
CALCIUM SERPL-MCNC: 8.9 MG/DL (ref 8.5–10.1)
CHLORIDE SERPL-SCNC: 104 MMOL/L (ref 97–108)
CO2 SERPL-SCNC: 32 MMOL/L (ref 21–32)
CREAT SERPL-MCNC: 0.9 MG/DL (ref 0.7–1.3)
DIFFERENTIAL METHOD BLD: ABNORMAL
EOSINOPHIL # BLD: 0.2 K/UL (ref 0–0.4)
EOSINOPHIL NFR BLD: 4 % (ref 0–7)
ERYTHROCYTE [DISTWIDTH] IN BLOOD BY AUTOMATED COUNT: 15.6 % (ref 11.5–14.5)
GLOBULIN SER CALC-MCNC: 4.4 G/DL (ref 2–4)
GLUCOSE SERPL-MCNC: 90 MG/DL (ref 65–100)
HCT VFR BLD AUTO: 33.8 % (ref 36.6–50.3)
HGB BLD-MCNC: 10.2 G/DL (ref 12.1–17)
IMM GRANULOCYTES # BLD AUTO: 0 K/UL (ref 0–0.04)
IMM GRANULOCYTES NFR BLD AUTO: 0 % (ref 0–0.5)
LYMPHOCYTES # BLD: 1.1 K/UL (ref 0.8–3.5)
LYMPHOCYTES NFR BLD: 24 % (ref 12–49)
MCH RBC QN AUTO: 27 PG (ref 26–34)
MCHC RBC AUTO-ENTMCNC: 30.2 G/DL (ref 30–36.5)
MCV RBC AUTO: 89.4 FL (ref 80–99)
MONOCYTES # BLD: 0.6 K/UL (ref 0–1)
MONOCYTES NFR BLD: 12 % (ref 5–13)
NEUTS SEG # BLD: 2.9 K/UL (ref 1.8–8)
NEUTS SEG NFR BLD: 60 % (ref 32–75)
NRBC # BLD: 0 K/UL (ref 0–0.01)
NRBC BLD-RTO: 0 PER 100 WBC
PLATELET # BLD AUTO: 229 K/UL (ref 150–400)
PMV BLD AUTO: 10.6 FL (ref 8.9–12.9)
POTASSIUM SERPL-SCNC: 3.4 MMOL/L (ref 3.5–5.1)
PROT SERPL-MCNC: 7.6 G/DL (ref 6.4–8.2)
PSA SERPL-MCNC: 4.4 NG/ML (ref 0.01–4)
RBC # BLD AUTO: 3.78 M/UL (ref 4.1–5.7)
SODIUM SERPL-SCNC: 143 MMOL/L (ref 136–145)
WBC # BLD AUTO: 4.8 K/UL (ref 4.1–11.1)

## 2019-03-07 PROCEDURE — 97164 PT RE-EVAL EST PLAN CARE: CPT

## 2019-03-07 PROCEDURE — 80053 COMPREHEN METABOLIC PANEL: CPT

## 2019-03-07 PROCEDURE — 36415 COLL VENOUS BLD VENIPUNCTURE: CPT

## 2019-03-07 PROCEDURE — 85025 COMPLETE CBC W/AUTO DIFF WBC: CPT

## 2019-03-07 PROCEDURE — 97530 THERAPEUTIC ACTIVITIES: CPT

## 2019-03-07 PROCEDURE — 84153 ASSAY OF PSA TOTAL: CPT

## 2019-03-07 NOTE — PROGRESS NOTES
[id number] [facility] Oncology at [address] [place] [address]  [phone]    Hematology / Oncology Established Visit    Reason for Visit:   Rosalia Donaldson is a 74 y.o. male who comes in for hospital follow up of sigmoid mass / prostate cancer. Hematology Oncology Treatment History:     Diagnosis: Prosate cancer, diagnosed 1. Stage: now metastatic    Pathology: 9/6/18 Sigmoid colon ulcer, biopsy:   Adenocarcinoma, strongly favor prostate primary (see Comment). Stains weakly for PSAP. Prior Treatment: Brachytherapy and EBRT in 12/2015. Current Treatment: Rosia Leventhal started on 1/28/19. Lupron started     History of Present Illness:   Mr. Magnus Hines is a 75 y/o male with h/o prostate cancer who comes in for hospital follow up after recently admission for persistent diarrhea, evidence of a possible sigmoid mass. He had been suffering with persistent diarrhea, 50-70-lb weight loss for approx 4-5 months. He underwent 2 recent colonoscopies (one in July and one in Aug 2018) by Dr. Aurelio Milner at [facility]. He states the first colonoscopy was not clear due to poor prep. The second colonoscopy reportedly showed abnormal rectal and sigmoid mucosa with a possible mass. This was biopsied and was benign. The patient then underwent a CT scan which was notable for R-sided hydronephrosis from extrinsic compression of the right ureter. The patient's urologist, Dr. Steven Hurtado, attempted to pass a ureteral stent but was unsuccessful. The patient was then admitted to [address] [place] [address] for weakness, persistent diarrhea and lower abdominal pain. Abd/pelvis CT on 9/4/18 showed a distal sigmoid mass with possible invasion of the posterior bladder wall as well as R hydronephrosis. He was evaluated by Dr. Marcheta Angelucci in general surgery and underwent diverting loop colostomy given the symptoms caused by the sigmoid mass. He underwent colonoscopy by Dr. Kristel Sy in GI with finding of a large sigmoid mass, which was biopsied.  Pathology revealed an adenocarcinoma, but morphology was more consistent with prostate cancer than colorectal cancer. He was seen by Dr. Shirlene Pickard in Urology during hospital stay who recommended percutaneous nephrostomy tube, placed by IR on 9/7. Given the biopsy findings, Dr. Shirlene Pickard recommended further evaluation with prostate/pelvic MRI. Nuclear med bone scan negative for any suspicious bony lesions. After hospital discharge, he fell and was admitted to /[facility]. He stayed there for 3 days and was discharged to rehab, where he stayed for 4 weeks. Interval history: Today, patient comes in for follow up. Since the last visit, patient was hospitalized at [address] [place] [address]. Patient had a fall at home and lied on the floor for 2 days. He was hospitalized 2/8/29 - 2/12/19 He was told he had a bladder and/or kidney infection, he was treated with IVF and IV antibiotics. He went home with a PICC line to continue IV antibiotics, which is completed. Per patient's daughter, they tested for endocarditis, but stated DENIA was negative. Not currently on oral antibiotics. Because no urine was coming into his nephrostomy bag, he has his ureteral stent repositioned/replaced. He now has urine the bag and is able to urinate as well. He feels his strength is improving since his hospital visit. No fevers, or chills since discharge. He denies hematuria. Reports dysuria but reports hesitancy with voiding which he did not have in the past. The patient states the mucus from his rectum is persistent but now intermittent. Reports \"spots\" of blood in his depends, but otherwise no blood in stools. He states he is up on his feet approx half the day, doing chores like laundry. He states his appetite is decreased but he is eating anyway. He is eating at least 2 meals day, has increased weight from 226 lb to 232 lb. He reports hot flashes and feels down. No fevers, chills, sweats. Denies SOB or chest pain.      Past Medical History:   Diagnosis Date    Diabetes mellitus ([address]  Prostate cancer ([facility] [address]     Sleep apnea       Past Surgical History:   Procedure Laterality Date    FLEXIBLE SIGMOIDOSCOPY N/A 9/6/2018    SIGMOIDOSCOPY FLEXIBLE performed by Maximilian Cali MD at [address] [facility] HX HIP REPLACEMENT Left     HX HIP REPLACEMENT Right     HX OTHER SURGICAL      bladder stent    IR CHANGE NEPHROSTOMY PYELOS TUBE RT  2/1/2019      Social History     Tobacco Use    Smoking status: Never Smoker    Smokeless tobacco: Never Used   Substance Use Topics    Alcohol use: No      Family History   Problem Relation Age of Onset    Cancer Mother         colon    Hypertension Mother     Heart Disease Mother     Cancer Father     Diabetes Sister     Cancer Brother     Diabetes Sister      Current Outpatient Medications   Medication Sig    abiraterone (ZYTIGA) 250 mg tab Take 4 Tabs by mouth daily.  traMADol (ULTRAM) 50 mg tablet Take  mg by mouth.  traZODone (DESYREL) 50 mg tablet TK 1 T PO  QHS  PRN    predniSONE (DELTASONE) 5 mg tablet Take 1 Tab by mouth daily.  aspirin delayed-release 81 mg tablet Take 81 mg by mouth daily.  potassium chloride (K-DUR, KLOR-CON) 20 mEq tablet Take 20 mEq by mouth two (2) times a day.  tamsulosin (FLOMAX) 0.4 mg capsule Take 0.4 mg by mouth daily.  bicalutamide (CASODEX) 50 mg tablet TK 1 T PO  D     No current facility-administered medications for this visit. Allergies   Allergen Reactions    Ciprofloxacin Rash        Review of Systems: A complete review of systems was obtained, negative except as described above.     Physical Exam:     Visit Vitals  /76   Pulse 74   Temp 97.9 °F (36.6 °C) (Oral)   Resp 16   Ht 5' 9\" (1.753 m)   Wt 232 lb (105.2 kg)   SpO2 99%   BMI 34.26 kg/m²       ECOG PS: 0  General: Well developed, no acute distress  Eyes: PERRLA, EOMI, anicteric sclerae  HENT: Atraumatic, OP clear, TMs intact without erythema  Neck: Supple  Lymphatic: No cervical, supraclavicular, axillary or inguinal adenopathy  Respiratory: CTAB, normal respiratory effort  CV: Normal rate, regular rhythm, no murmurs, 2+ edema bilateral LE   GI / : Soft, nontender, nondistended, no masses, no hepatomegaly, no splenomegaly. Has colostomy in LLQ pink stoma, mild protrusion of stoma and surrounding abdomen, non-tender to palpation - c/d/i. Nephrostomy tube in R lower back - c/d/i  MS: Slow gait and normal station. Digits without clubbing or cyanosis. Skin: No rashes, ecchymoses, or petechiae. Normal temperature, turgor, and texture. Neuro/Psych: Alert, oriented. 5/5 strength in all 4 extremities. Appropriate affect, normal judgment/insight. Results:     Lab Results   Component Value Date/Time    WBC 4.8 03/07/2019 09:57 AM    HGB 10.2 (L) 03/07/2019 09:57 AM    HCT 33.8 (L) 03/07/2019 09:57 AM    PLATELET 807 03/97/7356 09:57 AM    MCV 89.4 03/07/2019 09:57 AM    ABS. NEUTROPHILS 2.9 03/07/2019 09:57 AM     Lab Results   Component Value Date/Time    Sodium 142 02/07/2019 10:45 AM    Potassium 3.7 02/07/2019 10:45 AM    Chloride 103 02/07/2019 10:45 AM    CO2 30 02/07/2019 10:45 AM    Glucose 95 02/07/2019 10:45 AM    BUN 18 02/07/2019 10:45 AM    Creatinine 1.45 (H) 02/07/2019 10:45 AM    GFR est AA 58 (L) 02/07/2019 10:45 AM    GFR est non-AA 47 (L) 02/07/2019 10:45 AM    Calcium 8.9 02/07/2019 10:45 AM    Glucose (POC) 151 (H) 09/12/2018 01:54 PM     Lab Results   Component Value Date/Time    Bilirubin, total 0.4 02/07/2019 10:45 AM    ALT (SGPT) 12 02/07/2019 10:45 AM    AST (SGOT) 26 02/07/2019 10:45 AM    Alk.  phosphatase 70 02/07/2019 10:45 AM    Protein, total 7.4 02/07/2019 10:45 AM    Albumin 3.0 (L) 02/07/2019 10:45 AM    Globulin 4.4 (H) 02/07/2019 10:45 AM     Lab Results   Component Value Date/Time    Iron 25 (L) 09/05/2018 12:54 PM    TIBC 172 (L) 09/05/2018 12:54 PM    Iron % saturation 15 (L) 09/05/2018 12:54 PM    Ferritin 213 09/05/2018 12:54 PM       Lab Results   Component Value Date/Time Vitamin B12 726 09/05/2018 12:54 PM    Folate 9.5 09/05/2018 12:54 PM     No results found for: TSH, TSH2, TSH3, TSHP, TSHEXT, TSHEXT    Lab Results   Component Value Date/Time    Prostate Specific Ag 2.1 01/11/2019 09:59 AM    Prostate Specific Ag 1.7 09/11/2018 04:44 AM     Date:   PSA:  2015 27 6/2017  0.8  10/2017 1.5  3/2018  1.9  9/11/18 1.7  10/2018  3.7  11/29/18 0.57  1/24/19:           2.9  3/7/19:  4.4      Imaging:     Abd/pelvis CT 9/4/18:  FINDINGS:   LUNG BASES: Clear. INCIDENTALLY IMAGED HEART AND MEDIASTINUM: Unremarkable. LIVER: Numerous hepatic cysts are noted with a reference 2.3 cm cyst in the  dome. No hepatic mass. GALLBLADDER: Unremarkable. SPLEEN: No mass. PANCREAS: No mass or ductal dilatation. ADRENALS: Unremarkable. KIDNEYS: There is moderate right hydroureteronephrosis to the level of the  distal ureter, related to the colon mass. There is a delayed nephrogram. There  is a 2.4 cm left renal cyst.  STOMACH: Unremarkable. SMALL BOWEL: No dilatation or wall thickening. COLON: There is a mass lesion involving the distal sigmoid colon. There is  concern for involvement of the posterior bladder wall. APPENDIX: Unremarkable. PERITONEUM: No ascites or pneumoperitoneum. RETROPERITONEUM: No lymphadenopathy or aortic aneurysm. REPRODUCTIVE ORGANS: Prostate seed implants are noted. URINARY BLADDER: No mass or calculus. BONES: The patient is status post bilateral total hip replacement. Degenerative  changes are seen in the lumbar spine. ADDITIONAL COMMENTS: There is a small periumbilical hernia containing only fat. IMPRESSION:  Distal sigmoid colon mass lesion with potential for involvement of the posterior  bladder wall. This is causing right hydroureteronephrosis and a delayed  nephrogram. Hepatic and left renal cysts. Small ventral hernia containing only  fat. Bone scan:  IMPRESSION:   1. No definite evidence of bony metastatic disease.  Urinary bladder is distended which limits evaluation of the pelvis. Patient is status post bilateral hip  replacements. There is focal increased activity adjacent to right femoral neck may represent loosening and correlation would be helpful. There is increased bony activity in the shoulders and sternoclavicular joints and mid thoracic spine most likely degenerative.     Pelvic MRI 9/29/18:  FINDINGS: The prostate gland measures 2.6 x 3.1 x 3.4 cm there are seeds in the prostate gland. The region of the prostate gland is somewhat obscured because of  artifact from bilateral prosthetic hips. . A distinct peripheral zone is not identified. No evidence of prostate gland mass. No pathologic enhancement of the prostate gland. Diffusion imaging is markedly compromised because of the adjacent  metallic implants and paramagnetic effect. . The seminal vesicles are present. The tissue plane between the seminal vesicles and the sigmoid mass are effaced. This may be involving the seminal vesicles. No  definite involvement of the prostate gland itself is identified,.. Urinary bladder is displaced anteriorly by the bulky sigmoid mass. . Fat plane between  the bulky mass and the posterior wall the urinary bladder not well defined. No lymphadenopathy. Bone marrow signal is within normal limits. Muscle signal is within normal limits. IMPRESSION:   1. Bulky mass of the sigmoid colon which is again visualized. This has been described on the previous CT. Fat planes with the seminal vesicles as well as  the posterior wall of the urinary bladder cannot be defined and there may be direct extension. 2. Prostate gland contains seeds. Fat planes surrounding the prostate are intact. There is no evidence of mass extending beyond the capsule. No definite  mass is seen within the prostate gland, however evaluation is limited because of technical factors as described above.     CT 1/15/19:   Interval increase in concentric, masslike thickening involving the mid to distal sigmoid colon and rectum, with extracolonic extension and evidence of bladder wall invasion. Mild bilateral hydroureter, without significant hydronephrosis. Right nephrostomy tube remains in satisfactory position. Unchanged hepatic and renal cysts. Assessment & Plan:   Rosalia Donaldson is a 74 y.o. male with h/o prostate cancer comes in for follow up.     1. Metastatic prostate cancer: Was localized at diagnosis, treated with brachytherapy in 12/2015. Now with metastatic disease which likely started in seminal vesicles and progressed with local extension into sigmoid colon. The pathology is more consistent with prostate adenocarcinoma than colon cancer and no mass seen on first 2 colonoscopies. CEA normal. Chest CT negative. Given large sigmoid mass at risk of causing obstruction, patient underwent diverting loop colostomy on 9/7/18. Although I defer to Urology on surgical management, I agree that attempting a resection of this metastatic disease is too extensive for this patient and has low likelihood of leading to a cure. As patient has a bulky sigmoid mass causing hydronephrosis requiring utereral stent and colon obstruction, I feel that adding Zytiga to the Lupron may help obtain better and quicker response. Also based on the STAMPEDE trial, adding Zytiga to treatment in the first line setting improves overall survival (3yr OS 83% vs 76%). In the future, patient may be able to undergo ureteral stent placement and colostomy reversal. Recent CT 1/15/19 shows evidence of castration resistant prostate cancer based on disease progression on Lupron and Bicalutamide. PSA up to 4.4 today 3/7/19.   -- Continue Lupron every 6 months, next injection scheduled for May.   -- Discontinue Bicalutamide if ok with Dr. Austen French.   -- Continue Zytiga (Abiraterone) 1000mg PO daily (started 1/28/19) with Prednisone 5mg PO daily   -- Return in 4-5 weeks with CBC, CMP, PSA  -- Request CT imaging to be pushed to our location if possible  -- Plan on repeat CT  and bone scan in 3 months - ordered for early April (must ask Radiology for comparison with prior outside imaging in comments when ordering). If PSA and CT show continued progression, will need to switch to Enzalutamide vs Docetaxel. (Consider Docetaxel since able to start treatment faster).     2. Right hydronephrosis: 2/2 extrinsic compression of the R distal ureter from the prostate vs sigmoid mass. IR placed percutaneous nephrostomy tube on 9/7/18. This was replaced during hospital admission in 2/2019.  -- Follow up with Dr. Shanda Zamora - currently scheduled for May      3. Normocytic anemia: Improving, possibly due to response of his prostate cancer. Likely a combination of anemia of chronic disease due to underlying malignancy. No evidence of iron deficiency. Normal VitB12, folate.      4. Type II DM: Dr. Sri Mcgee his primary care doctor and was taken off all diabetes medication about 2 months ago. Can restart ASA.     5. H/o localized prostate cancer: Diagnosed 2015 with PSA 27 at time of diagnosis. Treated with brachytherapy and EBRT. PSA was 0.8 (6/2017), 1.5 (10/2017), then 1.9 (3/2018). Last saw Dr. Theresa Grubbs on 8/23/18. Repeat PSA was 1.7 on 9/11/18. PSA 3.7 in Oct 2018 and 0.57 on 11/29/18. 7. BLE edema: Referring to lymphedema clinic. May need diuretic in the future as well. Lymphedema specialist fitted for compression stockings. Met with lymphedema specialists on 3/7 and set for compression stockings. I appreciate the opportunity to participate in Mr. Armando Callahan's care. Signed By: Kaleb Alfred NP     March 7, 2019      [phone] - daughter-in-law.

## 2019-03-07 NOTE — PROGRESS NOTES
[facility] Lab Visit:    [id number] Pt arrived ambulatory and in no distress, labs drawn per EW, . Departed [facility] ambulatory and in no distress. Visit Vitals  /76   Pulse 74   Temp 97.9 °F (36.6 °C)   Resp 18   SpO2 100%       Labs available in CC once resulted.

## 2019-03-07 NOTE — PROGRESS NOTES
[facility]  [address] [place] [address],[address]  Tessa [address]      Re- EVALUATION    NAME: Kamilla Mazariegos AGE: 74 y.o. GENDER: male  DATE: 3/7/2019  REFERRING PHYSICIAN: Josiane Elliott MD  HISTORY AND BACKGROUND:  Patient referred to clinic for evaluation of LE swelling. Patient has history of prostate cancer diagnosed in 2015, treated with brachytherapy 12/2015. Metastatic disease into sigmoid colon, with pathology more consistent with prostate adenocarcinoma than colon cancer. Decision has been made that surgical management would be too extensive for this patient when patient was last seen by oncologist 1/11/2019. Sigmoid mass causing hydronephrosis requiring ureteral stent, percutaneous nephrostomy tube placed 9/7/18 R lower back; colostomy LLQ. L THR 10 years ago, R THR last year. See further medical/surgical history as noted below. Patient returns today for appt that was to include compression garment fit, however, patient reports fall at home resulting in hospitalization. States he was on the floor a couple of days before his family found him, reporting he was unable to get off the floor without help. Reports that he had a PICC line, receiving IV fluids, maybe antibiotics?, stating legs have been more swollen since IV hydration initiated. Primary Diagnosis:  · BLE lymphedema, secondary (I89.0)  Other Treatment Diagnoses:   Abnormality of gait (other abnormalities of gait and mobility R26.89)   Swelling not relieved by elevation ( R60.9 edema)   Prostate cancer, metastatic (C61)  Morbid Obesity (E66.01)  Date of Onset: 9/6/2018  Present Symptoms and Functional Limitations: overal reduced activity, lack of understanding regarding management of LE swelling.    Lymmphedema Life Impact Scale: 16/68; 24%; CJ  Past Medical History:   Past Medical History:   Diagnosis Date    [facility] ([facility]     Prostate cancer ([facility]     Sleep apnea      Past Surgical History:   Procedure Laterality Date    FLEXIBLE SIGMOIDOSCOPY N/A 9/6/2018    SIGMOIDOSCOPY FLEXIBLE performed by Del Rosenberg MD at [address] HX HIP REPLACEMENT Left     HX HIP REPLACEMENT Right     HX OTHER SURGICAL      bladder stent    IR CHANGE NEPHROSTOMY PYELOS TUBE RT  2/1/2019     Current Medications:    Current Outpatient Medications   Medication Sig    abiraterone (ZYTIGA) 250 mg tab Take 4 Tabs by mouth daily.  bicalutamide (CASODEX) 50 mg tablet TK 1 T PO  D    traMADol (ULTRAM) 50 mg tablet Take  mg by mouth.  traZODone (DESYREL) 50 mg tablet TK 1 T PO  QHS  PRN    predniSONE (DELTASONE) 5 mg tablet Take 1 Tab by mouth daily.  aspirin delayed-release 81 mg tablet Take 81 mg by mouth daily.  potassium chloride (K-DUR, KLOR-CON) 20 mEq tablet Take 20 mEq by mouth two (2) times a day.  tamsulosin (FLOMAX) 0.4 mg capsule Take 0.4 mg by mouth daily. No current facility-administered medications for this encounter. Allergies: No Known Allergies   Prior Level of Function/Social/Work History/Personal factors and/or comorbidities impacting plan of care: Patient reports previously active, exercising every day including outdoor walk at the park. States he has not been walking recently due to feeling weaker. Patient has cancer history as noted above. Reports he lost his wife last year. Recent fall at home, patient unable to get himself up off the floor. Living Situation: lives alone. Trainable Caregiver?: son brings patient to appt today. Self-care/ADLs: mod I, shower seat as needed. Dresses indep, cooks meals. Mobility: ambulates into clinic with cane, slow apolonia. Sleeping Arrangement:  bed   Adaptive Equipment Owned: cane, rolling walker, shower seat. Previous Therapy:  PT s/p hip replacement, s/p fall 9/2018.   No previous treatment regarding LE edema. Compression/Lymphedema Equipment:  [employer] purchased knee high \"compression socks\". SUBJECTIVE:   \"I do notice that my legs are more swollen since I have had the PICC line. \"  Patient reports PICC line has been removed for a couple of days, stating he has noted improvement in LE swelling since then. States he did not purchase recommended compression stockings as advised at initial evaluation due to cost, however, is agreeable to obtain what he needs at this time. Patients goals for therapy: evaluation to be completed regarding LE swelling, proceeding with recommended treatment. .    OBJECTIVE DATA SUMMARY:   EXAMINATION/PRESENTATION/DECISION MAKING:   Pain: 4/10; leg heaviness, skin tightness. Self-Care and ADLs:       LB Dressing: Modified independent, additional time   Don/Doff Shoes/Socks: Modified independent, additional time. Skin and Tissue Assessment:  Dermal Status:  [x]   Intact []  Dry   []  Tenuous [] Flaky   []  Wound/lesion present []  Scars   []  Dermatitis Skin shiny, mildly taut lower legs. Texture/Consistency:  [x]  Boggy: mild lower legs, dorsal foot, L>R [x]  Pitting Edema: +3 bilateral distal leg/ankle region. []  Brawny []  Combination   []  Fibrotic/Woody    Pigmentation/Color Change:  [x]  Normal []  Hemosiderin   []  Red []  Erythematous   []  Hyperpigmented []  Hyperlipodermatosclerosis   Anomalies:  []  Lymphorrhea []  Vesicles   []  Petechiae []  Warty Vercusis   []  Bullae []  Papilloma   Circulatory:  []  ANGELES [x]  Varicosities:   [x]  Pulses: dorsal pedal palpable []  Vascular studies ruled out DVT in past   []  DVT History    Nails:  []   Normal  [x]   Fungus  Stemmers Sign: negative    Initial evaluation      today    Volumetric Measurements:   Right:  Today--8708.09 mL; 2/7/2019--7910.64 mL Left:   Today--8702.77 mL;  2/7/2019--7623.68 mL   % Difference: 0.06; R>L minimally    (See scanned graph)  Range of Motion: bilateral LE grossly [facility] AROM.  Sensation:  Intact to light touch  Mobility:  Bed/Chair Mobility:  Independent  Transfers: independent    Sitting Balance:  good Standing Balance:  Fair +   Gait:  Modified independent straight cane Wheelchair Mobility:  (Other) na   Endurance:  Intact 100 feet in clinic with straight cane. Stairs:  Not assessed. Safety:  Patient is alert and oriented:  yes   Safety awareness:  Patient resistant to recommendation that he have supervision for outdoor walking at park near his home. Otherwise, uses cane for ambulation as recommended. 1 fall reported in past 6 months. Fall Risk?:  High, recent fall at home, unable to get off of floor without assistance. Now has [employer]. Patient given written fall prevention handout: Yes   Precautions:  Standard lymphedema precautions to include avoiding blood pressure readings, injections and IVs or other procedures/acts that could lead to broken skin on affected area, and avoiding excessive heat, resistive activity or altitude without compression garment      Evaluation Time: 8:45-9:00 am  15 minutes      TREATMENT PROVIDED:   1. Treatment description:  The patient/son educated regarding the role and function of the lymphatic system, pathophysiology of secondary lymphedema, and instructed in the lymphedema management protocol of complete decongestive therapy (CDT). This includes skin care to prevent breakdown or infection, lymphedema exercises, custom compression therapy options (bandaging, compression garments, compression pump, Princella Viola, JoViPak, [employer] Travis-Joe, etc. as needed), and decongestion with manual lymphatic drainage as indicated. We discussed the need for consistent compression system for lymphedema management, and the need for medical grade compression. Skin care education was performed,applying low pH lotion to extremity using upward strokes to stimulate lymphatic vessels.   Discussed compression options with patient, and, due to more pronounce edema noted today since last seen, we discussed benefit of compression bandaging/compression wrap adjustable garments/proceeding with ordering recommended compression stockings as per initial evaluation. Patient advised that, with current level of swelling, he will require size III vs size II due to increased ankle girth. Patient made the decision made to proceed with order for RM Juzo 3511 knee high stockings, size III regular length. Discussed with patient that we will start with CCL 1 stocking for ease of donning, with potential need to increase to CCL 2 stocking as needed based on severity of swelling. Patient verbalizes understanding of instruction provided. Advised to return to clinic upon receiving stockings to allow garments to be fit in clinic, and for instruction in appropriate don/doff of garments. Patient advised that walking would be beneficial for him, however, recommend that he not walk outdoors without friend/family member, and using his cane for safety and to reduce fall risk. Patient agreeable to recommendations regarding walking. Advised that he can return to LE strengthening exercise program that he performed s/p THR, which he states he has at home. Patient is in agreement with plan at this time regarding management of LE lymphedema/edema. Notify HH agency and request Profore compression while patient is receiving services at home to reduce LE edema. Treatment time:  9:01-9:30 am  Minutes: 29 minutes   Therapeutic activity 2      ASSESSMENT:   Kamilla Mazariegos is a 74 y.o. male who presents with stage I lymphedema, mild, with pitting noted bilateral ankle region, however, overall limb volume larger R LE. Patient expresses not noticing LE swelling, however, is able to see pitting both feet when identified by therapist. Patient was offered compression options as noted above, with patient making decision to order and fit CCL 1 knee high stockings, Juzo 3511, size III.  Patient may benefit from MLD in clinic has indicated based on response to daily wear of compression stockings. Patient may require assistance for don/doff of compression stockings, and donning aids to be demonstrated at time of garment fitting. Patient will benefit from complete decongestive therapy (CDT) including manual lymphatic drainage (MLD) technique, short-stretch textile bandages/compression system to decongest limb, and kinesiotaping as appropriate. Patient will receive instruction in proper skin care to recognize signs/symptoms of and prevent infection, therapeutic exercise, and self-MLD for independent home program and restorative lymphatic performance. This care is medically necessary due to the infection risk with lymphedema, and to improve functional activities. CDT is necessary to resolve swelling to allow patient to return to wearing normal clothes/footwear, and prevent worsening of symptoms, such as venous stasis ulcerations, infections, or hospitalizations. Patient will be independent with home program strategies to allow improved ADL ability and mobility and to allow patient to return to greatest functional independence. Rehabilitation potential is considered to be good/fair. Factors which may influence rehabilitation potential include metastatic cancer with patient reporting overall reduction in mobility, moderate fall risk, potential need for assistance to don/doff compression garments and patient lives alone. Evaluation only secondary to patient receiving [facility] services, including PT. PLAN OF CARE:   Recommendations and Planned Interventions:  Discharge at this time with patient receiving [facility] PT/RN services. Request Profore compression application with , with patient to return to clinic with compression stockings upon receiving. Patient has participated in goal setting and agrees to work toward plan of care. Patient was instructed to call if questions or concerns arise.     Thank you for this referral.  Angela Hines PT, CLT   Time Calculation: 46 mins    TREATMENT PLAN EFFECTIVE DATES:   3/7/2019   I have read the above plan of care for [facility]. I certify the above prescribed services are required by this patient and are medically necessary.   The above plan of care has been developed in conjunction with the lymphedema/physical therapist.       Physician Signature: ____________________________________Date:______________

## 2019-03-07 NOTE — PROGRESS NOTES
Brock Garrison is a 74 y.o. male here today for follow up of prostate cancer. Pain level today 0/10.

## 2019-03-25 ENCOUNTER — TELEPHONE (OUTPATIENT)
Dept: ONCOLOGY | Age: 74
End: 2019-03-25

## 2019-03-25 NOTE — TELEPHONE ENCOUNTER
03/25/19 4:34 PM Verified ID, patient reports bright red blood in depends x 1 week. Reports increased swelling in legs, right worse than left. Denies SOB, dizziness, or chest pain. Reports legs are sore. Notified patient he needs to go to the [address] [place] [address] [facility] first thing tomorrow morning to have doppler of legs, hemoccult test and blood work drawn. Advised if he develops acute symptoms of sob, tachypnea, cp he needs to go to ED immediately. Verbalizes understanding. No further questions at this time.

## 2019-03-26 ENCOUNTER — HOSPITAL ENCOUNTER (EMERGENCY)
Age: 74
Discharge: HOME | End: 2019-03-26
Attending: EMERGENCY MEDICINE
Payer: MEDICARE

## 2019-03-26 ENCOUNTER — APPOINTMENT (OUTPATIENT)
Dept: VASCULAR SURGERY | Age: 74
End: 2019-03-26
Attending: EMERGENCY MEDICINE
Payer: MEDICARE

## 2019-03-26 ENCOUNTER — APPOINTMENT (OUTPATIENT)
Dept: GENERAL RADIOLOGY | Age: 74
End: 2019-03-26
Attending: EMERGENCY MEDICINE
Payer: MEDICARE

## 2019-03-26 VITALS
RESPIRATION RATE: 12 BRPM | TEMPERATURE: 98.4 F | HEIGHT: 69 IN | SYSTOLIC BLOOD PRESSURE: 155 MMHG | HEART RATE: 86 BPM | OXYGEN SATURATION: 100 % | WEIGHT: 220 LBS | DIASTOLIC BLOOD PRESSURE: 73 MMHG | BODY MASS INDEX: 32.58 KG/M2

## 2019-03-26 DIAGNOSIS — K94.01 BLEEDING FROM COLOSTOMY (HCC): ICD-10-CM

## 2019-03-26 DIAGNOSIS — N39.0 URINARY TRACT INFECTION WITHOUT HEMATURIA, SITE UNSPECIFIED: ICD-10-CM

## 2019-03-26 DIAGNOSIS — R60.0 LOWER EXTREMITY EDEMA: Primary | ICD-10-CM

## 2019-03-26 LAB
ALBUMIN SERPL-MCNC: 3 G/DL (ref 3.5–5)
ALBUMIN/GLOB SERPL: 0.6 {RATIO} (ref 1.1–2.2)
ALP SERPL-CCNC: 70 U/L (ref 45–117)
ALT SERPL-CCNC: 12 U/L (ref 12–78)
ANION GAP SERPL CALC-SCNC: 7 MMOL/L (ref 5–15)
APPEARANCE UR: ABNORMAL
AST SERPL-CCNC: 37 U/L (ref 15–37)
ATRIAL RATE: 86 BPM
BACTERIA URNS QL MICRO: ABNORMAL /HPF
BASOPHILS # BLD: 0 K/UL (ref 0–0.1)
BASOPHILS NFR BLD: 0 % (ref 0–1)
BILIRUB SERPL-MCNC: 0.3 MG/DL (ref 0.2–1)
BILIRUB UR QL: NEGATIVE
BUN SERPL-MCNC: 17 MG/DL (ref 6–20)
BUN/CREAT SERPL: 14 (ref 12–20)
CALCIUM SERPL-MCNC: 8.8 MG/DL (ref 8.5–10.1)
CALCULATED P AXIS, ECG09: 60 DEGREES
CALCULATED R AXIS, ECG10: 11 DEGREES
CALCULATED T AXIS, ECG11: 23 DEGREES
CHLORIDE SERPL-SCNC: 109 MMOL/L (ref 97–108)
CO2 SERPL-SCNC: 31 MMOL/L (ref 21–32)
COLOR UR: ABNORMAL
COMMENT, HOLDF: NORMAL
CREAT SERPL-MCNC: 1.25 MG/DL (ref 0.7–1.3)
DIAGNOSIS, 93000: NORMAL
DIFFERENTIAL METHOD BLD: ABNORMAL
EOSINOPHIL # BLD: 0.2 K/UL (ref 0–0.4)
EOSINOPHIL NFR BLD: 2 % (ref 0–7)
EPITH CASTS URNS QL MICRO: ABNORMAL /LPF
ERYTHROCYTE [DISTWIDTH] IN BLOOD BY AUTOMATED COUNT: 14.5 % (ref 11.5–14.5)
GLOBULIN SER CALC-MCNC: 4.7 G/DL (ref 2–4)
GLUCOSE SERPL-MCNC: 127 MG/DL (ref 65–100)
GLUCOSE UR STRIP.AUTO-MCNC: NEGATIVE MG/DL
HCT VFR BLD AUTO: 33.8 % (ref 36.6–50.3)
HEMOCCULT STL QL: POSITIVE
HGB BLD-MCNC: 10.1 G/DL (ref 12.1–17)
HGB UR QL STRIP: ABNORMAL
IMM GRANULOCYTES # BLD AUTO: 0 K/UL (ref 0–0.04)
IMM GRANULOCYTES NFR BLD AUTO: 0 % (ref 0–0.5)
KETONES UR QL STRIP.AUTO: NEGATIVE MG/DL
LACTATE BLD-SCNC: 1.2 MMOL/L (ref 0.4–2)
LEUKOCYTE ESTERASE UR QL STRIP.AUTO: ABNORMAL
LYMPHOCYTES # BLD: 0.9 K/UL (ref 0.8–3.5)
LYMPHOCYTES NFR BLD: 11 % (ref 12–49)
MCH RBC QN AUTO: 27.1 PG (ref 26–34)
MCHC RBC AUTO-ENTMCNC: 29.9 G/DL (ref 30–36.5)
MCV RBC AUTO: 90.6 FL (ref 80–99)
MONOCYTES # BLD: 0.6 K/UL (ref 0–1)
MONOCYTES NFR BLD: 7 % (ref 5–13)
NEUTS SEG # BLD: 6.5 K/UL (ref 1.8–8)
NEUTS SEG NFR BLD: 80 % (ref 32–75)
NITRITE UR QL STRIP.AUTO: NEGATIVE
NRBC # BLD: 0 K/UL (ref 0–0.01)
NRBC BLD-RTO: 0 PER 100 WBC
P-R INTERVAL, ECG05: 130 MS
PH UR STRIP: 6.5 [PH] (ref 5–8)
PLATELET # BLD AUTO: 307 K/UL (ref 150–400)
PMV BLD AUTO: 10.3 FL (ref 8.9–12.9)
POTASSIUM SERPL-SCNC: 2.9 MMOL/L (ref 3.5–5.1)
PROT SERPL-MCNC: 7.7 G/DL (ref 6.4–8.2)
PROT UR STRIP-MCNC: 100 MG/DL
Q-T INTERVAL, ECG07: 396 MS
QRS DURATION, ECG06: 90 MS
QTC CALCULATION (BEZET), ECG08: 473 MS
RBC # BLD AUTO: 3.73 M/UL (ref 4.1–5.7)
RBC #/AREA URNS HPF: ABNORMAL /HPF (ref 0–5)
SAMPLES BEING HELD,HOLD: NORMAL
SODIUM SERPL-SCNC: 147 MMOL/L (ref 136–145)
SP GR UR REFRACTOMETRY: 1.02 (ref 1–1.03)
TROPONIN I SERPL-MCNC: <0.05 NG/ML
UA: UC IF INDICATED,UAUC: ABNORMAL
UROBILINOGEN UR QL STRIP.AUTO: 1 EU/DL (ref 0.2–1)
VENTRICULAR RATE, ECG03: 86 BPM
WBC # BLD AUTO: 8.1 K/UL (ref 4.1–11.1)
WBC URNS QL MICRO: ABNORMAL /HPF (ref 0–4)

## 2019-03-26 PROCEDURE — 93970 EXTREMITY STUDY: CPT

## 2019-03-26 PROCEDURE — 87040 BLOOD CULTURE FOR BACTERIA: CPT

## 2019-03-26 PROCEDURE — 81001 URINALYSIS AUTO W/SCOPE: CPT

## 2019-03-26 PROCEDURE — 96361 HYDRATE IV INFUSION ADD-ON: CPT

## 2019-03-26 PROCEDURE — 87186 SC STD MICRODIL/AGAR DIL: CPT

## 2019-03-26 PROCEDURE — 85025 COMPLETE CBC W/AUTO DIFF WBC: CPT

## 2019-03-26 PROCEDURE — 96360 HYDRATION IV INFUSION INIT: CPT

## 2019-03-26 PROCEDURE — 82272 OCCULT BLD FECES 1-3 TESTS: CPT

## 2019-03-26 PROCEDURE — 83605 ASSAY OF LACTIC ACID: CPT

## 2019-03-26 PROCEDURE — 87077 CULTURE AEROBIC IDENTIFY: CPT

## 2019-03-26 PROCEDURE — 84484 ASSAY OF TROPONIN QUANT: CPT

## 2019-03-26 PROCEDURE — 36415 COLL VENOUS BLD VENIPUNCTURE: CPT

## 2019-03-26 PROCEDURE — 93005 ELECTROCARDIOGRAM TRACING: CPT

## 2019-03-26 PROCEDURE — 99285 EMERGENCY DEPT VISIT HI MDM: CPT

## 2019-03-26 PROCEDURE — 80053 COMPREHEN METABOLIC PANEL: CPT

## 2019-03-26 PROCEDURE — 74011250636 HC RX REV CODE- 250/636: Performed by: EMERGENCY MEDICINE

## 2019-03-26 PROCEDURE — 87086 URINE CULTURE/COLONY COUNT: CPT

## 2019-03-26 PROCEDURE — 71045 X-RAY EXAM CHEST 1 VIEW: CPT

## 2019-03-26 RX ORDER — CEPHALEXIN 500 MG/1
500 CAPSULE ORAL 2 TIMES DAILY
Qty: 14 CAP | Refills: 0 | Status: SHIPPED | OUTPATIENT
Start: 2019-03-26 | End: 2019-04-02

## 2019-03-26 RX ORDER — HYDROCHLOROTHIAZIDE 25 MG/1
25 TABLET ORAL DAILY
Qty: 10 TAB | Refills: 0 | Status: SHIPPED | OUTPATIENT
Start: 2019-03-26 | End: 2019-04-16

## 2019-03-26 RX ADMIN — SODIUM CHLORIDE 1000 ML: 900 INJECTION, SOLUTION INTRAVENOUS at 09:56

## 2019-03-26 NOTE — ED NOTES
Verbal/Bedside report was given to [facility], RN who will assume care of patient at this time. SBAR report consisted of ED summary, MAR, recent results, and additional pertinent information. Receiving nurse given opportunity to ask questions and seek clarifications. Receiving nurse aware of pending lab results and orders that are to be completed.

## 2019-03-26 NOTE — ED PROVIDER NOTES
74 y.o. male with past medical history significant for diabetes, prostate cancer, and sleep apnea who presents from home referred by Dr. Pelaez [facility] office for evaluation of rectal bleeding. Pt as a h/o stage 4 colon and prostate cancer with colostomy and urostomy bags in place. The pt had a fall ~1 month ago and after was admitted to [facility] for 9 days where he was treated for a UTI. Since that time, he has been experiencing worsening bilateral leg swelling. Per son, the pt has also noted blood in his depends and his pads. He denies any blood output from his colostomy. Pt called Dr. Pelaez [facility] office who sent him in for further evaluation. Pt denies any recent falls. No h/o anemia or blood transfusion. Pt denies abd pain, vomiting, fever, or SOB. There are no other acute medical concerns at this time. Social hx: no tobacco use; no EtOH use PCP: Natalie Gilliam MD 
 
Note written by Erwin Koroma, as dictated by Lizzie Smith MD 9:16 AM 
 
The history is provided by the patient. No  was used. Past Medical History:  
Diagnosis Date  Diabetes mellitus ([facility]  Prostate cancer ([facility]  Sleep apnea Past Surgical History:  
Procedure Laterality Date 2021 Luis Orona N/A 9/6/2018 SIGMOIDOSCOPY FLEXIBLE performed by Esha Smith MD at [address] [place] [address] Left  HX HIP REPLACEMENT Right  HX OTHER SURGICAL    
 bladder stent  IR CHANGE NEPHROSTOMY PYELOS TUBE RT  2/1/2019 Family History:  
Problem Relation Age of Onset  Cancer Mother   
     colon  Hypertension Mother  Heart Disease Mother  Cancer Father  Diabetes Sister  Cancer Brother  Diabetes Sister Social History Socioeconomic History  Marital status: SINGLE Spouse name: Not on file  Number of children: Not on file  Years of education: Not on file  Highest education level: Not on file Occupational History  Not on file Social Needs  Financial resource strain: Not on file  Food insecurity:  
  Worry: Not on file Inability: Not on file  Transportation needs:  
  Medical: Not on file Non-medical: Not on file Tobacco Use  Smoking status: Never Smoker  Smokeless tobacco: Never Used Substance and Sexual Activity  Alcohol use: No  
 Drug use: No  
 Sexual activity: Never Lifestyle  Physical activity:  
  Days per week: Not on file Minutes per session: Not on file  Stress: Not on file Relationships  Social connections:  
  Talks on phone: Not on file Gets together: Not on file Attends Christian service: Not on file Active member of club or organization: Not on file Attends meetings of clubs or organizations: Not on file Relationship status: Not on file  Intimate partner violence:  
  Fear of current or ex partner: Not on file Emotionally abused: Not on file Physically abused: Not on file Forced sexual activity: Not on file Other Topics Concern [address] Service Not Asked  Blood Transfusions Not Asked  Caffeine Concern Not Asked  Occupational Exposure Not Asked Kevin Martinez Hazards Not Asked  Sleep Concern Not Asked  Stress Concern Not Asked  Weight Concern Not Asked  Special Diet Not Asked  Back Care Not Asked  Exercise Not Asked  Bike Helmet Not Asked  Seat Belt Not Asked  Self-Exams Not Asked Social History Narrative  Not on file ALLERGIES: Ciprofloxacin Review of Systems Constitutional: Negative for fever. Eyes: Negative for visual disturbance. Respiratory: Negative for cough, shortness of breath and wheezing. Cardiovascular: Positive for leg swelling. Negative for chest pain. Gastrointestinal: Positive for anal bleeding. Negative for abdominal pain, diarrhea, nausea and vomiting. Genitourinary: Negative for dysuria. Musculoskeletal: Negative. Negative for back pain and neck stiffness. Skin: Negative for rash. Neurological: Negative. Negative for syncope and headaches. Psychiatric/Behavioral: Negative for confusion. All other systems reviewed and are negative. Vitals:  
 03/26/19 0959 03/26/19 1015 03/26/19 1030 03/26/19 1045 BP:  158/73 158/80 160/81 Pulse:  90 91 89 Resp:      
Temp:      
SpO2: 100% 99% 99% 100% Weight:      
Height:      
      
 
Physical Exam  
Constitutional: He appears well-developed and well-nourished. No distress. HENT:  
Head: Normocephalic. Mouth/Throat: Mucous membranes are pale. Eyes: Pupils are equal, round, and reactive to light. Neck: Normal range of motion. Cardiovascular: Normal rate and regular rhythm. No murmur heard. Pulmonary/Chest: Effort normal and breath sounds normal. No respiratory distress. Abdominal: Soft. There is no tenderness. Colostomy in the LLQ that has a small amount of blood in the bag. Percutaneous nephrostomy in the right flank. Genitourinary:  
Genitourinary Comments: Rectal exam: no gross blood; mass in the upper aspect of the rectum. Musculoskeletal: Normal range of motion. He exhibits no edema. Neurological: He is alert. He has normal strength. No cranial nerve deficit. Skin: Skin is warm and dry. Psychiatric: He has a normal mood and affect. His behavior is normal.  
Nursing note and vitals reviewed. Note written by Erwin Ni, as dictated by Loan Schaefer MD 9:24 AM 
  
 
MDM Procedures ED EKG interpretation: 
Rhythm: normal sinus rhythm; and regular . Rate (approx.): 86; ST/T wave: no acute ST changes. Note written by Erwin Ni, as dictated by Loan Schaefer MD 9:38 AM 
 
CONSULT NOTE: 
12:16 PM Loan Schaefer MD spoke with Maia Desai NP, Consult for Oncology. Discussed available diagnostic tests and clinical findings.   She spoke to Dr. Elfego Flanagan and they recommend a light diuretic and Keflex. Pt has an appt with the Lymphedema clinic at 8:30am tomorrow morning and can have his compression stockings fitted at that time. Pt can be discharged home. 12:18 PM 
Patient's results have been reviewed with them. Patient and/or family have verbally conveyed their understanding and agreement of the patient's signs, symptoms, diagnosis, treatment and prognosis and additionally agree to follow up as recommended or return to the Emergency Room should their condition change prior to follow-up. Discharge instructions have also been provided to the patient with some educational information regarding their diagnosis as well a list of reasons why they would want to return to the ER prior to their follow-up appointment should their condition change.

## 2019-03-26 NOTE — ED TRIAGE NOTES
The patient states he has stage 4 colon and prostates cancer with colostomy and urostomy to the right flank. Treated 2 weeks ago for sepsis at [facility]. Complains of increased leg swelling for the past 2 weeks. Denies SOB or pain at this time.

## 2019-03-27 ENCOUNTER — HOSPITAL ENCOUNTER (OUTPATIENT)
Dept: PHYSICAL THERAPY | Age: 74
Discharge: HOME | End: 2019-03-27
Payer: MEDICARE

## 2019-03-27 VITALS — SYSTOLIC BLOOD PRESSURE: 138 MMHG | OXYGEN SATURATION: 98 % | HEART RATE: 97 BPM | DIASTOLIC BLOOD PRESSURE: 62 MMHG

## 2019-03-27 PROCEDURE — 97140 MANUAL THERAPY 1/> REGIONS: CPT

## 2019-03-27 PROCEDURE — 97530 THERAPEUTIC ACTIVITIES: CPT

## 2019-03-27 PROCEDURE — 97164 PT RE-EVAL EST PLAN CARE: CPT

## 2019-03-27 NOTE — PROGRESS NOTES
[address] [facility]  [address] [place] [address],[address]  Tessa Arthurvbrantngleyn [address]      Re- EVALUATION    NAME: Rosalia Donaldson AGE: 74 y.o. GENDER: male  DATE: 3/27/2019  REFERRING PHYSICIAN: Naz Schroeder MD  HISTORY AND BACKGROUND:  Patient referred to clinic initially for evaluation of LE swelling. Patient was measured for compression garments, received knee high stockings, however, due to increase in swelling, garments do not fit. ED visit on 3/26/2019, with doppler study ruling out DVT, and HCTZ and Keflex prescribed. Patient to see Dr. Isabella Juárez on 4/7/2019. Patient has history of prostate cancer diagnosed in 2015, treated with brachytherapy 12/2015. Metastatic disease into sigmoid colon, with pathology more consistent with prostate adenocarcinoma than colon cancer. Decision has been made that surgical management would be too extensive for this patient when patient was last seen by oncologist 1/11/2019. Sigmoid mass causing hydronephrosis requiring ureteral stent, percutaneous nephrostomy tube placed 9/7/18 R lower back; colostomy LLQ. L THR 10 years ago, R THR last year. See further medical/surgical history as noted below. Patient returns today for appt that was to include compression garment fit, however, patient reports fall at home resulting in hospitalization. States he was on the floor a couple of days before his family found him, reporting he was unable to get off the floor without help. Reports that he had a PICC line, receiving IV fluids, maybe antibiotics?, stating legs have been more swollen since IV hydration initiated.      Primary Diagnosis:  · BLE lymphedema, secondary (I89.0)  Other Treatment Diagnoses:   Abnormality of gait (other abnormalities of gait and mobility R26.89)   Swelling not relieved by elevation ( R60.9 edema)   Prostate cancer, metastatic (C61)  Morbid Obesity (E66.01)  Date of Onset: 9/6/2018  Present Symptoms and Functional Limitations: overal reduced activity, lack of understanding regarding management of LE swelling. Lymmphedema Life Impact Scale: 32/68; 47%; CK  Past Medical History:   Past Medical History:   Diagnosis Date    [facility] mellitus ([facility]     Prostate cancer ([facility]     Sleep apnea      Past Surgical History:   Procedure Laterality Date    FLEXIBLE SIGMOIDOSCOPY N/A 9/6/2018    SIGMOIDOSCOPY FLEXIBLE performed by Ludwig Phipps MD at [address] HX HIP REPLACEMENT Left     HX HIP REPLACEMENT Right     HX OTHER SURGICAL      bladder stent    IR CHANGE NEPHROSTOMY PYELOS TUBE RT  2/1/2019     Current Medications:    Current Outpatient Medications   Medication Sig    cephALEXin (KEFLEX) 500 mg capsule Take 1 Cap by mouth two (2) times a day for 7 days.  hydroCHLOROthiazide (HYDRODIURIL) 25 mg tablet Take 1 Tab by mouth daily for 10 days.  abiraterone (ZYTIGA) 250 mg tab Take 4 Tabs by mouth daily.  bicalutamide (CASODEX) 50 mg tablet TK 1 T PO  D    traMADol (ULTRAM) 50 mg tablet Take  mg by mouth.  traZODone (DESYREL) 50 mg tablet TK 1 T PO  QHS  PRN    predniSONE (DELTASONE) 5 mg tablet Take 1 Tab by mouth daily.  aspirin delayed-release 81 mg tablet Take 81 mg by mouth daily.  potassium chloride (K-DUR, KLOR-CON) 20 mEq tablet Take 20 mEq by mouth two (2) times a day.  tamsulosin (FLOMAX) 0.4 mg capsule Take 0.4 mg by mouth daily. No current facility-administered medications for this encounter. Allergies: Allergies   Allergen Reactions    Ciprofloxacin Rash      Prior Level of Function/Social/Work History/Personal factors and/or comorbidities impacting plan of care: Patient reports previously active, exercising every day including outdoor walk at the park. States he has not been walking recently due to feeling weaker. Patient has cancer history as noted above. Reports he lost his wife last year.  Recent fall at home, patient unable to get himself up off the floor. To ED yesterday due to increase in LE swelling noted by patient and family. Living Situation: lives alone, private residence. Dtr in law states they are working toward patient living with them if she is agreeable. Trainable Caregiver?: son brings patient to appt today. Self-care/ADLs: mod I, shower seat as needed, slower to perform LE dressing today. Mobility: ambulates into clinic with cane, slow apolonia. Sleeping Arrangement:  bed   Adaptive Equipment Owned: cane, rolling walker, shower seat. Previous Therapy:  PT s/p hip replacement, s/p fall 9/2018. No previous treatment regarding LE edema. Compression/Lymphedema Equipment:  Juzo knee high compression stockings, recently ordered, however, patient failed to bring to clinic. SUBJECTIVE:   Patient and daughter in law report today that LE swelling noted to be increased, which prompted visit to ED. Doppler study was negative for DVT. Urinalysis completed, with patient started on Keflex, and HCTZ, awaiting results of urine culture. Daughter in law instructed to contact Dr. Jami Arellano for further information regarding labs/testing from ED visit. Patients goals for therapy: evaluation to be completed regarding LE swelling, proceeding with recommended treatment. .    OBJECTIVE DATA SUMMARY:   EXAMINATION/PRESENTATION/DECISION MAKING:   Pain: 6/10; leg heaviness, skin tightness. Pain Scale 1: (P) Numeric (0 - 10)  Pain Intensity 1: (P) 6     Self-Care and ADLs:       LB Dressing: Modified independent, additional time   Don/Doff Shoes/Socks: Modified independent, additional time. Assist to don post op shoe L. Skin and Tissue Assessment:  Dermal Status:  [x]   Intact []  Dry   []  Tenuous [] Flaky   []  Wound/lesion present []  Scars   []  Dermatitis Skin shiny, mildly taut lower legs. Texture/Consistency:  [x]  Boggy:full leg involvement, L LE>R LE.   See limb volume measurements below.  [x]  Pitting Edema: +3 bilateral distal leg/ankle/dorsal foot region. []  Brawny []  Combination   [x]  Fibrotic/Woody:     Pigmentation/Color Change:  []  Normal []  Hemosiderin   []  Red []  Erythematous   [x]  Hyperpigmented; lower leg, foot region bilateral. []  Hyperlipodermatosclerosis   Anomalies: na  []  Lymphorrhea []  Vesicles   []  Petechiae []  Warty Vercusis   []  Bullae []  Papilloma   Circulatory:  []  ANGELES [x]  Varicosities:   [x]  Pulses: dorsal pedal located with doppler. [x]  Vascular studies ruled out DVT in past; 3/26/2019   []  DVT History    Nails:  []   Normal  [x]   Fungus  Stemmers Sign: negative    Volumetric Measurements:   Right: Today--8849.70 mL.  3/7/2019--8708.09 mL; 2/7/2019--7910.64 mL Left: Today--9500.21 mL.  3/7/2019--8702.77 mL;  2/7/2019--7623.68 mL   % Difference: 7.35% today Since initial evaluation, R LE volume increased by 939.6 mL; L LE volume increased by 1876.53 mL. (See scanned graph)  Range of Motion: bilateral LE grossly WFL AROM. Sensation:  Intact to light touch  Mobility:  Bed/Chair Mobility:  Mod I, additional time Transfers: Mod I   Sitting Balance:  good Standing Balance:  Fair    Gait:  Modified independent straight cane, slow apolonia, reduced stride length. Wheelchair Mobility:  (Other) na   Endurance:  Intact 100 feet in clinic with straight cane. Stairs:  Not assessed. Safety:  Patient is alert and oriented:  Yes, lethargic   Safety awareness:   1 fall reported in past 6 months. Agreeable to use rolling water vs cane at this time. Fall Risk?:  High, recent fall at home, unable to get off of floor without assistance. Now has [employer].    Patient given written fall prevention handout: Yes   Precautions:  Standard lymphedema precautions to include avoiding blood pressure readings, injections and IVs or other procedures/acts that could lead to broken skin on affected area, and avoiding excessive heat, resistive activity or altitude without compression garment      Evaluation Time: 8:45-9:00 am  15 minutes      TREATMENT PROVIDED:   1. Treatment description:  The patient/daughter in law educated regarding the role and function of the lymphatic system, pathophysiology of secondary lymphedema, and instructed in the lymphedema management protocol of complete decongestive therapy (CDT). This includes skin care to prevent breakdown or infection, lymphedema exercises, custom compression therapy options (bandaging, compression garments, compression pump, Clarance Smaller, JoViPak, [employer], etc. as needed), and decongestion with manual lymphatic drainage as indicated. Skin care performed with lotion application performed in clinic, patient/daughter in law instructed to apply lotion nightly, upward stroke technique. We discussed the need for consistent compression system for lymphedema management, and the need for medical grade compression. Discussed compression options with patient/daughter in law, and, due to more pronounce edema noted today since last seen, we discussed benefit of compression bandaging/compression wrap adjustable garments recommended today due to recent fluctuations in lymphedema. Patient advised that, with current level of swelling, decongestion of limbs is indicated prior to fitting compression garments, if patient tolerates well. Applied Profore wrap, L LE knee high, to assess tolerance to compression prior to having patient purchase compression bandaging supplies. Patient advised to notify clinic of any questions or concerns, with both patient and daughter in law instructed in the following regarding home management of Profore wrap. Compression bandaging instructions:  1. Profore wrap was applied to L LE lower leg in clinic today.   2. If tolerated, remain bandaged between appointments with therapist, removing under the following conditions--DO NOT WAIT FOR A RETURN PHONE CALL FROM CLINIC:    -Numbness/tingling in extremity different from what you have experienced without the bandages in place.  -Compromise in circulation, monitoring blood refill into toes after applying gentle pressure to toes.  -Onset of pain in extremity that is sudden and severe in nature. -Redness, warmth in limb, and/or fever, flu-like symptoms, which may indicate infection. If this occurs, call your doctor right away. If you note a sudden increase in swelling in extremity, with or without redness/warmth, go to the emergency room as soon as possible to have blood clot ruled out. 3. If you remove Profore, items can be disgarded. 4. Measuring for velcro compression garments is likely the best option for long term management of lymphedema. Patient advised to elevated LE's for 30 minutes 3-4x/day as tolerated. Patient/family is in agreement with plan at this time regarding management of LE lymphedema/edema. Follow up appts are indicated at this time. Treatment time:  9:01-9:55 am  Minutes: 54    Manual therapy 4      ASSESSMENT:   David Mancuso is a 74 y.o. male who presents with stage II lymphedema, now moderate in nature, with fibrotic tissue noted, limb volumes elevated comparing with prior two visits. Note skin turgor reduced L LE, full leg, however, thigh edema improved after 45 minutes of elevation in clinic during evaluation and treatment. Patient to [facility] ED yesterday due to increased LE swelling 3/26/2019, and reports that compression stockings previously ordered do not fit, however, failed to bring to clinic. Patient and daughter in law advised that phase I CDT indicated at this time, with patient likely requiring adjustable compression garments, velcro, for long-term management of lymphedema. Patient to monitor urine output via catheter.  Patient will benefit from complete decongestive therapy (CDT) including manual lymphatic drainage (MLD) technique, short-stretch textile bandages/compression system to decongest limb, and kinesiotaping as appropriate. Patient will receive instruction in proper skin care to recognize signs/symptoms of and prevent infection, therapeutic exercise, and self-MLD for independent home program and restorative lymphatic performance. This care is medically necessary due to the infection risk with lymphedema, and to improve functional activities. CDT is necessary to resolve swelling to allow patient to return to wearing normal clothes/footwear, and prevent worsening of symptoms, such as venous stasis ulcerations, infections, or hospitalizations. Patient will be independent with home program strategies to allow improved ADL ability and mobility and to allow patient to return to greatest functional independence. Rehabilitation potential is considered to be good/fair based on response to Profore compression wrap. Factors which may influence rehabilitation potential include metastatic cancer with patient reporting overall reduction in mobility, high fall risk, potential need for assistance to don/doff compression garments and patient lives alone. Recommend 10-14 PT visits over the next 6-12 weeks. PLAN OF CARE:   Recommendations and Planned Interventions:  Manual lymph drainage  Compression bandaging  Compression garment fit  Instruction in home management of long term compression system  Skin care education  Lymphedema specific exercise program       Goals:      1. Instruct the patient to be independent with proper skin care to prevent future skin breakdown and decrease the potential risk for infections that are associated with Lymphedema. 2. Patient will be independent with a personal lymphedema exercise program to assist with the lymphatic flow and reduce limb volume 500-700 mL. 3. Patient will understand the signs and symptoms of acute infection.   4.   Patient will have knowledge of the compression options and acquire a safe and   appropriate daytime and night time compression system to prevent re-accumulation of fluid. 5.  Patient or family will be able to don/doff garments independently. Garment   system effectiveness will be evaluated prior to discharge for better long-term   management and outcomes. 6. LLIS outcomes measurements improvement by 6-8 points to allow patient to perform ADL's and mobilize without c/o.  6.   To transition patient to independent restorative phase of CDT. Patient has participated in goal setting and agrees to work toward plan of care. Patient was instructed to call if questions or concerns arise. Thank you for this referral.  Javier Ragland, PT, CLT   Time Calculation: 70 mins    TREATMENT PLAN EFFECTIVE DATES:   3/27/2019 to 6/20/2019  I have read the above plan of care for [facility]. I certify the above prescribed services are required by this patient and are medically necessary.   The above plan of care has been developed in conjunction with the lymphedema/physical therapist.       Physician Signature: ____________________________________Date:______________

## 2019-03-29 LAB
BACTERIA SPEC CULT: ABNORMAL
BACTERIA SPEC CULT: ABNORMAL
CC UR VC: ABNORMAL
SERVICE CMNT-IMP: ABNORMAL

## 2019-03-31 LAB
BACTERIA SPEC CULT: NORMAL
SERVICE CMNT-IMP: NORMAL

## 2019-04-01 ENCOUNTER — HOSPITAL ENCOUNTER (OUTPATIENT)
Dept: NUCLEAR MEDICINE | Age: 74
Discharge: HOME | End: 2019-04-01
Attending: NURSE PRACTITIONER
Payer: MEDICARE

## 2019-04-01 ENCOUNTER — HOSPITAL ENCOUNTER (OUTPATIENT)
Dept: CT IMAGING | Age: 74
Discharge: HOME | End: 2019-04-01
Attending: NURSE PRACTITIONER
Payer: MEDICARE

## 2019-04-01 DIAGNOSIS — C61 PROSTATE CANCER (HCC): ICD-10-CM

## 2019-04-01 PROCEDURE — 74177 CT ABD & PELVIS W/CONTRAST: CPT

## 2019-04-01 PROCEDURE — 78306 BONE IMAGING WHOLE BODY: CPT

## 2019-04-01 PROCEDURE — 74011636320 HC RX REV CODE- 636/320: Performed by: RADIOLOGY

## 2019-04-01 RX ADMIN — IOPAMIDOL 100 ML: 755 INJECTION, SOLUTION INTRAVENOUS at 12:36

## 2019-04-02 ENCOUNTER — TELEPHONE (OUTPATIENT)
Dept: ONCOLOGY | Age: 74
End: 2019-04-02

## 2019-04-02 ENCOUNTER — HOSPITAL ENCOUNTER (OUTPATIENT)
Dept: PHYSICAL THERAPY | Age: 74
Discharge: HOME | End: 2019-04-02
Payer: MEDICARE

## 2019-04-02 PROCEDURE — 97140 MANUAL THERAPY 1/> REGIONS: CPT

## 2019-04-02 NOTE — PROGRESS NOTES
[facility]  Lymphedema Clinic and Cancer Rehabilitation  [address] [place] [address]  [address]  Gosia Arthur        LYmphedema Therapy  Visit: 2    [x]                  Daily note               []                 30 day/10th visit progress note    NAME: Tiffany Sifuentes  DATE: 4/2/2019    GOALS  Goals:        1. Instruct the patient to be independent with proper skin care to prevent future skin breakdown and decrease the potential risk for infections that are associated with Lymphedema. 2. Patient will be independent with a personal lymphedema exercise program to assist with the lymphatic flow and reduce limb volume 500-700 mL. 3. Patient will understand the signs and symptoms of acute infection. 4.   Patient will have knowledge of the compression options and acquire a safe and appropriate daytime and night time compression system to prevent re-accumulation of fluid. 5.  Patient or family will be able to don/doff garments independently. Garment system effectiveness will be evaluated prior to discharge for better long-term management and outcomes. 6. LLIS outcomes measurements improvement by 6-8 points to allow patient to perform ADL's and mobilize without c/o.  6.   To transition patient to independent restorative phase of CDT. SUBJECTIVE REPORT:  Pt arrives to appt in manual w/c, accompanied by his son. Pt still with profore wrap on left LE, however he had cut the top portion off since his catheter leaked on bandages yesterday after CT scan. Overall, pt reports fair tolerance for profore wrap. Pain:   6/10 bilateral LEs                             Gait:  Able to ambulate into treatment room x 10 ft using SPC with close SBA following B LE bandaging. Gait slightly unsteady with post-op shoes. Reviewed safety recommendations. []  Independent gait without any assistive device for community distances  ADLs:  Needs assist from family.    Treatment Response:  Good response to profore wrap application on bilateral LEs. [x]   Patient reviewed packet received at evaluation  []   Patient completed home program as prescribed  []   Patient not fully compliant with home program to date  []   Patient waiting on compression garment arrival  Function: limited by acute swelling, pain, and functional decline since prior fall at home   []   Patient requiring less assistance with completion of home program  []   ADLs are requiring less assistance   []   Patient able to return to work/leisure activities  Lymphedema Life Impact Scale: NT  Weight: NT  TREATMENT AND OBJECTIVE DATA SUMMARY:   Patient/Family Education: Compression bandaging instructions:  1. Profore wrap was applied to B LEs from MTP to just below knee today. 2. If tolerated, remain bandaged between appointments with therapist, removing under the following conditions--DO NOT WAIT FOR A RETURN PHONE CALL FROM CLINIC:  -Numbness/tingling in extremity different from what you have experienced without the bandages in place.  -Compromise in circulation, monitoring blood refill into toes after applying gentle pressure to toes.  -Onset of pain in extremity that is sudden and severe in nature. -Redness, warmth in limb, and/or fever, flu-like symptoms, which may indicate infection. If this occurs, call your doctor right away. If you note a sudden increase in swelling in extremity, with or without redness/warmth, go to the emergency room as soon as possible to have blood clot ruled out. 3. If you remove Profore, items can be disgarded. 4. Measuring for velcro compression garments is likely the best option for long term management of lymphedema. Patient advised to elevated LE's for 30 minutes 3-4x/day as tolerated. Patient/family is in agreement with plan at this time regarding management of LE lymphedema/edema. Follow up appts are indicated at this time.           Educated in skin care: []   Skin care products  [x]   Hygiene  [x]  Prevention of cellulitis  []   Wound care     Educated in exercise: [x]   Walking program  []   Annie ball routine  []   Stick routine  [x]   ROM routine     Instructed in self MLD:   Written sequence given and reviewed with patient as well as demonstration and instruction during MLD portion of the session. Instructed in don/doff of compression system:   [x]   Multi layer bandage (MLB) donning principles and wear precautions  []   Day garments  []   Night garments     Therapeutic Exercise/Procedure  minutes   Treatment time:   Annie ball exercise program: Demonstration by therapist of written routine. Patient performed each x5 reps. Stick exercise program: N/A   Free exercises/ROM: N/A   Home program: Patient to perform daily to BID:  []   Skin care  [x]   Deep abdominal breathing  [x]   Exercise routine  [x]   Walking program  []   Rest in supine   []   Compression bandage  []   Compression garments  []   Vasopneumatic device  []   Wound care  []   Self MLD  []   Bring supplies to each therapy visit  []   Purchase necessary supplies  []   Weight loss program  []   Follow up with       Rationale: Exercise will increase the lymph angiomotoricity and tissue pressure of the skin and thus decrease swelling.      Modalities  minutes   Treatment time:   Vasopneumatic pump:      Manual Lymphatic Drainage (MLD) 55 minutes   Treatment time: 12:45 - 1:40 pm  Area to decongest:    [] UE          []  Right     []  Left      [] Trunk      [x] LE             [x]  Right     [x]  Left      [] Trunk         Sequence used and effectiveness: [] Secondary/Primary sequence for upper extremity with / without trunk involvement    [] Secondary/Primary sequence for lower extremities with / without trunk involvement     [] Modifications were made to manual lymph drainage sequence to exclude cervical techniques secondary to patient's age    [] Self MLD sequence/techniques/hand strokes   Skin/wound care/debridement: Profore wrap on left LE removed and LE washed in basin with soap and water. Pt says his catheter bag leaked on leg yesterday. Noted fungal growth on toes and pt encouraged to see podiatrist - provided with list today. Applied Eucerin/aquaphor mix to bilateral LEs using upward strokes to stimulate lymphatic vessels. Upper/Lower extremity compression: Pt continues with acute swelling of bilateral LEs (L>R) with 3+ pitting edema left lower leg and 2+ pitting edema right lower leg. Applied stockinette and 4 layer profore wrap to bilateral LEs from MTPs to just below knee. Good capillary refill noted after bandaging. Reviewed compression bandaging precautions. Pt to return in 1 week to remove wraps and possibly measure for velcro garments if volumes reduced. Kinesiotaping:    Girth/Volume measurement: Reviewed results of volumetric measurements taken on evaluation. TOTAL TREATMENT 55 mins     Circumferential Limb Measurements:  Affected Side: B LEs (L>R)   Left (cm) Right (cm)   5th Tuberosity 25.1    24.1      Ankle 25.2    23.5      Calf 45.1    45.5      Y measurement 37.1     36.3             ASSESSMENT:   Treatment effectiveness and tolerance: Pt continues with acute swelling of bilateral LEs (L>R) with 3+ pitting edema left lower leg and 2+ pitting edema right lower leg. Pt previously measured for [employer] 3511 knee high stockings prior to his fall at home and increased swelling. Advised pt that these will not fit his legs until swelling reduced, however also feel that pt will have difficulty don/doffing stockings on his own. Pt and son in agreement and have requested to return stockings. Pt says he no longer has the box they came in; will contact [employer] to try to get an RA. Discussed alternative options and will plan to measure pt for velcro compression system once volumes reduced. Pt shown Juzo velcro foot and leg piece today.      Applied stockinette and 4 layer profore wrap to bilateral LEs from MTPs to just below knee. Pt to return in 1 week to remove wraps and possibly measure for velcro garments if volumes reduced. Progress toward goals: Progressing towards goals. PLAN OF CARE:   Changes to the plan of care: 1. Recheck volumes. 2. Measure for velcro garments. 3. Continue with profore wrap until pt fit for garments. Frequency: []  2 times a week  [x]  Weekly  []  Biweekly  []  Monthly   Other: Will attempt to get RA to return [email or web address] stockings.  Pt says he no longer has box they came in.       Griffin Ortega, PT, DPT, CLT

## 2019-04-02 NOTE — TELEPHONE ENCOUNTER
[facility] [address]  [phone]      04/02/19 10:18 AM Left message for patient via home/cell number listed requesting that he return call. Per Kathy Porras NP, wanted to offer sooner appointment tomorrow, 04/03 for [facility] labs/MD visit. If patient unable to come in, will keep appointment as scheduled. Provided office phone number for him to return call.

## 2019-04-05 ENCOUNTER — APPOINTMENT (OUTPATIENT)
Dept: GENERAL RADIOLOGY | Age: 74
DRG: 682 | End: 2019-04-05
Attending: EMERGENCY MEDICINE
Payer: MEDICARE

## 2019-04-05 ENCOUNTER — HOSPITAL ENCOUNTER (INPATIENT)
Age: 74
LOS: 11 days | Discharge: NURSING, REHAB OR LONG-TERM CARE | DRG: 682 | End: 2019-04-16
Attending: EMERGENCY MEDICINE | Admitting: INTERNAL MEDICINE
Payer: MEDICARE

## 2019-04-05 ENCOUNTER — APPOINTMENT (OUTPATIENT)
Dept: NON INVASIVE DIAGNOSTICS | Age: 74
DRG: 682 | End: 2019-04-05
Attending: INTERNAL MEDICINE
Payer: MEDICARE

## 2019-04-05 ENCOUNTER — APPOINTMENT (OUTPATIENT)
Dept: CT IMAGING | Age: 74
DRG: 682 | End: 2019-04-05
Attending: INTERNAL MEDICINE
Payer: MEDICARE

## 2019-04-05 ENCOUNTER — APPOINTMENT (OUTPATIENT)
Dept: CT IMAGING | Age: 74
DRG: 682 | End: 2019-04-05
Attending: EMERGENCY MEDICINE
Payer: MEDICARE

## 2019-04-05 ENCOUNTER — APPOINTMENT (OUTPATIENT)
Dept: VASCULAR SURGERY | Age: 74
DRG: 682 | End: 2019-04-05
Attending: INTERNAL MEDICINE
Payer: MEDICARE

## 2019-04-05 DIAGNOSIS — R55 SYNCOPE AND COLLAPSE: ICD-10-CM

## 2019-04-05 DIAGNOSIS — R53.81 DEBILITATED PATIENT: ICD-10-CM

## 2019-04-05 DIAGNOSIS — I89.0 LYMPHEDEMA OF BOTH LOWER EXTREMITIES: ICD-10-CM

## 2019-04-05 DIAGNOSIS — C79.89 PROSTATE CANCER METASTATIC TO PELVIS (HCC): ICD-10-CM

## 2019-04-05 DIAGNOSIS — R29.898 WEAKNESS OF RIGHT LEG: ICD-10-CM

## 2019-04-05 DIAGNOSIS — R53.1 WEAKNESS: Primary | ICD-10-CM

## 2019-04-05 DIAGNOSIS — N17.9 AKI (ACUTE KIDNEY INJURY) (HCC): ICD-10-CM

## 2019-04-05 DIAGNOSIS — R50.81 FEVER IN OTHER DISEASES: ICD-10-CM

## 2019-04-05 DIAGNOSIS — R29.898 WEAKNESS OF RIGHT FOOT: ICD-10-CM

## 2019-04-05 DIAGNOSIS — C61 PROSTATE CANCER METASTATIC TO PELVIS (HCC): ICD-10-CM

## 2019-04-05 DIAGNOSIS — R29.898 WEAKNESS OF BOTH LEGS: ICD-10-CM

## 2019-04-05 DIAGNOSIS — E87.6 HYPOKALEMIA: ICD-10-CM

## 2019-04-05 DIAGNOSIS — Z71.89 GOALS OF CARE, COUNSELING/DISCUSSION: ICD-10-CM

## 2019-04-05 DIAGNOSIS — Z71.89 ADVANCED CARE PLANNING/COUNSELING DISCUSSION: ICD-10-CM

## 2019-04-05 DIAGNOSIS — M62.82 NON-TRAUMATIC RHABDOMYOLYSIS: ICD-10-CM

## 2019-04-05 DIAGNOSIS — R19.00 PELVIC MASS: ICD-10-CM

## 2019-04-05 DIAGNOSIS — T79.6XXD TRAUMATIC RHABDOMYOLYSIS, SUBSEQUENT ENCOUNTER: ICD-10-CM

## 2019-04-05 DIAGNOSIS — Z71.89 DNR (DO NOT RESUSCITATE) DISCUSSION: ICD-10-CM

## 2019-04-05 PROBLEM — E11.9 DIABETES MELLITUS (HCC): Status: ACTIVE | Noted: 2019-04-05

## 2019-04-05 PROBLEM — G47.30 SLEEP APNEA: Status: ACTIVE | Noted: 2019-04-05

## 2019-04-05 PROBLEM — R82.81 PYURIA: Status: ACTIVE | Noted: 2019-04-05

## 2019-04-05 PROBLEM — T30.0 BURN: Status: ACTIVE | Noted: 2019-04-05

## 2019-04-05 LAB
ALBUMIN SERPL-MCNC: 2.7 G/DL (ref 3.5–5)
ALBUMIN/GLOB SERPL: 0.5 {RATIO} (ref 1.1–2.2)
ALP SERPL-CCNC: 75 U/L (ref 45–117)
ALT SERPL-CCNC: 65 U/L (ref 12–78)
AMYLASE SERPL-CCNC: 33 U/L (ref 25–115)
ANION GAP SERPL CALC-SCNC: 3 MMOL/L (ref 5–15)
ANION GAP SERPL CALC-SCNC: 6 MMOL/L (ref 5–15)
ANION GAP SERPL CALC-SCNC: 6 MMOL/L (ref 5–15)
APPEARANCE UR: ABNORMAL
APTT PPP: 20.6 SEC (ref 22.1–32)
AST SERPL-CCNC: 284 U/L (ref 15–37)
ATRIAL RATE: 104 BPM
ATRIAL RATE: 96 BPM
BACTERIA URNS QL MICRO: ABNORMAL /HPF
BASOPHILS # BLD: 0 K/UL (ref 0–0.1)
BASOPHILS NFR BLD: 0 % (ref 0–1)
BILIRUB SERPL-MCNC: 0.5 MG/DL (ref 0.2–1)
BILIRUB UR QL: NEGATIVE
BNP SERPL-MCNC: 514 PG/ML
BUN SERPL-MCNC: 13 MG/DL (ref 6–20)
BUN SERPL-MCNC: 14 MG/DL (ref 6–20)
BUN SERPL-MCNC: 14 MG/DL (ref 6–20)
BUN/CREAT SERPL: 10 (ref 12–20)
BUN/CREAT SERPL: 8 (ref 12–20)
BUN/CREAT SERPL: 9 (ref 12–20)
CALCIUM SERPL-MCNC: 7.7 MG/DL (ref 8.5–10.1)
CALCIUM SERPL-MCNC: 7.8 MG/DL (ref 8.5–10.1)
CALCIUM SERPL-MCNC: 8.8 MG/DL (ref 8.5–10.1)
CALCULATED P AXIS, ECG09: 70 DEGREES
CALCULATED R AXIS, ECG10: 1 DEGREES
CALCULATED R AXIS, ECG10: 6 DEGREES
CALCULATED T AXIS, ECG11: 14 DEGREES
CALCULATED T AXIS, ECG11: 30 DEGREES
CHLORIDE SERPL-SCNC: 106 MMOL/L (ref 97–108)
CHLORIDE SERPL-SCNC: 108 MMOL/L (ref 97–108)
CHLORIDE SERPL-SCNC: 109 MMOL/L (ref 97–108)
CK MB CFR SERPL CALC: 0.8 % (ref 0–2.5)
CK MB SERPL-MCNC: 80.7 NG/ML (ref 5–25)
CK SERPL-CCNC: ABNORMAL U/L (ref 39–308)
CK SERPL-CCNC: ABNORMAL U/L (ref 39–308)
CO2 SERPL-SCNC: 29 MMOL/L (ref 21–32)
CO2 SERPL-SCNC: 31 MMOL/L (ref 21–32)
CO2 SERPL-SCNC: 34 MMOL/L (ref 21–32)
COLOR UR: ABNORMAL
COMMENT, HOLDF: NORMAL
CREAT SERPL-MCNC: 1.34 MG/DL (ref 0.7–1.3)
CREAT SERPL-MCNC: 1.47 MG/DL (ref 0.7–1.3)
CREAT SERPL-MCNC: 1.75 MG/DL (ref 0.7–1.3)
CREAT UR-MCNC: 92.1 MG/DL
DIAGNOSIS, 93000: NORMAL
DIAGNOSIS, 93000: NORMAL
DIFFERENTIAL METHOD BLD: ABNORMAL
ECHO AO ROOT DIAM: 3.59 CM
ECHO AV AREA PEAK VELOCITY: 1.9 CM2
ECHO AV PEAK GRADIENT: 9.9 MMHG
ECHO AV PEAK VELOCITY: 157.5 CM/S
ECHO EST RA PRESSURE: 3 MMHG
ECHO LA MAJOR AXIS: 3.22 CM
ECHO LA TO AORTIC ROOT RATIO: 0.9
ECHO LA VOL 2C: 54.06 ML (ref 18–58)
ECHO LA VOL 4C: 48.15 ML (ref 18–58)
ECHO LA VOL BP: 61.71 ML (ref 18–58)
ECHO LA VOL/BSA BIPLANE: 28.69 ML/M2 (ref 16–28)
ECHO LA VOLUME INDEX A2C: 25.13 ML/M2 (ref 16–28)
ECHO LA VOLUME INDEX A4C: 22.38 ML/M2 (ref 16–28)
ECHO LV INTERNAL DIMENSION DIASTOLIC: 5.06 CM (ref 4.2–5.9)
ECHO LV INTERNAL DIMENSION SYSTOLIC: 3.6 CM
ECHO LV IVSD: 0.98 CM (ref 0.6–1)
ECHO LV MASS 2D: 208.8 G (ref 88–224)
ECHO LV MASS INDEX 2D: 97.1 G/M2 (ref 49–115)
ECHO LV POSTERIOR WALL DIASTOLIC: 0.97 CM (ref 0.6–1)
ECHO LVOT DIAM: 1.88 CM
ECHO LVOT PEAK GRADIENT: 4.6 MMHG
ECHO LVOT PEAK VELOCITY: 106.75 CM/S
ECHO MV A VELOCITY: 87.95 CM/S
ECHO MV E DECELERATION TIME (DT): 249.5 MS
ECHO MV E VELOCITY: 69.13 CM/S
ECHO MV E/A RATIO: 0.79
ECHO MV REGURGITANT PEAK GRADIENT: 24.4 MMHG
ECHO MV REGURGITANT PEAK VELOCITY: 246.96 CM/S
ECHO PULMONARY ARTERY SYSTOLIC PRESSURE (PASP): 25.5 MMHG
ECHO PV MAX VELOCITY: 88.5 CM/S
ECHO PV PEAK GRADIENT: 3.1 MMHG
ECHO RIGHT VENTRICULAR SYSTOLIC PRESSURE (RVSP): 25.5 MMHG
ECHO RV INTERNAL DIMENSION: 3.17 CM
ECHO TV REGURGITANT MAX VELOCITY: 237.04 CM/S
ECHO TV REGURGITANT PEAK GRADIENT: 22.5 MMHG
EOSINOPHIL # BLD: 0 K/UL (ref 0–0.4)
EOSINOPHIL NFR BLD: 0 % (ref 0–7)
EPITH CASTS URNS QL MICRO: ABNORMAL /LPF
ERYTHROCYTE [DISTWIDTH] IN BLOOD BY AUTOMATED COUNT: 14.2 % (ref 11.5–14.5)
ERYTHROCYTE [DISTWIDTH] IN BLOOD BY AUTOMATED COUNT: 14.4 % (ref 11.5–14.5)
GLOBULIN SER CALC-MCNC: 5 G/DL (ref 2–4)
GLUCOSE BLD STRIP.AUTO-MCNC: 134 MG/DL (ref 65–100)
GLUCOSE BLD STRIP.AUTO-MCNC: 149 MG/DL (ref 65–100)
GLUCOSE BLD STRIP.AUTO-MCNC: 167 MG/DL (ref 65–100)
GLUCOSE BLD STRIP.AUTO-MCNC: 312 MG/DL (ref 65–100)
GLUCOSE SERPL-MCNC: 167 MG/DL (ref 65–100)
GLUCOSE SERPL-MCNC: 186 MG/DL (ref 65–100)
GLUCOSE SERPL-MCNC: 293 MG/DL (ref 65–100)
GLUCOSE UR STRIP.AUTO-MCNC: NEGATIVE MG/DL
GRAN CASTS URNS QL MICRO: ABNORMAL /LPF
HCT VFR BLD AUTO: 32.4 % (ref 36.6–50.3)
HCT VFR BLD AUTO: 37.8 % (ref 36.6–50.3)
HGB BLD-MCNC: 11.4 G/DL (ref 12.1–17)
HGB BLD-MCNC: 9.8 G/DL (ref 12.1–17)
HGB UR QL STRIP: ABNORMAL
IMM GRANULOCYTES # BLD AUTO: 0.1 K/UL (ref 0–0.04)
IMM GRANULOCYTES NFR BLD AUTO: 1 % (ref 0–0.5)
INR PPP: 1.1 (ref 0.9–1.1)
KETONES UR QL STRIP.AUTO: ABNORMAL MG/DL
LACTATE SERPL-SCNC: 1.9 MMOL/L (ref 0.4–2)
LACTATE SERPL-SCNC: 2.7 MMOL/L (ref 0.4–2)
LEUKOCYTE ESTERASE UR QL STRIP.AUTO: ABNORMAL
LIPASE SERPL-CCNC: 69 U/L (ref 73–393)
LYMPHOCYTES # BLD: 0.9 K/UL (ref 0.8–3.5)
LYMPHOCYTES NFR BLD: 6 % (ref 12–49)
MAGNESIUM SERPL-MCNC: 2.3 MG/DL (ref 1.6–2.4)
MAGNESIUM SERPL-MCNC: 2.7 MG/DL (ref 1.6–2.4)
MCH RBC QN AUTO: 26.5 PG (ref 26–34)
MCH RBC QN AUTO: 26.8 PG (ref 26–34)
MCHC RBC AUTO-ENTMCNC: 30.2 G/DL (ref 30–36.5)
MCHC RBC AUTO-ENTMCNC: 30.2 G/DL (ref 30–36.5)
MCV RBC AUTO: 87.6 FL (ref 80–99)
MCV RBC AUTO: 88.9 FL (ref 80–99)
MONOCYTES # BLD: 0.7 K/UL (ref 0–1)
MONOCYTES NFR BLD: 5 % (ref 5–13)
NEUTS SEG # BLD: 12 K/UL (ref 1.8–8)
NEUTS SEG NFR BLD: 88 % (ref 32–75)
NITRITE UR QL STRIP.AUTO: NEGATIVE
NRBC # BLD: 0 K/UL (ref 0–0.01)
NRBC # BLD: 0 K/UL (ref 0–0.01)
NRBC BLD-RTO: 0 PER 100 WBC
NRBC BLD-RTO: 0 PER 100 WBC
P-R INTERVAL, ECG05: 112 MS
P-R INTERVAL, ECG05: 122 MS
PH UR STRIP: 6 [PH] (ref 5–8)
PHOSPHATE SERPL-MCNC: 3.1 MG/DL (ref 2.6–4.7)
PLATELET # BLD AUTO: 288 K/UL (ref 150–400)
PLATELET # BLD AUTO: 344 K/UL (ref 150–400)
PMV BLD AUTO: 10 FL (ref 8.9–12.9)
PMV BLD AUTO: 10.3 FL (ref 8.9–12.9)
POTASSIUM SERPL-SCNC: 2.1 MMOL/L (ref 3.5–5.1)
POTASSIUM SERPL-SCNC: 2.6 MMOL/L (ref 3.5–5.1)
POTASSIUM SERPL-SCNC: 4.3 MMOL/L (ref 3.5–5.1)
PROT SERPL-MCNC: 7.7 G/DL (ref 6.4–8.2)
PROT UR STRIP-MCNC: 100 MG/DL
PROTHROMBIN TIME: 11.5 SEC (ref 9–11.1)
Q-T INTERVAL, ECG07: 376 MS
Q-T INTERVAL, ECG07: 468 MS
QRS DURATION, ECG06: 92 MS
QRS DURATION, ECG06: 94 MS
QTC CALCULATION (BEZET), ECG08: 475 MS
QTC CALCULATION (BEZET), ECG08: 615 MS
RBC # BLD AUTO: 3.7 M/UL (ref 4.1–5.7)
RBC # BLD AUTO: 4.25 M/UL (ref 4.1–5.7)
RBC #/AREA URNS HPF: ABNORMAL /HPF (ref 0–5)
SAMPLES BEING HELD,HOLD: NORMAL
SERVICE CMNT-IMP: ABNORMAL
SODIUM SERPL-SCNC: 143 MMOL/L (ref 136–145)
SODIUM SERPL-SCNC: 143 MMOL/L (ref 136–145)
SODIUM SERPL-SCNC: 146 MMOL/L (ref 136–145)
SODIUM UR-SCNC: 15 MMOL/L
SP GR UR REFRACTOMETRY: 1.02 (ref 1–1.03)
THERAPEUTIC RANGE,PTTT: ABNORMAL SECS (ref 58–77)
TROPONIN I SERPL-MCNC: 0.09 NG/ML
UA: UC IF INDICATED,UAUC: ABNORMAL
UROBILINOGEN UR QL STRIP.AUTO: 1 EU/DL (ref 0.2–1)
VENTRICULAR RATE, ECG03: 104 BPM
VENTRICULAR RATE, ECG03: 96 BPM
WBC # BLD AUTO: 13.7 K/UL (ref 4.1–11.1)
WBC # BLD AUTO: 14.9 K/UL (ref 4.1–11.1)
WBC URNS QL MICRO: >100 /HPF (ref 0–4)

## 2019-04-05 PROCEDURE — 82150 ASSAY OF AMYLASE: CPT

## 2019-04-05 PROCEDURE — 84100 ASSAY OF PHOSPHORUS: CPT

## 2019-04-05 PROCEDURE — 74011250637 HC RX REV CODE- 250/637: Performed by: INTERNAL MEDICINE

## 2019-04-05 PROCEDURE — 71250 CT THORAX DX C-: CPT

## 2019-04-05 PROCEDURE — 93005 ELECTROCARDIOGRAM TRACING: CPT

## 2019-04-05 PROCEDURE — 83605 ASSAY OF LACTIC ACID: CPT

## 2019-04-05 PROCEDURE — 74011250637 HC RX REV CODE- 250/637: Performed by: EMERGENCY MEDICINE

## 2019-04-05 PROCEDURE — 85027 COMPLETE CBC AUTOMATED: CPT

## 2019-04-05 PROCEDURE — 81001 URINALYSIS AUTO W/SCOPE: CPT

## 2019-04-05 PROCEDURE — 87077 CULTURE AEROBIC IDENTIFY: CPT

## 2019-04-05 PROCEDURE — 84484 ASSAY OF TROPONIN QUANT: CPT

## 2019-04-05 PROCEDURE — 87086 URINE CULTURE/COLONY COUNT: CPT

## 2019-04-05 PROCEDURE — 83735 ASSAY OF MAGNESIUM: CPT

## 2019-04-05 PROCEDURE — 93970 EXTREMITY STUDY: CPT

## 2019-04-05 PROCEDURE — 74011250636 HC RX REV CODE- 250/636: Performed by: INTERNAL MEDICINE

## 2019-04-05 PROCEDURE — 74011250636 HC RX REV CODE- 250/636: Performed by: EMERGENCY MEDICINE

## 2019-04-05 PROCEDURE — 99285 EMERGENCY DEPT VISIT HI MDM: CPT

## 2019-04-05 PROCEDURE — 74011000258 HC RX REV CODE- 258: Performed by: INTERNAL MEDICINE

## 2019-04-05 PROCEDURE — 83880 ASSAY OF NATRIURETIC PEPTIDE: CPT

## 2019-04-05 PROCEDURE — 74011636637 HC RX REV CODE- 636/637: Performed by: INTERNAL MEDICINE

## 2019-04-05 PROCEDURE — 83690 ASSAY OF LIPASE: CPT

## 2019-04-05 PROCEDURE — 77030010545

## 2019-04-05 PROCEDURE — 85730 THROMBOPLASTIN TIME PARTIAL: CPT

## 2019-04-05 PROCEDURE — 82962 GLUCOSE BLOOD TEST: CPT

## 2019-04-05 PROCEDURE — 96365 THER/PROPH/DIAG IV INF INIT: CPT

## 2019-04-05 PROCEDURE — 71045 X-RAY EXAM CHEST 1 VIEW: CPT

## 2019-04-05 PROCEDURE — 84300 ASSAY OF URINE SODIUM: CPT

## 2019-04-05 PROCEDURE — 76450000000

## 2019-04-05 PROCEDURE — 87040 BLOOD CULTURE FOR BACTERIA: CPT

## 2019-04-05 PROCEDURE — 80048 BASIC METABOLIC PNL TOTAL CA: CPT

## 2019-04-05 PROCEDURE — 85610 PROTHROMBIN TIME: CPT

## 2019-04-05 PROCEDURE — 87186 SC STD MICRODIL/AGAR DIL: CPT

## 2019-04-05 PROCEDURE — 70450 CT HEAD/BRAIN W/O DYE: CPT

## 2019-04-05 PROCEDURE — 93306 TTE W/DOPPLER COMPLETE: CPT

## 2019-04-05 PROCEDURE — 74176 CT ABD & PELVIS W/O CONTRAST: CPT

## 2019-04-05 PROCEDURE — 82550 ASSAY OF CK (CPK): CPT

## 2019-04-05 PROCEDURE — 65660000000 HC RM CCU STEPDOWN

## 2019-04-05 PROCEDURE — 96361 HYDRATE IV INFUSION ADD-ON: CPT

## 2019-04-05 PROCEDURE — 80053 COMPREHEN METABOLIC PANEL: CPT

## 2019-04-05 PROCEDURE — 82570 ASSAY OF URINE CREATININE: CPT

## 2019-04-05 PROCEDURE — 36415 COLL VENOUS BLD VENIPUNCTURE: CPT

## 2019-04-05 PROCEDURE — 85025 COMPLETE CBC W/AUTO DIFF WBC: CPT

## 2019-04-05 RX ORDER — POTASSIUM CHLORIDE 7.45 MG/ML
10 INJECTION INTRAVENOUS
Status: DISPENSED | OUTPATIENT
Start: 2019-04-05 | End: 2019-04-05

## 2019-04-05 RX ORDER — CARVEDILOL 12.5 MG/1
12.5 TABLET ORAL 2 TIMES DAILY WITH MEALS
Status: DISCONTINUED | OUTPATIENT
Start: 2019-04-05 | End: 2019-04-05

## 2019-04-05 RX ORDER — INSULIN LISPRO 100 [IU]/ML
INJECTION, SOLUTION INTRAVENOUS; SUBCUTANEOUS
Status: DISCONTINUED | OUTPATIENT
Start: 2019-04-05 | End: 2019-04-16 | Stop reason: HOSPADM

## 2019-04-05 RX ORDER — HYDROCODONE BITARTRATE AND ACETAMINOPHEN 5; 325 MG/1; MG/1
1 TABLET ORAL
Status: DISCONTINUED | OUTPATIENT
Start: 2019-04-05 | End: 2019-04-16 | Stop reason: HOSPADM

## 2019-04-05 RX ORDER — SODIUM CHLORIDE 0.9 % (FLUSH) 0.9 %
5-40 SYRINGE (ML) INJECTION AS NEEDED
Status: DISCONTINUED | OUTPATIENT
Start: 2019-04-05 | End: 2019-04-16 | Stop reason: HOSPADM

## 2019-04-05 RX ORDER — ACETAMINOPHEN 325 MG/1
650 TABLET ORAL
Status: DISCONTINUED | OUTPATIENT
Start: 2019-04-05 | End: 2019-04-07

## 2019-04-05 RX ORDER — SODIUM CHLORIDE 0.9 % (FLUSH) 0.9 %
5-40 SYRINGE (ML) INJECTION EVERY 8 HOURS
Status: DISCONTINUED | OUTPATIENT
Start: 2019-04-05 | End: 2019-04-16 | Stop reason: HOSPADM

## 2019-04-05 RX ORDER — TRAZODONE HYDROCHLORIDE 50 MG/1
50 TABLET ORAL
COMMUNITY

## 2019-04-05 RX ORDER — POTASSIUM CHLORIDE 750 MG/1
40 TABLET, FILM COATED, EXTENDED RELEASE ORAL
Status: COMPLETED | OUTPATIENT
Start: 2019-04-05 | End: 2019-04-05

## 2019-04-05 RX ORDER — MAGNESIUM SULFATE 100 %
4 CRYSTALS MISCELLANEOUS AS NEEDED
Status: DISCONTINUED | OUTPATIENT
Start: 2019-04-05 | End: 2019-04-16 | Stop reason: HOSPADM

## 2019-04-05 RX ORDER — DEXTROSE, SODIUM CHLORIDE, AND POTASSIUM CHLORIDE 5; .45; .3 G/100ML; G/100ML; G/100ML
INJECTION INTRAVENOUS CONTINUOUS
Status: DISCONTINUED | OUTPATIENT
Start: 2019-04-05 | End: 2019-04-16 | Stop reason: HOSPADM

## 2019-04-05 RX ORDER — DEXTROSE 50 % IN WATER (D50W) INTRAVENOUS SYRINGE
12.5-25 AS NEEDED
Status: DISCONTINUED | OUTPATIENT
Start: 2019-04-05 | End: 2019-04-16 | Stop reason: HOSPADM

## 2019-04-05 RX ORDER — HEPARIN SODIUM 5000 [USP'U]/ML
5000 INJECTION, SOLUTION INTRAVENOUS; SUBCUTANEOUS EVERY 8 HOURS
Status: DISCONTINUED | OUTPATIENT
Start: 2019-04-05 | End: 2019-04-16 | Stop reason: HOSPADM

## 2019-04-05 RX ORDER — POTASSIUM CHLORIDE 7.45 MG/ML
10 INJECTION INTRAVENOUS
Status: COMPLETED | OUTPATIENT
Start: 2019-04-05 | End: 2019-04-05

## 2019-04-05 RX ORDER — TAMSULOSIN HYDROCHLORIDE 0.4 MG/1
0.4 CAPSULE ORAL DAILY
Status: DISCONTINUED | OUTPATIENT
Start: 2019-04-05 | End: 2019-04-16 | Stop reason: HOSPADM

## 2019-04-05 RX ORDER — NALOXONE HYDROCHLORIDE 0.4 MG/ML
0.4 INJECTION, SOLUTION INTRAMUSCULAR; INTRAVENOUS; SUBCUTANEOUS AS NEEDED
Status: DISCONTINUED | OUTPATIENT
Start: 2019-04-05 | End: 2019-04-16 | Stop reason: HOSPADM

## 2019-04-05 RX ADMIN — INSULIN LISPRO 2 UNITS: 100 INJECTION, SOLUTION INTRAVENOUS; SUBCUTANEOUS at 09:42

## 2019-04-05 RX ADMIN — Medication 10 ML: at 21:14

## 2019-04-05 RX ADMIN — SODIUM CHLORIDE 1000 ML: 900 INJECTION, SOLUTION INTRAVENOUS at 03:03

## 2019-04-05 RX ADMIN — HYDROCODONE BITARTRATE AND ACETAMINOPHEN 1 TABLET: 5; 325 TABLET ORAL at 21:51

## 2019-04-05 RX ADMIN — HEPARIN SODIUM 5000 UNITS: 5000 INJECTION INTRAVENOUS; SUBCUTANEOUS at 23:55

## 2019-04-05 RX ADMIN — POTASSIUM CHLORIDE 10 MEQ: 10 INJECTION, SOLUTION INTRAVENOUS at 04:47

## 2019-04-05 RX ADMIN — HEPARIN SODIUM 5000 UNITS: 5000 INJECTION INTRAVENOUS; SUBCUTANEOUS at 15:39

## 2019-04-05 RX ADMIN — POTASSIUM CHLORIDE 40 MEQ: 750 TABLET, EXTENDED RELEASE ORAL at 04:47

## 2019-04-05 RX ADMIN — ACETAMINOPHEN 650 MG: 325 TABLET ORAL at 16:41

## 2019-04-05 RX ADMIN — INSULIN LISPRO 4 UNITS: 100 INJECTION, SOLUTION INTRAVENOUS; SUBCUTANEOUS at 21:13

## 2019-04-05 RX ADMIN — POTASSIUM CHLORIDE 40 MEQ: 750 TABLET, EXTENDED RELEASE ORAL at 17:09

## 2019-04-05 RX ADMIN — VANCOMYCIN HYDROCHLORIDE 1750 MG: 10 INJECTION, POWDER, LYOPHILIZED, FOR SOLUTION INTRAVENOUS at 09:09

## 2019-04-05 RX ADMIN — Medication 10 ML: at 14:00

## 2019-04-05 RX ADMIN — TAMSULOSIN HYDROCHLORIDE 0.4 MG: 0.4 CAPSULE ORAL at 10:32

## 2019-04-05 RX ADMIN — DEXTROSE MONOHYDRATE, SODIUM CHLORIDE, AND POTASSIUM CHLORIDE: 50; 4.5; 2.98 INJECTION, SOLUTION INTRAVENOUS at 17:44

## 2019-04-05 RX ADMIN — POTASSIUM CHLORIDE 10 MEQ: 10 INJECTION, SOLUTION INTRAVENOUS at 06:26

## 2019-04-05 RX ADMIN — INSULIN LISPRO 2 UNITS: 100 INJECTION, SOLUTION INTRAVENOUS; SUBCUTANEOUS at 17:17

## 2019-04-05 RX ADMIN — CEFEPIME 2 G: 2 INJECTION, POWDER, FOR SOLUTION INTRAVENOUS at 06:42

## 2019-04-05 RX ADMIN — CEFEPIME 2 G: 2 INJECTION, POWDER, FOR SOLUTION INTRAVENOUS at 17:42

## 2019-04-05 RX ADMIN — HEPARIN SODIUM 5000 UNITS: 5000 INJECTION INTRAVENOUS; SUBCUTANEOUS at 10:48

## 2019-04-05 RX ADMIN — SODIUM CHLORIDE 1000 ML: 900 INJECTION, SOLUTION INTRAVENOUS at 04:47

## 2019-04-05 RX ADMIN — ACETAMINOPHEN 650 MG: 325 TABLET ORAL at 23:55

## 2019-04-05 RX ADMIN — DEXTROSE MONOHYDRATE, SODIUM CHLORIDE, AND POTASSIUM CHLORIDE 100 ML/HR: 50; 4.5; 2.98 INJECTION, SOLUTION INTRAVENOUS at 09:08

## 2019-04-05 NOTE — ED NOTES
Bedside and Verbal shift change report given to Vish Hernandez (oncoming nurse) by MT RN (offgoing nurse). Report included the following information SBAR.

## 2019-04-05 NOTE — PROGRESS NOTES
[facility]: MED RECONCILIATION Comments/Recommendations:  
Reviewed PTA medications at bedside, patient is a fair historian regarding his home medications Patient's chemotherapy regimen was recently adjusted- Reviewed OP oncology notes and discussed with patient, bicalutamide was recently discontinued and patient takes abiraterone 1000 mg plus prednisone 5 mg daily. Patient is due for a leuprolide injection in May. Regarding diuretics- Patient was recently prescribed a 10 day supply of HCTZ 25 mg daily, patient states that he takes this medication along with potassium. Last PO potassium Rx was picked up for a 60 day supply in 11/2018 however patient endorses that he does still take this twice daily. Medications added:  
 
Leuprolide injection every 6 months Medications removed: 
 
Bicalutamide 50 mg- Discontinued per oncology, patient states he does not take. Last filled for a 30 day supply 12/26/18. Tramadol 50 mg- Patient states he stopped taking this medication Information obtained from: Patient at bedside, Sandrita Garciaa, Outpatient Oncology notes Allergies: Ciprofloxacin Prior to Admission Medications:  
 
Medication Documentation Review Audit Reviewed by BRENT JustinD (Pharmacist) on 04/05/19 at 1384 Medication Sig Documenting Provider Last Dose Status Taking?  
abiraterone (ZYTIGA) 250 mg tab Take 4 Tabs by mouth daily. Toya Fields NP 4/4/2019 Active Yes  
aspirin delayed-release 81 mg tablet Take 81 mg by mouth daily. Provider, Historical 4/4/2019 Active Yes  
hydroCHLOROthiazide (HYDRODIURIL) 25 mg tablet Take 1 Tab by mouth daily for 10 days. Ev Donato MD 4/4/2019 Active Yes  
leuprolide acetate (LEUPROLIDE, 6 MONTH, SC) by SubCUTAneous route. Due in 5/2019 per OP oncology notes Provider, Historical  Active Yes  
potassium chloride (K-DUR, KLOR-CON) 20 mEq tablet Take 20 mEq by mouth two (2) times a day. Provider, Historical 4/4/2019 Active Yes predniSONE (DELTASONE) 5 mg tablet Take 1 Tab by mouth daily. Barrera Michelle MD 4/4/2019 Active Yes  
tamsulosin (FLOMAX) 0.4 mg capsule Take 0.4 mg by mouth daily. Provider, Historical 4/4/2019 Active Yes  
traZODone (DESYREL) 50 mg tablet Take 50 mg by mouth nightly as needed for Sleep. Provider, Historical 3/29/2019 Active Yes Marta Paniagua, PHARMD   Contact: [phone]

## 2019-04-05 NOTE — PROGRESS NOTES
[address] Dosing Services: Antimicrobial Stewardship Progress Note Consult for antibiotic dosing of Cefepime and Vancomycin by Dr. Florence Gonzalez Pharmacist reviewed antibiotic appropriateness for 74year old , male  for indication of sepsis Day of Therapy 1 Plan: 
 
Vancomycin 1.75 grams x 1, then 1.5 grams every 24 hours - will monitor renal function since SrCr acutely elevated and adjust dosing if/as needed. Trough - after 2nd dose Goal trough: 15-20 Non-Kinetic Antimicrobial Dosing: Cefepime 2 grams ever 12 hours for CrCl 30-50 Serum Creatinine Lab Results Component Value Date/Time Creatinine 1.75 (H) 04/05/2019 03:00 AM  
   
Creatinine Clearance Estimated Creatinine Clearance: 42.5 mL/min (A) (based on SCr of 1.75 mg/dL (H)). WBC Lab Results Component Value Date/Time WBC 13.7 (H) 04/05/2019 03:00 AM  
   
H/H Lab Results Component Value Date/Time HGB 11.4 (L) 04/05/2019 03:00 AM  
  
 
Platelets Lab Results Component Value Date/Time PLATELET 297 77/50/8548 03:00 AM  
  
 
 
Pharmacist: Doni Kaufman,[address] information:

## 2019-04-05 NOTE — ED NOTES
.Verbal shift change report given to MT (oncoming nurse) by PAULA (offgoing nurse). Report included the following information SBAR, ED Summary, MAR and Recent Results.

## 2019-04-05 NOTE — ED PROVIDER NOTES
74 y.o. male with past medical history significant for DM, prostate CA, colon CA, sleep apnea, s/p bladder stent placement presents via EMS with chief complaint of a syncopal episode that occurred yesterday morning while watching a basketball game at home. EMS reports, per pt family, that the pt has been getting progressively weaker in his bilateral lower extremities. EMS further reports that the pt was found on the floor by the family, noting that the time of LOC is unknown. Per EMS, the pt's colostomy bag was leaking over his abd. Pt reports associated loss of appetite and diarrhea. Pt denies any chest pain headaches, abd pain, back pain, nausea, vomiting, or constipation at this time. There are no other acute medical concerns at this time. Social hx: Patient denies Tobacco use. Denies EtOH use. Denies illicit drug abuse. PCP: Chery Dukes MD 
 
Note written by Ayaz Murray, as dictated by Gema Sethi MD 2:23 AM 
 
 
The history is provided by the patient. No  was used. Past Medical History:  
Diagnosis Date  Diabetes mellitus ([address]  Prostate cancer ([facility] [address]  Sleep apnea Past Surgical History:  
Procedure Laterality Date 2021 Luis Carrasco Adwoa N/A 9/6/2018 SIGMOIDOSCOPY FLEXIBLE performed by Mary Castellanos MD at [address] [place] [address] Left  HX HIP REPLACEMENT Right  HX OTHER SURGICAL    
 bladder stent  IR CHANGE NEPHROSTOMY PYELOS TUBE RT  2/1/2019 Family History:  
Problem Relation Age of Onset  Cancer Mother   
     colon  Hypertension Mother  Heart Disease Mother  Cancer Father  Diabetes Sister  Cancer Brother  Diabetes Sister Social History Socioeconomic History  Marital status: SINGLE Spouse name: Not on file  Number of children: Not on file  Years of education: Not on file  Highest education level: Not on file Occupational History  Not on file Social Needs  Financial resource strain: Not on file  Food insecurity:  
  Worry: Not on file Inability: Not on file  Transportation needs:  
  Medical: Not on file Non-medical: Not on file Tobacco Use  Smoking status: Never Smoker  Smokeless tobacco: Never Used Substance and Sexual Activity  Alcohol use: No  
 Drug use: No  
 Sexual activity: Never Lifestyle  Physical activity:  
  Days per week: Not on file Minutes per session: Not on file  Stress: Not on file Relationships  Social connections:  
  Talks on phone: Not on file Gets together: Not on file Attends Muslim service: Not on file Active member of club or organization: Not on file Attends meetings of clubs or organizations: Not on file Relationship status: Not on file  Intimate partner violence:  
  Fear of current or ex partner: Not on file Emotionally abused: Not on file Physically abused: Not on file Forced sexual activity: Not on file Other Topics Concern [address] Service Not Asked  Blood Transfusions Not Asked  Caffeine Concern Not Asked  Occupational Exposure Not Asked [employer] Hazards Not Asked  Sleep Concern Not Asked  Stress Concern Not Asked  Weight Concern Not Asked  Special Diet Not Asked  Back Care Not Asked  Exercise Not Asked  Bike Helmet Not Asked  Seat Belt Not Asked  Self-Exams Not Asked Social History Narrative  Not on file ALLERGIES: Ciprofloxacin Review of Systems Constitutional: Positive for appetite change (decreased). Cardiovascular: Negative for chest pain. Gastrointestinal: Positive for diarrhea. Negative for abdominal pain, constipation, nausea and vomiting. Musculoskeletal: Negative for back pain. Neurological: Positive for syncope. Negative for headaches. All other systems reviewed and are negative. Vitals:  
 04/05/19 0224 BP: 139/46 Pulse: 99  
 Resp: 20 Temp: 98.2 °F (36.8 °C) SpO2: 96% Weight: 99.8 kg (220 lb) Height: 5' 9\" (1.753 m) Physical Exam  
Nursing note and vitals reviewed. CONSTITUTIONAL: Frail elderly male; in no apparent distress HEAD: Normocephalic; atraumatic EYES: PERRL; EOM intact; conjunctiva and sclera are clear bilaterally. ENT: No rhinorrhea; normal pharynx with no tonsillar hypertrophy; mucous membranes pink/dry, no erythema, no exudate. NECK: Supple; non-tender; no cervical lymphadenopathy CARD: Normal S1, S2; no murmurs, rubs, or gallops. Regular rate and rhythm. RESP: Normal respiratory effort; breath sounds clear and equal bilaterally; no wheezes, rhonchi, or rales. ABD: Normal bowel sounds; non-distended; Colostomy bag in place non-tender; no palpable organomegaly, no masses, no bruits. Back Exam: Normal inspection; no vertebral point tenderness, no CVA tenderness. Normal range of motion. Right Nephrostomy tube EXT: Normal ROM in all four extremities; non-tender to palpation; Legs covered with dressing B/L. SKIN: Warm; dry; no rash; Pale Decreased skin turgor NEURO:Alert and oriented x 3, coherent, CLEMENTINE-XII grossly intact, sensory and motor are non-focal. 
 
 
 
MDM Number of Diagnoses or Management Options Hypokalemia:  
Lymphedema of both lower extremities:  
Non-traumatic rhabdomyolysis:  
Syncope and collapse:  
Weakness:  
Diagnosis management comments: Assessment:74year-old male with weakness/ syncope dehydration, rule out electrolyte abnormality, ACS, head injury, status post fall with syncope and failure to try it. The patient has multiple chronic comorbidities, resides by himself, unable to care for self, no longer able to care for self. The patient cannot ambulate and requires significant assistance. Plan: EKG/ chest x-ray/ lab/ IV fluid/ CT scan of the head/ serial exam/ consult hospitalist/ Monitor and Reevaluate. Amount and/or Complexity of Data Reviewed Clinical lab tests: ordered and reviewed Tests in the radiology section of CPT®: ordered and reviewed Tests in the medicine section of CPT®: reviewed and ordered Discussion of test results with the performing providers: yes Decide to obtain previous medical records or to obtain history from someone other than the patient: yes Obtain history from someone other than the patient: yes Review and summarize past medical records: yes Discuss the patient with other providers: yes Independent visualization of images, tracings, or specimens: yes Risk of Complications, Morbidity, and/or Mortality Presenting problems: moderate Diagnostic procedures: moderate Management options: moderate Critical Care Total time providing critical care: (Total critical care time spent exclusive of procedures: 60 minutes) Patient Progress Patient progress: stable Procedures ED EKG interpretation: 
Rhythm: sinus tachycardia; and regular . Rate (approx.): 104; Axis: normal; P wave: normal; QRS interval: normal ; ST/T wave: non-specific changes; in  Lead: Diffusely; Other findings: abnormal ekg. This EKG was interpreted by Rohit Haddad MD,ED Provider. XRAY INTERPRETATION (ED MD) Chest Xray No acute process seen. Normal heart size. No bony abnormalities. No infiltrate. Betina Rodriguez MD 2:31 AM 
 
PROGRESS NOTE: 
 
Pt has been reexamined by Betina Rodriguez MD all available results have been reviewed with pt and any available family. Pt understands sx, dx, and tx in ED. Care plan has been outlined and questions have been answered. Pt and any available family understands and agrees to need for admission to hospital for further tx not available in ED. Pt is ready for admission.    
Written by Rohit Haddad MD,  4:46 AM 
 
CONSULT NOTE: 
Betina Rodriguez MD spoke with Dr. Breanna Cook of the adult hospitalist team. Discussed patient's presentation, history, physical assessment, and available diagnostic results. He will evaluate, write orders and admit the patient to the hospital. 4:47 AM 
 
.
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
## 2019-04-05 NOTE — PROGRESS NOTES
Last office note included Consultation to follow Fran Kisha is a 74year old male who presents today for \"CT scan results\". He returns for follow-up. 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 and/or chemotherapy Blood per rectum is described with the colostomy diverting Nephrostomy tube will be changed in 2 weeks by IR at [facility] Added new observation of PSA: 2.94 ng/mL (01/24/2019 11:29) Added new observation of TESTO, TOTAL: 3.70 ng/dL (01/24/2019 11:29) Testosterone Reference Interval - 300 to 882 ng/dL PAST MEDICAL HISTORY: 
 
Allergies: No known allergies. DENIES: Latex, Shellfish, X-Ray Dye, Iodine. Medications: TRAZODONE HCL 50 MG ORAL TABLET (TRAZODONE HCL) one tablet by mouth nightly as needed POTASSIUM TABLET (POTASSIUM TABS) daily GABAPENTIN CAPSULE (GABAPENTIN CAPS) as needed METFORMIN  MG ORAL TABLET (METFORMIN HCL) 1x daily MULTIVITAMINS ORAL CAPSULE (MULTIPLE VITAMIN) daily Problems: Hormone refractory prostate cancer (ICD-185) (VEN69-O18) Hydronephrosis, bilateral (ICD-591) (RGO70-S52.30) Prostate cancer, metastatic (ICD-185) (PAC87-V91) Sigmoid colostomy (ICD-V44.4) (SCM06-C56. 4) Prostate Nodule rule out cancer (ICD-600.10) (VAB05-L33.2) Illnesses: Diabetes, Bowel Problems, and Cancer. DENIES: Heart Disease, Pacemaker/Defibrillator, Lung Disease, High Blood Pressure, Stroke/Seizure, Kidney Problems, Bleeding Problems, HIV, or Hepatitis. Surgeries: Prostate Biopsy and Joint Replacement Surgery. Family History: Unknown to the patient. Social History: Retired. . Smoking status: never smoker. Does not drink alcohol. System Review: Admits to: Impaired Sex Drive. DENIES: Unexplained Weight Loss, Dry Eyes, Dry Mouth, Leg Swelling, Shortness of Breath, Constipation, Involuntary Urine Loss, Lower Extremity Weakness, Dry Skin, Difficulty Walking, Psychiatric Problems, Easy Bleeding, Rash. EXAMINATION: Vitals: Pulse: 74 BP: 120/47 Weight: 218 lbs Height: 5' 9\" BMI: 32.31 kg/m^2 URINALYSIS Urine Micro not done PSA HISTORY 
0.597 ng/ml on 11/29/2018 
3.7 ng/ml on 10/05/2018 IMPRESSION: 
 
1. PROSTATE CANCER - METASTATIC (SQH10-H08) - Deteriorated: CT scan shows radiographic progression in the colon area. Although his biopsy does show prostate cancer it was very undifferentiated and this still could represent a secondary or collision type of malignancy consider GI further evaluation. 2. SIGMOID COLOSTOMY (KBT18-V92.4) - Unchanged 3. HORMONE REFRACTORY PROSTATE CANCER (HAE98-T93) - New: Continue hormonal therapy with medical oncology management. I do not think he is a candidate for Provenge therapy or urologic measures at this time. They do not wish to proceed with heroic total pelvic exoneration. cc: MD Florentin Caldwell MD , [facility] E&M Service Upcoming Orders Return Office Visit - with  SP Phlebotomist 1 at previously scheduled appointment  Office [facility] 05/14/2019  10:00 AM  
CMPLesron 6 mo, PSA, Testosterone Total 
 
 
 
Electronically signed by Jeannie Ann MD on 01/24/2019 at 11:52 AM

## 2019-04-05 NOTE — ED NOTES
Pt C/O BLE pain. Pt with lymphedema bandages on BLE. Pain worse in LLE. Cap refill 3. Daughter in law reports that the bandages are to come off on Wednesday. Will notify MD of pt pain.

## 2019-04-05 NOTE — ED NOTES
Assumed care of patient. Pt C/O left wrist burning. Potassium going through this line. NS added to Potassium line and rate decreased to 75ml/hr. Pt reported improvement in pain. Pt placed on telemetry and turned onto his left side for comfort.  Pt given ice water per request.

## 2019-04-05 NOTE — H&P
[facility] [address] [facility], [facility] 19 
[phone] Hospitalist Admission Note NAME:  Efren Montano :   1945 MRN:  [id number] PCP:  Nathaniel Mccann MD  
 
Date/Time:  2019 7:26 AM 
 
 
  
Assessment / Plan:   
  
  JAGDEEP (acute kidney injury): likely 2/2 IVVD, poor PO intake. Associated with hypokalemia. Start IVF with IV K+. Repeat BMP later today. CT showed bilateral hydronephrosis left greater than right similar to prior study (new moderate hydro on CT ). Nephrology and urology consult Rhabdomyolysis: likely from being down for prolonged amount of time. IVF as above. No bicarb gtt due to severe hypokalemia. Trend CK Lactic acidosis: likely from IVVD, less likely sepsis. Noted pyuria however does have nephrostomy tube. Empiric cefepime, and vanc for now. Follow cultures. Deescalate abx quickly if possible Syncope: unclear etiology. May have been 2/2 severe weakness, debility. Monitor on tele. TTE. Check LE dopplers. Poor renal function precludes CTA however suspicion for PE is low (no tachycardia, hypoxia, CP, SOB). Pelvic mass / Prostate cancer with mets: CT on  showed findings consistent with significant interval progression of disease. Distal sigmoid colon/rectal mass lesion is enlarged. There are new foci of metastasis identified in the pelvis and in the retroperitoneum. New moderate left-sided hydroureteronephrosis associated with the pelvic mass lesion. Poor prognosis. Hem/onc consult. Palliative care consult. Defer need to continue home meds to oncology Sleep apnea: CPAP QHS Diabetes mellitus ([facility] [address] : SSI alone for now Severe obesity with body mass index (BMI) of 35.0 to 39.9 with serious comorbidity: however with malnutrition. Nutrition consult Code Status: FULL, d/w pt Surrogate decision maker: son Previous notes and lab results reviewed, including hem/onc note Total time spent with patient: 50 Minutes Time spent in the care of this patient included reviewing records, discussing with nursing, obtaining history and examining the patient, and discussing treatment plans, with >50% time spent counseling/coordinating care Risk of deterioration: High Care Plan discussed with: ED provider, Patient, Nursing Staff and >50% of time spent in counseling and coordination of care Discussed:  Code Status, Care Plan and D/C Planning Prophylaxis:  Hep SQ Disposition:  Home w/Family Subjective: CHIEF COMPLAINT: passed out at home HISTORY OF PRESENT ILLNESS:    
Mr. Filipe James is a 74 y.o. male w/ hx of prostate CA with mets who presents with syncope. Unclear events surrounding syncope and there is no family at bedside. Per chart review and per pt, the last thing he remember was that he was watching basketball game on TV, and family found him down on floor, uncertain how long he was down for. He has become progressively weaker in recent days, noting dizziness however denying CP, SOB, palpitations. No fevers or chills. No HA, abdominal pain. ED workup showed JAGDEEP, severe hypokalemia, lactic acidosis. CK was 10.7K. Mr. Filipe James is admitted for further evaluation and management. Past Medical History:  
Diagnosis Date  Diabetes mellitus ([address]  Prostate cancer ([facility] [address]  Sleep apnea Past Surgical History:  
Procedure Laterality Date 2021 Luis Orona N/A 9/6/2018 SIGMOIDOSCOPY FLEXIBLE performed by Mary Castellanos MD at [address] [facility] [place] Left  HX HIP REPLACEMENT Right  HX OTHER SURGICAL    
 bladder stent  IR CHANGE NEPHROSTOMY PYELOS TUBE RT  2/1/2019 Social History Tobacco Use  Smoking status: Never Smoker  Smokeless tobacco: Never Used Substance Use Topics  Alcohol use: No  
  
 
Family History Problem Relation Age of Onset  Cancer Mother colon  Hypertension Mother  Heart Disease Mother  Cancer Father  Diabetes Sister  Cancer Brother  Diabetes Sister Allergies Allergen Reactions  Ciprofloxacin Rash Prior to Admission medications Medication Sig Start Date End Date Taking? Authorizing Provider  
hydroCHLOROthiazide (HYDRODIURIL) 25 mg tablet Take 1 Tab by mouth daily for 10 days. 3/26/19 4/5/19  Jayant Velasquez MD  
abiraterone (ZYTIGA) 250 mg tab Take 4 Tabs by mouth daily. 12/12/18   Livier Dyer NP  
bicalutamide (CASODEX) 50 mg tablet TK 1 T PO  D 11/8/18   Provider, Historical  
traMADol (ULTRAM) 50 mg tablet Take  mg by mouth. 10/3/17   Provider, Historical  
traZODone (DESYREL) 50 mg tablet TK 1 T PO  QHS  PRN 11/8/18   Provider, Historical  
predniSONE (DELTASONE) 5 mg tablet Take 1 Tab by mouth daily. 12/7/18   Gina Eduardo MD  
aspirin delayed-release 81 mg tablet Take 81 mg by mouth daily. Provider, Historical  
potassium chloride (K-DUR, KLOR-CON) 20 mEq tablet Take 20 mEq by mouth two (2) times a day. Provider, Historical  
tamsulosin (FLOMAX) 0.4 mg capsule Take 0.4 mg by mouth daily. Provider, Historical  
 
 
Review of Systems: 
(bold if positive, if negative) Gen:  fatigueEyes:  ENT:  CVS:  dizzinessedemaPulm:  GI:   
:   
MS:  Pain, weakness, swelling, Skin:  Psych:  Endo:   
Hem:  Renal:   
Neuro:    
 
 
  
Objective: VITALS:   
Vital signs reviewed; most recent are: 
 
Visit Vitals /57 Pulse 99 Temp 98.2 °F (36.8 °C) Resp 20 Ht 5' 9\" (1.753 m) Wt 99.8 kg (220 lb) SpO2 97% BMI 32.49 kg/m² SpO2 Readings from Last 6 Encounters:  
04/05/19 97% 03/27/19 98% 03/26/19 100% 03/07/19 100% 03/07/19 99% 02/07/19 99% No intake or output data in the 24 hours ending 04/05/19 0726 Exam:  
 
Physical Exam: 
 
Gen:  Chronically ill-appearing. NAD HEENT:  No scleral icterus, PERRL, hearing intact to voice, dry mucous membranes Neck:  Supple, without masses. Resp:  No accessory muscle use. CTAB without wheezing, rales, rhonchi 
Card: RRR. Normal S1 and S2 without murmurs, rubs, or gallops. 2+ bilateral lower extremity edema. No JVD. Peripheral pulses in tact. Abd:  Normoactive bowel sounds. Soft, non-tender, non-distended. Colostomy bag in place, draining stool. Nephrostomy tube in place, draining dark,purulent-appearing urine Musc:  No cyanosis or clubbing Skin:  No rashes or ulcers; turgor intact. Neuro:  Cranial nerves are grossly intact, generalized motor weakness, follows commands appropriately Psych:  Good insight, normal affect. Alert, oriented x 3. Answers questions appropriately Labs: 
 
Recent Labs 04/05/19 
0300 WBC 13.7* HGB 11.4* HCT 37.8  Recent Labs 04/05/19 
0300 *  
K 2.1*  
 CO2 34* * BUN 14  
CREA 1.75* CA 8.8 MG 2.7* PHOS 3.1 ALB 2.7* SGOT 284* ALT 65 No components found for: [place] No results for input(s): PH, PCO2, PO2, HCO3, FIO2 in the last 72 hours. Recent Labs 04/05/19 
0300 INR 1.1 No results found for: [facility], [place] Lab Results Component Value Date/Time Culture result: KLEBSIELLA PNEUMONIAE (A) 03/26/2019 09:52 AM  
 Culture result: PSEUDOMONAS AERUGINOSA (A) 03/26/2019 09:52 AM  
 Culture result: NO GROWTH 5 DAYS 03/26/2019 09:41 AM  
 
All other current labs reviewed in the computer. Imaging/Studies: 
 
CT c/a/p 1. No significant change in large pelvic soft tissue mass surrounding the 
prostate, inseparable from the rectum, as well as the posterior right bladder 
wall. Bilateral hydronephrosis left greater than right similar to prior study. Unchanged position of right nephrostomy tube. 2. No acute process in the chest. 
  
Tele: sinus 
 
___________________________________________________ Attending Physician: Cee Fonseca MD

## 2019-04-05 NOTE — PROGRESS NOTES
Spiritual Care Assessment/Progress Note [address] 
 
 
NAME: Marisela Brody      MRN: [id number] AGE: 74 y.o. SEX: male Gnosticism Affiliation: [facility]  
Language: [place] 4/5/2019     Total Time (in minutes): 15 Spiritual Assessment begun in [facility] EMERGENCY DEPT through conversation with: 
  
    [x]Patient        [] Family    [] Friend(s) Reason for Consult: Palliative Care, Initial/Spiritual Assessment Spiritual beliefs: (Please include comment if needed) [x] Identifies with a carine tradition:   Episcopalian  
   [] Supported by a carine community:        
   [] Claims no spiritual orientation:       
   [] Seeking spiritual identity:            
   [] Adheres to an individual form of spirituality:       
   [] Not able to assess:                   
 
    
Identified resources for coping:  
   [x] Prayer                           
   [] Music                  [] Guided Imagery [x] Family/friends                 [] Pet visits [] Devotional reading                         [] Unknown 
   [] Other:                                          
 
 
Interventions offered during this visit: (See comments for more details) Patient Interventions: Affirmation of emotions/emotional suffering, Affirmation of carine, Catharsis/review of pertinent events in supportive environment, Coping skills reviewed/reinforced, Iconic (affirming the presence of God/Higher Power), Prayer (actual) Plan of Care: 
 
 [] Support spiritual and/or cultural needs  
 [] Support AMD and/or advance care planning process    
 [] Support grieving process 
 [] Coordinate Rites and/or Rituals  
 [] Coordination with community clergy [] No spiritual needs identified at this time 
 [] Detailed Plan of Care below (See Comments)  [] Make referral to Music Therapy 
[] Make referral to Pet Therapy    
[] Make referral to Addiction services 
[] Make referral to [facility] [] Make referral to Spiritual Care Partner 
[] No future visits requested       
[x] Follow up visits as needed Comments:  visit for initial spiritual assessment, palliative care consult. Patient lying on gurney in Emergency Room (ER) exam room 9. Resting quietly, but responds to knock on door and verbal stimuli. Good eye contact, calm, good natured. Says he is feeling better, but is tired. Provided spiritual presence and listening as he spoke briefly about his present thoughts, feelings, and concerns. Described an active carine and trust in God. Says he has good family support. He requested prayer and a prayer was offered. He appeared comforted and encouraged as a result of this prayer and visit and expressed gratitude for this prayer and visit. Visited by Rev. Stoney Villalta, [facility]  paging service: [phone])

## 2019-04-05 NOTE — ED TRIAGE NOTES
Pt arrives via EMS from home c/o GLF with generalized weakness. Per family, pt has been progressively getting weaker in LE. Pt reports LOC today, unsure of time, pt unsure if he hit his head. Pt family states pt was found on the floor unsure how long pt was down. Pt found with colostomy leaking all over abdomen. Denies pain, CP, n/v/d, SOB, headache

## 2019-04-05 NOTE — ED NOTES
Bedside and Verbal shift change report given to Oral Andrew RN (oncoming nurse) by Ata Lindsey RN (offgoing nurse). Report included the following information SBAR, Kardex, ED Summary, [facility] and Recent Results.
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
## 2019-04-05 NOTE — CONSULTS
Palliative Medicine Consult    Patient Name: Rob Sprague  YOB: 1945    Date of Initial Consult: 4/5/19  Reason for Consult: Care decisions  Requesting Provider: Dr. Randy Madera  Primary Care Physician: Chika Lieberman MD      SUMMARY:   Rob Sprague is a 74 y.o. with a past history of metastatic prostate cancer with large pelvic mass, diabetes mellitus, multiple falls, who was admitted on 4/5/2019 from home with a diagnosis of acute kidney injury, rhabdomyolysis, fall. Current medical issues leading to Palliative Medicine involvement include: Care decisions. History of present illness/past medical history patient is a 75-year-old gentleman with history of metastatic prostate cancer with a large pelvic mass. He has required nephrostomy tube placement secondary to bilateral hydronephrosis. He is followed by Dr. Phi Campbell who is been treating him for prostate cancer with Zytiga, prednisone, and Lupron. Patient has been admitted to the hospital at least on 2 occasions secondary to falls with associated rhabdomyolysis. He apparently fell sometime yesterday and was found by family. I did talk with his daughter-in-law via phone and she thinks he may have been down for approximately 12 hours. Social historypatient lives alone. His son and daughter-in-law have moved from [place] to be closer to him and they live approximately 15 minutes away. PALLIATIVE DIAGNOSES:   1. Goals of care discussion  2. Advanced care planning  3. Debility  4. Metastatic prostate cancer  5. Recurrent falls  6. DNR discussion       PLAN:   1. Jozef Norris, licensed clinical  and I briefly met with the patient. He was in the emergency room and was very sleepy. He asked for us to reach out to family and he wanted to sleep. 2. I was able to talk with his daughter-in-law, Ermelinda Maria, on the phone. I reviewed the role of palliative medicine in his care. We then discussed Mr. Seymour Merchant medical issues.   I was able to review all of his labs and his CT scan from last night. She is very concerned about his decline and now multiple falls. She seems to understand it will be a challenge for him to remain at home alone. They are willing to look into all options to include nursing home, skilled nursing, and even assisted living. She does state that the patient has had decline over the last couple months and has been telling the family that he is not sure how much he wants to continue with current treatment. Ideally we would like to have these discussions with the patient who just was not able to fully participate today. Laxmi is going to talk with her , Jackeline Vale, who is the patient's son. She is interested in meeting early next week if he still in the hospital to have further discussions about options. We did discuss all possibilities to include continuing current therapy versus possible transition to hospice type of care. Once again they would like to honor his wishes and have him be part of this discussion. They will support whenever he decides. I have given his daughter-in-law our phone number and will certainly reach out to them on Monday  3. Goals of carecontinue current therapy for now. Ongoing discussions about next steps will be done over the next several days. I will ask for PT and OT to see him. 4. Advanced medical directivenone in the chart and his son would serve as his medical power of . 5. CODE STATUS I did not address this with the patient who was not able to fully participate today. This will need to be addressed in our family meeting. 6. Symptom managementno acute symptoms for us to manage at this time. 7. Psychosocialhas good support from family. No spiritual concerns  8. Discussed with bedside nurse. 9. Initial consult note routed to primary continuity provider  10.  Communicated plan of care with: Palliative IDT       GOALS OF CARE / TREATMENT PREFERENCES:   [====Goals of Care====]  GOALS OF CARE:  Patient/Health Care Proxy Stated Goals: Other (comment)(exploring all options)      TREATMENT PREFERENCES:   Code Status: Full Code    Advance Care Planning:  Advance Care Planning 9/5/2018   Patient's Healthcare Decision Maker is: Legal Next of Kin   Confirm Advance Directive None       Medical Interventions: Full interventions  Other Instructions: The palliative care team has discussed with patient / health care proxy about goals of care / treatment preferences for patient.  [====Goals of Care====]         HISTORY:     History obtained from: Chart, daughter-in-law, patient    CHIEF COMPLAINT: Fall    HPI/SUBJECTIVE:    The patient is:   [x] Verbal and participatory  [] Non-participatory due to:   Patient is sleeping when we enter the room. He does arouse briefly but was clear that he was not interested in talking at this time. Clinical Pain Assessment (nonverbal scale for severity on nonverbal patients):   Clinical Pain Assessment  Severity: 0            FUNCTIONAL ASSESSMENT:     Palliative Performance Scale (PPS):  PPS: 50       PSYCHOSOCIAL/SPIRITUAL SCREENING:     Advance Care Planning:  Advance Care Planning 9/5/2018   Patient's Healthcare Decision Maker is: Legal Next of Kin   Confirm Advance Directive None        Any spiritual / Adventist concerns:  [] Yes /  [x] No    Caregiver Burnout:  [] Yes /  [x] No /  [] No Caregiver Present      Anticipatory grief assessment:   [x] Normal  / [] Maladaptive       ESAS Anxiety: Anxiety: 0    ESAS Depression: Depression: 0        REVIEW OF SYSTEMS:     Positive and pertinent negative findings in ROS are noted above in HPI. The following systems were [x] reviewed / [] unable to be reviewed as noted in HPI  Other findings are noted below. Systems: constitutional, ears/nose/mouth/throat, respiratory, gastrointestinal, genitourinary, musculoskeletal, integumentary, neurologic, psychiatric, endocrine.  Positive findings noted below.  Modified ESAS Completed by: provider   Fatigue: 4 Drowsiness: 2   Depression: 0 Pain: 0   Anxiety: 0 Nausea: 0   Anorexia: 2 Dyspnea: 0     Constipation: No              PHYSICAL EXAM:     From RN flowsheet:  Wt Readings from Last 3 Encounters:   04/05/19 220 lb (99.8 kg)   03/26/19 220 lb (99.8 kg)   03/07/19 232 lb (105.2 kg)     Blood pressure 155/79, pulse (!) 104, temperature 99.7 °F (37.6 °C), resp. rate 24, height 5' 9\" (1.753 m), weight 220 lb (99.8 kg), SpO2 98 %.     Pain Scale 1: Numeric (0 - 10)  Pain Intensity 1: 0                 Last bowel movement, if known:     Constitutional: Ill-appearing, lethargic, no acute distress  Eyes: pupils equal, anicteric  ENMT: no nasal discharge, moist mucous membranes  Cardiovascular: regular rhythm, distal pulses intact  Respiratory: breathing not labored, symmetric  Gastrointestinal: soft non-tender, +bowel sounds, colostomy present  Musculoskeletal: no deformity, no tenderness to palpation  Skin: warm, dry, nephrostomy tube present  Neurologic: following commands, moving all extremities  Psychiatric: full affect, no hallucinations  Other:       HISTORY:     Principal Problem:    Syncope (4/5/2019)    Active Problems:    Severe obesity with body mass index (BMI) of 35.0 to 39.9 with serious comorbidity ([address] (6/14/2018)      Pelvic mass (9/4/2018)      Prostate cancer ([address] (1/11/2019)      Sleep apnea (4/5/2019)      Diabetes mellitus ([address] (4/5/2019)      Hypokalemia (4/5/2019)      Rhabdomyolysis (4/5/2019)      JAGDEEP (acute kidney injury) ([address] (4/5/2019)      Pyuria (4/5/2019)      Burn (4/5/2019)      Debilitated patient (4/5/2019)      Past Medical History:   Diagnosis Date    Diabetes mellitus ([address]     Prostate cancer ([address]     Sleep apnea       Past Surgical History:   Procedure Laterality Date    FLEXIBLE SIGMOIDOSCOPY N/A 9/6/2018    SIGMOIDOSCOPY FLEXIBLE performed by Anupama Guerrero MD at [address] [place] [address] HX HIP REPLACEMENT Left     HX HIP REPLACEMENT Right     HX OTHER SURGICAL      bladder stent    IR CHANGE NEPHROSTOMY PYELOS TUBE RT  2/1/2019      Family History   Problem Relation Age of Onset    Cancer Mother         colon    Hypertension Mother     Heart Disease Mother     Cancer Father     Diabetes Sister     Cancer Brother     Diabetes Sister       History reviewed, no pertinent family history. Social History     Tobacco Use    Smoking status: Never Smoker    Smokeless tobacco: Never Used   Substance Use Topics    Alcohol use: No     Allergies   Allergen Reactions    Ciprofloxacin Rash      Current Facility-Administered Medications   Medication Dose Route Frequency    dextrose 5% - 0.45% NaCl with KCl 40 mEq/L infusion   IntraVENous CONTINUOUS    cefepime (MAXIPIME) 2 g in 0.9% sodium chloride (MBP/ADV) 100 mL  2 g IntraVENous Q12H    sodium chloride (NS) flush 5-40 mL  5-40 mL IntraVENous Q8H    sodium chloride (NS) flush 5-40 mL  5-40 mL IntraVENous PRN    acetaminophen (TYLENOL) tablet 650 mg  650 mg Oral Q6H PRN    naloxone (NARCAN) injection 0.4 mg  0.4 mg IntraVENous PRN    [START ON 4/6/2019] vancomycin (VANCOCIN) 1,500 mg in 0.9% sodium chloride 500 mL IVPB  1,500 mg IntraVENous Q24H    insulin lispro (HUMALOG) injection   SubCUTAneous AC&HS    glucose chewable tablet 16 g  4 Tab Oral PRN    dextrose (D50W) injection syrg 12.5-25 g  12.5-25 g IntraVENous PRN    glucagon (GLUCAGEN) injection 1 mg  1 mg IntraMUSCular PRN    tamsulosin (FLOMAX) capsule 0.4 mg  0.4 mg Oral DAILY    heparin (porcine) injection 5,000 Units  5,000 Units SubCUTAneous Q8H     Current Outpatient Medications   Medication Sig    leuprolide acetate (LEUPROLIDE, 6 MONTH, SC) by SubCUTAneous route. Due in 5/2019 per OP oncology notes    traZODone (DESYREL) 50 mg tablet Take 50 mg by mouth nightly as needed for Sleep.  hydroCHLOROthiazide (HYDRODIURIL) 25 mg tablet Take 1 Tab by mouth daily for 10 days.     abiraterone (ZYTIGA) 250 mg tab Take 4 Tabs by mouth daily.  predniSONE (DELTASONE) 5 mg tablet Take 1 Tab by mouth daily.  aspirin delayed-release 81 mg tablet Take 81 mg by mouth daily.  potassium chloride (K-DUR, KLOR-CON) 20 mEq tablet Take 20 mEq by mouth two (2) times a day.  tamsulosin (FLOMAX) 0.4 mg capsule Take 0.4 mg by mouth daily. LAB AND IMAGING FINDINGS:     Lab Results   Component Value Date/Time    WBC 14.9 (H) 04/05/2019 04:21 PM    HGB 9.8 (L) 04/05/2019 04:21 PM    PLATELET 859 86/48/8448 04:21 PM     Lab Results   Component Value Date/Time    Sodium 143 04/05/2019 04:21 PM    Potassium 2.6 (LL) 04/05/2019 04:21 PM    Chloride 108 04/05/2019 04:21 PM    CO2 29 04/05/2019 04:21 PM    BUN 14 04/05/2019 04:21 PM    Creatinine 1.34 (H) 04/05/2019 04:21 PM    Calcium 7.8 (L) 04/05/2019 04:21 PM    Magnesium 2.3 04/05/2019 12:08 PM    Phosphorus 3.1 04/05/2019 03:00 AM      Lab Results   Component Value Date/Time    AST (SGOT) 284 (H) 04/05/2019 03:00 AM    Alk.  phosphatase 75 04/05/2019 03:00 AM    Protein, total 7.7 04/05/2019 03:00 AM    Albumin 2.7 (L) 04/05/2019 03:00 AM    Globulin 5.0 (H) 04/05/2019 03:00 AM     Lab Results   Component Value Date/Time    INR 1.1 04/05/2019 03:00 AM    Prothrombin time 11.5 (H) 04/05/2019 03:00 AM    aPTT 20.6 (L) 04/05/2019 03:00 AM      Lab Results   Component Value Date/Time    Iron 25 (L) 09/05/2018 12:54 PM    TIBC 172 (L) 09/05/2018 12:54 PM    Iron % saturation 15 (L) 09/05/2018 12:54 PM    Ferritin 213 09/05/2018 12:54 PM      No results found for: PH, PCO2, PO2  No components found for: [place]   Lab Results   Component Value Date/Time    CK 13,844 ([facility]) 04/05/2019 12:08 PM    CK - MB 80.7 (H) 04/05/2019 03:00 AM                Total time: 70  Counseling / coordination time, spent as noted above: 65  > 50% counseling / coordination?: yes    Prolonged service was provided for  []30 min   []75 min in face to face time in the presence of the patient, spent as noted above. Time Start:   Time End:   Note: this can only be billed with 81018 (initial) or 96104 (follow up). If multiple start / stop times, list each separately.

## 2019-04-05 NOTE — WOUND CARE
Wound care consult: 
Initial visit for skin and wound assessment, alert, painful- currently on stretcher; to be transferred to floor and transferred to low air loss surface. Assessment LLQ colostomy appliance leaking and removed. Small amount soft brown stool, stoma red and flat. Evelyn stomal skin intact, abdomen round and distended. Right elbow linear abraded areas and discoloration. Scattered yellow fluid filled blisters. Right medial upper back- area of edema and discoloration with linear skin abrasions. Lower legs intact, mild edema, skin intact. Treatment Repositioned in bed Heels floated One piece appliance replaced to colostomy. Mepilex borders to back and right lower arm Recommendations/Plan Turn, reposition every 2 hours as tolerated, float heels, place in prevalon boots. Incontinent care with comfort shields. Apply Zinc to all open areas, moisture barrier as needed. Assessment called to Dr Sandi Alvarez- orders obtained. Will follow, reconsult as needed.  
 
Kristan Sifuentes RN [facility], [facility].

## 2019-04-05 NOTE — PROGRESS NOTES
1630 - patient noted to have mucus like substance coming from rectum and penis. Patient states he urinates 3-4x a day. Attempted to place condom catheter.

## 2019-04-05 NOTE — ED NOTES
Spoke with pt's daughter Dorcas Newberry. She can be called with any questions or concerns. She can be reached at [phone].

## 2019-04-05 NOTE — ED NOTES
Spoke with Dr Padilla Certain concerning burn like blistering to the RUE. He will look at when he does rounds. Verified orders of 3 runs of Potassium. Also informed MD of LLE pain and the presence of lymphedema bandages. Permission given to remove bandages.

## 2019-04-05 NOTE — CONSULTS
[facility] [place] [facility] [place] [address]  [address] [facility], [address] [facility]  Aaliyah Shivers: [phone]  F: [phone]      Reason for Visit:   Hodan Bailey is a 74 y.o. male who is seen in consultation at the request of Dr. Juan Swanson   for evaluation of prostate cancer with mets. Hematology / Oncology Treatment History:     Diagnosis: Prosate cancer, diagnosed 2015.      Stage: Now metastatic     Pathology: 9/6/18 Sigmoid colon ulcer, biopsy:   Adenocarcinoma, strongly favor prostate primary (see Comment). Stains weakly for PSAP.     Prior Treatment: Brachytherapy and EBRT in 12/2015.      Current Treatment: Oval Vibha started on 1/28/19. Lupron started earlier. Oncologic History:  Patient was admitted to the hospital in 9/2018 for persistent diarrhea, evidence of a sigmoid mass. He had been suffering with persistent diarrhea, 50-70-lb weight loss for approx 4-5 months. He underwent 2 recent colonoscopies (one in July and one in Aug 2018) by Dr. Meghann Day at [facility]. He states the first colonoscopy was not clear due to poor prep. The second colonoscopy reportedly showed abnormal rectal and sigmoid mucosa with a possible mass. This was biopsied and was benign. The patient then underwent a CT scan which was notable for R-sided hydronephrosis from extrinsic compression of the right ureter. The patient's urologist, Dr. Darryle Hay, attempted to pass a ureteral stent but was unsuccessful. The patient was then admitted to [facility] for weakness, persistent diarrhea and lower abdominal pain. Abd/pelvis CT on 9/4/18 showed a distal sigmoid mass with possible invasion of the posterior bladder wall as well as R hydronephrosis. He was evaluated by Dr. Georgette Bone in general surgery and underwent diverting loop colostomy given the symptoms caused by the sigmoid mass. He underwent colonoscopy by Dr. Joseph La in GI with finding of a large sigmoid mass, which was biopsied.  Pathology revealed an adenocarcinoma, but morphology was more consistent with prostate cancer than colorectal cancer. He was seen by Dr. Jerry Aceves in Urology during hospital stay who recommended percutaneous nephrostomy tube, placed by IR on 9/7. Given the biopsy findings, Dr. Jerry Aceves recommended further evaluation with prostate/pelvic MRI. Nuclear med bone scan negative for any suspicious bony lesions. After hospital discharge, he fell and was admitted to /[facility]. He stayed there for 3 days and was discharged to rehab, where he stayed for 4 weeks. His pathology was reviewed by an outside facility with opinion that this was a poorly differentiated prostate cancer. Pt was started on Zytiga + Prednisone in 1/2019. History of Present Illness:   Mr. Claudia Akbar is a 75 y/o male with h/o prostate cancer who is admitted to the hospital after passing out and woke up after spending approximately 1 day on the ground unconscious. This is his second hospitalization for similar presentation. He was found with diarrhea which had leaked out of his colostomy bag, soiling his clothes. Patient has a complicated oncologist history with a presumed metastatic prostate cancer causing direct extension into colon and surrounding structures, causing obstruction of ureters. Pt required diverting colostomy and R nephrostomy tube. PSA has been low and not markedly elevated. After starting Lupron, Zytiga + Prednisone for prostate cancer, PSA declined slightly, but on most recent check was increased to 3-4 range. ED labs notable for K 2.1, Cr 1.75, CK 10,665. Patient was started on IVF. He states he cannot recall how he ended up on the floor. He simply remembers sitting and watching basketball. Denies any fevers or chills  at home. Denies SOB. Appetite has been poor for several months. Lives by himself. No family at bedside.      Past Medical History:   Diagnosis Date    Diabetes mellitus ([address]     Prostate cancer ([address]     Sleep apnea       Past Surgical History:   Procedure Laterality Date    FLEXIBLE SIGMOIDOSCOPY N/A 9/6/2018    SIGMOIDOSCOPY FLEXIBLE performed by Brock Awad MD at [address] [facility] HX HIP REPLACEMENT Left     HX HIP REPLACEMENT Right     HX OTHER SURGICAL      bladder stent    IR CHANGE NEPHROSTOMY PYELOS TUBE RT  2/1/2019      Social History     Tobacco Use    Smoking status: Never Smoker    Smokeless tobacco: Never Used   Substance Use Topics    Alcohol use: No      Family History   Problem Relation Age of Onset    Cancer Mother         colon    Hypertension Mother     Heart Disease Mother     Cancer Father     Diabetes Sister     Cancer Brother     Diabetes Sister      Current Facility-Administered Medications   Medication Dose Route Frequency    dextrose 5% - 0.45% NaCl with KCl 40 mEq/L infusion   IntraVENous CONTINUOUS    cefepime (MAXIPIME) 2 g in 0.9% sodium chloride (MBP/ADV) 100 mL  2 g IntraVENous Q12H    sodium chloride (NS) flush 5-40 mL  5-40 mL IntraVENous Q8H    sodium chloride (NS) flush 5-40 mL  5-40 mL IntraVENous PRN    acetaminophen (TYLENOL) tablet 650 mg  650 mg Oral Q6H PRN    naloxone (NARCAN) injection 0.4 mg  0.4 mg IntraVENous PRN    vancomycin (VANCOCIN) 1,750 mg in 0.9% sodium chloride 500 mL IVPB  1,750 mg IntraVENous ONCE    [START ON 4/6/2019] vancomycin (VANCOCIN) 1,500 mg in 0.9% sodium chloride 500 mL IVPB  1,500 mg IntraVENous Q24H    insulin lispro (HUMALOG) injection   SubCUTAneous AC&HS    glucose chewable tablet 16 g  4 Tab Oral PRN    dextrose (D50W) injection syrg 12.5-25 g  12.5-25 g IntraVENous PRN    glucagon (GLUCAGEN) injection 1 mg  1 mg IntraMUSCular PRN    tamsulosin (FLOMAX) capsule 0.4 mg  0.4 mg Oral DAILY    heparin (porcine) injection 5,000 Units  5,000 Units SubCUTAneous Q8H     Current Outpatient Medications   Medication Sig    hydroCHLOROthiazide (HYDRODIURIL) 25 mg tablet Take 1 Tab by mouth daily for 10 days.     abiraterone (ZYTIGA) 250 mg tab Take 4 Tabs by mouth daily.    bicalutamide (CASODEX) 50 mg tablet TK 1 T PO  D    traMADol (ULTRAM) 50 mg tablet Take  mg by mouth.  traZODone (DESYREL) 50 mg tablet TK 1 T PO  QHS  PRN    predniSONE (DELTASONE) 5 mg tablet Take 1 Tab by mouth daily.  aspirin delayed-release 81 mg tablet Take 81 mg by mouth daily.  potassium chloride (K-DUR, KLOR-CON) 20 mEq tablet Take 20 mEq by mouth two (2) times a day.  tamsulosin (FLOMAX) 0.4 mg capsule Take 0.4 mg by mouth daily. Allergies   Allergen Reactions    Ciprofloxacin Rash        Review of Systems: A complete review of systems was obtained, negative except as described above. Physical Exam:     Visit Vitals  /57   Pulse 99   Temp 98.2 °F (36.8 °C)   Resp 20   Ht 5' 9\" (1.753 m)   Wt 99.8 kg (220 lb)   SpO2 97%   BMI 32.49 kg/m²     ECOG PS: 2-3  General: elderly; No distress  Eyes: PERRLA, anicteric sclerae  HENT: Atraumatic with normal appearance of ears and nose; OP clear  Neck: Supple; no thyromegaly   Lymphatic: No cervical, supraclavicular, or axillary adenopathy  Respiratory: anteriorly CTAB, normal respiratory effort  CV: Normal rate, regular rhythm, no murmurs, 2+ pitting edema in BLE. GI: Colostomy noted with liquid brown stool noted; Soft, nontender, nondistended, no masses, no hepatomegaly, no splenomegaly  : nephrostomy tube noted  MS:Digits without clubbing or cyanosis. Skin: No rashes, ecchymoses, or petechiae. Normal temperature, turgor, and texture. Neuro/Psych: Alert, oriented, appropriate affect, normal judgment/insight      Results:     Lab Results   Component Value Date/Time    WBC 13.7 (H) 04/05/2019 03:00 AM    HGB 11.4 (L) 04/05/2019 03:00 AM    HCT 37.8 04/05/2019 03:00 AM    PLATELET 348 32/23/0869 03:00 AM    MCV 88.9 04/05/2019 03:00 AM    ABS.  NEUTROPHILS 12.0 (H) 04/05/2019 03:00 AM     Lab Results   Component Value Date/Time    Sodium 146 (H) 04/05/2019 03:00 AM    Potassium 2.1 (LL) 04/05/2019 03:00 AM    Chloride 106 04/05/2019 03:00 AM    CO2 34 (H) 04/05/2019 03:00 AM    Glucose 167 (H) 04/05/2019 03:00 AM    BUN 14 04/05/2019 03:00 AM    Creatinine 1.75 (H) 04/05/2019 03:00 AM    GFR est AA 46 (L) 04/05/2019 03:00 AM    GFR est non-AA 38 (L) 04/05/2019 03:00 AM    Calcium 8.8 04/05/2019 03:00 AM    Glucose (POC) 151 (H) 09/12/2018 01:54 PM     Lab Results   Component Value Date/Time    Bilirubin, total 0.5 04/05/2019 03:00 AM    ALT (SGPT) 65 04/05/2019 03:00 AM    AST (SGOT) 284 (H) 04/05/2019 03:00 AM    Alk. phosphatase 75 04/05/2019 03:00 AM    Protein, total 7.7 04/05/2019 03:00 AM    Albumin 2.7 (L) 04/05/2019 03:00 AM    Globulin 5.0 (H) 04/05/2019 03:00 AM     Lab Results   Component Value Date/Time    Reticulocyte count 0.8 09/05/2018 12:54 PM    Iron % saturation 15 (L) 09/05/2018 12:54 PM    TIBC 172 (L) 09/05/2018 12:54 PM    Ferritin 213 09/05/2018 12:54 PM    Vitamin B12 726 09/05/2018 12:54 PM    Folate 9.5 09/05/2018 12:54 PM    Lipase 69 (L) 04/05/2019 03:00 AM     Lab Results   Component Value Date/Time    INR 1.1 04/05/2019 03:00 AM    aPTT 20.6 (L) 04/05/2019 03:00 AM     Lab Results   Component Value Date/Time    CEA 1.1 09/05/2018 12:54 PM    Prostate Specific Ag 4.4 (H) 03/07/2019 09:57 AM     4/5/2019 XR CHEST  IMPRESSION:  No acute process. 4/5/2019 CT HEAD WO CONT  IMPRESSION  No acute process. 4/5/2019 CT CHEST ABD PELV  WO CONT  1. No significant change in large pelvic soft tissue mass surrounding the  prostate, inseparable from the rectum, as well as the posterior right bladder  wall. Bilateral hydronephrosis left greater than right similar to prior study. Unchanged position of right nephrostomy tube. 2. No acute process in the chest.  Outside imaging performed January 15, 2019 at [facility] or provided for  comparison. Addendum: Comparison is performed.   In the interval from January 15, 2019 to April 1, 2019 there is been additional  interval progression enlarged distal sigmoid colon/rectal mass. Enlarged nodular  pelvic masses. Slightly progressed left-sided hydroureteronephrosis as well. 4/5 DUPLEX LE doppler  pending    Assessment and Recommendations:   Morgan Perkins is a 74 y.o. male with h/o prostate cancer admitted with unexplained syncope. 1. Metastatic prostate cancer, castration resistant: Was localized at diagnosis, treated with brachytherapy in 12/2015. Now with metastatic disease which likely started in seminal vesicles and progressed with local extension into sigmoid colon. The pathology is more consistent with prostate adenocarcinoma than colon cancer and no mass seen on first 2 colonoscopies. CEA normal. Chest CT negative. Given large sigmoid mass at risk of causing obstruction, patient underwent diverting loop colostomy on 9/7/18. . Patient has a bulky sigmoid mass causing hydronephrosis requiring utereral stent and colon obstruction, added Zytiga to the Lupron to help obtain better and quicker response. Also based on the STAMPEDE trial, adding Zytiga to treatment in the first line setting improves overall survival (3yr OS 83% vs 76%). In the future, patient may be able to undergo ureteral stent placement and colostomy reversal. Recent CT 4/2019 shows evidence of disease progression on Lupron, Zytiga, Prednisone along with recent increase in PSA 4.4 on 3/7/19. While metastatic prostate cancer has several different treatment options, this patient's disease is atypical, quite aggressive, poorly differentiated. He and his family have been adamant about treatment, but will readdress with most current evidence of disease progression. 2. JAGDEEP / Rhabdomyolysis: CT scan revealing bilateral hydronephrosis (likely due to disease progression). JAGDEEP is 2/2 to decreased oral intake and diarrhea. CK up to 10,665. Nephrology and Urology have evaluated pt. -- Receiving IVFs    3. Falls/ syncope: Unclear etiology and 2nd occurrence. Echo pending.      4. Hypokalemia: 2/2 diarrhea and poor PO intake. Receiving repletion. 5. Right hydronephrosis: 2/2 extrinsic compression of the R distal ureter from the prostate vs sigmoid mass. IR placed percutaneous nephrostomy tube on 9/7/18. This was replaced during hospital admission in 2/2019. Follows with Dr. Erick Mendez as outpatient and was seen by Urology here. 6. Normocytic anemia: Likely a combination of anemia of chronic disease due to underlying malignancy. Previously no evidence of iron deficiency. Normal VitB12, folate. 7. BLE edema: Followed by  lymphedema clinic as outpatient; compression garment removed due to pain. -- Will consult wound team     Patient seen in conjunction with Teresa Neff NP.     Signed By: Sabra Ahumada, MD

## 2019-04-05 NOTE — CONSULTS
[address]     Name:  Kristopher Calderon  MR#:  [id number]  :  1945  ACCOUNT #:  [de-identified]  DATE OF SERVICE:  2019      NEPHROLOGY CONSULTATION    REQUESTING PHYSICIAN:  Dr. Tia Cordon:  Elevated serum creatinine. HISTORY OF PRESENT ILLNESS:  The patient is a 77-year-old -American male with metastatic prostate cancer, which is locally advanced. He has a right nephrostomy tube in place. He has been weak for several days. He last ate on Monday and since then has had no appetite and has not been eating. He has had diarrhea during that time which he describes as a substantial amount. He fell two days ago and has been unable to get back up. Eventually, he was brought to the emergency room and found to have acute renal failure. His creatinine was 1.75, up from 1.25 in late 2019 and 0.9 in early 2019. CPK is elevated. He has hypokalemia. We have been asked to see him regarding abnormal renal function. REVIEW OF SYSTEMS:  CONSTITUTIONAL:  No fevers or chills. GASTROINTESTINAL:  No nausea or vomiting. Positive for diarrhea. Positive for anorexia. GENITOURINARY:  He has a right nephrostomy in place. He has hematuria, which is chronic. CARDIOVASCULAR:  No chest pain or shortness of breath. All other systems are reviewed and are negative except as per history of present illness. PAST MEDICAL HISTORY:  Hypertension, diabetes, prostate cancer. FAMILY HISTORY:  No family history of renal disease. SOCIAL HISTORY:  He does not smoke. PHYSICAL EXAMINATION:  VITAL SIGNS:  Temperature 97.8, pulse 99, blood pressure 164/57. GENERAL:  Chronically ill -American male. HEENT:  Eyes anicteric. Extraocular movements are intact. ENMT:  Mucous membranes are dry. There is no epistaxis. NECK:  Nontender. There is no JVD. HEART:  Regular. There is trace to 1+ lower extremity edema.   RESPIRATORY:  Lungs are clear with good effort. GI:  Abdomen is soft and nontender. Bowel sounds are active. Hepatosplenomegaly is not appreciated. SKIN:  Warm and slightly edematous. There is no rash. MUSCULOSKELETAL:  There is no cyanosis or clubbing. There is no synovitis in fingers or wrists bilaterally. LABORATORY DATA:  Sodium 146, potassium 2.1, chloride 106, bicarb 34, BUN 14, creatinine 1.75. CK is 10,665. Hemoglobin is 11.4. RADIOGRAPHIC STUDIES:  CT scan of the abdomen shows bilateral hydronephrosis, left greater than right, with right nephrostomy tube. ASSESSMENT:  Acute renal failure likely secondary to intravascular volume depletion. There may be an element of rhabdomyolysis due to prolonged period of time that he was down. There may be some degree of worsening obstruction, although it appears that Urology feels his obstructive disease is stable. PLAN:  1. Volume expansion with intravenous fluids as you are doing. 2.  Replete potassium. 3.  Hold diuretic which he was taking at home. 4.  Avoid nephrotoxins. 5.  Monitor serial labs. 6.  There is no acute indication for dialysis. He would be a poor candidate for hemodialysis due to his chronic comorbidities. 7.  I suppose, if all else fails, we could consider a nephrostomy on the left if Urology thought that would be beneficial.  8.  Agree with Palliative Care evaluation.       MD DAVID Castro/MARIA [id number]  D:  04/05/2019 11:51  T:  04/05/2019 13:53  JOB #:  [id number]

## 2019-04-05 NOTE — CONSULTS
Consult dict    Multifactorial JAGDEEP. Likely due to IVVD due to decreased intake and diarrhea. Also may be an element of rhabdo and chronic obstruction. It appears his obstructive dz is stable per urology. Will continue volume expansion   Replete K  Avoid nephrotoxins  Watch labs  There is no acute indication for RRT.  He is a poor candidate for dialysis

## 2019-04-05 NOTE — PROGRESS NOTES
Palliative Medicine Social Work Note Palliative Medicine (Dr. Bard Gonzalez) made supportive visit to pt in ED. Pt was resting in bed in dark room, roused when greeted but stated he preferred team come back at later time if visit entailed conversation. Pt did agree for dtr to be contacted to discuss his care. Thank you for including Palliative Medicine in the care of Mr. Quyen Tan. Hvbrendoneyrarbfranciscaut [address] BIANCA Del Angel, [facility]-SW Palliative Medicine:  [phone])

## 2019-04-06 LAB
ALBUMIN SERPL-MCNC: 2.1 G/DL (ref 3.5–5)
ALBUMIN/GLOB SERPL: 0.6 {RATIO} (ref 1.1–2.2)
ALP SERPL-CCNC: 64 U/L (ref 45–117)
ALT SERPL-CCNC: 91 U/L (ref 12–78)
ANION GAP SERPL CALC-SCNC: 6 MMOL/L (ref 5–15)
AST SERPL-CCNC: 345 U/L (ref 15–37)
BASOPHILS # BLD: 0 K/UL (ref 0–0.1)
BASOPHILS NFR BLD: 0 % (ref 0–1)
BILIRUB SERPL-MCNC: 0.4 MG/DL (ref 0.2–1)
BUN SERPL-MCNC: 13 MG/DL (ref 6–20)
BUN/CREAT SERPL: 10 (ref 12–20)
CALCIUM SERPL-MCNC: 7.3 MG/DL (ref 8.5–10.1)
CHLORIDE SERPL-SCNC: 110 MMOL/L (ref 97–108)
CK SERPL-CCNC: ABNORMAL U/L (ref 39–308)
CO2 SERPL-SCNC: 29 MMOL/L (ref 21–32)
CREAT SERPL-MCNC: 1.34 MG/DL (ref 0.7–1.3)
DIFFERENTIAL METHOD BLD: ABNORMAL
EOSINOPHIL # BLD: 0.1 K/UL (ref 0–0.4)
EOSINOPHIL NFR BLD: 1 % (ref 0–7)
ERYTHROCYTE [DISTWIDTH] IN BLOOD BY AUTOMATED COUNT: 14.4 % (ref 11.5–14.5)
EST. AVERAGE GLUCOSE BLD GHB EST-MCNC: 143 MG/DL
GLOBULIN SER CALC-MCNC: 3.3 G/DL (ref 2–4)
GLUCOSE BLD STRIP.AUTO-MCNC: 111 MG/DL (ref 65–100)
GLUCOSE BLD STRIP.AUTO-MCNC: 149 MG/DL (ref 65–100)
GLUCOSE BLD STRIP.AUTO-MCNC: 152 MG/DL (ref 65–100)
GLUCOSE BLD STRIP.AUTO-MCNC: 208 MG/DL (ref 65–100)
GLUCOSE SERPL-MCNC: 161 MG/DL (ref 65–100)
HBA1C MFR BLD: 6.6 % (ref 4.2–6.3)
HCT VFR BLD AUTO: 30.4 % (ref 36.6–50.3)
HGB BLD-MCNC: 9.2 G/DL (ref 12.1–17)
IMM GRANULOCYTES # BLD AUTO: 0.1 K/UL (ref 0–0.04)
IMM GRANULOCYTES NFR BLD AUTO: 1 % (ref 0–0.5)
LYMPHOCYTES # BLD: 0.9 K/UL (ref 0.8–3.5)
LYMPHOCYTES NFR BLD: 6 % (ref 12–49)
MAGNESIUM SERPL-MCNC: 2.2 MG/DL (ref 1.6–2.4)
MCH RBC QN AUTO: 26.7 PG (ref 26–34)
MCHC RBC AUTO-ENTMCNC: 30.3 G/DL (ref 30–36.5)
MCV RBC AUTO: 88.1 FL (ref 80–99)
MONOCYTES # BLD: 0.9 K/UL (ref 0–1)
MONOCYTES NFR BLD: 6 % (ref 5–13)
NEUTS SEG # BLD: 12.4 K/UL (ref 1.8–8)
NEUTS SEG NFR BLD: 86 % (ref 32–75)
NRBC # BLD: 0 K/UL (ref 0–0.01)
NRBC BLD-RTO: 0 PER 100 WBC
PHOSPHATE SERPL-MCNC: 2.2 MG/DL (ref 2.6–4.7)
PLATELET # BLD AUTO: 258 K/UL (ref 150–400)
PMV BLD AUTO: 10.8 FL (ref 8.9–12.9)
POTASSIUM SERPL-SCNC: 3 MMOL/L (ref 3.5–5.1)
PROT SERPL-MCNC: 5.4 G/DL (ref 6.4–8.2)
RBC # BLD AUTO: 3.45 M/UL (ref 4.1–5.7)
SERVICE CMNT-IMP: ABNORMAL
SODIUM SERPL-SCNC: 145 MMOL/L (ref 136–145)
WBC # BLD AUTO: 14.4 K/UL (ref 4.1–11.1)

## 2019-04-06 PROCEDURE — 74011250637 HC RX REV CODE- 250/637: Performed by: INTERNAL MEDICINE

## 2019-04-06 PROCEDURE — 74011250636 HC RX REV CODE- 250/636: Performed by: INTERNAL MEDICINE

## 2019-04-06 PROCEDURE — 80053 COMPREHEN METABOLIC PANEL: CPT

## 2019-04-06 PROCEDURE — 84100 ASSAY OF PHOSPHORUS: CPT

## 2019-04-06 PROCEDURE — 74011636637 HC RX REV CODE- 636/637: Performed by: INTERNAL MEDICINE

## 2019-04-06 PROCEDURE — 82550 ASSAY OF CK (CPK): CPT

## 2019-04-06 PROCEDURE — 83735 ASSAY OF MAGNESIUM: CPT

## 2019-04-06 PROCEDURE — 74011000258 HC RX REV CODE- 258: Performed by: INTERNAL MEDICINE

## 2019-04-06 PROCEDURE — 36415 COLL VENOUS BLD VENIPUNCTURE: CPT

## 2019-04-06 PROCEDURE — 82962 GLUCOSE BLOOD TEST: CPT

## 2019-04-06 PROCEDURE — 65270000029 HC RM PRIVATE

## 2019-04-06 PROCEDURE — 85025 COMPLETE CBC W/AUTO DIFF WBC: CPT

## 2019-04-06 PROCEDURE — 83036 HEMOGLOBIN GLYCOSYLATED A1C: CPT

## 2019-04-06 PROCEDURE — 77030011256 HC DRSG MEPILEX <16IN NO BORD MOLN -A

## 2019-04-06 RX ADMIN — CEFEPIME 2 G: 2 INJECTION, POWDER, FOR SOLUTION INTRAVENOUS at 04:47

## 2019-04-06 RX ADMIN — HEPARIN SODIUM 5000 UNITS: 5000 INJECTION INTRAVENOUS; SUBCUTANEOUS at 08:27

## 2019-04-06 RX ADMIN — ACETAMINOPHEN 650 MG: 325 TABLET ORAL at 04:43

## 2019-04-06 RX ADMIN — TAMSULOSIN HYDROCHLORIDE 0.4 MG: 0.4 CAPSULE ORAL at 08:27

## 2019-04-06 RX ADMIN — DEXTROSE MONOHYDRATE, SODIUM CHLORIDE, AND POTASSIUM CHLORIDE: 50; 4.5; 2.98 INJECTION, SOLUTION INTRAVENOUS at 04:44

## 2019-04-06 RX ADMIN — VANCOMYCIN HYDROCHLORIDE 1500 MG: 10 INJECTION, POWDER, LYOPHILIZED, FOR SOLUTION INTRAVENOUS at 08:04

## 2019-04-06 RX ADMIN — Medication 10 ML: at 21:23

## 2019-04-06 RX ADMIN — INSULIN LISPRO 2 UNITS: 100 INJECTION, SOLUTION INTRAVENOUS; SUBCUTANEOUS at 21:17

## 2019-04-06 RX ADMIN — Medication 10 ML: at 04:50

## 2019-04-06 RX ADMIN — INSULIN LISPRO 2 UNITS: 100 INJECTION, SOLUTION INTRAVENOUS; SUBCUTANEOUS at 08:27

## 2019-04-06 RX ADMIN — HEPARIN SODIUM 5000 UNITS: 5000 INJECTION INTRAVENOUS; SUBCUTANEOUS at 15:50

## 2019-04-06 RX ADMIN — ACETAMINOPHEN 650 MG: 325 TABLET ORAL at 21:15

## 2019-04-06 RX ADMIN — INSULIN LISPRO 2 UNITS: 100 INJECTION, SOLUTION INTRAVENOUS; SUBCUTANEOUS at 12:11

## 2019-04-06 RX ADMIN — ACETAMINOPHEN 650 MG: 325 TABLET ORAL at 15:50

## 2019-04-06 RX ADMIN — CEFEPIME 2 G: 2 INJECTION, POWDER, FOR SOLUTION INTRAVENOUS at 17:15

## 2019-04-06 RX ADMIN — Medication 10 ML: at 14:00

## 2019-04-06 RX ADMIN — DEXTROSE MONOHYDRATE, SODIUM CHLORIDE, AND POTASSIUM CHLORIDE: 50; 4.5; 2.98 INJECTION, SOLUTION INTRAVENOUS at 21:19

## 2019-04-06 NOTE — PROGRESS NOTES
[facility] [place] [facility] [place] [address] [facility], [address] [place] [address] W: [phone]  F: [phone] Reason for Visit:  
Isaiah Esquivel is a 74 y.o. male who is seen in consultation at the request of Dr. Esha Patel 
 for evaluation of prostate cancer with mets. Hematology / Oncology Treatment History:  
 
Diagnosis: Prosate cancer, diagnosed 2015.  
  
Stage: Now metastatic 
  
Pathology: 9/6/18 Sigmoid colon ulcer, biopsy:  
Adenocarcinoma, strongly favor prostate primary (see Comment). Stains weakly for PSAP. 
  
Prior Treatment: Brachytherapy and EBRT in 12/2015.  
  
Current Treatment: Angel Comes started on 1/28/19. Lupron started earlier. Oncologic History: 
Patient was admitted to the hospital in 9/2018 for persistent diarrhea, evidence of a sigmoid mass. He had been suffering with persistent diarrhea, 50-70-lb weight loss for approx 4-5 months. He underwent 2 recent colonoscopies (one in July and one in Aug 2018) by Dr. Tolu Bolden at [facility]. He states the first colonoscopy was not clear due to poor prep. The second colonoscopy reportedly showed abnormal rectal and sigmoid mucosa with a possible mass. This was biopsied and was benign. The patient then underwent a CT scan which was notable for R-sided hydronephrosis from extrinsic compression of the right ureter. The patient's urologist, Dr. Adan Gandara, attempted to pass a ureteral stent but was unsuccessful. The patient was then admitted to [address] [place] [address] for weakness, persistent diarrhea and lower abdominal pain. Abd/pelvis CT on 9/4/18 showed a distal sigmoid mass with possible invasion of the posterior bladder wall as well as R hydronephrosis. He was evaluated by Dr. Nguyễn Beatty in general surgery and underwent diverting loop colostomy given the symptoms caused by the sigmoid mass. He underwent colonoscopy by Dr. Zoila Mcclendon in GI with finding of a large sigmoid mass, which was biopsied.  Pathology revealed an adenocarcinoma, but morphology was more consistent with prostate cancer than colorectal cancer. He was seen by Dr. Sheyla Harding in Urology during hospital stay who recommended percutaneous nephrostomy tube, placed by IR on 9/7. Given the biopsy findings, Dr. Sheyla Harding recommended further evaluation with prostate/pelvic MRI. Nuclear med bone scan negative for any suspicious bony lesions. After hospital discharge, he fell and was admitted to /[facility]. He stayed there for 3 days and was discharged to rehab, where he stayed for 4 weeks. His pathology was reviewed by an outside facility with opinion that this was a poorly differentiated prostate cancer. Pt was started on Zytiga + Prednisone in 1/2019. History of Present Illness: Mr. Yaya Ordonez is a 73 y/o male with h/o prostate cancer who is admitted to the hospital after passing out and woke up after spending approximately 1 day on the ground unconscious. This is his second hospitalization for similar presentation. He was found with diarrhea which had leaked out of his colostomy bag, soiling his clothes. Patient has a complicated oncologist history with a presumed metastatic prostate cancer causing direct extension into colon and surrounding structures, causing obstruction of ureters. Pt required diverting colostomy and R nephrostomy tube. PSA has been low and not markedly elevated. After starting Lupron, Zytiga + Prednisone for prostate cancer, PSA declined slightly, but on most recent check was increased to 3-4 range. ED labs notable for K 2.1, Cr 1.75, CK 10,665. Patient was started on IVF. He states he cannot recall how he ended up on the floor. He simply remembers sitting and watching basketball. Denies any fevers or chills  at home. Denies SOB. Appetite has been poor for several months. Lives by himself. No family at bedside. Today, patient states he feels slightly better in regards to energy level and R leg pain. Past Medical History:  
Diagnosis Date  Diabetes mellitus ([facility]  Prostate cancer ([facility]  Sleep apnea Past Surgical History:  
Procedure Laterality Date 2021 Luis Orona N/A 9/6/2018 SIGMOIDOSCOPY FLEXIBLE performed by Irish Alanis MD at [address] Left  HX HIP REPLACEMENT Right  HX OTHER SURGICAL    
 bladder stent  IR CHANGE NEPHROSTOMY PYELOS TUBE RT  2/1/2019 Social History Tobacco Use  Smoking status: Never Smoker  Smokeless tobacco: Never Used Substance Use Topics  Alcohol use: No  
  
Family History Problem Relation Age of Onset  Cancer Mother   
     colon  Hypertension Mother  Heart Disease Mother  Cancer Father  Diabetes Sister  Cancer Brother  Diabetes Sister Current Facility-Administered Medications Medication Dose Route Frequency  [START ON 4/7/2019] Vancomycin Random AM  1 Each Other ONCE  
 dextrose 5% - 0.45% NaCl with KCl 40 mEq/L infusion   IntraVENous CONTINUOUS  
 cefepime (MAXIPIME) 2 g in 0.9% sodium chloride (MBP/ADV) 100 mL  2 g IntraVENous Q12H  
 sodium chloride (NS) flush 5-40 mL  5-40 mL IntraVENous Q8H  
 sodium chloride (NS) flush 5-40 mL  5-40 mL IntraVENous PRN  
 acetaminophen (TYLENOL) tablet 650 mg  650 mg Oral Q6H PRN  
 naloxone (NARCAN) injection 0.4 mg  0.4 mg IntraVENous PRN  
 vancomycin (VANCOCIN) 1,500 mg in 0.9% sodium chloride 500 mL IVPB  1,500 mg IntraVENous Q24H  
 insulin lispro (HUMALOG) injection   SubCUTAneous AC&HS  
 glucose chewable tablet 16 g  4 Tab Oral PRN  
 dextrose (D50W) injection syrg 12.5-25 g  12.5-25 g IntraVENous PRN  
 glucagon (GLUCAGEN) injection 1 mg  1 mg IntraMUSCular PRN  
 tamsulosin (FLOMAX) capsule 0.4 mg  0.4 mg Oral DAILY  heparin (porcine) injection 5,000 Units  5,000 Units SubCUTAneous Q8H  
 HYDROcodone-acetaminophen (NORCO) 5-325 mg per tablet 1 Tab  1 Tab Oral Q4H PRN Allergies Allergen Reactions  Ciprofloxacin Rash Review of Systems: A complete review of systems was obtained, negative except as described above. Physical Exam:  
 
Visit Vitals /58 (BP 1 Location: Right arm, BP Patient Position: Head of bed elevated (Comment degrees)) Pulse (!) 106 Temp 99 °F (37.2 °C) Resp 18 Ht 5' 9\" (1.753 m) Wt 230 lb 9.6 oz (104.6 kg) SpO2 97% BMI 34.05 kg/m² ECOG PS: 2-3 General: elderly; No distress Eyes: PERRLA, anicteric sclerae HENT: Atraumatic with normal appearance of ears and nose; OP clear Neck: Supple; no thyromegaly Lymphatic: No cervical, supraclavicular, or axillary adenopathy Respiratory: anteriorly CTAB, normal respiratory effort CV: Normal rate, regular rhythm, no murmurs, 2+ pitting edema in BLE. GI: Colostomy noted with liquid brown stool noted; Soft, nontender, nondistended, no masses, no hepatomegaly, no splenomegaly : nephrostomy tube noted MS:Digits without clubbing or cyanosis. Skin: No rashes, ecchymoses, or petechiae. Normal temperature, turgor, and texture. Neuro/Psych: Alert, oriented, appropriate affect, normal judgment/insight Results:  
 
Lab Results Component Value Date/Time WBC 14.4 (H) 04/06/2019 01:01 AM  
 HGB 9.2 (L) 04/06/2019 01:01 AM  
 HCT 30.4 (L) 04/06/2019 01:01 AM  
 PLATELET 494 23/97/4983 01:01 AM  
 MCV 88.1 04/06/2019 01:01 AM  
 ABS. NEUTROPHILS 12.4 (H) 04/06/2019 01:01 AM  
 
Lab Results Component Value Date/Time Sodium 145 04/06/2019 01:01 AM  
 Potassium 3.0 (L) 04/06/2019 01:01 AM  
 Chloride 110 (H) 04/06/2019 01:01 AM  
 CO2 29 04/06/2019 01:01 AM  
 Glucose 161 (H) 04/06/2019 01:01 AM  
 BUN 13 04/06/2019 01:01 AM  
 Creatinine 1.34 (H) 04/06/2019 01:01 AM  
 GFR est AA >60 04/06/2019 01:01 AM  
 GFR est non-AA 52 (L) 04/06/2019 01:01 AM  
 Calcium 7.3 (L) 04/06/2019 01:01 AM  
 Glucose (POC) 149 (H) 04/06/2019 11:56 AM  
 
Lab Results Component Value Date/Time  Bilirubin, total 0.4 04/06/2019 01:01 AM  
 ALT (SGPT) 91 (H) 04/06/2019 01:01 AM  
 AST (SGOT) 345 (H) 04/06/2019 01:01 AM  
 Alk. phosphatase 64 04/06/2019 01:01 AM  
 Protein, total 5.4 (L) 04/06/2019 01:01 AM  
 Albumin 2.1 (L) 04/06/2019 01:01 AM  
 Globulin 3.3 04/06/2019 01:01 AM  
 
Lab Results Component Value Date/Time Reticulocyte count 0.8 09/05/2018 12:54 PM  
 Iron % saturation 15 (L) 09/05/2018 12:54 PM  
 TIBC 172 (L) 09/05/2018 12:54 PM  
 Ferritin 213 09/05/2018 12:54 PM  
 Vitamin B12 726 09/05/2018 12:54 PM  
 Folate 9.5 09/05/2018 12:54 PM  
 Lipase 69 (L) 04/05/2019 03:00 AM  
 
Lab Results Component Value Date/Time INR 1.1 04/05/2019 03:00 AM  
 aPTT 20.6 (L) 04/05/2019 03:00 AM  
 
Lab Results Component Value Date/Time CEA 1.1 09/05/2018 12:54 PM  
 Prostate Specific Ag 4.4 (H) 03/07/2019 09:57 AM  
 
4/5/2019 XR CHEST IMPRESSION: 
No acute process. 4/5/2019 CT HEAD WO CONT IMPRESSION No acute process. 4/5/2019 CT CHEST ABD PELV  WO CONT 1. No significant change in large pelvic soft tissue mass surrounding the 
prostate, inseparable from the rectum, as well as the posterior right bladder 
wall. Bilateral hydronephrosis left greater than right similar to prior study. Unchanged position of right nephrostomy tube. 2. No acute process in the chest. 
Outside imaging performed January 15, 2019 at [facility] neurology or provided for 
comparison. Addendum: Comparison is performed. In the interval from January 15, 2019 to April 1, 2019 there is been additional 
interval progression enlarged distal sigmoid colon/rectal mass. Enlarged nodular 
pelvic masses. Slightly progressed left-sided hydroureteronephrosis as well. 4/5 DUPLEX LE doppler 
pending Assessment and Recommendations:  
Rodolfo Dunne is a 74 y.o. male with h/o prostate cancer admitted with unexplained syncope.   
 
1. Metastatic prostate cancer, castration resistant: Was localized at diagnosis, treated with brachytherapy in 12/2015. Now with metastatic disease which likely started in seminal vesicles and progressed with local extension into sigmoid colon. The pathology is more consistent with prostate adenocarcinoma than colon cancer and no mass seen on first 2 colonoscopies. CEA normal. Chest CT negative. Given large sigmoid mass at risk of causing obstruction, patient underwent diverting loop colostomy on 9/7/18. . Patient has a bulky sigmoid mass causing hydronephrosis requiring utereral stent and colon obstruction, added Zytiga to the Lupron to help obtain better and quicker response. Also based on the STAMPEDE trial, adding Zytiga to treatment in the first line setting improves overall survival (3yr OS 83% vs 76%). In the future, patient may be able to undergo ureteral stent placement and colostomy reversal. Recent CT 4/2019 shows evidence of disease progression on Lupron, Zytiga, Prednisone along with recent increase in PSA 4.4 on 3/7/19. While metastatic prostate cancer has several different treatment options, this patient's disease is atypical, quite aggressive, poorly differentiated. He and his family have been adamant about treatment, but will readdress with most current evidence of disease progression. -- I discussed with patient in detail that his current treatment with Raffy Tanner is not working. I discussed that treatment options include switching to alternate pill such as Enzalutamide, which is unlikely to work. Another option is Docetaxel chemotherapy. Patient's performance status is likely too poor to consider this and would pose a high risk of infection. Home hospice is another option if the patient wishes to focus on comfort and quality of life rather than treatment. 2. JAGDEEP / Rhabdomyolysis: CT scan revealing bilateral hydronephrosis (likely due to disease progression). JAGDEEP is 2/2 to decreased oral intake and diarrhea. CK up to 10,665 --> 13,471.  Nephrology and Urology have evaluated pt. -- Receiving IVFs 3. Falls/ syncope: Unclear etiology and 2nd occurrence. Echo pending. 4. UTI: Urine culture growing Klebsiella and Pseudomonas. Currently on Cefepime and Vanc. 5. Right hydronephrosis: 2/2 extrinsic compression of the R distal ureter from the prostate vs sigmoid mass. IR placed percutaneous nephrostomy tube on 9/7/18. This was replaced during hospital admission in 2/2019. Follows with Dr. Raulito Canales as outpatient and was seen by Urology here. 6. Normocytic anemia: Likely a combination of anemia of chronic disease due to underlying malignancy. Previously no evidence of iron deficiency. Normal VitB12, folate. 7. BLE edema: Followed by  lymphedema clinic as outpatient; compression garment removed due to pain.   
 
Signed By: Michele Heard MD

## 2019-04-06 NOTE — PROGRESS NOTES
Nephrology Progress Note Mordecai Jeans Date of Admission : 4/5/2019 CC:  Follow up for JAGDEEP Assessment and Plan JAGDEEP: 
- 2/2 volume depletion and rhabdo 
- cont IVF 
- daily labs Rhabdo: 
- cont IVF 
- trend CPK Hypovolemia: 
- improving 
- cont IVF Hypokalemia: 
- cont IV repletion B/l hydronephrosis: - R nephrostomy tube in place Metastatic prostate cancer DM2: 
- on insulin Interval History: 
Seen and examined. Cr improving. Voiding well. No cp or sob. Eating ok this am.   
 
Current Medications: all current  Medications have been eviewed in EPIC Review of Systems: Pertinent items are noted in HPI. Objective: 
Vitals:   
Vitals:  
 04/06/19 0408 04/06/19 0426 04/06/19 0700 04/06/19 7811 BP: 113/58   110/57 Pulse: 100  (!) 109 (!) 105 Resp: 20   18 Temp: 100 °F (37.8 °C)   99.3 °F (37.4 °C) SpO2: 98%   97% Weight:  104.6 kg (230 lb 9.6 oz) Height:      
 
Intake and Output: 
04/06 0701 - 04/06 1900 In: 2273.3 [I.V.:2273.3] Out: -  
04/04 1901 - 04/06 0700 In: 2596.7 [P.O.:240; I.V.:2356.7] Out: 685 [Urine:685] Physical Examination:General: NAD,Conversant Neck:  Supple, no mass Resp:  Lungs CTA B/L, no wheezing , normal respiratory effort CV:  RRR,  no murmur or rub,1+ b/l LE edema GI:  Soft, NT, + Bowel sounds, no hepatosplenomegaly Neurologic:  Non focal 
Psych:             AAO x 3 appropriate affect Skin:  No Rash :  R nephrostomy tube 
 
[]    High complexity decision making was performed 
[]    Patient is at high-risk of decompensation with multiple organ involvement Lab Data Personally Reviewed: I have reviewed all the pertinent labs, microbiology data and radiology studies during assessment. Recent Labs 04/06/19 
0101 04/05/19 
1621 04/05/19 
1208 04/05/19 
0300  143 143 146*  
K 3.0* 2.6* 4.3 2.1*  
* 108 109* 106 CO2 29 29 31 34* * 186* 293* 167* BUN 13 14 13 14 CREA 1.34* 1.34* 1.47* 1.75* CA 7.3* 7.8* 7.7* 8.8 MG 2.2  --  2.3 2.7* PHOS 2.2*  --   --  3.1 ALB 2.1*  --   --  2.7* SGOT 345*  --   --  284* ALT 91*  --   --  65 INR  --   --   --  1.1 Recent Labs 04/06/19 
0101 04/05/19 
1621 04/05/19 
0300 WBC 14.4* 14.9* 13.7* HGB 9.2* 9.8* 11.4* HCT 30.4* 32.4* 37.8  288 344 No results found for: SDES Lab Results Component Value Date/Time Culture result: NO GROWTH 1 DAY 04/05/2019 04:50 AM  
 Culture result: KLEBSIELLA PNEUMONIAE (A) 03/26/2019 09:52 AM  
 Culture result: PSEUDOMONAS AERUGINOSA (A) 03/26/2019 09:52 AM  
 
Recent Results (from the past 24 hour(s)) METABOLIC PANEL, BASIC Collection Time: 04/05/19 12:08 PM  
Result Value Ref Range Sodium 143 136 - 145 mmol/L Potassium 4.3 3.5 - 5.1 mmol/L Chloride 109 (H) 97 - 108 mmol/L  
 CO2 31 21 - 32 mmol/L Anion gap 3 (L) 5 - 15 mmol/L Glucose 293 (H) 65 - 100 mg/dL BUN 13 6 - 20 MG/DL Creatinine 1.47 (H) 0.70 - 1.30 MG/DL  
 BUN/Creatinine ratio 9 (L) 12 - 20 GFR est AA 57 (L) >60 ml/min/1.73m2 GFR est non-AA 47 (L) >60 ml/min/1.73m2 Calcium 7.7 (L) 8.5 - 10.1 MG/DL  
SODIUM, UR, RANDOM Collection Time: 04/05/19 12:08 PM  
Result Value Ref Range Sodium,urine random 15 MMOL/L  
CREATININE, UR, RANDOM Collection Time: 04/05/19 12:08 PM  
Result Value Ref Range Creatinine, urine 92.10 mg/dL MAGNESIUM Collection Time: 04/05/19 12:08 PM  
Result Value Ref Range Magnesium 2.3 1.6 - 2.4 mg/dL CK Collection Time: 04/05/19 12:08 PM  
Result Value Ref Range CK 13,844 (HH) 39 - 308 U/L  
GLUCOSE, POC Collection Time: 04/05/19 12:28 PM  
Result Value Ref Range Glucose (POC) 134 (H) 65 - 100 mg/dL Performed by Radha You ECHO ADULT COMPLETE Collection Time: 04/05/19  2:38 PM  
Result Value Ref Range LA Volume 61.71 (A) 18 - 58 mL Ao Root D 3.59 cm Aortic Valve Systolic Peak Velocity 867.97 cm/s Aortic Valve Area by Continuity of Peak Velocity 1.9 cm2 AoV PG 9.9 mmHg LVIDd 5.06 4.2 - 5.9 cm  
 LVPWd 0.97 0.6 - 1.0 cm LVIDs 3.60 cm IVSd 0.98 0.6 - 1.0 cm  
 LVOT d 1.88 cm  
 LVOT Peak Velocity 106.75 cm/s LVOT Peak Gradient 4.6 mmHg MV A Dale 87.95 cm/s  
 MV E Dale 69.13 cm/s  
 MV E/A 0.79 Left Atrium to Aortic Root Ratio 0.90 RVIDd 3.17 cm  
 LA Vol 4C 48.15 18 - 58 mL  
 LA Vol 2C 54.06 18 - 58 mL  
 LV Mass .8 88 - 224 g LV Mass AL Index 97.1 49 - 115 g/m2 RVSP 25.5 mmHg Est. RA Pressure 3.0 mmHg Mitral Regurgitant Peak Velocity 246.96 cm/s Mitral Valve E Wave Deceleration Time 249.5 ms Left Atrium Major Axis 3.22 cm Triscuspid Valve Regurgitation Peak Gradient 22.5 mmHg Pulmonic Valve Max Velocity 88.50 cm/s  
 TR Max Velocity 237.04 cm/s  
 LA Vol Index 28.69 16 - 28 ml/m2 PASP 25.5 mmHg LA Vol Index 25.13 16 - 28 ml/m2 LA Vol Index 22.38 16 - 28 ml/m2 MR Peak Gradient 24.4 mmHg PV peak gradient 3.1 mmHg METABOLIC PANEL, BASIC Collection Time: 04/05/19  4:21 PM  
Result Value Ref Range Sodium 143 136 - 145 mmol/L Potassium 2.6 (LL) 3.5 - 5.1 mmol/L Chloride 108 97 - 108 mmol/L  
 CO2 29 21 - 32 mmol/L Anion gap 6 5 - 15 mmol/L Glucose 186 (H) 65 - 100 mg/dL BUN 14 6 - 20 MG/DL Creatinine 1.34 (H) 0.70 - 1.30 MG/DL  
 BUN/Creatinine ratio 10 (L) 12 - 20 GFR est AA >60 >60 ml/min/1.73m2 GFR est non-AA 52 (L) >60 ml/min/1.73m2 Calcium 7.8 (L) 8.5 - 10.1 MG/DL  
CBC W/O DIFF Collection Time: 04/05/19  4:21 PM  
Result Value Ref Range WBC 14.9 (H) 4.1 - 11.1 K/uL  
 RBC 3.70 (L) 4.10 - 5.70 M/uL HGB 9.8 (L) 12.1 - 17.0 g/dL HCT 32.4 (L) 36.6 - 50.3 % MCV 87.6 80.0 - 99.0 FL  
 MCH 26.5 26.0 - 34.0 PG  
 MCHC 30.2 30.0 - 36.5 g/dL  
 RDW 14.4 11.5 - 14.5 % PLATELET 440 236 - 986 K/uL MPV 10.0 8.9 - 12.9 FL  
 NRBC 0.0 0  WBC ABSOLUTE NRBC 0.00 0.00 - 0.01 K/uL GLUCOSE, POC Collection Time: 04/05/19  5:06 PM  
Result Value Ref Range Glucose (POC) 167 (H) 65 - 100 mg/dL Performed by Anup Santoyo GLUCOSE, POC Collection Time: 04/05/19  8:45 PM  
Result Value Ref Range Glucose (POC) 312 (H) 65 - 100 mg/dL Performed by [facility] (PCT) METABOLIC PANEL, COMPREHENSIVE Collection Time: 04/06/19  1:01 AM  
Result Value Ref Range Sodium 145 136 - 145 mmol/L Potassium 3.0 (L) 3.5 - 5.1 mmol/L Chloride 110 (H) 97 - 108 mmol/L  
 CO2 29 21 - 32 mmol/L Anion gap 6 5 - 15 mmol/L Glucose 161 (H) 65 - 100 mg/dL BUN 13 6 - 20 MG/DL Creatinine 1.34 (H) 0.70 - 1.30 MG/DL  
 BUN/Creatinine ratio 10 (L) 12 - 20 GFR est AA >60 >60 ml/min/1.73m2 GFR est non-AA 52 (L) >60 ml/min/1.73m2 Calcium 7.3 (L) 8.5 - 10.1 MG/DL Bilirubin, total 0.4 0.2 - 1.0 MG/DL  
 ALT (SGPT) 91 (H) 12 - 78 U/L  
 AST (SGOT) 345 (H) 15 - 37 U/L Alk. phosphatase 64 45 - 117 U/L Protein, total 5.4 (L) 6.4 - 8.2 g/dL Albumin 2.1 (L) 3.5 - 5.0 g/dL Globulin 3.3 2.0 - 4.0 g/dL A-G Ratio 0.6 (L) 1.1 - 2.2    
CBC WITH AUTOMATED DIFF Collection Time: 04/06/19  1:01 AM  
Result Value Ref Range WBC 14.4 (H) 4.1 - 11.1 K/uL  
 RBC 3.45 (L) 4.10 - 5.70 M/uL HGB 9.2 (L) 12.1 - 17.0 g/dL HCT 30.4 (L) 36.6 - 50.3 % MCV 88.1 80.0 - 99.0 FL  
 MCH 26.7 26.0 - 34.0 PG  
 MCHC 30.3 30.0 - 36.5 g/dL  
 RDW 14.4 11.5 - 14.5 % PLATELET 325 618 - 036 K/uL MPV 10.8 8.9 - 12.9 FL  
 NRBC 0.0 0  WBC ABSOLUTE NRBC 0.00 0.00 - 0.01 K/uL NEUTROPHILS 86 (H) 32 - 75 % LYMPHOCYTES 6 (L) 12 - 49 % MONOCYTES 6 5 - 13 % EOSINOPHILS 1 0 - 7 % BASOPHILS 0 0 - 1 % IMMATURE GRANULOCYTES 1 (H) 0.0 - 0.5 % ABS. NEUTROPHILS 12.4 (H) 1.8 - 8.0 K/UL  
 ABS. LYMPHOCYTES 0.9 0.8 - 3.5 K/UL  
 ABS. MONOCYTES 0.9 0.0 - 1.0 K/UL  
 ABS. EOSINOPHILS 0.1 0.0 - 0.4 K/UL  
 ABS. BASOPHILS 0.0 0.0 - 0.1 K/UL ABS. IMM. GRANS. 0.1 (H) 0.00 - 0.04 K/UL  
 DF AUTOMATED MAGNESIUM Collection Time: 04/06/19  1:01 AM  
Result Value Ref Range Magnesium 2.2 1.6 - 2.4 mg/dL PHOSPHORUS Collection Time: 04/06/19  1:01 AM  
Result Value Ref Range Phosphorus 2.2 (L) 2.6 - 4.7 MG/DL  
CK Collection Time: 04/06/19  1:01 AM  
Result Value Ref Range CK 13,471 (HH) 39 - 308 U/L  
GLUCOSE, POC Collection Time: 04/06/19  7:07 AM  
Result Value Ref Range Glucose (POC) 152 (H) 65 - 100 mg/dL Performed by Debbie Smith (PCT) Damien Hernandez MD 
[address] [place] [address] Phone - [phone] Fax - [phone] 
[email or web address]

## 2019-04-06 NOTE — PROGRESS NOTES
[facility] Medical Progress Note NAME: Mushtaq Farley :  1945 MRM:  [id number] Date/Time: 2019  1:23 PM 
 
  
Assessment and Plan:  
 
Rhabdomyolysis / JAGDEEP (acute kidney injury) / CKD stage 3 - Rhabdo POA, due to fall and general dehydration, plus chronic renal obstruction. May have CKD 3 due to DM as well. Renal consulted. Hydrate and monitor. Not a candidate for RRT. Syncope / Dehydration / Hypokalemia / Hypernatremia - POA due to poor PO intake, likely vagal, likely exacerbated by general deconditioning, anemia, etc.  Hydrate and replete lytes. Check orthostatics with PT. Prostate cancer stage 4 - Followed by Dr Jack Gutiérrez. He presentation suggests he is nearing end stage. Palliative consult. Oncology consult. Usually on leuprolide and zytiga, but hold for acute illness. Would be appropriate for hospice Ureteral obstruction / Hydronephrosis bilateral / Pelvic mass / Pyuria / nephrostomy tubes in place - POA. Urology consulted. No procedure. Continue cefepime/vanco. monitor output. Continue flomax. SIRS criteria / sinus tachycardia / Leukocytosis - POA, unclear etiology. GNR on urine cx. Complicated UTI vs non-infectious stress with colonization. Empiric cefepime and vancomycin Debilitated patient / Recurrent falls / burn - Burn on arm due to landing on heater. Multiple falls this year. Fall precautions. PT/OT eval.  This is mainly deconditioning related to end stage cancer Diabetes mellitus type 2 without compilations - POA, BG always <160. Diabetic diet and counseling. SSI per protocol. Hold home meds. A1c useless in setting of anemia. Inlight of end stage cancer, I would stop all testing and treatment, as tight BG control is not expected to improve or extend his life. Anemia - POA, mild but will worsen with hydration. Likely due to underlying chronic disease. Severe obesity - Weight loss of no benefit in setting of end stage cancer. His albumin is low, reflecting current malnutrition and urine loss. Diet for comfort. Sleep apnea - Resume home CPAP for comfort if desired. Usually takes trazodone, but that may contribute to syncope. HTN - Stop HCTZ and ASA. BP control is not expected to improve or extend his life. Subjective: Chief Complaint:  Weak, vague, comfortable ROS: 
(bold if positive, if negative) Tolerating minimal PT Tolerating some Diet Objective:  
 
Last 24hrs VS reviewed since prior progress note. Most recent are: 
 
Visit Vitals /58 (BP 1 Location: Right arm, BP Patient Position: Head of bed elevated (Comment degrees)) Pulse (!) 103 Temp 99 °F (37.2 °C) Resp 18 Ht 5' 9\" (1.753 m) Wt 104.6 kg (230 lb 9.6 oz) SpO2 97% BMI 34.05 kg/m² SpO2 Readings from Last 6 Encounters:  
04/06/19 97% 03/27/19 98% 03/26/19 100% 03/07/19 100% 03/07/19 99% 02/07/19 99% Intake/Output Summary (Last 24 hours) at 4/6/2019 1323 Last data filed at 4/6/2019 3114 Gross per 24 hour Intake 3370 ml Output 685 ml Net 2685 ml Physical Exam: 
 
Gen:  Obese, frail, in no acute distress HEENT:  Pink conjunctivae, PERRL, hearing intact to voice, moist mucous membranes Neck:  Supple, without masses, thyroid non-tender Resp:  No accessory muscle use, clear breath sounds without wheezes rales or rhonchi 
Card:  No murmurs, tachycardic S1, S2 without thrills, bruits or peripheral edema Abd:  Soft, non-tender, non-distended, normoactive bowel sounds are present, no mass Lymph:  No cervical or inguinal adenopathy Musc:  No cyanosis or clubbing Skin:  No rashes or ulcers, skin turgor is good Neuro:  Cranial nerves are grossly intact, general motor weakness, follows commands vaguely Psych: Moderate insight, oriented to person, place, Telemetry reviewed:   normal sinus rhythm __________________________________________________________________ Medications Reviewed: (see below) Medications:  
 
Current Facility-Administered Medications Medication Dose Route Frequency  dextrose 5% - 0.45% NaCl with KCl 40 mEq/L infusion   IntraVENous CONTINUOUS  
 cefepime (MAXIPIME) 2 g in 0.9% sodium chloride (MBP/ADV) 100 mL  2 g IntraVENous Q12H  
 sodium chloride (NS) flush 5-40 mL  5-40 mL IntraVENous Q8H  
 sodium chloride (NS) flush 5-40 mL  5-40 mL IntraVENous PRN  
 acetaminophen (TYLENOL) tablet 650 mg  650 mg Oral Q6H PRN  
 naloxone (NARCAN) injection 0.4 mg  0.4 mg IntraVENous PRN  
 vancomycin (VANCOCIN) 1,500 mg in 0.9% sodium chloride 500 mL IVPB  1,500 mg IntraVENous Q24H  
 insulin lispro (HUMALOG) injection   SubCUTAneous AC&HS  
 glucose chewable tablet 16 g  4 Tab Oral PRN  
 dextrose (D50W) injection syrg 12.5-25 g  12.5-25 g IntraVENous PRN  
 glucagon (GLUCAGEN) injection 1 mg  1 mg IntraMUSCular PRN  
 tamsulosin (FLOMAX) capsule 0.4 mg  0.4 mg Oral DAILY  heparin (porcine) injection 5,000 Units  5,000 Units SubCUTAneous Q8H  
 HYDROcodone-acetaminophen (NORCO) 5-325 mg per tablet 1 Tab  1 Tab Oral Q4H PRN Lab Data Reviewed: (see below) Lab Review:  
 
Recent Labs 04/06/19 0101 04/05/19 
1621 04/05/19 
0300 WBC 14.4* 14.9* 13.7* HGB 9.2* 9.8* 11.4* HCT 30.4* 32.4* 37.8  288 344 Recent Labs 04/06/19 0101 04/05/19 
1621 04/05/19 
1208 04/05/19 
0300  143 143 146*  
K 3.0* 2.6* 4.3 2.1*  
* 108 109* 106 CO2 29 29 31 34* * 186* 293* 167* BUN 13 14 13 14 CREA 1.34* 1.34* 1.47* 1.75* CA 7.3* 7.8* 7.7* 8.8 MG 2.2  --  2.3 2.7* PHOS 2.2*  --   --  3.1 ALB 2.1*  --   --  2.7* TBILI 0.4  --   --  0.5 SGOT 345*  --   --  284* ALT 91*  --   --  65 INR  --   --   --  1.1 Lab Results Component Value Date/Time  Glucose (POC) 149 (H) 04/06/2019 11:56 AM  
 Glucose (POC) 152 (H) 04/06/2019 07:07 AM  
 Glucose (POC) 312 (H) 04/05/2019 08:45 PM  
 Glucose (POC) 167 (H) 04/05/2019 05:06 PM  
 Glucose (POC) 134 (H) 04/05/2019 12:28 PM  
 
No results for input(s): PH, PCO2, PO2, HCO3, FIO2 in the last 72 hours. Recent Labs 04/05/19 
0300 INR 1.1 All Micro Results Procedure Component Value Units Date/Time CULTURE, URINE [[id number]]  (Abnormal) Collected:  04/05/19 0450 Order Status:  Completed Specimen:  Urine Updated:  04/06/19 1131 Special Requests: --     
  NO SPECIAL REQUESTS Reflexed from [id number] [place] Count --     
  >100,000 COLONIES/mL Culture result: GRAM NEGATIVE RODS     
 CULTURE, BLOOD [[id number]] Collected:  04/05/19 0450 Order Status:  Completed Specimen:  Whole Blood Updated:  04/06/19 3272 Special Requests: NO SPECIAL REQUESTS Culture result: NO GROWTH 1 DAY I have reviewed notes of prior 24hr. Other pertinent lab: none Total time spent with patient: 35 Minutes I rounded on patient from 12:10 to 12:45 PM, 35 minutes, in addition to the time spent by my partner Dr Obdulia English, with personal examine and treatment at bedside, including additional Dx, orders and discussion. Care Plan discussed with: Patient, Care Manager, Nursing Staff, Consultant/Specialist and >50% of time spent in counseling and coordination of care Discussed:  Care Plan and D/C Planning Prophylaxis:  Hep SQ and H2B/PPI Disposition:  SNF/LTC 
        
___________________________________________________ Attending Physician: Sagrario Quiñones MD

## 2019-04-06 NOTE — PROGRESS NOTES
TRANSFER - OUT REPORT: 
 
Verbal report given to SARITA Wheatley(name) on Caroline Lawrence  being transferred to 5th floor(unit) for routine progression of care Report consisted of patients Situation, Background, Assessment and  
Recommendations(SBAR). Information from the following report(s) SBAR, Kardex and MAR was reviewed with the receiving nurse. Lines:  
Peripheral IV 04/05/19 Left Antecubital (Active) Site Assessment Clean, dry, & intact 4/6/2019  8:26 AM  
Phlebitis Assessment 0 4/6/2019  8:26 AM  
Infiltration Assessment 0 4/6/2019  8:26 AM  
Dressing Status Clean, dry, & intact 4/6/2019  8:26 AM  
Dressing Type Transparent 4/6/2019  8:26 AM  
Hub Color/Line Status Blue; Infusing 4/6/2019  8:26 AM  
Action Taken Open ports on tubing capped 4/5/2019  8:38 PM  
Alcohol Cap Used Yes 4/5/2019  8:38 PM  
   
Peripheral IV 04/05/19 Left Wrist (Active) Site Assessment Clean, dry, & intact 4/6/2019  8:26 AM  
Phlebitis Assessment 0 4/6/2019  8:26 AM  
Infiltration Assessment 0 4/6/2019  8:26 AM  
Dressing Status Clean, dry, & intact 4/6/2019  8:26 AM  
Dressing Type Transparent 4/6/2019  8:26 AM  
Hub Color/Line Status Pink;Capped 4/6/2019  8:26 AM  
Action Taken Open ports on tubing capped 4/5/2019  8:38 PM  
Alcohol Cap Used Yes 4/5/2019  8:38 PM  
  
 
Opportunity for questions and clarification was provided.

## 2019-04-06 NOTE — PROGRESS NOTES
Bedside shift change report given to Jin Cox RN (oncoming nurse) by Jama May RN (offgoing nurse). Report included the following information SBAR, Kardex and MAR.

## 2019-04-06 NOTE — PROGRESS NOTES
Physical Therapy Note: PT evaluation attempted and deferred. Per RN pt unavailable to participate at time of attempt. Will continue to follow and complete PT evaluation when pt available and appropriate.

## 2019-04-06 NOTE — ROUTINE PROCESS
Rcvd call from lab, patient's CPK is critical at [phone]. I informed patient's primary nurse, Tonya Vale RN.

## 2019-04-07 LAB
ANION GAP SERPL CALC-SCNC: 6 MMOL/L (ref 5–15)
BACTERIA SPEC CULT: ABNORMAL
BACTERIA SPEC CULT: ABNORMAL
BUN SERPL-MCNC: 14 MG/DL (ref 6–20)
BUN/CREAT SERPL: 10 (ref 12–20)
CALCIUM SERPL-MCNC: 7.2 MG/DL (ref 8.5–10.1)
CC UR VC: ABNORMAL
CHLORIDE SERPL-SCNC: 109 MMOL/L (ref 97–108)
CK SERPL-CCNC: 5911 U/L (ref 39–308)
CO2 SERPL-SCNC: 29 MMOL/L (ref 21–32)
CREAT SERPL-MCNC: 1.35 MG/DL (ref 0.7–1.3)
ERYTHROCYTE [DISTWIDTH] IN BLOOD BY AUTOMATED COUNT: 14.3 % (ref 11.5–14.5)
GLUCOSE BLD STRIP.AUTO-MCNC: 150 MG/DL (ref 65–100)
GLUCOSE BLD STRIP.AUTO-MCNC: 164 MG/DL (ref 65–100)
GLUCOSE BLD STRIP.AUTO-MCNC: 191 MG/DL (ref 65–100)
GLUCOSE SERPL-MCNC: 182 MG/DL (ref 65–100)
HCT VFR BLD AUTO: 27.4 % (ref 36.6–50.3)
HGB BLD-MCNC: 8.5 G/DL (ref 12.1–17)
MAGNESIUM SERPL-MCNC: 2.1 MG/DL (ref 1.6–2.4)
MCH RBC QN AUTO: 27.2 PG (ref 26–34)
MCHC RBC AUTO-ENTMCNC: 31 G/DL (ref 30–36.5)
MCV RBC AUTO: 87.8 FL (ref 80–99)
NRBC # BLD: 0 K/UL (ref 0–0.01)
NRBC BLD-RTO: 0 PER 100 WBC
PLATELET # BLD AUTO: 262 K/UL (ref 150–400)
PMV BLD AUTO: 10.9 FL (ref 8.9–12.9)
POTASSIUM SERPL-SCNC: 3 MMOL/L (ref 3.5–5.1)
RBC # BLD AUTO: 3.12 M/UL (ref 4.1–5.7)
SERVICE CMNT-IMP: ABNORMAL
SODIUM SERPL-SCNC: 144 MMOL/L (ref 136–145)
WBC # BLD AUTO: 14.4 K/UL (ref 4.1–11.1)

## 2019-04-07 PROCEDURE — 97530 THERAPEUTIC ACTIVITIES: CPT

## 2019-04-07 PROCEDURE — 80048 BASIC METABOLIC PNL TOTAL CA: CPT

## 2019-04-07 PROCEDURE — 36415 COLL VENOUS BLD VENIPUNCTURE: CPT

## 2019-04-07 PROCEDURE — 77030011641 HC PASTE OST ADH BMS -A

## 2019-04-07 PROCEDURE — 82962 GLUCOSE BLOOD TEST: CPT

## 2019-04-07 PROCEDURE — 74011250636 HC RX REV CODE- 250/636: Performed by: INTERNAL MEDICINE

## 2019-04-07 PROCEDURE — 97162 PT EVAL MOD COMPLEX 30 MIN: CPT

## 2019-04-07 PROCEDURE — 82550 ASSAY OF CK (CPK): CPT

## 2019-04-07 PROCEDURE — 77030012856

## 2019-04-07 PROCEDURE — 97165 OT EVAL LOW COMPLEX 30 MIN: CPT

## 2019-04-07 PROCEDURE — 77030011256 HC DRSG MEPILEX <16IN NO BORD MOLN -A

## 2019-04-07 PROCEDURE — 65270000029 HC RM PRIVATE

## 2019-04-07 PROCEDURE — 77030010541

## 2019-04-07 PROCEDURE — 85027 COMPLETE CBC AUTOMATED: CPT

## 2019-04-07 PROCEDURE — 74011636637 HC RX REV CODE- 636/637: Performed by: INTERNAL MEDICINE

## 2019-04-07 PROCEDURE — 74011000258 HC RX REV CODE- 258: Performed by: INTERNAL MEDICINE

## 2019-04-07 PROCEDURE — 74011250637 HC RX REV CODE- 250/637: Performed by: INTERNAL MEDICINE

## 2019-04-07 PROCEDURE — 97535 SELF CARE MNGMENT TRAINING: CPT

## 2019-04-07 PROCEDURE — 83735 ASSAY OF MAGNESIUM: CPT

## 2019-04-07 RX ORDER — ACETAMINOPHEN 325 MG/1
650 TABLET ORAL
Status: DISCONTINUED | OUTPATIENT
Start: 2019-04-07 | End: 2019-04-16 | Stop reason: HOSPADM

## 2019-04-07 RX ADMIN — Medication 10 ML: at 13:11

## 2019-04-07 RX ADMIN — ACETAMINOPHEN 650 MG: 325 TABLET ORAL at 04:11

## 2019-04-07 RX ADMIN — HEPARIN SODIUM 5000 UNITS: 5000 INJECTION INTRAVENOUS; SUBCUTANEOUS at 23:17

## 2019-04-07 RX ADMIN — DEXTROSE MONOHYDRATE, SODIUM CHLORIDE, AND POTASSIUM CHLORIDE: 50; 4.5; 2.98 INJECTION, SOLUTION INTRAVENOUS at 17:31

## 2019-04-07 RX ADMIN — INSULIN LISPRO 2 UNITS: 100 INJECTION, SOLUTION INTRAVENOUS; SUBCUTANEOUS at 08:54

## 2019-04-07 RX ADMIN — INSULIN LISPRO 2 UNITS: 100 INJECTION, SOLUTION INTRAVENOUS; SUBCUTANEOUS at 17:33

## 2019-04-07 RX ADMIN — DEXTROSE MONOHYDRATE, SODIUM CHLORIDE, AND POTASSIUM CHLORIDE: 50; 4.5; 2.98 INJECTION, SOLUTION INTRAVENOUS at 06:32

## 2019-04-07 RX ADMIN — TAMSULOSIN HYDROCHLORIDE 0.4 MG: 0.4 CAPSULE ORAL at 08:54

## 2019-04-07 RX ADMIN — HEPARIN SODIUM 5000 UNITS: 5000 INJECTION INTRAVENOUS; SUBCUTANEOUS at 06:38

## 2019-04-07 RX ADMIN — CEFEPIME 2 G: 2 INJECTION, POWDER, FOR SOLUTION INTRAVENOUS at 17:33

## 2019-04-07 RX ADMIN — INSULIN LISPRO 2 UNITS: 100 INJECTION, SOLUTION INTRAVENOUS; SUBCUTANEOUS at 12:45

## 2019-04-07 RX ADMIN — ACETAMINOPHEN 650 MG: 325 TABLET ORAL at 19:24

## 2019-04-07 RX ADMIN — HEPARIN SODIUM 5000 UNITS: 5000 INJECTION INTRAVENOUS; SUBCUTANEOUS at 00:05

## 2019-04-07 RX ADMIN — CEFEPIME 2 G: 2 INJECTION, POWDER, FOR SOLUTION INTRAVENOUS at 06:32

## 2019-04-07 RX ADMIN — Medication 10 ML: at 23:18

## 2019-04-07 RX ADMIN — HEPARIN SODIUM 5000 UNITS: 5000 INJECTION INTRAVENOUS; SUBCUTANEOUS at 17:33

## 2019-04-07 NOTE — PROGRESS NOTES
Bedside and Verbal shift change report given to Alyson STEIN (oncoming nurse) by Jordon Mullen (offgoing nurse). Report included the following information SBAR, Kardex, Intake/Output, MAR, Recent Results, Med Rec Status and Cardiac Rhythm nsr.

## 2019-04-07 NOTE — PROGRESS NOTES
Problem: Self Care Deficits Care Plan (Adult) Goal: *Acute Goals and Plan of Care (Insert Text) Description Occupational Therapy Goals Initiated 4/7/2019 1. Patient will perform grooming with supervision/set-up in unsupported sit within 7 day(s). 2.  Patient will perform upper body dressing with supervision/set-up within 7 day(s). 3.  Patient will perform lower body dressing with moderate assistance using AE PRN within 7 day(s). 4.  Patient will perform toilet transfers with moderate assistance to [facility] within 7 day(s). 5.  Patient will perform all aspects of toileting with moderate assistance  within 7 day(s). 6.  Patient will participate in upper extremity therapeutic exercise/activities with supervision/set-up for 5 minutes within 7 day(s). OCCUPATIONAL THERAPY EVALUATION Patient: Dae Dumas [de-identified]74 y.o. male) Date: 4/7/2019 Primary Diagnosis: Syncope [R55] Precautions:  Fall ASSESSMENT : 
Cleared by RN to see pt for therapy session. Based on the objective data described below, the patient presents with decreased balance, endurance, and strength, impaired functional mobility, and decreased LE ROM and BLE edema following admission for syncope. Pt presented to [facility] after being found down in his home for unknown amount of time. PMH significant for stage IV prostate CA. At baseline he lives alone and is I with ADLs and functional mobility. Received semisupine in bed, agreeable to participate. Required max A x2 for bed mobility, fair sitting balance. Unable to reach to LEs to perform dressing ADL due to decreased balance and LE ROM, required total A. Stood 2 times to [employer] with mod A x2, unable to advance feet forward, took side steps towards [facility] with assist x2 for balance and to facilitate coordination/weight shifting. Returned to bed with bed placed in chair position, performed grooming ADLs with setup in supported sit.  Pt reported some dizziness with transitions that subsided, vital signs monitored and stable. At this time pt is well below his reported functional baseline, requires assist x2 for mobility and setup-total A for ADLs. Pending progress and medical course recommend rehab at discharge. Patient will benefit from skilled intervention to address the above impairments. Patient?s rehabilitation potential is considered to be Good Factors which may influence rehabilitation potential include: ? None noted ? Mental ability/status ? Medical condition ? Home/family situation and support systems ? Safety awareness ? Pain tolerance/management ? Other: PLAN : 
Recommendations and Planned Interventions: 
?               Self Care Training                  ? Therapeutic Activities ? Functional Mobility Training    ? Cognitive Retraining 
? Therapeutic Exercises           ? Endurance Activities ? Balance Training                   ? Neuromuscular Re-Education ? Visual/Perceptual Training     ? Home Safety Training 
? Patient Education                 ? Family Training/Education ? Other (comment): Frequency/Duration: Patient will be followed by occupational therapy 5 times a week to address goals. Discharge Recommendations: Rehab Further Equipment Recommendations for Discharge: TBD SUBJECTIVE:  
Patient stated ? I'm going to need help with my socks. ? OBJECTIVE DATA SUMMARY:  
HISTORY:  
Past Medical History:  
Diagnosis Date Diabetes mellitus ([facility] [address] Prostate cancer ([facility] [address] Sleep apnea Past Surgical History:  
Procedure Laterality Date FLEXIBLE SIGMOIDOSCOPY N/A 9/6/2018 SIGMOIDOSCOPY FLEXIBLE performed by Amos Stern MD at [address] Left HX HIP REPLACEMENT Right HX OTHER SURGICAL    
 bladder stent IR CHANGE NEPHROSTOMY PYELOS TUBE RT  2/1/2019 Prior Level of Function/Environment/Context: PMH significant for stage IV prostate CA. At baseline he lives alone and is I with ADLs and functional mobility. Home Situation Home Environment: Private residence Wheelchair Ramp: Yes One/Two Story Residence: One story Living Alone: Yes Support Systems: Family member(s) Patient Expects to be Discharged to[de-identified] Skilled nursing facility Current DME Used/Available at Home: Walker, rolling, Cane, straight Hand dominance: Right EXAMINATION OF PERFORMANCE DEFICITS: 
Cognitive/Behavioral Status: 
Neurologic State: Alert Orientation Level: Oriented X4 Cognition: Follows commands Perception: Appears intact Perseveration: No perseveration noted Safety/Judgement: Awareness of environment; Fall prevention;Decreased insight into deficits Hearing: Auditory Auditory Impairment: None Vision/Perceptual:   
    
    
Acuity: Within Defined Limits Range of Motion: 
AROM: Grossly decreased, non-functional 
PROM: Generally decreased, functional 
  
  
Strength: 
Strength: Grossly decreased, non-functional 
  
Coordination: 
Coordination: Grossly decreased, non-functional 
Fine Motor Skills-Upper: Left Intact; Right Intact Gross Motor Skills-Upper: Left Intact; Right Intact Tone & Sensation: 
Tone: Normal 
Sensation: Impaired Balance: 
Sitting: Intact Standing: Impaired Standing - Static: Constant support Standing - Dynamic : Constant support Functional Mobility and Transfers for ADLs: 
Bed Mobility: 
Rolling: Moderate assistance Supine to Sit: Total assistance;Assist x2 Sit to Supine: Total assistance;Assist x2 Transfers: 
Sit to Stand: Maximum assistance;Assist x2 Stand to Sit: Moderate assistance;Assist x2 ADL Assessment: 
Feeding: Setup Oral Facial Hygiene/Grooming: Setup(supported sit) Bathing: Maximum assistance Upper Body Dressing: Maximum assistance Lower Body Dressing: Total assistance Toileting: Total assistance ADL Intervention and task modifications: 
  
 
Grooming Washing Face: Supervision/set-up(bed in chair position) Upper Body Dressing Assistance Hospital Gown: Maximum assistance Lower Body Dressing Assistance Socks: Total assistance (dependent)(pt unable to perform due to fatigue and decr LE ROM/strength) Toileting Bladder Hygiene: Total assistance (dependent)(following incontinent episode) Cognitive Retraining Safety/Judgement: Awareness of environment; Fall prevention;Decreased insight into deficits Functional Measure: 
Barthel Index: 
Bathin Bladder: 0 Bowels: 5 Groomin Dressin Feedin Mobility: 0 Stairs: 0 Toilet Use: 0 Transfer (Bed to Chair and Back): 5 Total: 15/100 Percentage of impairment  
0% 1-19% 20-39% 40-59% 60-79% 80-99% 100% Barthel Score 0-100 100 99-80 79-60 59-40 20-39 1-19 
 0 The Barthel ADL Index: Guidelines 1. The index should be used as a record of what a patient does, not as a record of what a patient could do. 2. The main aim is to establish degree of independence from any help, physical or verbal, however minor and for whatever reason. 3. The need for supervision renders the patient not independent. 4. A patient's performance should be established using the best available evidence. Asking the patient, friends/relatives and nurses are the usual sources, but direct observation and common sense are also important. However direct testing is not needed. 5. Usually the patient's performance over the preceding 24-48 hours is important, but occasionally longer periods will be relevant. 6. Middle categories imply that the patient supplies over 50 per cent of the effort. 7. Use of aids to be independent is allowed. Mauri Garcia., Barthel, D.W. (4434). Functional evaluation: the Barthel Index. [address] (14)2. GOVIND Dela Cruz, Jackie Hines., FiorellaBrenda Sharma, [address] (1999). Measuring the change indisability after inpatient rehabilitation; comparison of the responsiveness of the Barthel Index and Functional [place] Measure. Journal of Neurology, Neurosurgery, and Psychiatry, 66(4), 809-524. KUNAL Coffey, ROSSY Brice, & Mc Enamorado M.A. (2004.) Assessment of post-stroke quality of life in cost-effectiveness studies: The usefulness of the Barthel Index and the EuroQoL-5D. [facility], 13, 299-17 Occupational Therapy Evaluation Charge Determination History Examination Decision-Making LOW Complexity : Brief history review  MEDIUM Complexity : 3-5 performance deficits relating to physical, cognitive , or psychosocial skils that result in activity limitations and / or participation restrictions MEDIUM Complexity : Patient may present with comorbidities that affect occupational performnce. Miniml to moderate modification of tasks or assistance (eg, physical or verbal ) with assesment(s) is necessary to enable patient to complete evaluation Based on the above components, the patient evaluation is determined to be of the following complexity level: LOW Pain: 
Pain Scale 1: Numeric (0 - 10) Pain Intensity 1: 0 Activity Tolerance:  
Good Please refer to the flowsheet for vital signs taken during this treatment. After treatment:  
? Patient left in no apparent distress sitting up in chair ? Patient left in no apparent distress in bed 
? Call bell left within reach ? Nursing notified ? Caregiver present ? Bed alarm activated COMMUNICATION/EDUCATION:  
The patient?s plan of care was discussed with: Physical Therapist and Registered Nurse. 
? Home safety education was provided and the patient/caregiver indicated understanding. ? Patient/family have participated as able in goal setting and plan of care. ? Patient/family agree to work toward stated goals and plan of care. ? Patient understands intent and goals of therapy, but is neutral about his/her participation. ? Patient is unable to participate in goal setting and plan of care. This patient?s plan of care is appropriate for delegation to [facility]. Thank you for this referral. 
Eunice Galvan, OT Time Calculation: 31 mins

## 2019-04-07 NOTE — PROGRESS NOTES
[facility] Medical Progress Note NAME: Onalee Nissen :  1945 MRM:  [id number] Date/Time: 2019  9:29 AM 
 
  
Assessment and Plan:  
 
Rhabdomyolysis / JAGDEEP (acute kidney injury) / CKD stage 3 / hypokalemia - Rhabdo POA, due to fall and general dehydration, plus chronic renal obstruction. May have CKD 3 due to DM as well. Renal consulted. Hydrate and monitor. Not a candidate for RRT. Replete K. CK finally dropping with hydration Syncope / Dehydration / Hypokalemia / Hypernatremia - POA due to poor PO intake, likely vagal, likely exacerbated by general deconditioning, anemia, etc.  Hydrate and replete lytes. Check orthostatics with PT. Prostate cancer stage 4 - Followed by Dr Haydee Akhtar. He presentation suggests he is nearing end stage. Palliative consult. Oncology consulted and they concur that he no option for curative attempt, and is too frail for palliative attempt. Usually on leuprolide and zytiga, but hold for acute illness. He is appropriate for hospice Ureteral obstruction / Hydronephrosis bilateral / Pelvic mass / Pyuria / nephrostomy tubes in place - POA. Urology consulted. No procedure. Continue cefepime/vanco, awaiting final cx with GNR so far. Monitor output. Continue flomax. SIRS criteria / sinus tachycardia / Leukocytosis - POA, unclear etiology. GNR on urine cx. Complicated UTI vs non-infectious stress with colonization. Empiric cefepime and vancomycin Debilitated patient / Recurrent falls / burn - Burn on arm due to landing on heater. Multiple falls this year. Fall precautions. PT/OT eval.  This is mainly deconditioning related to end stage cancer. I am not confident he has great potential for rehab. Diabetes mellitus type 2 without compilations - POA, BG always <160. Diabetic diet and counseling. SSI per protocol. Hold home meds. A1c useless in setting of anemia.   Inlight of end stage cancer, I would stop all testing and treatment, as tight BG control is not expected to improve or extend his life. Anemia - POA, mild but will worsen with hydration. Likely due to underlying chronic disease. Severe obesity - Weight loss of no benefit in setting of end stage cancer. His albumin is low, reflecting current malnutrition and urine loss. Diet for comfort. Sleep apnea - Resume home CPAP for comfort if desired. Usually takes trazodone, but that may contribute to syncope. HTN - Stop HCTZ and ASA. BP control is not expected to improve or extend his life. Subjective: Chief Complaint:  Weak, vague, comfortable ROS: 
(bold if positive, if negative) Tolerating minimal PT Tolerating some Diet Objective:  
 
Last 24hrs VS reviewed since prior progress note. Most recent are: 
 
Visit Vitals /53 (BP 1 Location: Left arm, BP Patient Position: At rest) Pulse 97 Temp 99.3 °F (37.4 °C) Resp 18 Ht 5' 9\" (1.753 m) Wt 104.6 kg (230 lb 9.6 oz) SpO2 99% BMI 34.05 kg/m² SpO2 Readings from Last 6 Encounters:  
04/07/19 99% 03/27/19 98% 03/26/19 100% 03/07/19 100% 03/07/19 99% 02/07/19 99% Intake/Output Summary (Last 24 hours) at 4/7/2019 1943 Last data filed at 4/7/2019 0900 Gross per 24 hour Intake 1500 ml Output 1825 ml Net -325 ml Physical Exam: 
 
Gen:  Obese, frail, in no acute distress HEENT:  Pink conjunctivae, PERRL, hearing intact to voice, moist mucous membranes Neck:  Supple, without masses, thyroid non-tender Resp:  No accessory muscle use, clear breath sounds without wheezes rales or rhonchi 
Card:  No murmurs, tachycardic S1, S2 without thrills, bruits or peripheral edema Abd:  Soft, non-tender, non-distended, normoactive bowel sounds are present, no mass Lymph:  No cervical or inguinal adenopathy Musc:  No cyanosis or clubbing Skin:  No rashes or ulcers, skin turgor is good Neuro:  Cranial nerves are grossly intact, general motor weakness, follows commands vaguely Psych: Moderate insight, oriented to person, place, Telemetry reviewed:   normal sinus rhythm 
__________________________________________________________________ Medications Reviewed: (see below) Medications:  
 
Current Facility-Administered Medications Medication Dose Route Frequency  acetaminophen (TYLENOL) tablet 650 mg  650 mg Oral Q6H PRN  
 dextrose 5% - 0.45% NaCl with KCl 40 mEq/L infusion   IntraVENous CONTINUOUS  
 cefepime (MAXIPIME) 2 g in 0.9% sodium chloride (MBP/ADV) 100 mL  2 g IntraVENous Q12H  
 sodium chloride (NS) flush 5-40 mL  5-40 mL IntraVENous Q8H  
 sodium chloride (NS) flush 5-40 mL  5-40 mL IntraVENous PRN  
 naloxone (NARCAN) injection 0.4 mg  0.4 mg IntraVENous PRN  
 insulin lispro (HUMALOG) injection   SubCUTAneous AC&HS  
 glucose chewable tablet 16 g  4 Tab Oral PRN  
 dextrose (D50W) injection syrg 12.5-25 g  12.5-25 g IntraVENous PRN  
 glucagon (GLUCAGEN) injection 1 mg  1 mg IntraMUSCular PRN  
 tamsulosin (FLOMAX) capsule 0.4 mg  0.4 mg Oral DAILY  heparin (porcine) injection 5,000 Units  5,000 Units SubCUTAneous Q8H  
 HYDROcodone-acetaminophen (NORCO) 5-325 mg per tablet 1 Tab  1 Tab Oral Q4H PRN Lab Data Reviewed: (see below) Lab Review:  
 
Recent Labs 04/07/19 
0022 04/06/19 
0101 04/05/19 
1621 WBC 14.4* 14.4* 14.9* HGB 8.5* 9.2* 9.8* HCT 27.4* 30.4* 32.4*  
 258 288 Recent Labs 04/07/19 
0022 04/06/19 
0101 04/05/19 
1621 04/05/19 
1208 04/05/19 
0300  145 143 143 146*  
K 3.0* 3.0* 2.6* 4.3 2.1*  
* 110* 108 109* 106 CO2 29 29 29 31 34* * 161* 186* 293* 167* BUN 14 13 14 13 14 CREA 1.35* 1.34* 1.34* 1.47* 1.75* CA 7.2* 7.3* 7.8* 7.7* 8.8 MG 2.1 2.2  --  2.3 2.7* PHOS  --  2.2*  --   --  3.1 ALB  --  2.1*  --   --  2.7* TBILI  --  0.4  --   --  0.5 SGOT  --  345*  --   --  284* ALT  --  91*  --   --  65 INR  --   --   --   --  1.1 Lab Results Component Value Date/Time Glucose (POC) 150 (H) 04/07/2019 07:16 AM  
 Glucose (POC) 208 (H) 04/06/2019 09:08 PM  
 Glucose (POC) 111 (H) 04/06/2019 04:19 PM  
 Glucose (POC) 149 (H) 04/06/2019 11:56 AM  
 Glucose (POC) 152 (H) 04/06/2019 07:07 AM  
 
No results for input(s): PH, PCO2, PO2, HCO3, FIO2 in the last 72 hours. Recent Labs 04/05/19 
0300 INR 1.1 All Micro Results Procedure Component Value Units Date/Time CULTURE, BLOOD [[id number]] Collected:  04/05/19 0450 Order Status:  Completed Specimen:  Whole Blood Updated:  04/07/19 3996 Special Requests: NO SPECIAL REQUESTS Culture result: NO GROWTH 2 DAYS     
 CULTURE, URINE [[id number]]  (Abnormal) Collected:  04/05/19 0450 Order Status:  Completed Specimen:  Urine Updated:  04/06/19 1131 Special Requests: --     
  NO SPECIAL REQUESTS Reflexed from [id number] [place] Count --     
  >100,000 COLONIES/mL Culture result: [id number] [facility] I have reviewed notes of prior 24hr. Other pertinent lab: none Total time spent with patient: 28 Minutes I reviewed chart, notes, data and current medications in the medical record. I have examined and treated the patient at bedside during this period. Care Plan discussed with: Patient, Care Manager, Nursing Staff, Consultant/Specialist and >50% of time spent in counseling and coordination of care Discussed:  Care Plan and D/C Planning Prophylaxis:  Hep SQ and H2B/PPI Disposition:  SNF/LTC 
        
___________________________________________________ Attending Physician: Rubén Addison MD

## 2019-04-07 NOTE — PROGRESS NOTES
[facility] at [facility] [address] [facility], [address] W: [phone]  F: [phone] Reason for Visit:  
Efren Montano is a 74 y.o. male who is seen in consultation at the request of Dr. Esha Cavanaugh 
 for evaluation of prostate cancer with mets. Hematology / Oncology Treatment History:  
 
Diagnosis: Prosate cancer, diagnosed 2015.  
  
Stage: Now metastatic 
  
Pathology: 9/6/18 Sigmoid colon ulcer, biopsy:  
Adenocarcinoma, strongly favor prostate primary (see Comment). Stains weakly for PSAP. 
  
Prior Treatment: Brachytherapy and EBRT in 12/2015.  
  
Current Treatment: Gerarda Grammes started on 1/28/19. Lupron started earlier. Oncologic History: 
Patient was admitted to the hospital in 9/2018 for persistent diarrhea, evidence of a sigmoid mass. He had been suffering with persistent diarrhea, 50-70-lb weight loss for approx 4-5 months. He underwent 2 recent colonoscopies (one in July and one in Aug 2018) by Dr. Jazmin Bowens at [facility]. He states the first colonoscopy was not clear due to poor prep. The second colonoscopy reportedly showed abnormal rectal and sigmoid mucosa with a possible mass. This was biopsied and was benign. The patient then underwent a CT scan which was notable for R-sided hydronephrosis from extrinsic compression of the right ureter. The patient's urologist, Dr. Merary Marroquin, attempted to pass a ureteral stent but was unsuccessful. The patient was then admitted to [address] [place] [address] for weakness, persistent diarrhea and lower abdominal pain. Abd/pelvis CT on 9/4/18 showed a distal sigmoid mass with possible invasion of the posterior bladder wall as well as R hydronephrosis. He was evaluated by Dr. Edwin Disal in general surgery and underwent diverting loop colostomy given the symptoms caused by the sigmoid mass. He underwent colonoscopy by Dr. Pati Fink in GI with finding of a large sigmoid mass, which was biopsied.  Pathology revealed an adenocarcinoma, but morphology was more consistent with prostate cancer than colorectal cancer. He was seen by Dr. Roxanne Pichardo in Urology during hospital stay who recommended percutaneous nephrostomy tube, placed by IR on 9/7. Given the biopsy findings, Dr. Roxanne Pichardo recommended further evaluation with prostate/pelvic MRI. Nuclear med bone scan negative for any suspicious bony lesions. After hospital discharge, he fell and was admitted to /[facility]. He stayed there for 3 days and was discharged to rehab, where he stayed for 4 weeks. His pathology was reviewed by an outside facility with opinion that this was a poorly differentiated prostate cancer. Pt was started on Zytiga + Prednisone in 1/2019. History of Present Illness: Mr. Salvador William is a 75 y/o male with h/o prostate cancer who is admitted to the hospital after passing out and woke up after spending approximately 1 day on the ground unconscious. This is his second hospitalization for similar presentation. He was found with diarrhea which had leaked out of his colostomy bag, soiling his clothes. Patient has a complicated oncologist history with a presumed metastatic prostate cancer causing direct extension into colon and surrounding structures, causing obstruction of ureters. Pt required diverting colostomy and R nephrostomy tube. PSA has been low and not markedly elevated. After starting Lupron, Zytiga + Prednisone for prostate cancer, PSA declined slightly, but on most recent check was increased to 3-4 range. ED labs notable for K 2.1, Cr 1.75, CK 10,665. Patient was started on IVF. He states he cannot recall how he ended up on the floor. He simply remembers sitting and watching basketball. Denies any fevers or chills  at home. Denies SOB. Appetite has been poor for several months. Lives by himself. No family at bedside. Today, patient denies any new complaints. He continues to spike fevers.  He is upset that he did not receive his lunch yesterday until late in the evening. Past Medical History:  
Diagnosis Date  Diabetes mellitus ([facility]  Prostate cancer ([facility]  Sleep apnea Past Surgical History:  
Procedure Laterality Date 2021 Luis Orona N/A 9/6/2018 SIGMOIDOSCOPY FLEXIBLE performed by Lianna Fitzgerald MD at [address] Left  HX HIP REPLACEMENT Right  HX OTHER SURGICAL    
 bladder stent  IR CHANGE NEPHROSTOMY PYELOS TUBE RT  2/1/2019 Social History Tobacco Use  Smoking status: Never Smoker  Smokeless tobacco: Never Used Substance Use Topics  Alcohol use: No  
  
Family History Problem Relation Age of Onset  Cancer Mother   
     colon  Hypertension Mother  Heart Disease Mother  Cancer Father  Diabetes Sister  Cancer Brother  Diabetes Sister Current Facility-Administered Medications Medication Dose Route Frequency  acetaminophen (TYLENOL) tablet 650 mg  650 mg Oral Q6H PRN  
 dextrose 5% - 0.45% NaCl with KCl 40 mEq/L infusion   IntraVENous CONTINUOUS  
 cefepime (MAXIPIME) 2 g in 0.9% sodium chloride (MBP/ADV) 100 mL  2 g IntraVENous Q12H  
 sodium chloride (NS) flush 5-40 mL  5-40 mL IntraVENous Q8H  
 sodium chloride (NS) flush 5-40 mL  5-40 mL IntraVENous PRN  
 naloxone (NARCAN) injection 0.4 mg  0.4 mg IntraVENous PRN  
 insulin lispro (HUMALOG) injection   SubCUTAneous AC&HS  
 glucose chewable tablet 16 g  4 Tab Oral PRN  
 dextrose (D50W) injection syrg 12.5-25 g  12.5-25 g IntraVENous PRN  
 glucagon (GLUCAGEN) injection 1 mg  1 mg IntraMUSCular PRN  
 tamsulosin (FLOMAX) capsule 0.4 mg  0.4 mg Oral DAILY  heparin (porcine) injection 5,000 Units  5,000 Units SubCUTAneous Q8H  
 HYDROcodone-acetaminophen (NORCO) 5-325 mg per tablet 1 Tab  1 Tab Oral Q4H PRN Allergies Allergen Reactions  Ciprofloxacin Rash Review of Systems: A complete review of systems was obtained, negative except as described above. Physical Exam:  
 
Visit Vitals /53 (BP 1 Location: Left arm, BP Patient Position: At rest) Pulse 97 Temp 99.3 °F (37.4 °C) Resp 18 Ht 5' 9\" (1.753 m) Wt 230 lb 9.6 oz (104.6 kg) SpO2 99% BMI 34.05 kg/m² ECOG PS: 2-3 General: elderly; No distress Eyes: PERRLA, anicteric sclerae HENT: Atraumatic with normal appearance of ears and nose; OP clear Neck: Supple; no thyromegaly Lymphatic: No cervical, supraclavicular, or axillary adenopathy Respiratory: anteriorly CTAB, normal respiratory effort CV: Normal rate, regular rhythm, no murmurs, 2+ pitting edema in BLE. GI: Colostomy noted with liquid brown stool noted; Soft, nontender, nondistended, no masses, no hepatomegaly, no splenomegaly : nephrostomy tube noted MS:Digits without clubbing or cyanosis. Skin: No rashes, ecchymoses, or petechiae. Normal temperature, turgor, and texture. Neuro/Psych: Alert, oriented, appropriate affect, normal judgment/insight Results:  
 
Lab Results Component Value Date/Time WBC 14.4 (H) 04/07/2019 12:22 AM  
 HGB 8.5 (L) 04/07/2019 12:22 AM  
 HCT 27.4 (L) 04/07/2019 12:22 AM  
 PLATELET 815 38/33/9765 12:22 AM  
 MCV 87.8 04/07/2019 12:22 AM  
 ABS. NEUTROPHILS 12.4 (H) 04/06/2019 01:01 AM  
 
Lab Results Component Value Date/Time Sodium 144 04/07/2019 12:22 AM  
 Potassium 3.0 (L) 04/07/2019 12:22 AM  
 Chloride 109 (H) 04/07/2019 12:22 AM  
 CO2 29 04/07/2019 12:22 AM  
 Glucose 182 (H) 04/07/2019 12:22 AM  
 BUN 14 04/07/2019 12:22 AM  
 Creatinine 1.35 (H) 04/07/2019 12:22 AM  
 GFR est AA >60 04/07/2019 12:22 AM  
 GFR est non-AA 52 (L) 04/07/2019 12:22 AM  
 Calcium 7.2 (L) 04/07/2019 12:22 AM  
 Glucose (POC) 150 (H) 04/07/2019 07:16 AM  
 
Lab Results Component Value Date/Time  Bilirubin, total 0.4 04/06/2019 01:01 AM  
 ALT (SGPT) 91 (H) 04/06/2019 01:01 AM  
 AST (SGOT) 345 (H) 04/06/2019 01:01 AM  
 Alk. phosphatase 64 04/06/2019 01:01 AM  
 Protein, total 5.4 (L) 04/06/2019 01:01 AM  
 Albumin 2.1 (L) 04/06/2019 01:01 AM  
 Globulin 3.3 04/06/2019 01:01 AM  
 
Lab Results Component Value Date/Time Reticulocyte count 0.8 09/05/2018 12:54 PM  
 Iron % saturation 15 (L) 09/05/2018 12:54 PM  
 TIBC 172 (L) 09/05/2018 12:54 PM  
 Ferritin 213 09/05/2018 12:54 PM  
 Vitamin B12 726 09/05/2018 12:54 PM  
 Folate 9.5 09/05/2018 12:54 PM  
 Lipase 69 (L) 04/05/2019 03:00 AM  
 
Lab Results Component Value Date/Time INR 1.1 04/05/2019 03:00 AM  
 aPTT 20.6 (L) 04/05/2019 03:00 AM  
 
Lab Results Component Value Date/Time CEA 1.1 09/05/2018 12:54 PM  
 Prostate Specific Ag 4.4 (H) 03/07/2019 09:57 AM  
 
4/5/2019 XR CHEST IMPRESSION: 
No acute process. 4/5/2019 CT HEAD WO CONT IMPRESSION No acute process. 4/5/2019 CT CHEST ABD PELV  WO CONT 1. No significant change in large pelvic soft tissue mass surrounding the 
prostate, inseparable from the rectum, as well as the posterior right bladder 
wall. Bilateral hydronephrosis left greater than right similar to prior study. Unchanged position of right nephrostomy tube. 2. No acute process in the chest. 
Outside imaging performed January 15, 2019 at [facility] or provided for 
comparison. Addendum: Comparison is performed. In the interval from January 15, 2019 to April 1, 2019 there is been additional 
interval progression enlarged distal sigmoid colon/rectal mass. Enlarged nodular 
pelvic masses. Slightly progressed left-sided hydroureteronephrosis as well. 4/5 DUPLEX LE doppler 
pending Assessment and Recommendations:  
Ramakrishna Gomes is a 74 y.o. male with h/o prostate cancer admitted with unexplained syncope. 1. Metastatic prostate cancer, castration resistant: Was localized at diagnosis, treated with brachytherapy in 12/2015.  Now with metastatic disease which likely started in seminal vesicles and progressed with local extension into sigmoid colon. The pathology is more consistent with prostate adenocarcinoma than colon cancer and no mass seen on first 2 colonoscopies. CEA normal. Chest CT negative. Given large sigmoid mass at risk of causing obstruction, patient underwent diverting loop colostomy on 9/7/18. . Patient has a bulky sigmoid mass causing hydronephrosis requiring utereral stent and colon obstruction, added Zytiga to the Lupron to help obtain better and quicker response. Also based on the STAMPEDE trial, adding Zytiga to treatment in the first line setting improves overall survival (3yr OS 83% vs 76%). In the future, patient may be able to undergo ureteral stent placement and colostomy reversal. Recent CT 4/2019 shows evidence of disease progression on Lupron, Zytiga, Prednisone along with recent increase in PSA 4.4 on 3/7/19. While metastatic prostate cancer has several different treatment options, this patient's disease is atypical, quite aggressive, poorly differentiated. He and his family have been adamant about treatment, but will readdress with most current evidence of disease progression. -- I discussed with patient in detail that his current treatment with Stephany Tae is not working. I discussed that treatment options include switching to alternate pill such as Enzalutamide, which is unlikely to work. Another option is Docetaxel chemotherapy. Patient's performance status is likely too poor to consider this and would pose a high risk of infection. Home hospice is another option if the patient wishes to focus on comfort and quality of life rather than treatment. -- Need to have family discussion, but they are out of town. 2. JAGDEEP / Rhabdomyolysis: CT scan revealing bilateral hydronephrosis (likely due to disease progression). JAGDEEP is 2/2 to decreased oral intake and diarrhea. CK up to 10,665 --> 13,471 --> 5911.  Nephrology and Urology have evaluated pt. JAGDEEP and Rhabdo are improving. 
-- Receiving IVFs 3. Falls/ syncope: Unclear etiology and 2nd occurrence. Echo pending. 4. UTI: Urine culture growing GNR (Last Urine cx grew Klebsiella and Pseudomonas). Currently on Cefepime. 5. Right hydronephrosis: 2/2 extrinsic compression of the R distal ureter from the prostate vs sigmoid mass. IR placed percutaneous nephrostomy tube on 9/7/18. This was replaced during hospital admission in 2/2019. Follows with Dr. Breanna Little as outpatient and was seen by Urology here. 6. Normocytic anemia: Likely a combination of anemia of chronic disease due to underlying malignancy. Previously no evidence of iron deficiency. Normal VitB12, folate. 7. BLE edema: Followed by  lymphedema clinic as outpatient; compression garment removed due to pain.   
 
Signed By: Radha Rangel MD

## 2019-04-07 NOTE — PROGRESS NOTES
Febrile on Tylenol 650 mg po every 6 hrs for pain and fever. Hospitalist on call notified continue tx.

## 2019-04-07 NOTE — PROGRESS NOTES
Problem: Falls - Risk of 
Goal: *Absence of Falls Outcome: Progressing Towards Goal 
  
Problem: Patient Education: Go to Patient Education Activity Goal: Patient/Family Education Outcome: Progressing Towards Goal 
  
Problem: Pressure Injury - Risk of 
Goal: *Prevention of pressure injury Outcome: Progressing Towards Goal 
  
Problem: Patient Education: Go to Patient Education Activity Goal: Patient/Family Education Outcome: Progressing Towards Goal 
  
Problem: Diabetes Self-Management Goal: *Disease process and treatment process Outcome: Progressing Towards Goal 
Goal: *Incorporating nutritional management into lifestyle Outcome: Progressing Towards Goal 
Goal: *Incorporating physical activity into lifestyle Outcome: Progressing Towards Goal 
Goal: *Developing strategies to promote health/change behavior Outcome: Progressing Towards Goal 
Goal: *Using medications safely Outcome: Progressing Towards Goal 
Goal: *Monitoring blood glucose, interpreting and using results Outcome: Progressing Towards Goal 
Goal: *Prevention, detection, treatment of acute complications Outcome: Progressing Towards Goal 
Goal: *Prevention, detection and treatment of chronic complications Outcome: Progressing Towards Goal 
Goal: *Developing strategies to address psychosocial issues Outcome: Progressing Towards Goal 
Goal: *Insulin pump training Outcome: Progressing Towards Goal 
Goal: *Sick day guidelines Outcome: Progressing Towards Goal 
Goal: *Patient Specific Goal (EDIT GOAL, INSERT TEXT) Outcome: Progressing Towards Goal 
  
Problem: Patient Education: Go to Patient Education Activity Goal: Patient/Family Education Outcome: Progressing Towards Goal

## 2019-04-07 NOTE — PROGRESS NOTES
Bedside shift change report given to Fabby Davis and Roxana Blackburn (oncoming nurse) by Pj Castro (offgoing nurse). Report included the following information SBAR, Kardex, MAR and Recent Results.

## 2019-04-07 NOTE — PROGRESS NOTES
Problem: Mobility Impaired (Adult and Pediatric) Goal: *Acute Goals and Plan of Care (Insert Text) Description Physical Therapy Goals Initiated 4/7/2019 1. Patient will move from supine to sit and sit to supine  in bed with maximal assistance within 7 day(s). 2.  Patient will transfer from bed to chair and chair to bed with maximal assistance using the least restrictive device within 7 day(s). 3.  Patient will perform sit to stand with maximal assistance within 7 day(s). 4.  Patient will ambulate with maximal assistance for 50 feet with the least restrictive device within 7 day(s). Outcome: Progressing Towards Goal 
Note: PHYSICAL THERAPY EVALUATION Patient: Anjali Villatoro [de-identified]74 y.o. male) Date: 4/7/2019 Primary Diagnosis: Syncope [R55] Precautions:   Fall ASSESSMENT : 
Based on the objective data described below, the patient presents with increased medical complexity, decreased balance, Right LE ROM, strength, and coordination following admission for syncopal episode. Patient lives alone, and was down for an unknown amount of time. He now has a wound on his back and Right arm from falling and hitting a heater on the way down. Patient also with rhabdomyolysis and kidney failure due to prolonged time down. Patient has stage 4 prostate cancer, a pelvic mass with a nephrostomy bag, CKD, DM and HTN. Patient prior to admission uses a [facility] and  for household ambulation and wheelchair for community distance. He lives alone, but family lives close by and drive him to appointments. Patient has been evaluated by heme/onc and they are recommending possible hospice. No decisions made yet. Today patient received supine in bed. He is alert and able to follow commands but at times has episodes of memory deficits. He required MAX -Total A x2 for all mobility. He is incontinent of urine (even with the nephrostomy bag) required linen and gown change.   He has very limited Right LE movement and painful with PROM. Once in standing he has decreased Right LE weight bearing and leading to decreased standing tolerance and LE coordination. He was unable to advance LE to transfer to chair, he was only able to take a few side steps toward head of bed before returning to supine. Bed then placed in chair position. Vitals were monitored throughout and stable, see below. Patient due to his current medical complexity he may benefit from rehab but will be TBD. Patient will benefit from skilled intervention to address the above impairments. Patients rehabilitation potential is considered to be Fair Factors which may influence rehabilitation potential include:  
? None noted ? Mental ability/status ? Medical condition ? Home/family situation and support systems ? Safety awareness 
? Pain tolerance/management 
? Other: PLAN : 
Recommendations and Planned Interventions: 
?           Bed Mobility Training             ? Neuromuscular Re-Education ? Transfer Training                   ? Orthotic/Prosthetic Training 
? Gait Training                         ? Modalities ? Therapeutic Exercises           ? Edema Management/Control ? Therapeutic Activities            ? Patient and Family Training/Education ? Other (comment): Frequency/Duration: Patient will be followed by physical therapy  5 times a week to address goals. Discharge Recommendations: [facility] vs TBD Further Equipment Recommendations for Discharge: TBD SUBJECTIVE:  
Patient stated i have been stuck in this position.  OBJECTIVE DATA SUMMARY:  
HISTORY:   
Past Medical History:  
Diagnosis Date  Diabetes mellitus ([facility] [address]  Prostate cancer ([facility] [address]  Sleep apnea Past Surgical History:  
Procedure Laterality Date 2021 Luis Orona N/A 9/6/2018 SIGMOIDOSCOPY FLEXIBLE performed by Giovanni Hirsch MD at [facility] Left  HX HIP REPLACEMENT Right  HX OTHER SURGICAL    
 bladder stent  IR CHANGE NEPHROSTOMY PYELOS TUBE RT  2/1/2019 Prior Level of Function/Home Situation: see below Personal factors and/or comorbidities impacting plan of care: see above Home Situation Home Environment: Private residence Wheelchair Ramp: Yes One/Two Story Residence: One story Living Alone: Yes Support Systems: Family member(s) Patient Expects to be Discharged to[de-identified] Skilled nursing facility Current DME Used/Available at Home: Walker, rolling, Cane, straight EXAMINATION/PRESENTATION/DECISION MAKING:  
Vitals:  
 04/07/19 1216 04/07/19 1225 04/07/19 1230 04/07/19 1240 BP: 121/65 130/69 127/85 129/71 BP 1 Location: Right arm Left arm Left arm Left arm BP Patient Position: At rest Sitting Standing Post activity; At rest;Supine Pulse: 99 (!) 108 (!) 111 (!) 106 Resp: 18 Temp: 98.7 °F (37.1 °C) SpO2: 97% Weight:      
Height:      
 
Critical Behavior: 
Neurologic State: Alert, Eyes open spontaneously Orientation Level: Oriented X4 Cognition: Follows commands Hearing: Auditory Auditory Impairment: NoneSkin:  see wound care notes Edema: +2 Right LE Range Of Motion: 
AROM: Grossly decreased, non-functional 
  
  
  
PROM: Generally decreased, functional 
  
  
  
Strength:   
Strength: Grossly decreased, non-functional 
  
  
  
  
  
  
Tone & Sensation:  
Tone: Normal 
  
  
  
  
Sensation: Impaired Coordination: 
Coordination: Grossly decreased, non-functional 
Vision:  
  
Functional Mobility: 
Bed Mobility: 
Rolling: Moderate assistance Supine to Sit: Total assistance;Assist x2 Sit to Supine: Total assistance;Assist x2 Transfers: 
Sit to Stand: Maximum assistance;Assist x2 Stand to Sit: Moderate assistance;Assist x2 Balance:  
Sitting: Intact Standing: Impaired Standing - Static: Constant support Standing - Dynamic : Constant supportAmbulation/Gait Training: 
Distance (ft): 2 Feet (ft) Assistive Device: Walker, rolling;Gait belt Ambulation - Level of Assistance: Total assistance;Assist x2 Gait Description (WDL): Exceptions to [facility] Gait Abnormalities: Decreased step clearance; Step to gait(forward flexed posture) Stance: Right decreased Speed/Pita: Slow Stairs: Therapeutic Exercises:  
 
 
Functional Measure: 
Barthel Index: 
 
Bathin Bladder: 0 Bowels: 5 Groomin Dressin Feedin Mobility: 0 Stairs: 0 Toilet Use: 0 Transfer (Bed to Chair and Back): 5 Total: 20/100 Percentage of impairment  
0% 1-19% 20-39% 40-59% 60-79% 80-99% 100% Barthel Score 0-100 100 99-80 79-60 59-40 20-39 1-19 
 0 The Barthel ADL Index: Guidelines 1. The index should be used as a record of what a patient does, not as a record of what a patient could do. 2. The main aim is to establish degree of independence from any help, physical or verbal, however minor and for whatever reason. 3. The need for supervision renders the patient not independent. 4. A patient's performance should be established using the best available evidence. Asking the patient, friends/relatives and nurses are the usual sources, but direct observation and common sense are also important. However direct testing is not needed. 5. Usually the patient's performance over the preceding 24-48 hours is important, but occasionally longer periods will be relevant. 6. Middle categories imply that the patient supplies over 50 per cent of the effort. 7. Use of aids to be independent is allowed. Link ., Barthel, D.W. (8947). Functional evaluation: the Barthel Index. [facility] (14)2.  
Khushboo Tirado paul GOVIND Krueger, Chase Grimes., Salty Liang., Janelle Espinal. (1999). Measuring the change indisability after inpatient rehabilitation; comparison of the responsiveness of the Barthel Index and Functional [place] Measure. Journal of Neurology, Neurosurgery, and Psychiatry, 66(4), 905-404. KUNAL Giraldo, ROSSY Brice, & Miracle Daugherty M.A. (2004.) Assessment of post-stroke quality of life in cost-effectiveness studies: The usefulness of the Barthel Index and the EuroQoL-5D. [facility], 13, 315-10 Physical Therapy Evaluation Charge Determination History Examination Presentation Decision-Making HIGH Complexity :3+ comorbidities / personal factors will impact the outcome/ POC  HIGH Complexity : 4+ Standardized tests and measures addressing body structure, function, activity limitation and / or participation in recreation  HIGH Complexity : Unstable and unpredictable characteristics  Other outcome measures Barthel Index  HIGH Based on the above components, the patient evaluation is determined to be of the following complexity level: HIGH Pain: 
Pain Scale 1: Numeric (0 - 10) Pain Intensity 1: 0 Activity Tolerance:  
Fair- limited activity tolerance due to strength and ROM deficits Please refer to the flowsheet for vital signs taken during this treatment. After treatment:  
?         Patient left in no apparent distress sitting up in chair ? Patient left in no apparent distress in bed- in chair position ? Call bell left within reach ? Nursing notified ? Caregiver present ? Bed alarm activated COMMUNICATION/EDUCATION:  
The patients plan of care was discussed with: Occupational Therapist and Registered Nurse. ?         Fall prevention education was provided and the patient/caregiver indicated understanding. ? Patient/family have participated as able in goal setting and plan of care.  
?         Patient/family agree to work toward stated goals and plan of care. 
?         Patient understands intent and goals of therapy, but is neutral about his/her participation. ? Patient is unable to participate in goal setting and plan of care. Thank you for this referral. 
Demetri Garza, PT, DPT Time Calculation: 36 mins

## 2019-04-07 NOTE — PROGRESS NOTES
Bedside and Verbal shift change report given to SARITA Luque (oncoming nurse) by Gucci Hutton (offgoing nurse). Report included the following information SBAR and Kardex.

## 2019-04-08 LAB
GLUCOSE BLD STRIP.AUTO-MCNC: 104 MG/DL (ref 65–100)
GLUCOSE BLD STRIP.AUTO-MCNC: 146 MG/DL (ref 65–100)
GLUCOSE BLD STRIP.AUTO-MCNC: 153 MG/DL (ref 65–100)
GLUCOSE BLD STRIP.AUTO-MCNC: 201 MG/DL (ref 65–100)
GLUCOSE BLD STRIP.AUTO-MCNC: 211 MG/DL (ref 65–100)
SERVICE CMNT-IMP: ABNORMAL

## 2019-04-08 PROCEDURE — 97530 THERAPEUTIC ACTIVITIES: CPT

## 2019-04-08 PROCEDURE — 82962 GLUCOSE BLOOD TEST: CPT

## 2019-04-08 PROCEDURE — 87040 BLOOD CULTURE FOR BACTERIA: CPT

## 2019-04-08 PROCEDURE — 65270000029 HC RM PRIVATE

## 2019-04-08 PROCEDURE — 74011250637 HC RX REV CODE- 250/637: Performed by: INTERNAL MEDICINE

## 2019-04-08 PROCEDURE — 74011636637 HC RX REV CODE- 636/637: Performed by: INTERNAL MEDICINE

## 2019-04-08 PROCEDURE — 74011250636 HC RX REV CODE- 250/636: Performed by: INTERNAL MEDICINE

## 2019-04-08 PROCEDURE — 74011000258 HC RX REV CODE- 258: Performed by: INTERNAL MEDICINE

## 2019-04-08 RX ORDER — IBUPROFEN 400 MG/1
400 TABLET ORAL
Status: DISCONTINUED | OUTPATIENT
Start: 2019-04-08 | End: 2019-04-16 | Stop reason: HOSPADM

## 2019-04-08 RX ORDER — POTASSIUM CHLORIDE 750 MG/1
20 TABLET, FILM COATED, EXTENDED RELEASE ORAL
Status: COMPLETED | OUTPATIENT
Start: 2019-04-08 | End: 2019-04-08

## 2019-04-08 RX ADMIN — POTASSIUM CHLORIDE 20 MEQ: 750 TABLET, EXTENDED RELEASE ORAL at 09:11

## 2019-04-08 RX ADMIN — CEFEPIME 2 G: 2 INJECTION, POWDER, FOR SOLUTION INTRAVENOUS at 17:02

## 2019-04-08 RX ADMIN — DEXTROSE MONOHYDRATE, SODIUM CHLORIDE, AND POTASSIUM CHLORIDE: 50; 4.5; 2.98 INJECTION, SOLUTION INTRAVENOUS at 15:19

## 2019-04-08 RX ADMIN — TAMSULOSIN HYDROCHLORIDE 0.4 MG: 0.4 CAPSULE ORAL at 08:10

## 2019-04-08 RX ADMIN — CEFEPIME 2 G: 2 INJECTION, POWDER, FOR SOLUTION INTRAVENOUS at 06:44

## 2019-04-08 RX ADMIN — ACETAMINOPHEN 650 MG: 325 TABLET ORAL at 17:02

## 2019-04-08 RX ADMIN — ACETAMINOPHEN 650 MG: 325 TABLET ORAL at 08:10

## 2019-04-08 RX ADMIN — INSULIN LISPRO 3 UNITS: 100 INJECTION, SOLUTION INTRAVENOUS; SUBCUTANEOUS at 11:31

## 2019-04-08 RX ADMIN — DEXTROSE MONOHYDRATE, SODIUM CHLORIDE, AND POTASSIUM CHLORIDE: 50; 4.5; 2.98 INJECTION, SOLUTION INTRAVENOUS at 04:27

## 2019-04-08 RX ADMIN — Medication 10 ML: at 22:46

## 2019-04-08 RX ADMIN — HEPARIN SODIUM 5000 UNITS: 5000 INJECTION INTRAVENOUS; SUBCUTANEOUS at 22:46

## 2019-04-08 RX ADMIN — Medication 10 ML: at 14:00

## 2019-04-08 RX ADMIN — HEPARIN SODIUM 5000 UNITS: 5000 INJECTION INTRAVENOUS; SUBCUTANEOUS at 06:45

## 2019-04-08 RX ADMIN — HEPARIN SODIUM 5000 UNITS: 5000 INJECTION INTRAVENOUS; SUBCUTANEOUS at 15:19

## 2019-04-08 RX ADMIN — INSULIN LISPRO 2 UNITS: 100 INJECTION, SOLUTION INTRAVENOUS; SUBCUTANEOUS at 17:02

## 2019-04-08 RX ADMIN — INSULIN LISPRO 3 UNITS: 100 INJECTION, SOLUTION INTRAVENOUS; SUBCUTANEOUS at 08:10

## 2019-04-08 RX ADMIN — Medication 10 ML: at 06:00

## 2019-04-08 NOTE — PROGRESS NOTES
Problem: Mobility Impaired (Adult and Pediatric) Goal: *Acute Goals and Plan of Care (Insert Text) Description Physical Therapy Goals Initiated 4/7/2019 1. Patient will move from supine to sit and sit to supine  in bed with maximal assistance within 7 day(s). 2.  Patient will transfer from bed to chair and chair to bed with maximal assistance using the least restrictive device within 7 day(s). 3.  Patient will perform sit to stand with maximal assistance within 7 day(s). 4.  Patient will ambulate with maximal assistance for 50 feet with the least restrictive device within 7 day(s). Outcome: Progressing Towards Goal 
 PHYSICAL THERAPY TREATMENT Patient: Milind Matta [de-identified]74 y.o. male) Date: 4/8/2019 Diagnosis: Syncope [R55] Syncope Precautions: Fall Chart, physical therapy assessment, plan of care and goals were reviewed. ASSESSMENT: 
Pt required coaxing to participate due to unable to find cell phone (nursing staff aware and addressing). Good response to encouragement from therapists and sister who was present. Required max A x 2 for bed mob and stand pivot to chair with RW and increased time. Noted min ability to advance L LE during pivot. Pt indep with mobility at baseline; now presents well below PLOF and will require follow up rehab at d/c. Progression toward goals: 
x    Improving appropriately and progressing toward goals ? Improving slowly and progressing toward goals ? Not making progress toward goals and plan of care will be adjusted PLAN: 
Patient continues to benefit from skilled intervention to address the above impairments. Continue treatment per established plan of care. Discharge Recommendations:  Rehab Further Equipment Recommendations for Discharge:  TBD SUBJECTIVE:  
Patient stated Jeny Albanian but my eyebrows\" re pt response when asked to localize pain OBJECTIVE DATA SUMMARY:  
Critical Behavior: 
Neurologic State: Alert Orientation Level: Oriented X4 Cognition: Follows commands, Appropriate decision making, Appropriate for age attention/concentration, Appropriate safety awareness Safety/Judgement: Awareness of environment, Fall prevention, Decreased insight into deficits Functional Mobility Training: 
Bed Mobility: 
  
Supine to Sit: Maximum assistance;Assist x2(assist at trunk and LEs) Transfers: 
  
Stand to Sit: Moderate assistance(decreased eccentric control) Bed to Chair: Maximum assistance;Assist x2; Additional time(SPT to L with RW, A for balance/ walker mgmt), min ability to advance L foot Balance: 
Sitting: Intact Standing: Impaired; With support Standing - Static: Constant support Standing - Dynamic : Poor;Constant support Ambulation/Gait Training: 
  
  
  
  
  
  
  
  
  
  
  
  
  
  
  
  
  
   
  
   
 
Pain: 
Pt reports generalized pain at beginning of session. No pain behavior noted with mobility. Activity Tolerance:  
Pt tolerated EOB activity and transfer to chair with RW and 2 person assist. No pain behavior noted during session Please refer to the flowsheet for vital signs taken during this treatment. After treatment:  
x   Patient left in no apparent distress sitting up in chair ? Patient left in no apparent distress in bed 
x    Call bell left within reach 
x    Nursing notified 
x    Caregiver present ? Bed alarm activated COMMUNICATION/COLLABORATION:  
The patients plan of care was discussed with: Certified Occupational Therapy Assistant, Registered Nurse and Certified Nursing Assistant/Patient Care Technician Pa Ortiz PT Time Calculation: 24 mins

## 2019-04-08 NOTE — PROGRESS NOTES
Palliative Medicine Code Status: Full Code Advance Care Planning: 
Advance Care Planning 2019 Patient's Healthcare Decision Maker is: Legal Next of Kin: Son Desirae, [phone] Confirm Advance Directive None Patient / Family Encounter Documentation Participants (names): Pt, son Constantino, dtr-in-law Aretha, sister Adrian Whitlock, Oncology NP Alexander Koch, Palliative Medicine (Dr. Kenji Hannon, [address]) Narrative:  Pt was up in chair, alert and engaged. Pt verbalized understanding that he is living on \"a limited timetable,\" stated he does not want to be a burden to his family, expressed willingness to leave his home and enter a facility of some sort so that his care needs will be met. Son and dtr shared that they have already begun exploring options near their home in the [address], plan to visit several facilities for information. Discussed hospice as an option in home vs facility setting, including financial aspect. Pt stated he does not receive Social Security, has an annuity. Family expressed interest in pursuing SNF for strength building before considering hospice. Pt does not currently have AMD on file; griffin Pollack is only child/legal NOK. Son and dtr-in-law spoke with Oncology NP outside of room, reportedly inquired about AMD but did not wish to discuss in front of pt's sister. Sister Adrian Wihtlock shared that she and pt have one more living sibling, two are . Psychosocial Issues Identified/ Resilience Factors:  Pt was tearful during visit, sister requested that pt be given meds for anxiety but dtr-in-law expressed preference for pt not to receive meds unless necessary, pt denied anxiety. Possible friction reportedly exists within the family. Son and dtr-in-law appear very supportive and proactive in terms of pt's care needs. Goals of Care / Plan: Pt/family expressed interest in SNF for strength building while family explores longer term arrangements.   Code status was not addressed as pt became overwhelmed by above discussion; Palliative team will plan to address at later time. Per Onc NP, family expressed interest in having AMD completed as well. SW will continue to follow for support. Thank you for including Palliative Medicine in the care of Mr. Sarath Cornell. Bright Del Angel LCSW, [facility]- 
[id number] ([id number])

## 2019-04-08 NOTE — PROGRESS NOTES
[facility] [address] YOB: 1945 Assessment & Plan: 1. JAGDEEP: ATN. Rhabdo, bl Hydro ( Urology following: appears to be stable) 
- stable cr, non oliguric,CK better 2. Low K: Replete 3. Rhabdo 4. Prostate CA Subjective:  
CC:JAGDEEP HPI: Patient seen Cr stable, non oliguric. ROS:no cp/sob/n/v Current Facility-Administered Medications Medication Dose Route Frequency  acetaminophen (TYLENOL) tablet 650 mg  650 mg Oral Q6H PRN  
 dextrose 5% - 0.45% NaCl with KCl 40 mEq/L infusion   IntraVENous CONTINUOUS  
 cefepime (MAXIPIME) 2 g in 0.9% sodium chloride (MBP/ADV) 100 mL  2 g IntraVENous Q12H  
 sodium chloride (NS) flush 5-40 mL  5-40 mL IntraVENous Q8H  
 sodium chloride (NS) flush 5-40 mL  5-40 mL IntraVENous PRN  
 naloxone (NARCAN) injection 0.4 mg  0.4 mg IntraVENous PRN  
 insulin lispro (HUMALOG) injection   SubCUTAneous AC&HS  
 glucose chewable tablet 16 g  4 Tab Oral PRN  
 dextrose (D50W) injection syrg 12.5-25 g  12.5-25 g IntraVENous PRN  
 glucagon (GLUCAGEN) injection 1 mg  1 mg IntraMUSCular PRN  
 tamsulosin (FLOMAX) capsule 0.4 mg  0.4 mg Oral DAILY  heparin (porcine) injection 5,000 Units  5,000 Units SubCUTAneous Q8H  
 HYDROcodone-acetaminophen (NORCO) 5-325 mg per tablet 1 Tab  1 Tab Oral Q4H PRN Objective:  
 
Vitals: 
Blood pressure 134/74, pulse (!) 113, temperature 99.6 °F (37.6 °C), resp. rate 18, height 5' 9\" (1.753 m), weight 104.6 kg (230 lb 9.6 oz), SpO2 100 %. Temp (24hrs), Av.8 °F (37.7 °C), Min:98.7 °F (37.1 °C), Max:102.1 °F (38.9 °C) Intake and Output: 
No intake/output data recorded.  1901 -  0700 In: 0185 [P.O.:1190; I.V.:2350] Out: Alondra Alfred [[id number]] Physical Exam:              
 Patient is intubated:  no 
 
Physical Examination:  
GENERAL ASSESSMENT: NAD HEENT:Nontraumatic CHEST: CTA HEART: S1S2 ABDOMEN: Soft,NT, 
 :  
 EXTREMITY: EDEMA 1 NEURO:Grossly non focal 
 
 
   
ECG/rhythm: 
 
Data Review No results for input(s): TNIPOC in the last 72 hours. No lab exists for component: ITNL Recent Labs 04/07/19 
0022 04/06/19 
0101 04/05/19 
1208 CPK 5,911* 13,471* 13,844* Recent Labs 04/07/19 
0022 04/06/19 
0101 04/05/19 
1621 04/05/19 
1208  145 143 143  
K 3.0* 3.0* 2.6* 4.3  
* 110* 108 109* CO2 29 29 29 31 BUN 14 13 14 13 CREA 1.35* 1.34* 1.34* 1.47* * 161* 186* 293* PHOS  --  2.2*  --   --   
MG 2.1 2.2  --  2.3 CA 7.2* 7.3* 7.8* 7.7* ALB  --  2.1*  --   --   
WBC 14.4* 14.4* 14.9*  --   
HGB 8.5* 9.2* 9.8*  --   
HCT 27.4* 30.4* 32.4*  --   
 258 288  -- No results for input(s): INR, PTP, APTT in the last 72 hours. No lab exists for component: INREXT Needs: urine analysis, urine sodium, protein and creatinine Lab Results Component Value Date/Time Sodium,urine random 15 04/05/2019 12:08 PM  
 Creatinine, urine 92.10 04/05/2019 12:08 PM  
 
 
 
Discussed with:  pt 
 
: Amrita Fields MD 
4/8/2019 [facility]: 
[email or web address] Sirena Overton office: 
[address] [place] [address], [address] [place], [address] [facility] Phone: [phone] Fax :     [phone] [place] office: 
[address] [facility], [address] Phone - [phone] Fax - [phone]
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
## 2019-04-08 NOTE — ROUTINE PROCESS
Bedside shift change report given to SARITA Peguero (oncoming nurse) by Rober Lafleur and Cheryl Ha RN (offgoing nurse). Report included the following information SBAR, Kardex and MAR.
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
## 2019-04-08 NOTE — PROGRESS NOTES
Nutrition Assessment: 
 
RECOMMENDATIONS/INTERVENTION(S): 1. Changed to Regular. Discussed with family- pt placed on non-room service so he will get trays at same time as DM trays. Pt wants pizza, couldn't have on Cardiac. 2. Continue Ensure Clear (apple) with meals. 3. Monitor PO intakes of meals / ONS, follow up with acceptance of ONS and possible additional supplements to further increase pt's kcal/pro intake. 4.  Monitor labs, weight, GI/diarrhea- loose/mucous per flowsheet. ASSESSMENT:  
4/8: Follow up. Pt meeting with palliative team. Intakes documented as 75%. Appetite fair currently. Changed diet to regular- pt wanted pizza but couldn't order it on cardiac diet. Pt ordering sometimes, other times not and family unhappy pt not getting meals at reasonable time. Placed pt on non-room service so pt will get trays with DM patients. Family wants pt to get \"regular food\" and not \"DM foods. \"  , 201, 104, 191 mg/dL. Continue ONS. Check weight again, up 10 lbs since admission 3 days ago. K+ low 3.0. A1C 6.6.  
 
4/5: 75 yo male admitted for syncope. MD consult for general nutrition management. PMHx: prostate CA with mets to colon s/p colostomy, DM, hydronephrosis with nephrostomy tube. Class I obese per BMI. Pt has had 42lb weight loss over last 10 months (15%). Weight has fluctuated recently per weight hx, hard to determine recent weight loss. Pt has had decreased PO intakes at home for several months. C/o persistent diarrhea. Cardiac diet currently ordered. Pt very drowsy at time of visit, unable to obtain detailed information at this time. Would only answer my questions with yes or no. States he has not had anything to eat today secondary to not being hungry. States he does like juice. Will try Ensure Clear for some additional kcals/pro and to not exacerbate diarrhea. and monitor pt's intakes. Labs: . Meds: Humalog. D5 1/2 NaCl with 40mEqKcl running at 100ml/hr. Patient meets criteria for Severe Protein Calorie Malnutrition as evidenced by:  
ASPEN Malnutrition Criteria Acute Illness, Chronic Illness, or Social/Enviornmental: Chronic illness Energy Intake: Less than/equal to 75% of est energy req for greater than/equal to 1 month Weight Loss: 10% x 6 mos Body Fat: Mild Muscle Mass: Mild ASPEN Malnutrition Score - Chronic Illness: 9 Chronic Illness - Malnutrition Diagnosis: Severe malnutrition. Diet Order: Cardiac 
% Eaten:   
Patient Vitals for the past 72 hrs: 
 % Diet Eaten 04/07/19 1830 75 % 04/07/19 1550 75 % Pertinent Medications: [x] Reviewed Labs: [x] Reviewed Anthropometrics: Height: 5' 9\" (175.3 cm) Weight: 104.6 kg (230 lb 9.6 oz) IBW (%IBW):   ( ) UBW (%UBW):   (  %) BMI: Body mass index is 34.05 kg/m². This BMI is indicative of: 
 [] Underweight    [] Normal    [] Overweight    [x] Class I Obesity    []  Extreme Obesity (BMI>40) Estimated Nutrition Needs (Based on): 4725 Kcals/day(BMR 1722 x AF 1.3) , 80 g(80-100gm (0.8-1gm/kg/d)) Protein Carbohydrate: At Least 130 g/day  Fluids: 2239 mL/day (1 ml/kcal) Last BM: 4/7   [x]Active     []Hyperactive  []Hypoactive       [] Absent   BS Skin:    [x] Intact   [] Incision  [] Breakdown   [] DTI   [] Tears/Excoriation/Abrasion  []Edema [] Other: Wt Readings from Last 30 Encounters:  
04/06/19 104.6 kg (230 lb 9.6 oz)  
03/26/19 99.8 kg (220 lb) 03/07/19 105.2 kg (232 lb) 02/07/19 102.5 kg (226 lb) 02/01/19 99.5 kg (219 lb 5.7 oz) 01/11/19 97.7 kg (215 lb 6.4 oz) 12/20/18 93.9 kg (207 lb) 12/07/18 96.3 kg (212 lb 3.2 oz) 09/17/18 99.4 kg (219 lb 3.2 oz) 09/06/18 98 kg (216 lb)  
06/14/18 118.8 kg (262 lb) NUTRITION DIAGNOSES:  
Problem:  Inadequate protein-energy intake Etiology: related to decreased ability to consume sufficient energy Signs/Symptoms: as evidenced by Po intakes < EEN's NUTRITION INTERVENTIONS: 
 Meals/Snacks: General/healthful diet   Supplements: Commercial supplement GOAL:  
Consume > 50% all meals + ONS within next 1-3 days Cultural, Protestant, or Ethnic Dietary Needs: None EDUCATION & DISCHARGE NEEDS:  
 [x] None Identified 
 [] Identified and Education Provided/Documented 
 [] Identified and Pt declined/was not appropriate [x] Interdisciplinary Care Plan Reviewed/Documented  
 [x] Discharge Needs: Follow Consistent Cho diet with ONS 3x/day 
 [] No Nutrition Related Discharge Needs NUTRITION RISK:  
Pt Is At Nutrition Risk  [x] No Nutrition Risk Identified  [] PT SEEN FOR:  
 []  MD Consult: []Calorie Count []Diabetic Diet Education []Diet Education []Electrolyte Management []General Nutrition Management and Supplements []Management of Tube Feeding []TPN Recommendations []  RN Referral:  []MST score >=2 
   []Enteral/Parenteral Nutrition PTA []Pregnant: Gestational DM or Multigestation  
              [] Pressure Ulcer 
 
[]  Low BMI      []  Length of Stay       [] Dysphagia Diet         [] Ventilator [x]  Follow-up Previous Recommendations: 
 [x] Implemented          [] Not Implemented          [] Not Applicable Previous Goal: 
 [x] Met              [] Progressing Towards Goal              [] Not Progressing Towards Goal   [] Not Applicable Kathrin Araya RD Pager [phone] Phone [phone]

## 2019-04-08 NOTE — DIABETES MGMT
DTC Progress Note Recommendations/ Comments:  Chart review for variable BG's - were 104 mg/dL - 194 mg/dL yesterday. However, today have risen to > 200 mg/dL Febrile yesterday. Also, c/o high level of pain. Elevated creatinine noted. PO intake 75% If appropriate, please consider Add carb consistent to current diet order If BG's remain > 200 mg/dL, may require Lantus 10 units Current hospital DM medication: lispro normal sensitivity scale Chart reviewed on [facility]. Patient is a 74 y.o. male with known DM on no DM meds at home Kwabena Krueger A1c:  
Lab Results Component Value Date/Time Hemoglobin A1c 6.6 (H) 04/06/2019 01:01 AM  
 
 
Recent Glucose Results:  
Lab Results Component Value Date/Time GLUCPOC 211 (H) 04/08/2019 11:18 AM  
 GLUCPOC 201 (H) 04/08/2019 06:34 AM  
 GLUCPOC 104 (H) 04/07/2019 09:15 PM  
  
 
Lab Results Component Value Date/Time Creatinine 1.35 (H) 04/07/2019 12:22 AM  
 
Estimated Creatinine Clearance: 56.4 mL/min (A) (based on SCr of 1.35 mg/dL (H)). Active Orders Diet DIET CARDIAC Regular PO intake:  
Patient Vitals for the past 72 hrs: 
 % Diet Eaten 04/07/19 1830 75 % 04/07/19 1550 75 % Will continue to follow as needed. Thank you Savanna Dumas RN, CDE Pager [phone] Time spent: 5 min

## 2019-04-08 NOTE — PROGRESS NOTES
Problem: Self Care Deficits Care Plan (Adult) Goal: *Acute Goals and Plan of Care (Insert Text) Description Occupational Therapy Goals Initiated 4/7/2019 1. Patient will perform grooming with supervision/set-up in unsupported sit within 7 day(s). 2.  Patient will perform upper body dressing with supervision/set-up within 7 day(s). 3.  Patient will perform lower body dressing with moderate assistance using AE PRN within 7 day(s). 4.  Patient will perform toilet transfers with moderate assistance to [facility] within 7 day(s). 5.  Patient will perform all aspects of toileting with moderate assistance  within 7 day(s). 6.  Patient will participate in upper extremity therapeutic exercise/activities with supervision/set-up for 5 minutes within 7 day(s). Note:  
OCCUPATIONAL THERAPY TREATMENT Patient: Rob Sprague [de-identified]74 y.o. male) Date: 4/8/2019 Diagnosis: Syncope [R55] Syncope Precautions: Fall Chart, occupational therapy assessment, plan of care, and goals were reviewed. ASSESSMENT: 
Pt required encouragement to participate due to unable to find cell phone and wanting to be in contact with his son (nursing staff aware and addressing). Good response to encouragement from therapists and sister who was present. Pt with good balance sitting edge of bed in prep for ADL tasks. Set-up for grooming tasks. Pivoted to chair with 2 assist. Recommend SNF. Progression toward goals: 
?       Improving appropriately and progressing toward goals ? Improving slowly and progressing toward goals ? Not making progress toward goals and plan of care will be adjusted PLAN: 
Patient continues to benefit from skilled intervention to address the above impairments. Continue treatment per established plan of care. Discharge Recommendations: SNF Further Equipment Recommendations for Discharge: None SUBJECTIVE:  
Patient stated ? Everything hurts except for my eyebrows?  
 
OBJECTIVE DATA SUMMARY:  
 Cognitive/Behavioral Status: 
Neurologic State: Alert Orientation Level: Oriented X4 Cognition: Follows commands Functional Mobility and Transfers for ADLs: 
Bed Mobility: 
Supine to Sit: Maximum assistance;Assist x2(assist at trunk and LEs) Transfers: 
  
  
Bed to Chair: Maximum assistance;Assist x2; Additional time(SPT to L with RW, A for balance/ walker mgmt) Balance: 
Sitting: Intact Standing: Impaired; With support Standing - Static: Constant support Standing - Dynamic : Constant support;Poor ADL Intervention: 
  
 
Grooming Washing Face: Supervision/set-up Lower Body Dressing Assistance Socks: Total assistance (dependent) Toileting Bladder Hygiene: Total assistance (dependent) Pain: 
Pain Scale 1: Numeric (0 - 10) Pain Intensity 1: 0 Activity Tolerance:  
Fair Please refer to the flowsheet for vital signs taken during this treatment. After treatment:  
? Patient left in no apparent distress sitting up in chair ? Patient left in no apparent distress in bed 
? Call bell left within reach ? Nursing notified ? Caregiver present ? Bed alarm activated COMMUNICATION/COLLABORATION:  
The patient?s plan of care was discussed with: Physical Therapist, Occupational Therapist and Registered Nurse SUAD Busby Time Calculation: 20 mins

## 2019-04-08 NOTE — PROGRESS NOTES
Bedside and Verbal shift change report given to Tricia Fernandez RN (oncoming nurse) by Isabel Lopez RN (offgoing nurse). Report included the following information SBAR, Kardex, Intake/Output, MAR, Accordion, Recent Results and Med Rec Status.

## 2019-04-08 NOTE — PROGRESS NOTES
[facility] [place] [facility] [place] [address] [address] [facility], [address] [facility] [address] [facility] W: [phone]  F: [phone] Reason for Visit:  
Lucita Gamez is a 74 y.o. male who is seen in consultation at the request of Dr. Lalito Mora 
 for evaluation of prostate cancer with mets. Hematology / Oncology Treatment History:  
 
Diagnosis: Prosate cancer, diagnosed 2015.  
  
Stage: Now metastatic 
  
Pathology: 9/6/18 Sigmoid colon ulcer, biopsy:  
Adenocarcinoma, strongly favor prostate primary (see Comment). Stains weakly for PSAP. 
  
Prior Treatment: Brachytherapy and EBRT in 12/2015.  
  
Current Treatment: Jeannene Munda started on 1/28/19. Lupron started earlier. Oncologic History: 
Patient was admitted to the hospital in 9/2018 for persistent diarrhea, evidence of a sigmoid mass. He had been suffering with persistent diarrhea, 50-70-lb weight loss for approx 4-5 months. He underwent 2 recent colonoscopies (one in July and one in Aug 2018) by Dr. Jace Corona at [facility]. He states the first colonoscopy was not clear due to poor prep. The second colonoscopy reportedly showed abnormal rectal and sigmoid mucosa with a possible mass. This was biopsied and was benign. The patient then underwent a CT scan which was notable for R-sided hydronephrosis from extrinsic compression of the right ureter. The patient's urologist, Dr. Gabbi Multani, attempted to pass a ureteral stent but was unsuccessful. The patient was then admitted to [address] [place] [address] August  for weakness, persistent diarrhea and lower abdominal pain. Abd/pelvis CT on 9/4/18 showed a distal sigmoid mass with possible invasion of the posterior bladder wall as well as R hydronephrosis. He was evaluated by Dr. Cuca Penaloza in general surgery and underwent diverting loop colostomy given the symptoms caused by the sigmoid mass. He underwent colonoscopy by Dr. Paco Novak in GI with finding of a large sigmoid mass, which was biopsied.  Pathology revealed an adenocarcinoma, but morphology was more consistent with prostate cancer than colorectal cancer. He was seen by Dr. Cyndy Siemens in Urology during hospital stay who recommended percutaneous nephrostomy tube, placed by IR on 9/7. Given the biopsy findings, Dr. Cyndy Siemens recommended further evaluation with prostate/pelvic MRI. Nuclear med bone scan negative for any suspicious bony lesions. After hospital discharge, he fell and was admitted to [facility]. He stayed there for 3 days and was discharged to rehab, where he stayed for 4 weeks. His pathology was reviewed by an outside facility with opinion that this was a poorly differentiated prostate cancer. Pt was started on Zytiga + Prednisone in 1/2019. History of Present Illness: Mr. Benz is a 75 y/o male with h/o prostate cancer who is admitted to the hospital after passing out and woke up after spending approximately 1 day on the ground unconscious. This is his second hospitalization for similar presentation. He was found with diarrhea which had leaked out of his colostomy bag, soiling his clothes. Patient has a complicated oncologic history with a presumed metastatic prostate cancer causing direct extension into colon and surrounding structures, causing obstruction of ureters. Pt required diverting colostomy and R nephrostomy tube. PSA has been low and not markedly elevated. After starting Lupron, Zytiga + Prednisone for prostate cancer, PSA declined slightly, but on most recent check was increased to 3-4 range. ED labs notable for K 2.1, Cr 1.75, CK 10,665. Patient was started on IVF. He states he cannot recall how he ended up on the floor. He simply remembers sitting and watching basketball. Denies any fevers or chills  at home. Denies SOB. Appetite has been poor for several months. Lives by himself. No family at bedside. Interval History:  
Sitting up in chair at bedside.  Teary during conversation about not being a burden to his family and finding a new place to live. Denies any pain at present. Denies SOB. Family at bedside. Son, daughter in law and pt's sister. Past Medical History:  
Diagnosis Date  Diabetes mellitus ([address]  Prostate cancer [address]  Sleep apnea Past Surgical History:  
Procedure Laterality Date 2021 Luis Orona N/A 9/6/2018 SIGMOIDOSCOPY FLEXIBLE performed by Joao Payne MD at [address] Left  HX HIP REPLACEMENT Right  HX OTHER SURGICAL    
 bladder stent  IR CHANGE NEPHROSTOMY PYELOS TUBE RT  2/1/2019 Social History Tobacco Use  Smoking status: Never Smoker  Smokeless tobacco: Never Used Substance Use Topics  Alcohol use: No  
  
Family History Problem Relation Age of Onset  Cancer Mother   
     colon  Hypertension Mother  Heart Disease Mother  Cancer Father  Diabetes Sister  Cancer Brother  Diabetes Sister Current Facility-Administered Medications Medication Dose Route Frequency  acetaminophen (TYLENOL) tablet 650 mg  650 mg Oral Q6H PRN  
 dextrose 5% - 0.45% NaCl with KCl 40 mEq/L infusion   IntraVENous CONTINUOUS  
 cefepime (MAXIPIME) 2 g in 0.9% sodium chloride (MBP/ADV) 100 mL  2 g IntraVENous Q12H  
 sodium chloride (NS) flush 5-40 mL  5-40 mL IntraVENous Q8H  
 sodium chloride (NS) flush 5-40 mL  5-40 mL IntraVENous PRN  
 naloxone (NARCAN) injection 0.4 mg  0.4 mg IntraVENous PRN  
 insulin lispro (HUMALOG) injection   SubCUTAneous AC&HS  
 glucose chewable tablet 16 g  4 Tab Oral PRN  
 dextrose (D50W) injection syrg 12.5-25 g  12.5-25 g IntraVENous PRN  
 glucagon (GLUCAGEN) injection 1 mg  1 mg IntraMUSCular PRN  
 tamsulosin (FLOMAX) capsule 0.4 mg  0.4 mg Oral DAILY  heparin (porcine) injection 5,000 Units  5,000 Units SubCUTAneous Q8H  
 HYDROcodone-acetaminophen (NORCO) 5-325 mg per tablet 1 Tab  1 Tab Oral Q4H PRN Allergies Allergen Reactions  Ciprofloxacin Rash Review of Systems: A complete review of systems was obtained, negative except as described above. Physical Exam:  
 
Visit Vitals /71 (BP 1 Location: Left arm, BP Patient Position: Supine; Post activity) Pulse (!) 109 Comment: Marielena NAVA RN present in room and aware of HR Temp 99.4 °F (37.4 °C) Resp 19 Ht 5' 9\" (1.753 m) Wt 104.6 kg (230 lb 9.6 oz) SpO2 97% BMI 34.05 kg/m² ECOG PS: 2-3 General: elderly; No distress Eyes: PERRLA, anicteric sclerae HENT: Atraumatic with normal appearance of ears and nose; OP clear Neck: Supple; no thyromegaly Lymphatic: No cervical, supraclavicular, or axillary adenopathy Respiratory: anteriorly CTAB, normal respiratory effort CV: Normal rate, regular rhythm, no murmurs, 2+ pitting edema in BLE. GI: Colostomy noted with liquid brown stool noted; Soft, nontender, nondistended, no masses, no hepatomegaly, no splenomegaly : nephrostomy tube noted MS:Digits without clubbing or cyanosis. Skin: No rashes, ecchymoses, or petechiae. Normal temperature, turgor, and texture. Neuro/Psych: Alert, oriented, appropriate affect, normal judgment/insight Results:  
 
Lab Results Component Value Date/Time WBC 14.4 (H) 04/07/2019 12:22 AM  
 HGB 8.5 (L) 04/07/2019 12:22 AM  
 HCT 27.4 (L) 04/07/2019 12:22 AM  
 PLATELET 777 10/73/9588 12:22 AM  
 MCV 87.8 04/07/2019 12:22 AM  
 ABS. NEUTROPHILS 12.4 (H) 04/06/2019 01:01 AM  
 
Lab Results Component Value Date/Time Sodium 144 04/07/2019 12:22 AM  
 Potassium 3.0 (L) 04/07/2019 12:22 AM  
 Chloride 109 (H) 04/07/2019 12:22 AM  
 CO2 29 04/07/2019 12:22 AM  
 Glucose 182 (H) 04/07/2019 12:22 AM  
 BUN 14 04/07/2019 12:22 AM  
 Creatinine 1.35 (H) 04/07/2019 12:22 AM  
 GFR est AA >60 04/07/2019 12:22 AM  
 GFR est non-AA 52 (L) 04/07/2019 12:22 AM  
 Calcium 7.2 (L) 04/07/2019 12:22 AM  
 Glucose (POC) 211 (H) 04/08/2019 11:18 AM  
 
Lab Results Component Value Date/Time Bilirubin, total 0.4 04/06/2019 01:01 AM  
 ALT (SGPT) 91 (H) 04/06/2019 01:01 AM  
 AST (SGOT) 345 (H) 04/06/2019 01:01 AM  
 Alk. phosphatase 64 04/06/2019 01:01 AM  
 Protein, total 5.4 (L) 04/06/2019 01:01 AM  
 Albumin 2.1 (L) 04/06/2019 01:01 AM  
 Globulin 3.3 04/06/2019 01:01 AM  
 
Lab Results Component Value Date/Time Reticulocyte count 0.8 09/05/2018 12:54 PM  
 Iron % saturation 15 (L) 09/05/2018 12:54 PM  
 TIBC 172 (L) 09/05/2018 12:54 PM  
 Ferritin 213 09/05/2018 12:54 PM  
 Vitamin B12 726 09/05/2018 12:54 PM  
 Folate 9.5 09/05/2018 12:54 PM  
 Lipase 69 (L) 04/05/2019 03:00 AM  
 
Lab Results Component Value Date/Time INR 1.1 04/05/2019 03:00 AM  
 aPTT 20.6 (L) 04/05/2019 03:00 AM  
 
Lab Results Component Value Date/Time CEA 1.1 09/05/2018 12:54 PM  
 Prostate Specific Ag 4.4 (H) 03/07/2019 09:57 AM  
 
4/5/2019 XR CHEST IMPRESSION: 
No acute process. 4/5/2019 CT HEAD WO CONT IMPRESSION No acute process. 4/5/2019 CT CHEST ABD PELV  WO CONT 1. No significant change in large pelvic soft tissue mass surrounding the 
prostate, inseparable from the rectum, as well as the posterior right bladder 
wall. Bilateral hydronephrosis left greater than right similar to prior study. Unchanged position of right nephrostomy tube. 2. No acute process in the chest. 
Outside imaging performed January 15, 2019 at [facility] or provided for 
comparison. Addendum: Comparison is performed. In the interval from January 15, 2019 to April 1, 2019 there is been additional 
interval progression enlarged distal sigmoid colon/rectal mass. Enlarged nodular 
pelvic masses. Slightly progressed left-sided hydroureteronephrosis as well. 4/5 DUPLEX LE doppler Bilateral lower extremity venous duplex negative for deep venous thrombosis or thrombophlebitis int he veins visualized Assessment and Recommendations: Isaiah Esquivel is a 74 y.o. male with h/o prostate cancer admitted with unexplained syncope. 1. Metastatic prostate cancer, castration resistant: Was localized at diagnosis, treated with brachytherapy in 12/2015. Now with metastatic disease which likely started in seminal vesicles and progressed with local extension into sigmoid colon. The pathology is more consistent with prostate adenocarcinoma than colon cancer and no mass seen on first 2 colonoscopies. CEA normal. Chest CT negative. Given large sigmoid mass at risk of causing obstruction, patient underwent diverting loop colostomy on 9/7/18. . Patient has a bulky sigmoid mass causing hydronephrosis requiring utereral stent and colon obstruction, added Zytiga to the Lupron to help obtain better and quicker response. Also based on the STAMPEDE trial, adding Zytiga to treatment in the first line setting improves overall survival (3yr OS 83% vs 76%). In the future, patient may be able to undergo ureteral stent placement and colostomy reversal. Recent CT 4/2019 shows evidence of disease progression on Lupron, Zytiga, Prednisone along with recent increase in PSA 4.4 on 3/7/19. While metastatic prostate cancer has several different treatment options, this patient's disease is atypical, quite aggressive, poorly differentiated. He and his family have been adamant about treatment, but will readdress with most current evidence of disease progression. -- I discussed with patient in detail that his current treatment with Angel Comes is not working. I discussed that treatment options include switching to alternate pill such as Enzalutamide, which is unlikely to work. Another option is Docetaxel chemotherapy. Patient's performance status is likely too poor to consider this and would pose a high risk of infection. Home hospice is another option if the patient wishes to focus on comfort and quality of life rather than treatment. -- Family discussion detailed below. 2. JAGDEEP / Rhabdomyolysis: CT scan revealing bilateral hydronephrosis (likely due to disease progression). JAGDEEP is 2/2 to decreased oral intake and diarrhea. CK up to 10,665 --> 13,471 --> 5911. Nephrology and Urology have evaluated pt. JAGDEEP and Rhabdo are improving. 
-- Receiving IVFs 3. Falls/ syncope: Unclear etiology and 2nd occurrence. Echo pending. 4. UTI: Urine culture growing Klebsiella and Pseudomonas, similar to last urine cs. Currently still spiking fevers on Cefepime. 5. Right hydronephrosis: 2/2 extrinsic compression of the R distal ureter from the prostate vs sigmoid mass. IR placed percutaneous nephrostomy tube on 9/7/18. This was replaced during hospital admission in 2/2019. Follows with Dr. Karolyn Alegria as outpatient and was seen by Urology here. 6. Normocytic anemia: Likely a combination of anemia of chronic disease due to underlying malignancy. Previously no evidence of iron deficiency. Normal VitB12, folate. 7. BLE edema: Followed by  lymphedema clinic as outpatient; compression garment removed due to pain. 8. Goals of care/ dispo Family meeting with pt, pt's son ConstantinoTERRY Vita. Pt's sister, Dr Marybeth Greer,  and myself. Mr Filipe James was able to state that his time is limited. Voices he would like to not be a burden to his family and is interested in pursuing some other living arrangements. Family has already been looking into facilities near their home on the [facility] [place]. Palliative team reviewed options including home with hospice support. Family is interested in [address] [place] [address] SNF for improving strength for now. Son and daughter in law requesting additional info regarding AMD and having that filled out while Mr Filipe James is in the hospital. Daughter in law voiced concern family friction involving pt's sister. Patient seen in conjunction with Stephanie Whittington NP.  
 
Signed By: Lupillo Choudhury MD

## 2019-04-08 NOTE — PROGRESS NOTES
Patient continued to show an increase in temperature. Doctor notified. Paired blood cultures obtained. Order for Motrin placed.

## 2019-04-09 ENCOUNTER — APPOINTMENT (OUTPATIENT)
Dept: MRI IMAGING | Age: 74
DRG: 682 | End: 2019-04-09
Attending: NURSE PRACTITIONER
Payer: MEDICARE

## 2019-04-09 ENCOUNTER — HOSPITAL ENCOUNTER (OUTPATIENT)
Dept: PHYSICAL THERAPY | Age: 74
End: 2019-04-09
Payer: MEDICARE

## 2019-04-09 LAB
ANION GAP SERPL CALC-SCNC: 7 MMOL/L (ref 5–15)
BUN SERPL-MCNC: 13 MG/DL (ref 6–20)
BUN/CREAT SERPL: 12 (ref 12–20)
CALCIUM SERPL-MCNC: 8.2 MG/DL (ref 8.5–10.1)
CHLORIDE SERPL-SCNC: 111 MMOL/L (ref 97–108)
CK SERPL-CCNC: 1505 U/L (ref 39–308)
CO2 SERPL-SCNC: 26 MMOL/L (ref 21–32)
CREAT SERPL-MCNC: 1.09 MG/DL (ref 0.7–1.3)
ERYTHROCYTE [DISTWIDTH] IN BLOOD BY AUTOMATED COUNT: 14.7 % (ref 11.5–14.5)
GLUCOSE BLD STRIP.AUTO-MCNC: 136 MG/DL (ref 65–100)
GLUCOSE BLD STRIP.AUTO-MCNC: 149 MG/DL (ref 65–100)
GLUCOSE BLD STRIP.AUTO-MCNC: 150 MG/DL (ref 65–100)
GLUCOSE BLD STRIP.AUTO-MCNC: 155 MG/DL (ref 65–100)
GLUCOSE SERPL-MCNC: 117 MG/DL (ref 65–100)
HCT VFR BLD AUTO: 27.6 % (ref 36.6–50.3)
HGB BLD-MCNC: 8.2 G/DL (ref 12.1–17)
MCH RBC QN AUTO: 26.3 PG (ref 26–34)
MCHC RBC AUTO-ENTMCNC: 29.7 G/DL (ref 30–36.5)
MCV RBC AUTO: 88.5 FL (ref 80–99)
NRBC # BLD: 0 K/UL (ref 0–0.01)
NRBC BLD-RTO: 0 PER 100 WBC
PLATELET # BLD AUTO: 276 K/UL (ref 150–400)
PMV BLD AUTO: 10.3 FL (ref 8.9–12.9)
POTASSIUM SERPL-SCNC: 3.4 MMOL/L (ref 3.5–5.1)
RBC # BLD AUTO: 3.12 M/UL (ref 4.1–5.7)
SERVICE CMNT-IMP: ABNORMAL
SODIUM SERPL-SCNC: 144 MMOL/L (ref 136–145)
WBC # BLD AUTO: 11.6 K/UL (ref 4.1–11.1)

## 2019-04-09 PROCEDURE — 74011636637 HC RX REV CODE- 636/637: Performed by: INTERNAL MEDICINE

## 2019-04-09 PROCEDURE — 74011250636 HC RX REV CODE- 250/636: Performed by: RADIOLOGY

## 2019-04-09 PROCEDURE — 85027 COMPLETE CBC AUTOMATED: CPT

## 2019-04-09 PROCEDURE — 74011000258 HC RX REV CODE- 258: Performed by: INTERNAL MEDICINE

## 2019-04-09 PROCEDURE — 82962 GLUCOSE BLOOD TEST: CPT

## 2019-04-09 PROCEDURE — 72158 MRI LUMBAR SPINE W/O & W/DYE: CPT

## 2019-04-09 PROCEDURE — 74011250637 HC RX REV CODE- 250/637: Performed by: INTERNAL MEDICINE

## 2019-04-09 PROCEDURE — A9575 INJ GADOTERATE MEGLUMI 0.1ML: HCPCS | Performed by: RADIOLOGY

## 2019-04-09 PROCEDURE — 82550 ASSAY OF CK (CPK): CPT

## 2019-04-09 PROCEDURE — 36415 COLL VENOUS BLD VENIPUNCTURE: CPT

## 2019-04-09 PROCEDURE — 80048 BASIC METABOLIC PNL TOTAL CA: CPT

## 2019-04-09 PROCEDURE — 72197 MRI PELVIS W/O & W/DYE: CPT

## 2019-04-09 PROCEDURE — 97530 THERAPEUTIC ACTIVITIES: CPT

## 2019-04-09 PROCEDURE — 74011250636 HC RX REV CODE- 250/636: Performed by: INTERNAL MEDICINE

## 2019-04-09 PROCEDURE — 97535 SELF CARE MNGMENT TRAINING: CPT

## 2019-04-09 PROCEDURE — 65270000029 HC RM PRIVATE

## 2019-04-09 RX ORDER — GADOTERATE MEGLUMINE 376.9 MG/ML
20 INJECTION INTRAVENOUS
Status: COMPLETED | OUTPATIENT
Start: 2019-04-09 | End: 2019-04-09

## 2019-04-09 RX ADMIN — HEPARIN SODIUM 5000 UNITS: 5000 INJECTION INTRAVENOUS; SUBCUTANEOUS at 22:26

## 2019-04-09 RX ADMIN — INSULIN LISPRO 2 UNITS: 100 INJECTION, SOLUTION INTRAVENOUS; SUBCUTANEOUS at 16:56

## 2019-04-09 RX ADMIN — DEXTROSE MONOHYDRATE, SODIUM CHLORIDE, AND POTASSIUM CHLORIDE: 50; 4.5; 2.98 INJECTION, SOLUTION INTRAVENOUS at 02:40

## 2019-04-09 RX ADMIN — TAMSULOSIN HYDROCHLORIDE 0.4 MG: 0.4 CAPSULE ORAL at 08:13

## 2019-04-09 RX ADMIN — CEFEPIME 2 G: 2 INJECTION, POWDER, FOR SOLUTION INTRAVENOUS at 06:05

## 2019-04-09 RX ADMIN — HEPARIN SODIUM 5000 UNITS: 5000 INJECTION INTRAVENOUS; SUBCUTANEOUS at 06:05

## 2019-04-09 RX ADMIN — HEPARIN SODIUM 5000 UNITS: 5000 INJECTION INTRAVENOUS; SUBCUTANEOUS at 16:56

## 2019-04-09 RX ADMIN — HYDROCODONE BITARTRATE AND ACETAMINOPHEN 1 TABLET: 5; 325 TABLET ORAL at 00:29

## 2019-04-09 RX ADMIN — Medication 10 ML: at 06:05

## 2019-04-09 RX ADMIN — ACETAMINOPHEN 650 MG: 325 TABLET ORAL at 22:57

## 2019-04-09 RX ADMIN — INSULIN LISPRO 2 UNITS: 100 INJECTION, SOLUTION INTRAVENOUS; SUBCUTANEOUS at 11:18

## 2019-04-09 RX ADMIN — GADOTERATE MEGLUMINE 20 ML: 376.9 INJECTION INTRAVENOUS at 19:00

## 2019-04-09 RX ADMIN — DEXTROSE MONOHYDRATE, SODIUM CHLORIDE, AND POTASSIUM CHLORIDE: 50; 4.5; 2.98 INJECTION, SOLUTION INTRAVENOUS at 16:55

## 2019-04-09 RX ADMIN — Medication 10 ML: at 22:25

## 2019-04-09 RX ADMIN — Medication 10 ML: at 17:02

## 2019-04-09 RX ADMIN — HYDROCODONE BITARTRATE AND ACETAMINOPHEN 1 TABLET: 5; 325 TABLET ORAL at 13:58

## 2019-04-09 RX ADMIN — CEFEPIME 2 G: 2 INJECTION, POWDER, FOR SOLUTION INTRAVENOUS at 16:57

## 2019-04-09 NOTE — PROGRESS NOTES
Problem: Mobility Impaired (Adult and Pediatric) Goal: *Acute Goals and Plan of Care (Insert Text) Description Physical Therapy Goals Initiated 4/7/2019 1. Patient will move from supine to sit and sit to supine  in bed with maximal assistance within 7 day(s). 2.  Patient will transfer from bed to chair and chair to bed with maximal assistance using the least restrictive device within 7 day(s). 3.  Patient will perform sit to stand with maximal assistance within 7 day(s). 4.  Patient will ambulate with maximal assistance for 50 feet with the least restrictive device within 7 day(s). Note: PHYSICAL THERAPY TREATMENT Patient: Jose Daniel Bonilla [de-identified]74 y.o. male) Date: 4/9/2019 Diagnosis: Syncope [R55] Syncope Precautions: Fall Chart, physical therapy assessment, plan of care and goals were reviewed. ASSESSMENT: 
Pt  with poor positioning in bed. Pt repositioned with head and trunk upright. Pt supine to sit with max assist of 2. Pt progressed to CGA sitting on EOB. Pt reports weakness and numbness to right leg and foot. Pts nurse changed bandages on pts back while pt was sitting EOB. Pt to stand with mod to max of 2 with bed elevated. Pt stood with flexed posture using RW min to mod of 2. Pts sheets were changed as pt stood. Pt back to bed with mod to max of 2. Pt tolerated treatment fairly well. Continue goals. Progression toward goals: 
?    Improving appropriately and progressing toward goals ? Improving slowly and progressing toward goals ? Not making progress toward goals and plan of care will be adjusted PLAN: 
Patient continues to benefit from skilled intervention to address the above impairments. Continue treatment per established plan of care. Discharge Recommendations:  Rehab Further Equipment Recommendations for Discharge:  rolling walker SUBJECTIVE:  
 
 
OBJECTIVE DATA SUMMARY:  
Critical Behavior: 
Neurologic State: Alert Orientation Level: Oriented to person, Oriented to place, Oriented to situation, Disoriented to time Cognition: Follows commands, Poor safety awareness Safety/Judgement: Awareness of environment, Fall prevention, Decreased insight into deficits Functional Mobility Training: 
Bed Mobility: 
  
Supine to Sit: Maximum assistance;Assist x2 Sit to Supine: Maximum assistance;Assist x2 Transfers: 
Sit to Stand: Moderate assistance;Assist x2;Maximum assistance Stand to Sit: Moderate assistance;Assist x2 Balance: 
Sitting: Intact Standing: Without support Pain: 
Pain Scale 1: Numeric (0 - 10) Pain Intensity 1: 9 Pain Location 1: Abdomen Pain Orientation 1: Anterior Pain Description 1: Aching Pain Intervention(s) 1: Medication (see MAR) Activity Tolerance:  
Pt tolerated treatment fairly well. Please refer to the flowsheet for vital signs taken during this treatment. After treatment:  
?    Patient left in no apparent distress sitting up in chair ? Patient left in no apparent distress in bed 
? Call bell left within reach ? Nursing notified ? Caregiver present ? Bed alarm activated COMMUNICATION/COLLABORATION:  
The patient?s plan of care was discussed with: Physical Therapist 
 
James Fregoso PTA Time Calculation: 23 mins

## 2019-04-09 NOTE — PROGRESS NOTES
Bedside and Verbal shift change report given to [facility] (oncoming nurse) by [facility] (offgoing nurse). Report included the following information SBAR, Kardex, Intake/Output, MAR and Recent Results.
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
## 2019-04-09 NOTE — PROGRESS NOTES
[facility] at [address], [address] [facility] 100[address] [facility] W: [phone]  F: [phone] Reason for Visit:  
Mordecai Jeans is a 74 y.o. male who is seen in consultation at the request of Dr. Lele Jacobson 
 for evaluation of prostate cancer with mets. Hematology / Oncology Treatment History:  
 
Diagnosis: Prosate cancer, diagnosed 2015.  
  
Stage: Now metastatic 
  
Pathology: 9/6/18 Sigmoid colon ulcer, biopsy:  
Adenocarcinoma, strongly favor prostate primary (see Comment). Stains weakly for PSAP. 
  
Prior Treatment: Brachytherapy and EBRT in 12/2015.  
  
Current Treatment: Romelle Heber started on 1/28/19. Lupron started earlier. Oncologic History: 
Patient was admitted to the hospital in 9/2018 for persistent diarrhea, evidence of a sigmoid mass. He had been suffering with persistent diarrhea, 50-70-lb weight loss for approx 4-5 months. He underwent 2 recent colonoscopies (one in July and one in Aug 2018) by Dr. Jennifer Patel at [facility]. He states the first colonoscopy was not clear due to poor prep. The second colonoscopy reportedly showed abnormal rectal and sigmoid mucosa with a possible mass. This was biopsied and was benign. The patient then underwent a CT scan which was notable for R-sided hydronephrosis from extrinsic compression of the right ureter. The patient's urologist, Dr. Evaristo Ochoa, attempted to pass a ureteral stent but was unsuccessful. The patient was then admitted to [address] [place] [address] for weakness, persistent diarrhea and lower abdominal pain. Abd/pelvis CT on 9/4/18 showed a distal sigmoid mass with possible invasion of the posterior bladder wall as well as R hydronephrosis. He was evaluated by Dr. Mayank Arias in general surgery and underwent diverting loop colostomy given the symptoms caused by the sigmoid mass. He underwent colonoscopy by Dr. Marvella Fabry in GI with finding of a large sigmoid mass, which was biopsied.  Pathology revealed an adenocarcinoma, but morphology was more consistent with prostate cancer than colorectal cancer. He was seen by Dr. Mathis President in Urology during hospital stay who recommended percutaneous nephrostomy tube, placed by IR on 9/7. Given the biopsy findings, Dr. Mathis President recommended further evaluation with prostate/pelvic MRI. Nuclear med bone scan negative for any suspicious bony lesions. After hospital discharge, he fell and was admitted to /[facility]. He stayed there for 3 days and was discharged to rehab, where he stayed for 4 weeks. His pathology was reviewed by an outside facility with opinion that this was a poorly differentiated prostate cancer. Pt was started on Zytiga + Prednisone in 1/2019. History of Present Illness: Mr. Carla Douglas is a 73 y/o male with h/o prostate cancer who is admitted to the hospital after passing out and woke up after spending approximately 1 day on the ground unconscious. This is his second hospitalization for similar presentation. He was found with diarrhea which had leaked out of his colostomy bag, soiling his clothes. Patient has a complicated oncologic history with a presumed metastatic prostate cancer causing direct extension into colon and surrounding structures, causing obstruction of ureters. Pt required diverting colostomy and R nephrostomy tube. PSA has been low and not markedly elevated. After starting Lupron, Zytiga + Prednisone for prostate cancer, PSA declined slightly, but on most recent check was increased to 3-4 range. ED labs notable for K 2.1, Cr 1.75, CK 10,665. Patient was started on IVF. He states he cannot recall how he ended up on the floor. He simply remembers sitting and watching basketball. Denies any fevers or chills  at home. Denies SOB. Appetite has been poor for several months. Lives by himself. No family at bedside.   
 
Interval History:  
Having continued swelling to bilat LE; reports unable to feel when sharp object rubbed to bottom or top of R foot, numbness extending up to below the R knee area. He also reports weakness with inability to move his R ankle. Has been present for a few days. Appetite is fair; denies any pain. Denies any SOB. Still spiking fevers. No family at bedside. Past Medical History:  
Diagnosis Date  Diabetes mellitus ([facility]  Prostate cancer ([facility]  Sleep apnea Past Surgical History:  
Procedure Laterality Date 2021 Luis Orona N/A 9/6/2018 SIGMOIDOSCOPY FLEXIBLE performed by Edy Interiano MD at [address] [facility] Left  HX HIP REPLACEMENT Right  HX OTHER SURGICAL    
 bladder stent  IR CHANGE NEPHROSTOMY PYELOS TUBE RT  2/1/2019 Social History Tobacco Use  Smoking status: Never Smoker  Smokeless tobacco: Never Used Substance Use Topics  Alcohol use: No  
  
Family History Problem Relation Age of Onset  Cancer Mother   
     colon  Hypertension Mother  Heart Disease Mother  Cancer Father  Diabetes Sister  Cancer Brother  Diabetes Sister Current Facility-Administered Medications Medication Dose Route Frequency  ibuprofen (MOTRIN) tablet 400 mg  400 mg Oral Q6H PRN  
 acetaminophen (TYLENOL) tablet 650 mg  650 mg Oral Q6H PRN  
 dextrose 5% - 0.45% NaCl with KCl 40 mEq/L infusion   IntraVENous CONTINUOUS  
 cefepime (MAXIPIME) 2 g in 0.9% sodium chloride (MBP/ADV) 100 mL  2 g IntraVENous Q12H  
 sodium chloride (NS) flush 5-40 mL  5-40 mL IntraVENous Q8H  
 sodium chloride (NS) flush 5-40 mL  5-40 mL IntraVENous PRN  
 naloxone (NARCAN) injection 0.4 mg  0.4 mg IntraVENous PRN  
 insulin lispro (HUMALOG) injection   SubCUTAneous AC&HS  
 glucose chewable tablet 16 g  4 Tab Oral PRN  
 dextrose (D50W) injection syrg 12.5-25 g  12.5-25 g IntraVENous PRN  
 glucagon (GLUCAGEN) injection 1 mg  1 mg IntraMUSCular PRN  
  tamsulosin (FLOMAX) capsule 0.4 mg  0.4 mg Oral DAILY  heparin (porcine) injection 5,000 Units  5,000 Units SubCUTAneous Q8H  
 HYDROcodone-acetaminophen (NORCO) 5-325 mg per tablet 1 Tab  1 Tab Oral Q4H PRN Allergies Allergen Reactions  Ciprofloxacin Rash Review of Systems: A complete review of systems was obtained, negative except as described above. Physical Exam:  
 
Visit Vitals /66 (BP 1 Location: Left arm, BP Patient Position: Sitting;During activity) Comment (BP Patient Position): sitting up in bed eating breakfast  
Pulse 92 Temp 99.1 °F (37.3 °C) Resp 17 Ht 5' 9\" (1.753 m) Wt 104.6 kg (230 lb 9.6 oz) SpO2 98% BMI 34.05 kg/m² ECOG PS: 2-3 General: elderly; No distress Eyes: PERRLA, anicteric sclerae HENT: Atraumatic with normal appearance of ears and nose; OP clear Neck: Supple; no thyromegaly Lymphatic: No cervical, supraclavicular, or axillary adenopathy Respiratory: CTAB, normal respiratory effort CV: Normal rate, regular rhythm, no murmurs, 2+ pitting edema in BLE ; R>L 
GI: Colostomy noted with liquid brown stool noted; Soft, nontender, nondistended, no masses, no hepatomegaly, no splenomegaly : nephrostomy tube noted MS:Digits without clubbing or cyanosis. Skin: No rashes, ecchymoses, or petechiae. Normal temperature, turgor, and texture. Neuro/Psych: Alert, oriented, appropriate affect, normal judgment/insight. R leg numbness to below knee to foot. Hyperesthesia with sharp object to R foot, but numbness otherwise. 1/5 strength with R foot dorsiflexion, plantarflexion Results:  
 
Lab Results Component Value Date/Time WBC 11.6 (H) 04/09/2019 06:00 AM  
 HGB 8.2 (L) 04/09/2019 06:00 AM  
 HCT 27.6 (L) 04/09/2019 06:00 AM  
 PLATELET 182 14/92/7271 06:00 AM  
 MCV 88.5 04/09/2019 06:00 AM  
 ABS. NEUTROPHILS 12.4 (H) 04/06/2019 01:01 AM  
 
Lab Results Component Value Date/Time  Sodium 144 04/09/2019 06:00 AM  
 Potassium 3.4 (L) 04/09/2019 06:00 AM  
 Chloride 111 (H) 04/09/2019 06:00 AM  
 CO2 26 04/09/2019 06:00 AM  
 Glucose 117 (H) 04/09/2019 06:00 AM  
 BUN 13 04/09/2019 06:00 AM  
 Creatinine 1.09 04/09/2019 06:00 AM  
 GFR est AA >60 04/09/2019 06:00 AM  
 GFR est non-AA >60 04/09/2019 06:00 AM  
 Calcium 8.2 (L) 04/09/2019 06:00 AM  
 Glucose (POC) 136 (H) 04/09/2019 06:38 AM  
 
Lab Results Component Value Date/Time Bilirubin, total 0.4 04/06/2019 01:01 AM  
 ALT (SGPT) 91 (H) 04/06/2019 01:01 AM  
 AST (SGOT) 345 (H) 04/06/2019 01:01 AM  
 Alk. phosphatase 64 04/06/2019 01:01 AM  
 Protein, total 5.4 (L) 04/06/2019 01:01 AM  
 Albumin 2.1 (L) 04/06/2019 01:01 AM  
 Globulin 3.3 04/06/2019 01:01 AM  
 
Lab Results Component Value Date/Time Reticulocyte count 0.8 09/05/2018 12:54 PM  
 Iron % saturation 15 (L) 09/05/2018 12:54 PM  
 TIBC 172 (L) 09/05/2018 12:54 PM  
 Ferritin 213 09/05/2018 12:54 PM  
 Vitamin B12 726 09/05/2018 12:54 PM  
 Folate 9.5 09/05/2018 12:54 PM  
 Lipase 69 (L) 04/05/2019 03:00 AM  
 
Lab Results Component Value Date/Time INR 1.1 04/05/2019 03:00 AM  
 aPTT 20.6 (L) 04/05/2019 03:00 AM  
 
Lab Results Component Value Date/Time CEA 1.1 09/05/2018 12:54 PM  
 Prostate Specific Ag 4.4 (H) 03/07/2019 09:57 AM  
 
4/5/2019 XR CHEST IMPRESSION: 
No acute process. 4/5/2019 CT HEAD WO CONT IMPRESSION No acute process. 4/5/2019 CT CHEST ABD PELV  WO CONT 1. No significant change in large pelvic soft tissue mass surrounding the 
prostate, inseparable from the rectum, as well as the posterior right bladder 
wall. Bilateral hydronephrosis left greater than right similar to prior study. Unchanged position of right nephrostomy tube. 2. No acute process in the chest. 
Outside imaging performed January 15, 2019 at [facility] or provided for 
comparison. Addendum: Comparison is performed.  
In the interval from January 15, 2019 to April 1, 2019 there is been additional 
interval progression enlarged distal sigmoid colon/rectal mass. Enlarged nodular 
pelvic masses. Slightly progressed left-sided hydroureteronephrosis as well. 4/5 DUPLEX LE doppler Bilateral lower extremity venous duplex negative for deep venous thrombosis or thrombophlebitis int he veins visualized Assessment and Recommendations:  
Alberto Foster is a 74 y.o. male with h/o prostate cancer admitted with unexplained syncope. 1. Metastatic prostate cancer, castration resistant: Was localized at diagnosis, treated with brachytherapy in 12/2015. Now with metastatic disease which likely started in seminal vesicles and progressed with local extension into sigmoid colon. The pathology is more consistent with prostate adenocarcinoma than colon cancer and no mass seen on first 2 colonoscopies. CEA normal. Chest CT negative. Given large sigmoid mass at risk of causing obstruction, patient underwent diverting loop colostomy on 9/7/18. . Patient has a bulky sigmoid mass causing hydronephrosis requiring utereral stent and colon obstruction, added Zytiga to the Lupron to help obtain better and quicker response. Also based on the STAMPEDE trial, adding Zytiga to treatment in the first line setting improves overall survival (3yr OS 83% vs 76%). In the future, patient may be able to undergo ureteral stent placement and colostomy reversal. Recent CT 4/2019 shows evidence of disease progression on Lupron, Zytiga, Prednisone along with recent increase in PSA 4.4 on 3/7/19. While metastatic prostate cancer has several different treatment options, this patient's disease is atypical, quite aggressive, poorly differentiated. He and his family have been adamant about treatment, but will readdress with most current evidence of disease progression. -- Have discussed with patient in detail that his current treatment with Shaye Reyna is not working.  I discussed that treatment options include switching to alternate pill such as Enzalutamide, which is unlikely to work. Another option is Docetaxel chemotherapy. Patient's performance status is likely too poor to consider this and would pose a high risk of infection. Home hospice is another option if the patient wishes to focus on comfort and quality of life rather than treatment. -- Family discussion from yesterday detailed below. 2. JAGDEEP / Rhabdomyolysis: CT scan revealing bilateral hydronephrosis (likely due to disease progression). JAGDEEP is 2/2 to decreased oral intake and diarrhea. CK up to 10,665 --> 13,471 --> 5911--> 1,505  Nephrology and Urology have evaluated pt. JAGDEEP and Rhabdo are improving. 
-- Receiving IVFs 3. Falls/ syncope: Unclear etiology and 2nd occurrence. 4/5 Echo EF 56-60% 4. UTI: Urine culture growing Klebsiella and Pseudomonas, similar to last urine cs. Currently still spiking fevers on Cefepime. 5. Right hydronephrosis: 2/2 extrinsic compression of the R distal ureter from the prostate vs sigmoid mass. IR placed percutaneous nephrostomy tube on 9/7/18. This was replaced during hospital admission in 2/2019. Follows with Dr. Brandyn Banuelos as outpatient and was seen by Urology here. 6. Normocytic anemia: Likely a combination of anemia of chronic disease due to underlying malignancy. Previously no evidence of iron deficiency. Normal VitB12, folate. 7. BLE edema: Followed by  lymphedema clinic as outpatient; compression garment removed due to pain. 8. RLE [place]/weakness: Pt unable to to dorsi/ plantar-flex rt foot; with decreased in sensation from below the knee area 
-- MRI of L-spine and pelvis to eval 
 
8. Goals of care/ dispo 4/8 Family meeting with pt, pt's son ConstantinoTERRY Vita. Pt's sister, Dr Aida Henning,  and myself. Mr Cy Harris was able to state that his time is limited. Voices he would like to not be a burden to his family and is interested in pursuing some other living arrangements. Family has already been looking into facilities near their [place] on [facility]. Palliative team reviewed options including home with hospice support. Family is interested in [address] [place] [address] for improving strength for now. Son and daughter in law requesting additional info regarding AMD and having that filled out while Mr Venkat Castellano is in the hospital. Daughter in law voiced concern family friction involving pt's sister. Patient seen in conjunction with Yuliana Samayoa NP.  
 
Signed By: Mj Weiss MD

## 2019-04-09 NOTE — ROUTINE PROCESS
Bedside shift change report given to Fazal Doyle (oncoming nurse) by Idalia Melgoza (offgoing nurse). Report included the following information SBAR, Kardex, Intake/Output, MAR, Accordion, Recent Results and Med Rec Status.

## 2019-04-09 NOTE — CDMP QUERY
1.   
Patient admitted with JAGDEEP and rhabdomyolysis and is  noted that dietician in note of 4/5 states that the patient meets ASPEN criteria for severe malnutrition. If possible, please document in progress notes and d/c summary if you are evaluating and/or treating any of the following:  
 
=>  severe protein-calorie malnutrition 
=> Other explanation of clinical findings  
=> Clinically Undetermined (no explanation for clinical findings) The medical record reflects the following: 
 
   Risk Factors: progressive weakness, recent fall with undetermined length of time on the floor, metastatic prostate cancer, reports of persistent diarrhea. Clinical Indicators: dietician note of 4/5 states \" Class I obese per BMI. Pt has had 42lb weight loss over last 10 months (15%). Weight has fluctuated recently per weight hx, hard to determine recent weight loss. Pt has had decreased PO intakes at home for several months. meets ASPEN criteria of energy intake < 75 % required for > 1 month, unintended weight loss of 10% x 6 mos, mild muscle mass and sq fat loss,  
 
   Treatment: dietician consult with recommendations for ensure clear Thanks for your time. Vikki Kenyon RN, BSN 
[phone]

## 2019-04-09 NOTE — PROGRESS NOTES
[address] [facility] Efren Montano YOB: 1945 Assessment & Plan: 1. JAGDEEP: ATN. Rhabdo, bl Hydro ( Urology following: appears to be stable) - cr improved, non oliguric,CK better,would watch cr on NSAIDS, ok to use Loops prn for edema - We will see PRN 2. Low K: Replete 3. Rhabdo 4. Prostate CA Subjective:  
CC:JAGDEEP HPI: Patient seen Cr better K low CK better Edema+ ROS:no cp/sob/n/v Current Facility-Administered Medications Medication Dose Route Frequency  ibuprofen (MOTRIN) tablet 400 mg  400 mg Oral Q6H PRN  
 acetaminophen (TYLENOL) tablet 650 mg  650 mg Oral Q6H PRN  
 dextrose 5% - 0.45% NaCl with KCl 40 mEq/L infusion   IntraVENous CONTINUOUS  
 cefepime (MAXIPIME) 2 g in 0.9% sodium chloride (MBP/ADV) 100 mL  2 g IntraVENous Q12H  
 sodium chloride (NS) flush 5-40 mL  5-40 mL IntraVENous Q8H  
 sodium chloride (NS) flush 5-40 mL  5-40 mL IntraVENous PRN  
 naloxone (NARCAN) injection 0.4 mg  0.4 mg IntraVENous PRN  
 insulin lispro (HUMALOG) injection   SubCUTAneous AC&HS  
 glucose chewable tablet 16 g  4 Tab Oral PRN  
 dextrose (D50W) injection syrg 12.5-25 g  12.5-25 g IntraVENous PRN  
 glucagon (GLUCAGEN) injection 1 mg  1 mg IntraMUSCular PRN  
 tamsulosin (FLOMAX) capsule 0.4 mg  0.4 mg Oral DAILY  heparin (porcine) injection 5,000 Units  5,000 Units SubCUTAneous Q8H  
 HYDROcodone-acetaminophen (NORCO) 5-325 mg per tablet 1 Tab  1 Tab Oral Q4H PRN Objective:  
 
Vitals: 
Blood pressure 121/66, pulse 92, temperature 99.1 °F (37.3 °C), resp. rate 17, height 5' 9\" (1.753 m), weight 104.6 kg (230 lb 9.6 oz), SpO2 98 %. Temp (24hrs), Av.4 °F (37.4 °C), Min:97.4 °F (36.3 °C), Max:102.4 °F (39.1 °C) Intake and Output: 
 0701 - 1900 In: -  
Out: 400 [Urine:350] 1901 - 700 In: 2296.7 [P.O.:480; I.V.:1816.7] Out: 6322 [WNEKY:6689] Physical Exam: Patient is intubated:  no 
 
Physical Examination:  
GENERAL ASSESSMENT: NAD HEENT:Nontraumatic CHEST: CTA HEART: S1S2 ABDOMEN: Soft,NT, 
 :  
EXTREMITY: EDEMA 1 NEURO:Grossly non focal 
 
 
   
ECG/rhythm: 
 
Data Review No results for input(s): TNIPOC in the last 72 hours. No lab exists for component: ITNL Recent Labs 04/09/19 
0600 04/07/19 
0022 CPK 1,505* 5,911* Recent Labs 04/09/19 
0600 04/07/19 
0022  144  
K 3.4* 3.0*  
* 109* CO2 26 29 BUN 13 14 CREA 1.09 1. 35* * 182* MG  --  2.1 CA 8.2* 7.2* WBC 11.6* 14.4* HGB 8.2* 8.5* HCT 27.6* 27.4*  
 262 No results for input(s): INR, PTP, APTT in the last 72 hours. No lab exists for component: INREXT, INREXT Needs: urine analysis, urine sodium, protein and creatinine Lab Results Component Value Date/Time Sodium,urine random 15 04/05/2019 12:08 PM  
 Creatinine, urine 92.10 04/05/2019 12:08 PM  
 
 
 
Discussed with:  pt 
 
: Lisa Dawn MD 
4/9/2019 [facility]: 
[email or web address] [facility] office: 
[address] Phone: [phone] Fax :     [phone] [place] office: 
[address] [facility] Phone - [phone] Fax - [phone]

## 2019-04-09 NOTE — PROGRESS NOTES
MRI checklist completed. Nephrostomy tube, colostomy , right and left total hip replacements and bilateral renal stents.

## 2019-04-09 NOTE — PROGRESS NOTES
4/9/2019   CARE MANAGEMENT NOTE:  CM is following pt for initial discharge planning. CM has not had any successful attempts to meet with pt/family however Palliative Care team reports that SNF is the plan. CM will plan to contact pt/family tomorrow ALEXEY Lee

## 2019-04-09 NOTE — PROGRESS NOTES
Problem: Self Care Deficits Care Plan (Adult) Goal: *Acute Goals and Plan of Care (Insert Text) Description Occupational Therapy Goals Initiated 4/7/2019 1. Patient will perform grooming with supervision/set-up in unsupported sit within 7 day(s). 2.  Patient will perform upper body dressing with supervision/set-up within 7 day(s). 3.  Patient will perform lower body dressing with moderate assistance using AE PRN within 7 day(s). 4.  Patient will perform toilet transfers with moderate assistance to [facility] within 7 day(s). 5.  Patient will perform all aspects of toileting with moderate assistance  within 7 day(s). 6.  Patient will participate in upper extremity therapeutic exercise/activities with supervision/set-up for 5 minutes within 7 day(s). Note:  
OCCUPATIONAL THERAPY TREATMENT Patient: Efren Montano [de-identified]74 y.o. male) Date: 4/9/2019 Diagnosis: Syncope [R55] Syncope Precautions: Fall Chart, occupational therapy assessment, plan of care, and goals were reviewed. ASSESSMENT: 
Pt agreeable to OT. After supine to sit he washed his face and anterior UB, min assist to wash his back and to doff/don hospital gown. RN in to change dressings and pt stood one time in prep for PCT to change linens which were wet. No dizziness verbalized. Recommend SNF. Progression toward goals: 
?       Improving appropriately and progressing toward goals ? Improving slowly and progressing toward goals ? Not making progress toward goals and plan of care will be adjusted PLAN: 
Patient continues to benefit from skilled intervention to address the above impairments. Continue treatment per established plan of care. Discharge Recommendations: SNF Further Equipment Recommendations for Discharge:  None SUBJECTIVE:  
Patient stated ? Thank you guys. ? OBJECTIVE DATA SUMMARY:  
Cognitive/Behavioral Status: 
Neurologic State: Alert Orientation Level: Oriented to person;Oriented to place;Oriented to situation Cognition: Follows commands Functional Mobility and Transfers for ADLs: 
Bed Mobility: 
Supine to Sit: Maximum assistance;Assist x2 Sit to Supine: Maximum assistance;Assist x2 Transfers: 
Sit to Stand: Moderate assistance;Assist x2;Maximum assistance Balance: 
Sitting: Intact Standing: Without support ADL Intervention: 
  
 
Grooming Washing Face: Supervision/set-up Upper Body Bathing Bathing Assistance: Minimum assistance Position Performed: Seated edge of bed Upper Body Dressing Assistance Hospital Gown: Maximum assistance Pain: 
Pain Scale 1: Numeric (0 - 10) Pain Intensity 1: 9 Pain Location 1: Abdomen Pain Orientation 1: Anterior Pain Description 1: Aching Pain Intervention(s) 1: Medication (see MAR) Activity Tolerance:  
Fair Please refer to the flowsheet for vital signs taken during this treatment. After treatment:  
? Patient left in no apparent distress sitting up in chair ? Patient left in no apparent distress in bed 
? Call bell left within reach ? Nursing notified ? Caregiver present ? Bed alarm activated COMMUNICATION/COLLABORATION:  
The patient?s plan of care was discussed with: Physical Therapy Assistant, Occupational Therapist and Registered Nurse SUAD Colin Time Calculation: 20 mins

## 2019-04-09 NOTE — PROGRESS NOTES
Please complete MRI History and Safety Screening Form for this patient Using [employer] only under Orders Requiring a Screening Form: 
Example: 
Orders Requiring a Screening Form Procedure                    Order Status                      Form Status MRI EXAM                   Active                                In Progress Click on blue hyperlink as shown above to launch MRI screening form Answer all questions completely including Weight, Surgery, and Implant History. Document MUST be \"eSigned\" using a \"Signature Pad\" by the person completing form.  (patient and RN or MD completing form with the patient) Patient cannot be scanned until this form is completed and reviewed in MRI to ensure patient is SAFE and eligible for MRI. Call MRI when this has been successfully completed at [phone]. This must be done under [facility]. Do not use the Nursing Flowsheet. 24-Nov-2018 04:12

## 2019-04-10 LAB
GLUCOSE BLD STRIP.AUTO-MCNC: 130 MG/DL (ref 65–100)
GLUCOSE BLD STRIP.AUTO-MCNC: 142 MG/DL (ref 65–100)
GLUCOSE BLD STRIP.AUTO-MCNC: 151 MG/DL (ref 65–100)
GLUCOSE BLD STRIP.AUTO-MCNC: 167 MG/DL (ref 65–100)
SERVICE CMNT-IMP: ABNORMAL

## 2019-04-10 PROCEDURE — 97530 THERAPEUTIC ACTIVITIES: CPT

## 2019-04-10 PROCEDURE — 74011000258 HC RX REV CODE- 258: Performed by: INTERNAL MEDICINE

## 2019-04-10 PROCEDURE — 74011636637 HC RX REV CODE- 636/637: Performed by: INTERNAL MEDICINE

## 2019-04-10 PROCEDURE — 82962 GLUCOSE BLOOD TEST: CPT

## 2019-04-10 PROCEDURE — 74011250637 HC RX REV CODE- 250/637: Performed by: INTERNAL MEDICINE

## 2019-04-10 PROCEDURE — 97116 GAIT TRAINING THERAPY: CPT

## 2019-04-10 PROCEDURE — 74011250636 HC RX REV CODE- 250/636: Performed by: INTERNAL MEDICINE

## 2019-04-10 PROCEDURE — 65270000029 HC RM PRIVATE

## 2019-04-10 RX ADMIN — PIPERACILLIN SODIUM,TAZOBACTAM SODIUM 3.38 G: 3; .375 INJECTION, POWDER, FOR SOLUTION INTRAVENOUS at 12:28

## 2019-04-10 RX ADMIN — HEPARIN SODIUM 5000 UNITS: 5000 INJECTION INTRAVENOUS; SUBCUTANEOUS at 23:39

## 2019-04-10 RX ADMIN — Medication 10 ML: at 22:00

## 2019-04-10 RX ADMIN — Medication 10 ML: at 14:00

## 2019-04-10 RX ADMIN — DEXTROSE MONOHYDRATE, SODIUM CHLORIDE, AND POTASSIUM CHLORIDE: 50; 4.5; 2.98 INJECTION, SOLUTION INTRAVENOUS at 08:11

## 2019-04-10 RX ADMIN — CEFEPIME 2 G: 2 INJECTION, POWDER, FOR SOLUTION INTRAVENOUS at 06:21

## 2019-04-10 RX ADMIN — PIPERACILLIN SODIUM,TAZOBACTAM SODIUM 3.38 G: 3; .375 INJECTION, POWDER, FOR SOLUTION INTRAVENOUS at 20:26

## 2019-04-10 RX ADMIN — INSULIN LISPRO 2 UNITS: 100 INJECTION, SOLUTION INTRAVENOUS; SUBCUTANEOUS at 08:08

## 2019-04-10 RX ADMIN — INSULIN LISPRO 2 UNITS: 100 INJECTION, SOLUTION INTRAVENOUS; SUBCUTANEOUS at 17:04

## 2019-04-10 RX ADMIN — HEPARIN SODIUM 5000 UNITS: 5000 INJECTION INTRAVENOUS; SUBCUTANEOUS at 17:04

## 2019-04-10 RX ADMIN — TAMSULOSIN HYDROCHLORIDE 0.4 MG: 0.4 CAPSULE ORAL at 08:08

## 2019-04-10 RX ADMIN — DEXTROSE MONOHYDRATE, SODIUM CHLORIDE, AND POTASSIUM CHLORIDE: 50; 4.5; 2.98 INJECTION, SOLUTION INTRAVENOUS at 20:31

## 2019-04-10 RX ADMIN — Medication 10 ML: at 06:21

## 2019-04-10 RX ADMIN — HEPARIN SODIUM 5000 UNITS: 5000 INJECTION INTRAVENOUS; SUBCUTANEOUS at 06:23
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
## 2019-04-10 NOTE — PROGRESS NOTES
Problem: Self Care Deficits Care Plan (Adult) Goal: *Acute Goals and Plan of Care (Insert Text) Description Occupational Therapy Goals Initiated 4/7/2019 1. Patient will perform grooming with supervision/set-up in unsupported sit within 7 day(s). 2.  Patient will perform upper body dressing with supervision/set-up within 7 day(s). 3.  Patient will perform lower body dressing with moderate assistance using AE PRN within 7 day(s). 4.  Patient will perform toilet transfers with moderate assistance to [facility] within 7 day(s). 5.  Patient will perform all aspects of toileting with moderate assistance  within 7 day(s). 6.  Patient will participate in upper extremity therapeutic exercise/activities with supervision/set-up for 5 minutes within 7 day(s). Outcome: Progressing Towards Goal 
 OCCUPATIONAL THERAPY TREATMENT Patient: eBtito Hawk [de-identified]74 y.o. male) Date: 4/10/2019 Diagnosis: Syncope [R55] Syncope Precautions: Fall Chart, occupational therapy assessment, plan of care, and goals were reviewed. ASSESSMENT: 
Patient received supine in bed. Patient agreeable to perform OOB activity. He requires max assist x 2 for bed mobility. Once seated EOB, patient noted with wet gown and need for change. Mod assist for changing gown. Patient requires max assist x 2 for sit to stand. Once standing he is agreeable to transfer to chair. Patient transferring to L side to chair set up next to bed. Patient requires assist for walker management and placement for safety. Patient abl eto sit in chair and set up with water and fruit left from meal tray. Patient with pillows at back for less recline and more upright posture. Progression toward goals: 
?       Improving appropriately and progressing toward goals ? Improving slowly and progressing toward goals ? Not making progress toward goals and plan of care will be adjusted PLAN: 
 Patient continues to benefit from skilled intervention to address the above impairments. Continue treatment per established plan of care. Discharge Recommendations:  [address] Further Equipment Recommendations for Discharge:  TBD SUBJECTIVE:  
Patient stated ? Thank you for your help. ? OBJECTIVE DATA SUMMARY:  
Cognitive/Behavioral Status: 
Neurologic State: Alert Cognition: Follows commands Perception: Appears intact Perseveration: No perseveration noted Safety/Judgement: Awareness of environment Functional Mobility and Transfers for ADLs: 
Bed Mobility: 
Rolling: Maximum assistance;Assist x2 Supine to Sit: Assist x2;Maximum assistance Scooting: Moderate assistance;Assist x2 Transfers: 
Sit to Stand: Maximum assistance;Assist x2; Additional time(bed height elevated ) Bed to Chair: Assist x2;Maximum assistance(to left side due to RLE weakness) Balance: 
Sitting: Intact Standing: Impaired; With support Standing - Static: Constant support Standing - Dynamic : Constant support;Poor ADL Intervention: 
  
 
  
 
  
 
  
 
Upper Body Dressing Assistance Hospital Gown: Moderate assistance Cues: Physical assistance;Verbal cues provided Cognitive Retraining Safety/Judgement: Awareness of environment Neuro Re-Education: 
  
  
  
Therapeutic Exercises:  
Pain: 
Pain Scale 1: Adult Nonverbal Pain Scale Pain Intensity 1: 0 Activity Tolerance: VSS Please refer to the flowsheet for vital signs taken during this treatment. After treatment:  
? Patient left in no apparent distress sitting up in chair ? Patient left in no apparent distress in bed 
? Call bell left within reach ? Nursing notified ? Caregiver present ? Bed alarm activated COMMUNICATION/COLLABORATION:  
The patient?s plan of care was discussed with: Physical Therapy Assistant and Registered Nurse Hannah Avalos OTR/L Time Calculation: 26 mins

## 2019-04-10 NOTE — PROGRESS NOTES
visit for routine follow up. Patient sitting in chair at bedside. Good eye contact, calm, good natured. Says he is feeling a little better today. Provided spiritual presence and listening as he spoke of his current thoughts, feelings, and concerns. He spoke about what it has been like struggling with CA for the past four years, especially in lieu of his learning his CA is stage IV and life is uncertain at this point. He says he is prepared to transfer to Hospice care and is discussing that with his providers. He does not see this as a time of sadness, but a time of thankfulness and simply a fact of life. Says he has made peace with God and trusts in God for everything knowing that his life and death are in God's hands. He said that his son is having some difficulty dealing with this, but his son is coming along as he speaks to his son. Says this experience is also difficult for his sisters. When thinking about the end of his life and his death, he has always desired to surround himself at this time with people who he describes as being light and positive. He requested prayer and a prayer was offered. He appeared comforted and encouraged as a result of this visit and prayer and expressed gratitude for this prayer and visit. Visited by Rev. Magaly Pat, [facility]  paging service: [phone])

## 2019-04-10 NOTE — PROGRESS NOTES
[facility] [place] [address] [address] [facility], [address] [facility] [address] [facility] W: [phone]  F: [phone] Reason for Visit:  
Milind Matta is a 74 y.o. male who is seen in consultation at the request of Dr. Kajal Larios 
 for evaluation of prostate cancer with mets. Hematology / Oncology Treatment History:  
 
Diagnosis: Prosate cancer, diagnosed 2015.  
  
Stage: Now metastatic 
  
Pathology: 9/6/18 Sigmoid colon ulcer, biopsy:  
Adenocarcinoma, strongly favor prostate primary (see Comment). Stains weakly for PSAP. 
  
Prior Treatment: Brachytherapy and EBRT in 12/2015.  
  
Current Treatment: Bobby Quiana started on 1/28/19. Lupron started earlier. Oncologic History: 
Patient was admitted to the hospital in 9/2018 for persistent diarrhea, evidence of a sigmoid mass. He had been suffering with persistent diarrhea, 50-70-lb weight loss for approx 4-5 months. He underwent 2 recent colonoscopies (one in July and one in Aug 2018) by Dr. Kesha Henderson at [facility]. He states the first colonoscopy was not clear due to poor prep. The second colonoscopy reportedly showed abnormal rectal and sigmoid mucosa with a possible mass. This was biopsied and was benign. The patient then underwent a CT scan which was notable for R-sided hydronephrosis from extrinsic compression of the right ureter. The patient's urologist, Dr. Radha Dubon, attempted to pass a ureteral stent but was unsuccessful. The patient was then admitted to [facility] for weakness, persistent diarrhea and lower abdominal pain. Abd/pelvis CT on 9/4/18 showed a distal sigmoid mass with possible invasion of the posterior bladder wall as well as R hydronephrosis. He was evaluated by Dr. Wally Liu in general surgery and underwent diverting loop colostomy given the symptoms caused by the sigmoid mass. He underwent colonoscopy by Dr. Aj Mandujano in GI with finding of a large sigmoid mass, which was biopsied.  Pathology revealed an adenocarcinoma, but morphology was more consistent with prostate cancer than colorectal cancer. He was seen by Dr. Ildefonso Crowley in Urology during hospital stay who recommended percutaneous nephrostomy tube, placed by IR on 9/7. Given the biopsy findings, Dr. Ildefonso Crowley recommended further evaluation with prostate/pelvic MRI. Nuclear med bone scan negative for any suspicious bony lesions. After hospital discharge, he fell and was admitted to /[facility]. He stayed there for 3 days and was discharged to rehab, where he stayed for 4 weeks. His pathology was reviewed by an outside facility with opinion that this was a poorly differentiated prostate cancer. Pt was started on Zytiga + Prednisone in 1/2019. History of Present Illness: Mr. Saray Mishra is a 73 y/o male with h/o prostate cancer who is admitted to the hospital after passing out and woke up after spending approximately 1 day on the ground unconscious. This is his second hospitalization for similar presentation. He was found with diarrhea which had leaked out of his colostomy bag, soiling his clothes. Patient has a complicated oncologic history with a presumed metastatic prostate cancer causing direct extension into colon and surrounding structures, causing obstruction of ureters. Pt required diverting colostomy and R nephrostomy tube. PSA has been low and not markedly elevated. After starting Lupron, Zytiga + Prednisone for prostate cancer, PSA declined slightly, but on most recent check was increased to 3-4 range. ED labs notable for K 2.1, Cr 1.75, CK 10,665. Patient was started on IVF. He states he cannot recall how he ended up on the floor. He simply remembers sitting and watching basketball. Denies any fevers or chills  at home. Denies SOB. Appetite has been poor for several months. Lives by himself. No family at bedside.   
 
Interval History:  
Having continued swelling to bilat LE; continues with inability to feel sharp objects on the bottom of his rt foot but sensation of touch has improved to rt leg; continues with with inability to move his R ankle. Has been present for a few days. Appetite is fair; denies any pain. Denies any SOB. Still spiking fevers. Upset about an incident that happened this am when he was trying to use the urinal and was able to get himself to the side of the bed but then urine ended up going everywhere. No family at bedside. Past Medical History:  
Diagnosis Date  Diabetes mellitus ([address]  Prostate cancer ([facility]  Sleep apnea Past Surgical History:  
Procedure Laterality Date 2021 Luis Orona N/A 9/6/2018 SIGMOIDOSCOPY FLEXIBLE performed by Abigail Crawley MD at [facility] Left  HX HIP REPLACEMENT Right  HX OTHER SURGICAL    
 bladder stent  IR CHANGE NEPHROSTOMY PYELOS TUBE RT  2/1/2019 Social History Tobacco Use  Smoking status: Never Smoker  Smokeless tobacco: Never Used Substance Use Topics  Alcohol use: No  
  
Family History Problem Relation Age of Onset  Cancer Mother   
     colon  Hypertension Mother  Heart Disease Mother  Cancer Father  Diabetes Sister  Cancer Brother  Diabetes Sister Current Facility-Administered Medications Medication Dose Route Frequency  piperacillin-tazobactam (ZOSYN) 3.375 g in 0.9% sodium chloride (MBP/ADV) 100 mL  3.375 g IntraVENous Q8H  
 ibuprofen (MOTRIN) tablet 400 mg  400 mg Oral Q6H PRN  
 acetaminophen (TYLENOL) tablet 650 mg  650 mg Oral Q6H PRN  
 dextrose 5% - 0.45% NaCl with KCl 40 mEq/L infusion   IntraVENous CONTINUOUS  
 sodium chloride (NS) flush 5-40 mL  5-40 mL IntraVENous Q8H  
 sodium chloride (NS) flush 5-40 mL  5-40 mL IntraVENous PRN  
 naloxone (NARCAN) injection 0.4 mg  0.4 mg IntraVENous PRN  
 insulin lispro (HUMALOG) injection   SubCUTAneous AC&HS  
 glucose chewable tablet 16 g  4 Tab Oral PRN  
  dextrose (D50W) injection syrg 12.5-25 g  12.5-25 g IntraVENous PRN  
 glucagon (GLUCAGEN) injection 1 mg  1 mg IntraMUSCular PRN  
 tamsulosin (FLOMAX) capsule 0.4 mg  0.4 mg Oral DAILY  heparin (porcine) injection 5,000 Units  5,000 Units SubCUTAneous Q8H  
 HYDROcodone-acetaminophen (NORCO) 5-325 mg per tablet 1 Tab  1 Tab Oral Q4H PRN Allergies Allergen Reactions  Ciprofloxacin Rash Review of Systems: A complete review of systems was obtained, negative except as described above. Physical Exam:  
 
Visit Vitals /60 (BP 1 Location: Right arm, BP Patient Position: At rest) Pulse 87 Temp 98.4 °F (36.9 °C) Resp 18 Ht 5' 9\" (1.753 m) Wt 104.6 kg (230 lb 9.6 oz) SpO2 96% BMI 34.05 kg/m² ECOG PS: 2-3 General: elderly; No distress Eyes: PERRLA, anicteric sclerae HENT: Atraumatic with normal appearance of ears and nose; OP clear Neck: Supple; no thyromegaly Lymphatic: No cervical, supraclavicular, or axillary adenopathy Respiratory: CTAB, normal respiratory effort CV: Normal rate, regular rhythm, no murmurs, 2+ pitting edema in BLE ; R>L 
GI: Colostomy noted with liquid brown stool noted; Soft, nontender, nondistended, no masses, no hepatomegaly, no splenomegaly : nephrostomy tube noted MS:Digits without clubbing or cyanosis. Skin: No rashes, ecchymoses, or petechiae. Normal temperature, turgor, and texture. Neuro/Psych: Alert, oriented, appropriate affect, normal judgment/insight. R leg numbness to below knee to foot. Hyperesthesia with sharp object to R foot, but numbness otherwise. 1/5 strength with R foot dorsiflexion, plantarflexion Results:  
 
Lab Results Component Value Date/Time WBC 11.6 (H) 04/09/2019 06:00 AM  
 HGB 8.2 (L) 04/09/2019 06:00 AM  
 HCT 27.6 (L) 04/09/2019 06:00 AM  
 PLATELET 530 33/64/7471 06:00 AM  
 MCV 88.5 04/09/2019 06:00 AM  
 ABS. NEUTROPHILS 12.4 (H) 04/06/2019 01:01 AM  
 
Lab Results Component Value Date/Time Sodium 144 04/09/2019 06:00 AM  
 Potassium 3.4 (L) 04/09/2019 06:00 AM  
 Chloride 111 (H) 04/09/2019 06:00 AM  
 CO2 26 04/09/2019 06:00 AM  
 Glucose 117 (H) 04/09/2019 06:00 AM  
 BUN 13 04/09/2019 06:00 AM  
 Creatinine 1.09 04/09/2019 06:00 AM  
 GFR est AA >60 04/09/2019 06:00 AM  
 GFR est non-AA >60 04/09/2019 06:00 AM  
 Calcium 8.2 (L) 04/09/2019 06:00 AM  
 Glucose (POC) 130 (H) 04/10/2019 11:02 AM  
 
Lab Results Component Value Date/Time Bilirubin, total 0.4 04/06/2019 01:01 AM  
 ALT (SGPT) 91 (H) 04/06/2019 01:01 AM  
 AST (SGOT) 345 (H) 04/06/2019 01:01 AM  
 Alk. phosphatase 64 04/06/2019 01:01 AM  
 Protein, total 5.4 (L) 04/06/2019 01:01 AM  
 Albumin 2.1 (L) 04/06/2019 01:01 AM  
 Globulin 3.3 04/06/2019 01:01 AM  
 
Lab Results Component Value Date/Time Reticulocyte count 0.8 09/05/2018 12:54 PM  
 Iron % saturation 15 (L) 09/05/2018 12:54 PM  
 TIBC 172 (L) 09/05/2018 12:54 PM  
 Ferritin 213 09/05/2018 12:54 PM  
 Vitamin B12 726 09/05/2018 12:54 PM  
 Folate 9.5 09/05/2018 12:54 PM  
 Lipase 69 (L) 04/05/2019 03:00 AM  
 
Lab Results Component Value Date/Time INR 1.1 04/05/2019 03:00 AM  
 aPTT 20.6 (L) 04/05/2019 03:00 AM  
 
Lab Results Component Value Date/Time CEA 1.1 09/05/2018 12:54 PM  
 Prostate Specific Ag 4.4 (H) 03/07/2019 09:57 AM  
 
4/5/2019 XR CHEST IMPRESSION: 
No acute process. 4/5/2019 CT HEAD WO CONT IMPRESSION No acute process. 4/5/2019 CT CHEST ABD PELV  WO CONT 1. No significant change in large pelvic soft tissue mass surrounding the 
prostate, inseparable from the rectum, as well as the posterior right bladder 
wall. Bilateral hydronephrosis left greater than right similar to prior study. Unchanged position of right nephrostomy tube. 2. No acute process in the chest. 
Outside imaging performed January 15, 2019 at [facility] or provided for 
comparison. Addendum: Comparison is performed. In the interval from January 15, 2019 to April 1, 2019 there is been additional 
interval progression enlarged distal sigmoid colon/rectal mass. Enlarged nodular 
pelvic masses. Slightly progressed left-sided hydroureteronephrosis as well. 4/5 DUPLEX LE doppler Bilateral lower extremity venous duplex negative for deep venous thrombosis or thrombophlebitis int he veins visualized 4/09/2019 MRI LUMB SPINE W WO CONT IMPRESSION: 
  
1. Fluid collection in the left paraspinal musculature from L3 to L5 most likely 
related to infection. A necrotic prostate cancer metastasis would be unusual to 
the soft tissues. 2. Mild spondylosis in the lower lumbar spine. 4/09/2019 MRI PELV W WO CONT IMPRESSION:  
  
1. No mass effect on the lumbosacral plexus, sciatic nerves, or femoral nerves. 2. Large rectal mass has increased since September, unchanged since 4 days ago. Close approximation to the lumbosacral plexus without direct involvement. 3. New peripherally enhancing collections in the musculature involve the right 
gluteus alannah, right gluteus medius, right obturator externus, right rectus 
femoris, and left gluteus alannah muscles. Differential diagnosis is multifocal 
myositis or multifocal intramuscular abscesses more likely than new necrotic 
tumor metastasis. Assessment and Recommendations:  
Jaqueline Hi is a 74 y.o. male with h/o prostate cancer admitted with unexplained syncope. 1. Metastatic prostate cancer, castration resistant: Was localized at diagnosis, treated with brachytherapy in 12/2015. Now with metastatic disease which likely started in seminal vesicles and progressed with local extension into sigmoid colon. The pathology is more consistent with prostate adenocarcinoma than colon cancer and no mass seen on first 2 colonoscopies. CEA normal. Chest CT negative.  Given large sigmoid mass at risk of causing obstruction, patient underwent diverting loop colostomy on 9/7/18. . Patient has a bulky sigmoid mass causing hydronephrosis requiring utereral stent and colon obstruction, added Zytiga to the Lupron to help obtain better and quicker response. Also based on the STAMPEDE trial, adding Zytiga to treatment in the first line setting improves overall survival (3yr OS 83% vs 76%). In the future, patient may be able to undergo ureteral stent placement and colostomy reversal. Recent CT 4/2019 shows evidence of disease progression on Lupron, Zytiga, Prednisone along with recent increase in PSA 4.4 on 3/7/19. While metastatic prostate cancer has several different treatment options, this patient's disease is atypical, quite aggressive, poorly differentiated. He and his family have been adamant about treatment, but will readdress with most current evidence of disease progression. -- Have discussed with patient in detail that his current treatment with Elslisa Deems is not working. I discussed that treatment options include switching to alternate pill such as Enzalutamide, which is unlikely to work. Another option is Docetaxel chemotherapy. Patient's performance status is likely too poor to consider this and would pose a high risk of infection. Home hospice is another option if the patient wishes to focus on comfort and quality of life rather than treatment. -- Family discussion from Monday detailed below. 2. JAGDEEP / Rhabdomyolysis: CT scan revealing bilateral hydronephrosis (likely due to disease progression). JAGDEEP is 2/2 to decreased oral intake and diarrhea. CK up to 10,665 --> 13,471 --> 5911--> 1,505  Nephrology and Urology have evaluated pt. JAGDEEP and Rhabdo are improving. 
-- Receiving IVFs 3. Falls/ syncope: Unclear etiology and 2nd occurrence. 4/5 Echo EF 56-60% 4. UTI: Urine culture growing Klebsiella and Pseudomonas, similar to last urine cs. Currently still spiking fevers on Cefepime.  
 
5. Right hydronephrosis: 2/2 extrinsic compression of the R distal ureter from the prostate vs sigmoid mass. IR placed percutaneous nephrostomy tube on 9/7/18. This was replaced during hospital admission in 2/2019. Follows with Dr. Narcisa Power as outpatient and was seen by Urology here. 6. Normocytic anemia: Likely a combination of anemia of chronic disease due to underlying malignancy. Previously no evidence of iron deficiency. Normal VitB12, folate. 7. BLE edema: Followed by  lymphedema clinic as outpatient; compression garment removed due to pain. 8. RLE [place]/weakness: Pt unable to to dorsi/ plantar-flex rt foot; with decreased in sensation from below the knee area 
-- MRI of L-spine and pelvis to eval 
 
9. Gluteal Multifocal intramuscular abscesses Noted on MRI of pelvis 4/9 Concern for likely source of fevers Discussed with hospitalist: abx being changed to zosyn Dr Loyda Guerra reviewed with son via phone 10. Goals of care/ dispo 4/8 Family meeting with pt, pt's son ConstantinoETRRY Vita. Pt's sister, Dr Chika Sim,  and myself. Mr Benz was able to state that his time is limited. Voices he would like to not be a burden to his family and is interested in pursuing some other living arrangements. Family has already been looking into facilities near their home on [place] [facility] [place]. Palliative team reviewed options including home with hospice support. Family is interested in [address] [place] [address] SNF for improving strength for now. -Family wishing to discuss with CM options for placement; discussed with CM Plan was reviewed with Dr Loyda Guerra Signed By: Nancy Quiroga NP

## 2019-04-10 NOTE — ROUTINE PROCESS
Bedside shift change report given to Anna Garcia (oncoming nurse) by Anupama Davis (offgoing nurse). Report included the following information SBAR, Kardex, Intake/Output, MAR, Accordion, Recent Results and Med Rec Status.

## 2019-04-10 NOTE — PROGRESS NOTES
[facility] Dosing Services: 4/10/19 The following medication: Cefepime was automatically dose-adjusted per [facility] P&T Committee Protocol, with respect to renal function. Consult provided for this   74 y.o. , male , for the indication of UTI. Dosage changed to:  Cefepime 2gm IV every 8 hrs Pt Weight:  
Wt Readings from Last 1 Encounters:  
04/06/19 104.6 kg (230 lb 9.6 oz) Previous Regimen Cefepime 2gm IV every 12 hrs Serum Creatinine Lab Results Component Value Date/Time Creatinine 1.09 04/09/2019 06:00 AM  
 
   
Creatinine Clearance Estimated Creatinine Clearance: 69.8 mL/min (based on SCr of 1.09 mg/dL). BUN Lab Results Component Value Date/Time BUN 13 04/09/2019 06:00 AM  
 
   
 
 
Additional notes: 
 
 
Pharmacy to continue to monitor patient daily. Will make dosage adjustments based upon changing renal function. Signed Deepthi MCCONNELL [address] [place] [address] information:  [phone]

## 2019-04-10 NOTE — ROUTINE PROCESS
Bedside and Verbal shift change report given to [facility] (oncoming nurse) by Jailyn Mendoza (offgoing nurse). Report included the following information SBAR, Kardex, ED Summary, Intake/Output, MAR and Recent Results.
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
## 2019-04-10 NOTE — PROGRESS NOTES
Problem: Mobility Impaired (Adult and Pediatric) Goal: *Acute Goals and Plan of Care (Insert Text) Description Physical Therapy Goals Initiated 4/7/2019 1. Patient will move from supine to sit and sit to supine  in bed with maximal assistance within 7 day(s). 2.  Patient will transfer from bed to chair and chair to bed with maximal assistance using the least restrictive device within 7 day(s). 3.  Patient will perform sit to stand with maximal assistance within 7 day(s). 4.  Patient will ambulate with maximal assistance for 50 feet with the least restrictive device within 7 day(s). Note: PHYSICAL THERAPY TREATMENT Patient: Gaye Xavier [de-identified]74 y.o. male) Date: 4/10/2019 Diagnosis: Syncope [R55] Syncope Precautions: Fall Chart, physical therapy assessment, plan of care and goals were reviewed. ASSESSMENT: 
Pt comes to sit max assist of 2. Pt to stand with mod max of 2 with bed elevated. Pt with much difficulty advancing feet. Pt needing mod assist of 2 with cues to take 4 steps to bedside chair with RW. Pt mod to max of 2 stand to sit. Pt progress slow. Continue goals Progression toward goals: 
?    Improving appropriately and progressing toward goals ? Improving slowly and progressing toward goals ? Not making progress toward goals and plan of care will be adjusted PLAN: 
Patient continues to benefit from skilled intervention to address the above impairments. Continue treatment per established plan of care. Discharge Recommendations:  Hosea Hallman Further Equipment Recommendations for Discharge:  rolling walker SUBJECTIVE:  
 
 
OBJECTIVE DATA SUMMARY:  
Critical Behavior: 
Neurologic State: Alert Orientation Level: Oriented to person, Oriented to place, Disoriented to time, Disoriented to situation Cognition: Follows commands Safety/Judgement: Awareness of environment Functional Mobility Training: 
Bed Mobility: 
Rolling: Maximum assistance;Assist x2 
 Supine to Sit: Moderate assistance;Maximum assistance;Assist x2 Scooting: Moderate assistance;Assist x2 Transfers: 
Sit to Stand: mod to max;Assist x2; Additional time(bed height elevated ) Stand to Sit: Moderate assistance;Assist x2 Bed to Chair: Assist x2 mod to max assistance(to left side due to RLE weakness) Balance: 
Sitting: High guard Standing: Impaired; With support Standing - Static: Fair Standing - Dynamic : Constant support;Poor Pain: 
Pain Scale 1: Adult Nonverbal Pain Scale Pain Intensity 1: 0 Activity Tolerance:  
Pt tolerated treatment fairly well. Please refer to the flowsheet for vital signs taken during this treatment. After treatment:  
?    Patient left in no apparent distress sitting up in chair ? Patient left in no apparent distress in bed 
? Call bell left within reach ? Nursing notified ? Caregiver present ? Bed alarm activated COMMUNICATION/COLLABORATION:  
The patient?s plan of care was discussed with: Physical Therapist 
 
Belkys Meyer PTA Time Calculation: 23 mins

## 2019-04-10 NOTE — PROGRESS NOTES
4/10/2019 Reason for Admission:   Syncope and LOC having been found on the floor s/p GLF at home. Pt was diagnosed with prostate cancer in 2015. MRI findings:  gluteral abscesses and pt declines invasive procedures. RRAT Score:  16 Do you (patient/family) have any concerns for transition/discharge? Pt states that he is unable to reside alone. He resides in a one story home although his son moved to be nearby. Plan for utilizing home health:   No 
 
Current Advanced Directive/Advance Care Plan:  Full code; advanced care plan not on file Likelihood of readmission? Mod/yellow Transition of Care Plan:   Initially, pt wanted SNF for short term rehab followed by hospice at a later date. Per PT note dated 4/10, pt is max assist x 2 to stand and he required cues to take 4 steps to [facility] with a RW. 1.  CM left vm message for son, Henok Douglas ([phone]) to answer any questions and to address any concerns re: discharge planning. 2.   Palliative Care team informed me that they will meet with pt and son on Thurs. 4/11 at 3:30. 
3.  Pt would like to make decisions on goals of care and definitive plans. CM will await Palliative team discussion with pt/son and will be available to assist with post discharge needs ie. SNF, hospice. Melly
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
## 2019-04-11 ENCOUNTER — HOSPITAL ENCOUNTER (OUTPATIENT)
Dept: INFUSION THERAPY | Age: 74
Discharge: HOME | End: 2019-04-11

## 2019-04-11 LAB
BACTERIA SPEC CULT: NORMAL
GLUCOSE BLD STRIP.AUTO-MCNC: 129 MG/DL (ref 65–100)
GLUCOSE BLD STRIP.AUTO-MCNC: 170 MG/DL (ref 65–100)
GLUCOSE BLD STRIP.AUTO-MCNC: 225 MG/DL (ref 65–100)
SERVICE CMNT-IMP: ABNORMAL
SERVICE CMNT-IMP: NORMAL

## 2019-04-11 PROCEDURE — 65270000029 HC RM PRIVATE

## 2019-04-11 PROCEDURE — 74011636637 HC RX REV CODE- 636/637: Performed by: INTERNAL MEDICINE

## 2019-04-11 PROCEDURE — 97530 THERAPEUTIC ACTIVITIES: CPT

## 2019-04-11 PROCEDURE — 74011250636 HC RX REV CODE- 250/636: Performed by: INTERNAL MEDICINE

## 2019-04-11 PROCEDURE — 74011250637 HC RX REV CODE- 250/637: Performed by: INTERNAL MEDICINE

## 2019-04-11 PROCEDURE — 74011000258 HC RX REV CODE- 258: Performed by: INTERNAL MEDICINE

## 2019-04-11 PROCEDURE — 82962 GLUCOSE BLOOD TEST: CPT

## 2019-04-11 RX ORDER — PEPPERMINT OIL
SPIRIT ORAL AS NEEDED
Status: DISCONTINUED | OUTPATIENT
Start: 2019-04-11 | End: 2019-04-16 | Stop reason: HOSPADM

## 2019-04-11 RX ADMIN — PIPERACILLIN SODIUM,TAZOBACTAM SODIUM 3.38 G: 3; .375 INJECTION, POWDER, FOR SOLUTION INTRAVENOUS at 11:19

## 2019-04-11 RX ADMIN — Medication 10 ML: at 06:00

## 2019-04-11 RX ADMIN — INSULIN LISPRO 3 UNITS: 100 INJECTION, SOLUTION INTRAVENOUS; SUBCUTANEOUS at 11:20

## 2019-04-11 RX ADMIN — TAMSULOSIN HYDROCHLORIDE 0.4 MG: 0.4 CAPSULE ORAL at 09:54

## 2019-04-11 RX ADMIN — HEPARIN SODIUM 5000 UNITS: 5000 INJECTION INTRAVENOUS; SUBCUTANEOUS at 07:08

## 2019-04-11 RX ADMIN — INSULIN LISPRO 3 UNITS: 100 INJECTION, SOLUTION INTRAVENOUS; SUBCUTANEOUS at 17:17

## 2019-04-11 RX ADMIN — DEXTROSE MONOHYDRATE, SODIUM CHLORIDE, AND POTASSIUM CHLORIDE: 50; 4.5; 2.98 INJECTION, SOLUTION INTRAVENOUS at 23:27

## 2019-04-11 RX ADMIN — PIPERACILLIN SODIUM,TAZOBACTAM SODIUM 3.38 G: 3; .375 INJECTION, POWDER, FOR SOLUTION INTRAVENOUS at 20:01

## 2019-04-11 RX ADMIN — PIPERACILLIN SODIUM,TAZOBACTAM SODIUM 3.38 G: 3; .375 INJECTION, POWDER, FOR SOLUTION INTRAVENOUS at 05:00

## 2019-04-11 RX ADMIN — HEPARIN SODIUM 5000 UNITS: 5000 INJECTION INTRAVENOUS; SUBCUTANEOUS at 23:15

## 2019-04-11 RX ADMIN — IBUPROFEN 400 MG: 400 TABLET ORAL at 18:45

## 2019-04-11 RX ADMIN — HEPARIN SODIUM 5000 UNITS: 5000 INJECTION INTRAVENOUS; SUBCUTANEOUS at 17:16

## 2019-04-11 RX ADMIN — HYDROCODONE BITARTRATE AND ACETAMINOPHEN 1 TABLET: 5; 325 TABLET ORAL at 17:17

## 2019-04-11 NOTE — PROGRESS NOTES
Problem: Self Care Deficits Care Plan (Adult) Goal: *Acute Goals and Plan of Care (Insert Text) Description Occupational Therapy Goals Initiated 4/7/2019 1. Patient will perform grooming with supervision/set-up in unsupported sit within 7 day(s). 2.  Patient will perform upper body dressing with supervision/set-up within 7 day(s). 3.  Patient will perform lower body dressing with moderate assistance using AE PRN within 7 day(s). 4.  Patient will perform toilet transfers with moderate assistance to [facility] within 7 day(s). 5.  Patient will perform all aspects of toileting with moderate assistance  within 7 day(s). 6.  Patient will participate in upper extremity therapeutic exercise/activities with supervision/set-up for 5 minutes within 7 day(s). Outcome: Progressing Towards Goal 
 OCCUPATIONAL THERAPY TREATMENT Patient: Dae Thornton74 y.o. male) Date: 4/11/2019 Diagnosis: Syncope [R55] Syncope Precautions: Fall Chart, occupational therapy assessment, plan of care, and goals were reviewed. ASSESSMENT: 
Patient received supine in bed, agreeable to activity. Noted patient with saturated bed and assist for clean up once at EOB. Max assist x 2 to move to EOB with assist for LEs and trunk. Patient able to change gown with min assist while maintaining upright seated position with high guard. Patient requires max assist x 2 for sit to stand and transfers to chair with verbal cues and max/mod assist x2. Patient chair set up next to bed at left side. He requests washing face upon sitting in chair as he notes anticipation of family later this afternoon. Patient manages with set up. All needed items left in patient reach. Progression toward goals: 
?       Improving appropriately and progressing toward goals ? Improving slowly and progressing toward goals ? Not making progress toward goals and plan of care will be adjusted PLAN: 
 Patient continues to benefit from skilled intervention to address the above impairments. Continue treatment per established plan of care. Discharge Recommendations:  Hosea Hallman Further Equipment Recommendations for Discharge:  TBD SUBJECTIVE:  
Patient stated ? Oh man, I do need to be changed. ? OBJECTIVE DATA SUMMARY:  
Cognitive/Behavioral Status: 
Neurologic State: Alert Orientation Level: Oriented to person;Oriented to place;Oriented to situation;Disoriented to time Cognition: Follows commands Perception: Appears intact Perseveration: No perseveration noted Safety/Judgement: Awareness of environment Functional Mobility and Transfers for ADLs: 
Bed Mobility: 
Rolling: Maximum assistance;Assist x2 Supine to Sit: Maximum assistance;Assist x2; Moderate assistance Sit to Supine: Moderate assistance;Maximum assistance;Assist x2 Scooting: Maximum assistance Transfers: 
Sit to Stand: Maximum assistance; Moderate assistance;Assist x2 Bed to Chair: Maximum assistance;Assist x2; Moderate assistance Balance: 
Sitting: Intact Standing: Impaired; Without support Standing - Static: Fair Standing - Dynamic : Constant support ADL Intervention: 
  
 
Grooming Washing Face: Supervision/set-up(performed per patient request ) Lower Body Dressing Assistance Socks: Total assistance (dependent) Leg Crossed Method Used: No 
Position Performed: Supine Cues: Physical assistance;Verbal cues provided Cognitive Retraining Safety/Judgement: Awareness of environment Neuro Re-Education: 
  
  
  
Therapeutic Exercises:  
 
Pain: 
Pain Scale 1: Numeric (0 - 10) Pain Intensity 1: 0 Activity Tolerance: VSS Please refer to the flowsheet for vital signs taken during this treatment. After treatment:  
? Patient left in no apparent distress sitting up in chair ? Patient left in no apparent distress in bed 
? Call bell left within reach ? Nursing notified ? Caregiver present ? Bed alarm activated COMMUNICATION/COLLABORATION:  
The patient?s plan of care was discussed with: Physical Therapist and Registered Nurse Amanda Perez OTR/L Time Calculation: 24 mins 
 
 
 
 
 
 
 
'
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
## 2019-04-11 NOTE — PROGRESS NOTES
Palliative Medicine Code Status: DNR Advance Care Planning: 
Advance Care Planning 4/11/2019 Patient's Healthcare Decision Maker is: Named in scanned ACP document:  Guevara Ghosh, [phone] Confirm Advance Directive Yes, on file Does the patient have other document types Durable Do Not Resuscitate Patient / Family Encounter Documentation Participants (names): Pt, son Constantino, dtr-in-law Delores Gage (Dr. Kamar Hairston, [facility]) Narrative:  Pt was up in chair; son and DIL were present. Dr. Kamar Hairston shared concerns re: MRI findings and how this might impact future plans, explained that this was new information since previous goals of care discussion earlier in week. Son and DIL are very realistic, would like to continue with plan for SNF for strength building but verbalized understanding that duration of stay might be limited, will be making arrangements to care for pt at home with hospice after SNF stay. Son and DIL would take turns staying with pt in his home in addition to hiring caregivers. Son and DIL had also helped care for pt's wife in the last weeks of her life with the support of [facility]. Reviewed code status, as discussed during 4/10 visit with pt. Son shared that they had also discussed this with pt yesterday. After further discussion, pt stated he would not want attempts at resuscitation in the event of cardiac/respiratory arrest.  Pt expressed wish for son Kel Livers and DIL Aretha to serve as primary and secondary Medical POAs. AMD and DDNR were completed on this date. Copies were placed on chart to be scanned into medical record. Psychosocial Issues Identified/ Resilience Factors: Pt/family coping with advanced illness, hx of loss in the family. Son and DIL are very devoted and supportive.  
 
Goals of Care / Plan: Family is requesting SNF for strength building with eventual plan to return home with support from family, hired caregivers, and hospice. AMD and DDNR completed on this date. Discussed with Oncology NP and with Care Manager. SW will continue to follow for support. Thank you for including Palliative Medicine in the care of Mr. Venkat Castellano. Bright Del Angel LCSW, [facility] 
[phone])

## 2019-04-11 NOTE — ROUTINE PROCESS
Bedside and Verbal shift change report given to [facility], RN (oncoming nurse) by Jessica Lobato RN (offgoing nurse). Report included the following information SBAR, Kardex and Quality Measures.

## 2019-04-11 NOTE — ACP (ADVANCE CARE PLANNING)
Primary Decision MakerSelmer Cea - [phone]    Secondary Decision Maker: Bib Solis (dtr-in-law) - Child - [phone]  Advance Care Planning 4/11/2019   Patient's [place] is: Named in scanned ACP document   Confirm Advance Directive Yes, on file   Does the patient have other document types Do Not Resuscitate       Pt completed AMD on this date which appoints griffin Cantu and dtr-in-law Ruben Moreira as primary and secondary Medical POAs, respectively. Pt also made decision to change code status to DNR. DDNR was reviewed and completed. Copies of AMD and DDNR were placed on chart to be scanned into medical record.

## 2019-04-11 NOTE — PROGRESS NOTES
Bedside and Verbal shift change report given to Veronique Kirk (oncoming nurse) by Coleen Reyes (offgoing nurse). Report included the following information SBAR, Kardex, Intake/Output, MAR and Recent Results.

## 2019-04-11 NOTE — PROGRESS NOTES
[facility] [place] at [place] [address] [address] [facility] [address] [facility] W: [phone]  F: [phone] Reason for Visit:  
Jake Welch is a 74 y.o. male who is seen in consultation at the request of Dr. Wei Solis 
 for evaluation of prostate cancer with mets. Hematology / Oncology Treatment History:  
 
Diagnosis: Prosate cancer, diagnosed 2015.  
  
Stage: Now metastatic 
  
Pathology: 9/6/18 Sigmoid colon ulcer, biopsy:  
Adenocarcinoma, strongly favor prostate primary (see Comment). Stains weakly for PSAP. 
  
Prior Treatment: Brachytherapy and EBRT in 12/2015.  
  
Current Treatment: Dorethia Balboa started on 1/28/19. Lupron started earlier. Oncologic History: 
Patient was admitted to the hospital in 9/2018 for persistent diarrhea, evidence of a sigmoid mass. He had been suffering with persistent diarrhea, 50-70-lb weight loss for approx 4-5 months. He underwent 2 recent colonoscopies (one in July and one in Aug 2018) by Dr. Cody Mei at [facility]. He states the first colonoscopy was not clear due to poor prep. The second colonoscopy reportedly showed abnormal rectal and sigmoid mucosa with a possible mass. This was biopsied and was benign. The patient then underwent a CT scan which was notable for R-sided hydronephrosis from extrinsic compression of the right ureter. The patient's urologist, Dr. Philly Borjas, attempted to pass a ureteral stent but was unsuccessful. The patient was then admitted to [address] [place] [address] for weakness, persistent diarrhea and lower abdominal pain. Abd/pelvis CT on 9/4/18 showed a distal sigmoid mass with possible invasion of the posterior bladder wall as well as R hydronephrosis. He was evaluated by Dr. Kristian Basurto in general surgery and underwent diverting loop colostomy given the symptoms caused by the sigmoid mass. He underwent colonoscopy by Dr. Wynona Blizzard in GI with finding of a large sigmoid mass, which was biopsied.  Pathology revealed an adenocarcinoma, but morphology was more consistent with prostate cancer than colorectal cancer. He was seen by Dr. Madhavi Rivera in Urology during hospital stay who recommended percutaneous nephrostomy tube, placed by IR on 9/7. Given the biopsy findings, Dr. Madhavi Rivera recommended further evaluation with prostate/pelvic MRI. Nuclear med bone scan negative for any suspicious bony lesions. After hospital discharge, he fell and was admitted to /[facility]. He stayed there for 3 days and was discharged to rehab, where he stayed for 4 weeks. His pathology was reviewed by an outside facility with opinion that this was a poorly differentiated prostate cancer. Pt was started on Zytiga + Prednisone in 1/2019. History of Present Illness: Mr. Oren Laguna is a 75 y/o male with h/o prostate cancer who is admitted to the hospital after passing out and woke up after spending approximately 1 day on the ground unconscious. This is his second hospitalization for similar presentation. He was found with diarrhea which had leaked out of his colostomy bag, soiling his clothes. Patient has a complicated oncologic history with a presumed metastatic prostate cancer causing direct extension into colon and surrounding structures, causing obstruction of ureters. Pt required diverting colostomy and R nephrostomy tube. PSA has been low and not markedly elevated. After starting Lupron, Zytiga + Prednisone for prostate cancer, PSA declined slightly, but on most recent check was increased to 3-4 range. ED labs notable for K 2.1, Cr 1.75, CK 10,665. Patient was started on IVF. He states he cannot recall how he ended up on the floor. He simply remembers sitting and watching basketball. Denies any fevers or chills  at home. Denies SOB. Appetite has been poor for several months. Lives by himself. No family at bedside.   
 
Interval History:  
Sitting up in chair at bedside; ready to get back in bed; has been up for awhile. Having pain to rt leg area; rates pain 9/10. Has not be asking for pain medication. No further complaints. Son and daughter in law at bedside. Past Medical History:  
Diagnosis Date  Diabetes mellitus ([facility]  Prostate cancer ([facility]  Sleep apnea Past Surgical History:  
Procedure Laterality Date 2021 Luis Orona N/A 9/6/2018 SIGMOIDOSCOPY FLEXIBLE performed by Concepcion Whiting MD at [address] Left  HX HIP REPLACEMENT Right  HX OTHER SURGICAL    
 bladder stent  IR CHANGE NEPHROSTOMY PYELOS TUBE RT  2/1/2019 Social History Tobacco Use  Smoking status: Never Smoker  Smokeless tobacco: Never Used Substance Use Topics  Alcohol use: No  
  
Family History Problem Relation Age of Onset  Cancer Mother   
     colon  Hypertension Mother  Heart Disease Mother  Cancer Father  Diabetes Sister  Cancer Brother  Diabetes Sister Current Facility-Administered Medications Medication Dose Route Frequency  piperacillin-tazobactam (ZOSYN) 3.375 g in 0.9% sodium chloride (MBP/ADV) 100 mL  3.375 g IntraVENous Q8H  
 ibuprofen (MOTRIN) tablet 400 mg  400 mg Oral Q6H PRN  
 acetaminophen (TYLENOL) tablet 650 mg  650 mg Oral Q6H PRN  
 dextrose 5% - 0.45% NaCl with KCl 40 mEq/L infusion   IntraVENous CONTINUOUS  
 sodium chloride (NS) flush 5-40 mL  5-40 mL IntraVENous Q8H  
 sodium chloride (NS) flush 5-40 mL  5-40 mL IntraVENous PRN  
 naloxone (NARCAN) injection 0.4 mg  0.4 mg IntraVENous PRN  
 insulin lispro (HUMALOG) injection   SubCUTAneous AC&HS  
 glucose chewable tablet 16 g  4 Tab Oral PRN  
 dextrose (D50W) injection syrg 12.5-25 g  12.5-25 g IntraVENous PRN  
 glucagon (GLUCAGEN) injection 1 mg  1 mg IntraMUSCular PRN  
 tamsulosin (FLOMAX) capsule 0.4 mg  0.4 mg Oral DAILY  heparin (porcine) injection 5,000 Units  5,000 Units SubCUTAneous Q8H  
  HYDROcodone-acetaminophen (NORCO) 5-325 mg per tablet 1 Tab  1 Tab Oral Q4H PRN Allergies Allergen Reactions  Ciprofloxacin Rash Review of Systems: A complete review of systems was obtained, negative except as described above. Physical Exam:  
 
Visit Vitals /57 (BP 1 Location: Right arm, BP Patient Position: At rest) Pulse 100 Temp 97.7 °F (36.5 °C) Resp 19 Ht 5' 9\" (1.753 m) Wt 104.6 kg (230 lb 9.6 oz) SpO2 100% BMI 34.05 kg/m² ECOG PS: 2-3 General: elderly; No distress Eyes: PERRLA, anicteric sclerae HENT: Atraumatic with normal appearance of ears and nose; OP clear Neck: Supple; no thyromegaly Lymphatic: No cervical, supraclavicular, or axillary adenopathy Respiratory: CTAB, normal respiratory effort CV: Normal rate, regular rhythm, no murmurs, 2+ pitting edema in BLE ; R>L 
GI: Colostomy noted with liquid brown stool noted; Soft, nontender, nondistended, no masses, no hepatomegaly, no splenomegaly : nephrostomy tube noted MS:Digits without clubbing or cyanosis. Skin: No rashes, ecchymoses, or petechiae. Normal temperature, turgor, and texture. Neuro/Psych: Alert, oriented, appropriate affect, normal judgment/insight. R leg numbness to below knee to foot. Hyperesthesia with sharp object to R foot, but numbness otherwise. 1/5 strength with R foot dorsiflexion, plantarflexion Results:  
 
Lab Results Component Value Date/Time WBC 11.6 (H) 04/09/2019 06:00 AM  
 HGB 8.2 (L) 04/09/2019 06:00 AM  
 HCT 27.6 (L) 04/09/2019 06:00 AM  
 PLATELET 479 42/34/7477 06:00 AM  
 MCV 88.5 04/09/2019 06:00 AM  
 ABS. NEUTROPHILS 12.4 (H) 04/06/2019 01:01 AM  
 
Lab Results Component Value Date/Time  Sodium 144 04/09/2019 06:00 AM  
 Potassium 3.4 (L) 04/09/2019 06:00 AM  
 Chloride 111 (H) 04/09/2019 06:00 AM  
 CO2 26 04/09/2019 06:00 AM  
 Glucose 117 (H) 04/09/2019 06:00 AM  
 BUN 13 04/09/2019 06:00 AM  
 Creatinine 1.09 04/09/2019 06:00 AM  
 GFR est AA >60 04/09/2019 06:00 AM  
 GFR est non-AA >60 04/09/2019 06:00 AM  
 Calcium 8.2 (L) 04/09/2019 06:00 AM  
 Glucose (POC) 225 (H) 04/11/2019 11:08 AM  
 
Lab Results Component Value Date/Time Bilirubin, total 0.4 04/06/2019 01:01 AM  
 ALT (SGPT) 91 (H) 04/06/2019 01:01 AM  
 AST (SGOT) 345 (H) 04/06/2019 01:01 AM  
 Alk. phosphatase 64 04/06/2019 01:01 AM  
 Protein, total 5.4 (L) 04/06/2019 01:01 AM  
 Albumin 2.1 (L) 04/06/2019 01:01 AM  
 Globulin 3.3 04/06/2019 01:01 AM  
 
Lab Results Component Value Date/Time Reticulocyte count 0.8 09/05/2018 12:54 PM  
 Iron % saturation 15 (L) 09/05/2018 12:54 PM  
 TIBC 172 (L) 09/05/2018 12:54 PM  
 Ferritin 213 09/05/2018 12:54 PM  
 Vitamin B12 726 09/05/2018 12:54 PM  
 Folate 9.5 09/05/2018 12:54 PM  
 Lipase 69 (L) 04/05/2019 03:00 AM  
 
Lab Results Component Value Date/Time INR 1.1 04/05/2019 03:00 AM  
 aPTT 20.6 (L) 04/05/2019 03:00 AM  
 
Lab Results Component Value Date/Time CEA 1.1 09/05/2018 12:54 PM  
 Prostate Specific Ag 4.4 (H) 03/07/2019 09:57 AM  
 
4/5/2019 XR CHEST IMPRESSION: 
No acute process. 4/5/2019 CT HEAD WO CONT IMPRESSION No acute process. 4/5/2019 CT CHEST ABD PELV  WO CONT 1. No significant change in large pelvic soft tissue mass surrounding the 
prostate, inseparable from the rectum, as well as the posterior right bladder 
wall. Bilateral hydronephrosis left greater than right similar to prior study. Unchanged position of right nephrostomy tube. 2. No acute process in the chest. 
Outside imaging performed January 15, 2019 at [facility] or provided for 
comparison. Addendum: Comparison is performed. In the interval from January 15, 2019 to April 1, 2019 there is been additional 
interval progression enlarged distal sigmoid colon/rectal mass. Enlarged nodular 
pelvic masses. Slightly progressed left-sided hydroureteronephrosis as well. 4/5 DUPLEX LE doppler Bilateral lower extremity venous duplex negative for deep venous thrombosis or thrombophlebitis int he veins visualized 4/09/2019 MRI LUMB SPINE W WO CONT IMPRESSION: 
  
1. Fluid collection in the left paraspinal musculature from L3 to L5 most likely 
related to infection. A necrotic prostate cancer metastasis would be unusual to 
the soft tissues. 2. Mild spondylosis in the lower lumbar spine. 4/09/2019 MRI PELV W WO CONT IMPRESSION:  
  
1. No mass effect on the lumbosacral plexus, sciatic nerves, or femoral nerves. 2. Large rectal mass has increased since September, unchanged since 4 days ago. Close approximation to the lumbosacral plexus without direct involvement. 3. New peripherally enhancing collections in the musculature involve the right 
gluteus alannah, right gluteus medius, right obturator externus, right rectus 
femoris, and left gluteus alannah muscles. Differential diagnosis is multifocal 
myositis or multifocal intramuscular abscesses more likely than new necrotic 
tumor metastasis. Assessment and Recommendations:  
Fredrick Wilcox is a 74 y.o. male with h/o prostate cancer admitted with unexplained syncope. 1. Metastatic prostate cancer, castration resistant: Was localized at diagnosis, treated with brachytherapy in 12/2015. Now with metastatic disease which likely started in seminal vesicles and progressed with local extension into sigmoid colon. The pathology is more consistent with prostate adenocarcinoma than colon cancer and no mass seen on first 2 colonoscopies. CEA normal. Chest CT negative. Given large sigmoid mass at risk of causing obstruction, patient underwent diverting loop colostomy on 9/7/18. . Patient has a bulky sigmoid mass causing hydronephrosis requiring utereral stent and colon obstruction, added Zytiga to the Lupron to help obtain better and quicker response.  Also based on the STAMPEDE trial, adding Zytiga to treatment in the first line setting improves overall survival (3yr OS 83% vs 76%). In the future, patient may be able to undergo ureteral stent placement and colostomy reversal. Recent CT 4/2019 shows evidence of disease progression on Lupron, Zytiga, Prednisone along with recent increase in PSA 4.4 on 3/7/19. While metastatic prostate cancer has several different treatment options, this patient's disease is atypical, quite aggressive, poorly differentiated. He and his family have been adamant about treatment, but will readdress with most current evidence of disease progression. -- Have discussed with patient in detail that his current treatment with Vernel Spatz is not working. I discussed that treatment options include switching to alternate pill such as Enzalutamide, which is unlikely to work. Another option is Docetaxel chemotherapy. Patient's performance status is likely too poor to consider this and would pose a high risk of infection. Home hospice is another option if the patient wishes to focus on comfort and quality of life rather than treatment. -- Family discussion from Monday detailed below --Today's family meeting with palliative team; plan is to go to SNF and then home with hospice with family and caregivers to be there for support. 2. JAGDEEP / Rhabdomyolysis: CT scan revealing bilateral hydronephrosis (likely due to disease progression). JAGDEEP is 2/2 to decreased oral intake and diarrhea. CK up to 10,665 --> 13,471 --> 5911--> 1,505  Nephrology and Urology have evaluated pt. JAGDEEP and Rhabdo are improving. 
-- Receiving IVFs 3. Falls/ syncope: Unclear etiology and 2nd occurrence. 4/5 Echo EF 56-60% 4. UTI: Urine culture growing Klebsiella and Pseudomonas, similar to last urine cs. Currently still spiking fevers on Cefepime. 5. Right hydronephrosis: 2/2 extrinsic compression of the R distal ureter from the prostate vs sigmoid mass. IR placed percutaneous nephrostomy tube on 9/7/18. This was replaced during hospital admission in 2/2019.  Follows with Dr. Breanne Ryan as outpatient and was seen by Urology here. 6. Normocytic anemia: Likely a combination of anemia of chronic disease due to underlying malignancy. Previously no evidence of iron deficiency. Normal VitB12, folate. 7. BLE edema: Followed by  lymphedema clinic as outpatient; compression garment removed due to pain. 8. RLE [place]/weakness: Pt unable to to dorsi/ plantar-flex rt foot; with decreased in sensation from below the knee area 
-- MRI of L-spine and pelvis to eval 
 
9. Gluteal Multifocal intramuscular abscesses Noted on MRI of pelvis 4/9 Concern for likely source of fevers Discussed with hospitalist: abx being changed to zosyn Dr Demetria Cantu reviewed with son via phone 10. Goals of care/ dispo -- Pt made DNR and DDNR completed --Family would like to pursue SNF placement with plan to bring home with support of hospice once he has completed rehab or can no longer can be at the facility. Plan was reviewed with Dr Demetria Cantu Signed By: Capri Moore NP

## 2019-04-11 NOTE — PROGRESS NOTES
Problem: Mobility Impaired (Adult and Pediatric) Goal: *Acute Goals and Plan of Care (Insert Text) Description Physical Therapy Goals Initiated 4/7/2019 1. Patient will move from supine to sit and sit to supine  in bed with maximal assistance within 7 day(s). 2.  Patient will transfer from bed to chair and chair to bed with maximal assistance using the least restrictive device within 7 day(s). 3.  Patient will perform sit to stand with maximal assistance within 7 day(s). 4.  Patient will ambulate with maximal assistance for 50 feet with the least restrictive device within 7 day(s). Note: PHYSICAL THERAPY TREATMENT Patient: Jaqueline Hi [de-identified]74 y.o. male) Date: 4/11/2019 Diagnosis: Syncope [R55] Syncope Precautions: Fall Chart, physical therapy assessment, plan of care and goals were reviewed. ASSESSMENT: 
Pt agreeable to mobilize out of bed to chair. Pt to sit with mod to max of 2. Pt max assist to scoot to EOB. Pt sit to stand from elevated chair mod to max of 2. Pt stood with RW min to mod assist of 2. Pt took 4 steps to bedside chair with RW mod to max of 2. Pt has difficulty advancing right leg. Pt tolerated treatment fairly well. Continue goals. Progression toward goals: 
?    Improving appropriately and progressing toward goals ? Improving slowly and progressing toward goals ? Not making progress toward goals and plan of care will be adjusted PLAN: 
Patient continues to benefit from skilled intervention to address the above impairments. Continue treatment per established plan of care. Discharge Recommendations:  Hosea Hallman Further Equipment Recommendations for Discharge:  rolling walker SUBJECTIVE:  
 
 
OBJECTIVE DATA SUMMARY:  
Critical Behavior: 
Neurologic State: Alert Orientation Level: Oriented to person, Oriented to place, Oriented to situation, Disoriented to time Cognition: Follows commands Safety/Judgement: Awareness of environment Functional Mobility Training: 
Bed Mobility: 
  
Supine to Sit: Maximum assistance;Assist x2; Moderate assistance Sit to Supine: Moderate assistance;Maximum assistance;Assist x2 Scooting: Maximum assistance Transfers: 
Sit to Stand: Maximum assistance; Moderate assistance;Assist x2 Stand to Sit: Moderate assistance;Assist x2;Maximum assistance Bed to Chair: Maximum assistance;Assist x2; Moderate assistance Balance: 
Sitting: Intact Standing: Impaired; Without support Standing - Static: Fair Pain: 
Pain Scale 1: Numeric (0 - 10) Pain Intensity 1: 0 Activity Tolerance:  
Pt tolerated treatment fairly well. Please refer to the flowsheet for vital signs taken during this treatment. After treatment:  
?    Patient left in no apparent distress sitting up in chair ? Patient left in no apparent distress in bed 
? Call bell left within reach ? Nursing notified ? Caregiver present ? Bed alarm activated COMMUNICATION/COLLABORATION:  
The patient?s plan of care was discussed with: Physical Therapist 
 
Lindsay Mena PTA Time Calculation: 23 mins

## 2019-04-11 NOTE — PROGRESS NOTES
4/11/2019   CARE MANAGEMENT NOTE:  Palliative Care team met with pt and family members this afternoon. Reportedly, all are in agreement with SNF for increased strengthening and functioning status. Pt/family will consider hospice after SNF level of rehab. CM met with pt, pt's son and DIL to discuss SNF option. A list of area facilities was provided and choices were made. We reviewed the rehab expectation and Medicare coverage. Pt's Aretha STEWART ([phone]) is an alternate contact. Transaction Plan of Care: 1. SNF choices were made and referrals will be sent accordingly ([facility] [address] [place], [address], [facility]). 2.   PT/OT therapies will continue to work with pt until hospital discharge. CM will continue to follow pt for discharge planning. Melly

## 2019-04-12 LAB
GLUCOSE BLD STRIP.AUTO-MCNC: 175 MG/DL (ref 65–100)
GLUCOSE BLD STRIP.AUTO-MCNC: 185 MG/DL (ref 65–100)
GLUCOSE BLD STRIP.AUTO-MCNC: 191 MG/DL (ref 65–100)
GLUCOSE BLD STRIP.AUTO-MCNC: 195 MG/DL (ref 65–100)
SERVICE CMNT-IMP: ABNORMAL

## 2019-04-12 PROCEDURE — 74011250637 HC RX REV CODE- 250/637: Performed by: INTERNAL MEDICINE

## 2019-04-12 PROCEDURE — 82962 GLUCOSE BLOOD TEST: CPT

## 2019-04-12 PROCEDURE — 65270000029 HC RM PRIVATE

## 2019-04-12 PROCEDURE — 77030011256 HC DRSG MEPILEX <16IN NO BORD MOLN -A

## 2019-04-12 PROCEDURE — 74011636637 HC RX REV CODE- 636/637: Performed by: INTERNAL MEDICINE

## 2019-04-12 PROCEDURE — 74011250636 HC RX REV CODE- 250/636: Performed by: INTERNAL MEDICINE

## 2019-04-12 PROCEDURE — 74011000258 HC RX REV CODE- 258: Performed by: INTERNAL MEDICINE

## 2019-04-12 PROCEDURE — 77030020186 HC BOOT HL PROTCT SAGE -B

## 2019-04-12 RX ADMIN — DEXTROSE MONOHYDRATE, SODIUM CHLORIDE, AND POTASSIUM CHLORIDE: 50; 4.5; 2.98 INJECTION, SOLUTION INTRAVENOUS at 15:37

## 2019-04-12 RX ADMIN — HYDROCODONE BITARTRATE AND ACETAMINOPHEN 1 TABLET: 5; 325 TABLET ORAL at 18:24

## 2019-04-12 RX ADMIN — PIPERACILLIN SODIUM,TAZOBACTAM SODIUM 3.38 G: 3; .375 INJECTION, POWDER, FOR SOLUTION INTRAVENOUS at 11:21

## 2019-04-12 RX ADMIN — HEPARIN SODIUM 5000 UNITS: 5000 INJECTION INTRAVENOUS; SUBCUTANEOUS at 23:38

## 2019-04-12 RX ADMIN — INSULIN LISPRO 2 UNITS: 100 INJECTION, SOLUTION INTRAVENOUS; SUBCUTANEOUS at 16:55

## 2019-04-12 RX ADMIN — HEPARIN SODIUM 5000 UNITS: 5000 INJECTION INTRAVENOUS; SUBCUTANEOUS at 06:51

## 2019-04-12 RX ADMIN — PIPERACILLIN SODIUM,TAZOBACTAM SODIUM 3.38 G: 3; .375 INJECTION, POWDER, FOR SOLUTION INTRAVENOUS at 04:23

## 2019-04-12 RX ADMIN — Medication 10 ML: at 20:30

## 2019-04-12 RX ADMIN — INSULIN LISPRO 2 UNITS: 100 INJECTION, SOLUTION INTRAVENOUS; SUBCUTANEOUS at 08:36

## 2019-04-12 RX ADMIN — PIPERACILLIN SODIUM,TAZOBACTAM SODIUM 3.38 G: 3; .375 INJECTION, POWDER, FOR SOLUTION INTRAVENOUS at 20:28

## 2019-04-12 RX ADMIN — INSULIN LISPRO 2 UNITS: 100 INJECTION, SOLUTION INTRAVENOUS; SUBCUTANEOUS at 11:20

## 2019-04-12 RX ADMIN — HEPARIN SODIUM 5000 UNITS: 5000 INJECTION INTRAVENOUS; SUBCUTANEOUS at 15:37

## 2019-04-12 RX ADMIN — TAMSULOSIN HYDROCHLORIDE 0.4 MG: 0.4 CAPSULE ORAL at 09:58

## 2019-04-12 NOTE — PROGRESS NOTES
TRANSFER - IN REPORT: 
 
Bedside and Verbal shift change report given to Amando Guevara RN (oncoming nurse) by OCTAVIO Jose (offgoing nurse). Report included the following information SBAR and MAR.

## 2019-04-12 NOTE — PALLIATIVE CARE DISCHARGE
Goals of Care/Treatment Preferences The Palliative Medicine team was consulted as part of your/your loved one's care in the hospital. Our team is a supportive service; we strive to relieve suffering and improve quality of life. You met with Dr. Baer Medicsebastian and  Mariana Prince. We reviewed advance care planning information, which includes the following: 
Patient's Healthcare Decision Maker is[de-identified] Named in scanned ACP document Confirm Advance Directive: Yes, on file Does the patient have other document types: Do Not Resuscitate We reviewed / discussed your code status as: DNR 
   Full Code means perform CPR in the event of cardiac arrest. 
    DNR means do NOT perform CPR in the event of cardiac arrest. 
    Partial Code means you have specific preferences, please discuss with your healthcare team. 
    Chuck Velasquez means this issue was not addressed / resolved during your stay Other Instructions: You completed an Advance Medical Directive which appoints your son and daughter-in-law as your surrogate medical decision makers if you are unable to speak for yourself. You also have a Durable Do Not Resuscitate Order in place, which should travel with you. When you are in a facility, this form should be placed on your chart. Once you are home, it is recommended that the [facility] form be placed in a visible location such as on the refrigerator or bedroom door. Because of the importance of this information, we are providing you with a printed copy to share with other healthcare providers after this hospitalization is complete.

## 2019-04-12 NOTE — WOUND CARE
74 y.o. male PMH including prostate CA (mets), Diabetes, HTN and Colostomy (6/2019). Patient was found him down at home and brought to the ER. Admitted for JAGDEEP, severe hypokalemia, lactic acidosis. Wound care consulted for Skin/Ostomy eval.  
 
Assessment: 
Patient is in bed with Nursing students and Patient care tech at bedside. Skin evaluation finds scattered scabbed abrasions to the right elbow, forearm, and an open abrasion to the right shoulder. The right lateral heel has a linear abraded/non-blanching area of unknown etiology (likely related to fall). Left lateral heel has chronic discoloration. Colostomy was leaking on assessment and a 1 piece ostomy pouch was replaced. Stoma is pink/moist and flush to skin with nolan-stomal skin maceration. Cleansed and applied stoma powder to the nolan-stomal skin, then applied skin protectant spray. 1 piece ostomy pouch cut and fit over stoma site. Patient is on specialty bed surface. Recommendation: 
Daily with skin care apply lotion to extremities and bony prominences. To the left shoulder abrasion recommend Mepilex border dressing to cover and change every other day. Float heels and protect with prevelon boots. Turn Q2h while in bed (with turn team). Protect from lines and devices. When up in chair use a waffle cushion and reposition every 20 minutes.  
 
Will follow, Consult as needed,  
Senia GOODWIN RN Lilly Pang

## 2019-04-12 NOTE — PROGRESS NOTES
1000 
Changed nephrostomy dressing. Site looks clean dry and intact draining clear yellow urine. Changed mepalex on right posterior back area. mepalex on right elbow taken off and open to air. Prevalon boots placed on by wound care. Colostomy draining small amount of loose stool, malodorous. PIV infiltrated, new PIV placed by MILLY Roldan

## 2019-04-12 NOTE — DISCHARGE SUMMARY
Physician Interim Summary   Patient ID:  Milind Matta  [id number]  4 y.o.  1945    PCP: Shandra Smith MD     Consults: Nephrology, Hematology/Oncology and Palliative Care    Covering dates: 4/5/2019 through 4/12/2019    Admission Diagnoses: Syncope [R55]    Hospital Course:     Mr. Carla Douglas was admitted for JAGDEEP, rhabdo and syncope. He was successfully treated with IVF. While in the hospital he was noted to have increased RLE weakness and recurrent fevers. Repeat imaging notes several gluteal abscesses. Goals of care were discussed, pt declines aggressive intervention. He wishes to go to a SNF for a short period with the plan of hospice eventually    Additional hospital course and discharge summary will be done by discharging physician.

## 2019-04-12 NOTE — PROGRESS NOTES
4/12/2019   CARE MANAGEMENT NOTE: 
 
Transaction Plan of Care: 1. SNF choices were made and referrals were sent accordingly ([facility] [place], [address], [facility]). 2.  [facility] has expressed interest in pt however they are requesting additional info 3. PT/OT therapies will continue to work with pt until hospital discharge likely early next week. 
  
CM will continue to follow pt for discharge planning. Melly
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
## 2019-04-12 NOTE — PROGRESS NOTES
[facility] [place] [address] [address] [facility], [address] [facility] [address] [facility] W: [phone]  F: [phone] Reason for Visit:  
Petr Mack is a 74 y.o. male who is seen in consultation at the request of Dr. Pura Easton 
 for evaluation of prostate cancer with mets. Hematology / Oncology Treatment History:  
 
Diagnosis: Prosate cancer, diagnosed 2015.  
  
Stage: Now metastatic 
  
Pathology: 9/6/18 Sigmoid colon ulcer, biopsy:  
Adenocarcinoma, strongly favor prostate primary (see Comment). Stains weakly for PSAP. 
  
Prior Treatment: Brachytherapy and EBRT in 12/2015.  
  
Current Treatment: Jean Clemente started on 1/28/19. Lupron started earlier. Oncologic History: 
Patient was admitted to the hospital in 9/2018 for persistent diarrhea, evidence of a sigmoid mass. He had been suffering with persistent diarrhea, 50-70-lb weight loss for approx 4-5 months. He underwent 2 recent colonoscopies (one in July and one in Aug 2018) by Dr. Omayra Mitchell at [facility]. He states the first colonoscopy was not clear due to poor prep. The second colonoscopy reportedly showed abnormal rectal and sigmoid mucosa with a possible mass. This was biopsied and was benign. The patient then underwent a CT scan which was notable for R-sided hydronephrosis from extrinsic compression of the right ureter. The patient's urologist, Dr. Сергей Hall, attempted to pass a ureteral stent but was unsuccessful. The patient was then admitted to [address] [place] [address] for weakness, persistent diarrhea and lower abdominal pain. Abd/pelvis CT on 9/4/18 showed a distal sigmoid mass with possible invasion of the posterior bladder wall as well as R hydronephrosis. He was evaluated by Dr. Karyle Denver in general surgery and underwent diverting loop colostomy given the symptoms caused by the sigmoid mass. He underwent colonoscopy by Dr. Aaliyah Avilez in GI with finding of a large sigmoid mass, which was biopsied.  Pathology revealed an adenocarcinoma, but morphology was more consistent with prostate cancer than colorectal cancer. He was seen by Dr. Alejandro Benavides in Urology during hospital stay who recommended percutaneous nephrostomy tube, placed by IR on 9/7. Given the biopsy findings, Dr. Alejandro Benavides recommended further evaluation with prostate/pelvic MRI. Nuclear med bone scan negative for any suspicious bony lesions. After hospital discharge, he fell and was admitted to /[facility]. He stayed there for 3 days and was discharged to rehab, where he stayed for 4 weeks. His pathology was reviewed by an outside facility with opinion that this was a poorly differentiated prostate cancer. Pt was started on Zytiga + Prednisone in 1/2019. History of Present Illness: Mr. Susie Ramirez is a 73 y/o male with h/o prostate cancer who is admitted to the hospital after passing out and woke up after spending approximately 1 day on the ground unconscious. This is his second hospitalization for similar presentation. He was found with diarrhea which had leaked out of his colostomy bag, soiling his clothes. Patient has a complicated oncologic history with a presumed metastatic prostate cancer causing direct extension into colon and surrounding structures, causing obstruction of ureters. Pt required diverting colostomy and R nephrostomy tube. PSA has been low and not markedly elevated. After starting Lupron, Zytiga + Prednisone for prostate cancer, PSA declined slightly, but on most recent check was increased to 3-4 range. ED labs notable for K 2.1, Cr 1.75, CK 10,665. Patient was started on IVF. He states he cannot recall how he ended up on the floor. He simply remembers sitting and watching basketball. Denies any fevers or chills  at home. Denies SOB. Appetite has been poor for several months. Lives by himself. No family at bedside. Interval History:  
Having pain to rt leg; rates 7/10. No other complaints.  Asking if he could check out and go with his son to visit SNFs since he is the one that is going to be staying there. Wound nurse and nursing students at bedside. No family at bedside. Past Medical History:  
Diagnosis Date  Diabetes mellitus ([facility]  Prostate cancer ([facility]  Sleep apnea Past Surgical History:  
Procedure Laterality Date 2021 Luis Orona N/A 9/6/2018 SIGMOIDOSCOPY FLEXIBLE performed by Catrachita Chávez MD at [address] Left  HX HIP REPLACEMENT Right  HX OTHER SURGICAL    
 bladder stent  IR CHANGE NEPHROSTOMY PYELOS TUBE RT  2/1/2019 Social History Tobacco Use  Smoking status: Never Smoker  Smokeless tobacco: Never Used Substance Use Topics  Alcohol use: No  
  
Family History Problem Relation Age of Onset  Cancer Mother   
     colon  Hypertension Mother  Heart Disease Mother  Cancer Father  Diabetes Sister  Cancer Brother  Diabetes Sister Current Facility-Administered Medications Medication Dose Route Frequency  peppermint spirit solution   Other PRN  piperacillin-tazobactam (ZOSYN) 3.375 g in 0.9% sodium chloride (MBP/ADV) 100 mL  3.375 g IntraVENous Q8H  
 ibuprofen (MOTRIN) tablet 400 mg  400 mg Oral Q6H PRN  
 acetaminophen (TYLENOL) tablet 650 mg  650 mg Oral Q6H PRN  
 dextrose 5% - 0.45% NaCl with KCl 40 mEq/L infusion   IntraVENous CONTINUOUS  
 sodium chloride (NS) flush 5-40 mL  5-40 mL IntraVENous Q8H  
 sodium chloride (NS) flush 5-40 mL  5-40 mL IntraVENous PRN  
 naloxone (NARCAN) injection 0.4 mg  0.4 mg IntraVENous PRN  
 insulin lispro (HUMALOG) injection   SubCUTAneous AC&HS  
 glucose chewable tablet 16 g  4 Tab Oral PRN  
 dextrose (D50W) injection syrg 12.5-25 g  12.5-25 g IntraVENous PRN  
 glucagon (GLUCAGEN) injection 1 mg  1 mg IntraMUSCular PRN  
 tamsulosin (FLOMAX) capsule 0.4 mg  0.4 mg Oral DAILY  heparin (porcine) injection 5,000 Units  5,000 Units SubCUTAneous Q8H  
 HYDROcodone-acetaminophen (NORCO) 5-325 mg per tablet 1 Tab  1 Tab Oral Q4H PRN Allergies Allergen Reactions  Ciprofloxacin Rash Review of Systems: A complete review of systems was obtained, negative except as described above. Physical Exam:  
 
Visit Vitals /77 (BP 1 Location: Left arm, BP Patient Position: At rest) Pulse 100 Temp 97.8 °F (36.6 °C) Resp 18 Ht 5' 9\" (1.753 m) Wt 104.6 kg (230 lb 9.6 oz) SpO2 99% BMI 34.05 kg/m² ECOG PS: 2-3 General: elderly; No distress Eyes: PERRLA, anicteric sclerae HENT: Atraumatic with normal appearance of ears and nose; OP clear Neck: Supple; no thyromegaly Lymphatic: No cervical, supraclavicular, or axillary adenopathy Respiratory: CTAB, normal respiratory effort CV: Normal rate, regular rhythm, no murmurs, 2+ pitting edema in BLE ; R>L 
GI: Colostomy noted with liquid brown stool noted; Soft, nontender, nondistended, no masses, no hepatomegaly, no splenomegaly : nephrostomy tube noted MS:Digits without clubbing or cyanosis. Skin: No rashes, ecchymoses, or petechiae. Normal temperature, turgor, and texture. Neuro/Psych: Alert, oriented, appropriate affect, normal judgment/insight. R leg numbness to below knee to foot. Hyperesthesia with sharp object to R foot, but numbness otherwise. 1/5 strength with R foot dorsiflexion, plantarflexion Results:  
 
Lab Results Component Value Date/Time WBC 11.6 (H) 04/09/2019 06:00 AM  
 HGB 8.2 (L) 04/09/2019 06:00 AM  
 HCT 27.6 (L) 04/09/2019 06:00 AM  
 PLATELET 307 73/16/7542 06:00 AM  
 MCV 88.5 04/09/2019 06:00 AM  
 ABS. NEUTROPHILS 12.4 (H) 04/06/2019 01:01 AM  
 
Lab Results Component Value Date/Time  Sodium 144 04/09/2019 06:00 AM  
 Potassium 3.4 (L) 04/09/2019 06:00 AM  
 Chloride 111 (H) 04/09/2019 06:00 AM  
 CO2 26 04/09/2019 06:00 AM  
 Glucose 117 (H) 04/09/2019 06:00 AM  
 BUN 13 04/09/2019 06:00 AM  
 Creatinine 1.09 04/09/2019 06:00 AM  
 GFR est AA >60 04/09/2019 06:00 AM  
 GFR est non-AA >60 04/09/2019 06:00 AM  
 Calcium 8.2 (L) 04/09/2019 06:00 AM  
 Glucose (POC) 175 (H) 04/12/2019 11:05 AM  
 
Lab Results Component Value Date/Time Bilirubin, total 0.4 04/06/2019 01:01 AM  
 ALT (SGPT) 91 (H) 04/06/2019 01:01 AM  
 AST (SGOT) 345 (H) 04/06/2019 01:01 AM  
 Alk. phosphatase 64 04/06/2019 01:01 AM  
 Protein, total 5.4 (L) 04/06/2019 01:01 AM  
 Albumin 2.1 (L) 04/06/2019 01:01 AM  
 Globulin 3.3 04/06/2019 01:01 AM  
 
Lab Results Component Value Date/Time Reticulocyte count 0.8 09/05/2018 12:54 PM  
 Iron % saturation 15 (L) 09/05/2018 12:54 PM  
 TIBC 172 (L) 09/05/2018 12:54 PM  
 Ferritin 213 09/05/2018 12:54 PM  
 Vitamin B12 726 09/05/2018 12:54 PM  
 Folate 9.5 09/05/2018 12:54 PM  
 Lipase 69 (L) 04/05/2019 03:00 AM  
 
Lab Results Component Value Date/Time INR 1.1 04/05/2019 03:00 AM  
 aPTT 20.6 (L) 04/05/2019 03:00 AM  
 
Lab Results Component Value Date/Time CEA 1.1 09/05/2018 12:54 PM  
 Prostate Specific Ag 4.4 (H) 03/07/2019 09:57 AM  
 
4/5/2019 XR CHEST IMPRESSION: 
No acute process. 4/5/2019 CT HEAD WO CONT IMPRESSION No acute process. 4/5/2019 CT CHEST ABD PELV  WO CONT 1. No significant change in large pelvic soft tissue mass surrounding the 
prostate, inseparable from the rectum, as well as the posterior right bladder 
wall. Bilateral hydronephrosis left greater than right similar to prior study. Unchanged position of right nephrostomy tube. 2. No acute process in the chest. 
Outside imaging performed January 15, 2019 at [facility] or provided for 
comparison. Addendum: Comparison is performed. In the interval from January 15, 2019 to April 1, 2019 there is been additional 
interval progression enlarged distal sigmoid colon/rectal mass. Enlarged nodular pelvic masses. Slightly progressed left-sided hydroureteronephrosis as well. 4/5 DUPLEX LE doppler Bilateral lower extremity venous duplex negative for deep venous thrombosis or thrombophlebitis int he veins visualized 4/09/2019 MRI LUMB SPINE W WO CONT IMPRESSION: 
  
1. Fluid collection in the left paraspinal musculature from L3 to L5 most likely 
related to infection. A necrotic prostate cancer metastasis would be unusual to 
the soft tissues. 2. Mild spondylosis in the lower lumbar spine. 4/09/2019 MRI PELV W WO CONT IMPRESSION:  
  
1. No mass effect on the lumbosacral plexus, sciatic nerves, or femoral nerves. 2. Large rectal mass has increased since September, unchanged since 4 days ago. Close approximation to the lumbosacral plexus without direct involvement. 3. New peripherally enhancing collections in the musculature involve the right 
gluteus alannah, right gluteus medius, right obturator externus, right rectus 
femoris, and left gluteus alannah muscles. Differential diagnosis is multifocal 
myositis or multifocal intramuscular abscesses more likely than new necrotic 
tumor metastasis. Assessment and Recommendations:  
Maggi Shoemaker is a 74 y.o. male with h/o prostate cancer admitted with unexplained syncope. 1. Metastatic prostate cancer, castration resistant: Was localized at diagnosis, treated with brachytherapy in 12/2015. Now with metastatic disease which likely started in seminal vesicles and progressed with local extension into sigmoid colon. The pathology is more consistent with prostate adenocarcinoma than colon cancer and no mass seen on first 2 colonoscopies. CEA normal. Chest CT negative. Given large sigmoid mass at risk of causing obstruction, patient underwent diverting loop colostomy on 9/7/18. . Patient has a bulky sigmoid mass causing hydronephrosis requiring utereral stent and colon obstruction, added Zytiga to the Lupron to help obtain better and quicker response. Also based on the STAMPEDE trial, adding Zytiga to treatment in the first line setting improves overall survival (3yr OS 83% vs 76%). In the future, patient may be able to undergo ureteral stent placement and colostomy reversal. Recent CT 4/2019 shows evidence of disease progression on Lupron, Zytiga, Prednisone along with recent increase in PSA 4.4 on 3/7/19. While metastatic prostate cancer has several different treatment options, this patient's disease is atypical, quite aggressive, poorly differentiated. He and his family have been adamant about treatment, but will readdress with most current evidence of disease progression. -- Have discussed with patient in detail that his current treatment with Wyona Benders is not working. I discussed that treatment options include switching to alternate pill such as Enzalutamide, which is unlikely to work. Another option is Docetaxel chemotherapy. Patient's performance status is likely too poor to consider this and would pose a high risk of infection. Home hospice is another option if the patient wishes to focus on comfort and quality of life rather than treatment. -- Family discussion from Monday detailed below --Today's family meeting with palliative team; plan is to go to SNF and then home with hospice with family and caregivers to be there for support. 2. JAGDEEP / Rhabdomyolysis: CT scan revealing bilateral hydronephrosis (likely due to disease progression). JAGDEEP is 2/2 to decreased oral intake and diarrhea. CK up to 10,665 --> 13,471 --> 5911--> 1,505  Nephrology and Urology have evaluated pt. JAGDEEP and Rhabdo are improving. 
-- Receiving IVFs 3. Falls/ syncope: Unclear etiology and 2nd occurrence. 4/5 Echo EF 56-60% 4. UTI: Urine culture growing Klebsiella and Pseudomonas, similar to last urine cs. Currently still spiking fevers on Cefepime.  
 
5. Right hydronephrosis: 2/2 extrinsic compression of the R distal ureter from the prostate vs sigmoid mass. IR placed percutaneous nephrostomy tube on 9/7/18. This was replaced during hospital admission in 2/2019. Follows with Dr. Glenda Castro as outpatient and was seen by Urology here. 6. Normocytic anemia: Likely a combination of anemia of chronic disease due to underlying malignancy. Previously no evidence of iron deficiency. Normal VitB12, folate. 7. BLE edema: Followed by  lymphedema clinic as outpatient; compression garment removed due to pain. 8. RLE [place]/weakness: Pt unable to to dorsi/ plantar-flex rt foot; with decreased in sensation from below the knee area 
-- MRI of L-spine and pelvis to eval 
 
9. Gluteal Multifocal intramuscular abscesses Noted on MRI of pelvis 4/9 Concern for likely source of fevers Discussed with hospitalist: abx being changed to zosyn Dr Charles Raymond reviewed with son via phone 10. Goals of care/ dispo -- Pt made DNR and DDNR completed --Family would like to pursue SNF placement with plan to bring home with support of hospice once he has completed rehab or can no longer can be at the facility. : Discussed with CM Plan was reviewed with Dr Charles Raymond Signed By: Bobbi Gaines NP

## 2019-04-12 NOTE — PROGRESS NOTES
Occupational Therapy Note: Pt verbalizing not sleeping well last night, fatigued and asking if therapies can return later this afternoon. Will cont to follow.

## 2019-04-12 NOTE — PROGRESS NOTES
Bedside and Verbal shift change report given to Leodan Valle (oncoming nurse) by Marguarite Mortimer (offgoing nurse). Report included the following information SBAR, Kardex, ED Summary and Recent Results.

## 2019-04-12 NOTE — DIABETES MGMT
DTC Progress Note Recommendations/ Comments: If appropriate, please consider changing diet order to carb consistent. Pt has variable POC glucose control. He has required 10 units of correction in past 24 hours. Current hospital DM medication: lispro insulin correction scale Chart reviewed on XGIMI. Patient is a 74 y.o. male with known  Type 2 Diabetes on none at home. A1c:  
Lab Results Component Value Date/Time Hemoglobin A1c 6.6 (H) 04/06/2019 01:01 AM  
 
 
Recent Glucose Results:  
Lab Results Component Value Date/Time GLUCPOC 175 (H) 04/12/2019 11:05 AM  
 GLUCPOC 195 (H) 04/12/2019 07:30 AM  
 GLUCPOC 170 (H) 04/11/2019 09:06 PM  
  
 
Lab Results Component Value Date/Time Creatinine 1.09 04/09/2019 06:00 AM  
 
Estimated Creatinine Clearance: 69.8 mL/min (based on SCr of 1.09 mg/dL). Active Orders Diet DIET REGULAR  
  
 
PO intake:  
Patient Vitals for the past 72 hrs: 
 % Diet Eaten 04/11/19 1924 85 % Will continue to follow as needed. Thank you Time spent: 2 min

## 2019-04-12 NOTE — PROGRESS NOTES
PHYSICAL THERAPY:Pt reporting not much sleep last night and being very fatigued. Pt request PT come back later. PT will follow later this afternoon as time allows.

## 2019-04-12 NOTE — PROGRESS NOTES
Bedside and Verbal shift change report given to Pearlean Denver, RN (oncoming nurse) by Isaac Delgadillo RN (offgoing nurse). Report included the following information SBAR, Kardex and MAR.

## 2019-04-13 LAB
BACTERIA SPEC CULT: NORMAL
GLUCOSE BLD STRIP.AUTO-MCNC: 126 MG/DL (ref 65–100)
GLUCOSE BLD STRIP.AUTO-MCNC: 149 MG/DL (ref 65–100)
GLUCOSE BLD STRIP.AUTO-MCNC: 197 MG/DL (ref 65–100)
GLUCOSE BLD STRIP.AUTO-MCNC: 219 MG/DL (ref 65–100)
SERVICE CMNT-IMP: ABNORMAL
SERVICE CMNT-IMP: NORMAL

## 2019-04-13 PROCEDURE — 74011250636 HC RX REV CODE- 250/636: Performed by: INTERNAL MEDICINE

## 2019-04-13 PROCEDURE — 74011250637 HC RX REV CODE- 250/637: Performed by: INTERNAL MEDICINE

## 2019-04-13 PROCEDURE — 82962 GLUCOSE BLOOD TEST: CPT

## 2019-04-13 PROCEDURE — 65270000029 HC RM PRIVATE

## 2019-04-13 PROCEDURE — 74011636637 HC RX REV CODE- 636/637: Performed by: INTERNAL MEDICINE

## 2019-04-13 PROCEDURE — 74011000258 HC RX REV CODE- 258: Performed by: INTERNAL MEDICINE

## 2019-04-13 PROCEDURE — 77030027138 HC INCENT SPIROMETER -A

## 2019-04-13 RX ADMIN — INSULIN LISPRO 3 UNITS: 100 INJECTION, SOLUTION INTRAVENOUS; SUBCUTANEOUS at 11:40

## 2019-04-13 RX ADMIN — HEPARIN SODIUM 5000 UNITS: 5000 INJECTION INTRAVENOUS; SUBCUTANEOUS at 22:58

## 2019-04-13 RX ADMIN — Medication 10 ML: at 20:18

## 2019-04-13 RX ADMIN — Medication 10 ML: at 04:17

## 2019-04-13 RX ADMIN — TAMSULOSIN HYDROCHLORIDE 0.4 MG: 0.4 CAPSULE ORAL at 09:23

## 2019-04-13 RX ADMIN — INSULIN LISPRO 2 UNITS: 100 INJECTION, SOLUTION INTRAVENOUS; SUBCUTANEOUS at 16:59

## 2019-04-13 RX ADMIN — HEPARIN SODIUM 5000 UNITS: 5000 INJECTION INTRAVENOUS; SUBCUTANEOUS at 06:39

## 2019-04-13 RX ADMIN — PIPERACILLIN SODIUM,TAZOBACTAM SODIUM 3.38 G: 3; .375 INJECTION, POWDER, FOR SOLUTION INTRAVENOUS at 04:16

## 2019-04-13 RX ADMIN — PIPERACILLIN SODIUM,TAZOBACTAM SODIUM 3.38 G: 3; .375 INJECTION, POWDER, FOR SOLUTION INTRAVENOUS at 11:40

## 2019-04-13 RX ADMIN — PIPERACILLIN SODIUM,TAZOBACTAM SODIUM 3.38 G: 3; .375 INJECTION, POWDER, FOR SOLUTION INTRAVENOUS at 20:17

## 2019-04-13 RX ADMIN — HEPARIN SODIUM 5000 UNITS: 5000 INJECTION INTRAVENOUS; SUBCUTANEOUS at 15:45

## 2019-04-13 NOTE — PROGRESS NOTES
During initial night assessment pt A&O x0. Pt could not tell me his name and kept asking \"am I crazy. \" Pt kept asking \"what are the lights,\" \"how are you communicating with that device\" RN educated that pt was in the hospital and that we were taking care of him. 0420 - Pt A&O x4 and could answer all questions appropriately.
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
## 2019-04-14 LAB
GLUCOSE BLD STRIP.AUTO-MCNC: 131 MG/DL (ref 65–100)
GLUCOSE BLD STRIP.AUTO-MCNC: 148 MG/DL (ref 65–100)
GLUCOSE BLD STRIP.AUTO-MCNC: 149 MG/DL (ref 65–100)
GLUCOSE BLD STRIP.AUTO-MCNC: 209 MG/DL (ref 65–100)
SERVICE CMNT-IMP: ABNORMAL

## 2019-04-14 PROCEDURE — 82962 GLUCOSE BLOOD TEST: CPT

## 2019-04-14 PROCEDURE — 74011250636 HC RX REV CODE- 250/636: Performed by: INTERNAL MEDICINE

## 2019-04-14 PROCEDURE — 74011636637 HC RX REV CODE- 636/637: Performed by: INTERNAL MEDICINE

## 2019-04-14 PROCEDURE — 65270000029 HC RM PRIVATE

## 2019-04-14 PROCEDURE — 74011000258 HC RX REV CODE- 258: Performed by: INTERNAL MEDICINE

## 2019-04-14 PROCEDURE — 74011250637 HC RX REV CODE- 250/637: Performed by: INTERNAL MEDICINE

## 2019-04-14 RX ADMIN — PIPERACILLIN SODIUM,TAZOBACTAM SODIUM 3.38 G: 3; .375 INJECTION, POWDER, FOR SOLUTION INTRAVENOUS at 12:25

## 2019-04-14 RX ADMIN — INSULIN LISPRO 2 UNITS: 100 INJECTION, SOLUTION INTRAVENOUS; SUBCUTANEOUS at 16:29

## 2019-04-14 RX ADMIN — HEPARIN SODIUM 5000 UNITS: 5000 INJECTION INTRAVENOUS; SUBCUTANEOUS at 23:35

## 2019-04-14 RX ADMIN — INSULIN LISPRO 3 UNITS: 100 INJECTION, SOLUTION INTRAVENOUS; SUBCUTANEOUS at 12:26

## 2019-04-14 RX ADMIN — Medication 10 ML: at 21:46

## 2019-04-14 RX ADMIN — Medication 10 ML: at 14:00

## 2019-04-14 RX ADMIN — HEPARIN SODIUM 5000 UNITS: 5000 INJECTION INTRAVENOUS; SUBCUTANEOUS at 08:04

## 2019-04-14 RX ADMIN — TAMSULOSIN HYDROCHLORIDE 0.4 MG: 0.4 CAPSULE ORAL at 09:18

## 2019-04-14 RX ADMIN — DEXTROSE MONOHYDRATE, SODIUM CHLORIDE, AND POTASSIUM CHLORIDE: 50; 4.5; 2.98 INJECTION, SOLUTION INTRAVENOUS at 12:09

## 2019-04-14 RX ADMIN — HEPARIN SODIUM 5000 UNITS: 5000 INJECTION INTRAVENOUS; SUBCUTANEOUS at 15:23

## 2019-04-14 RX ADMIN — PIPERACILLIN SODIUM,TAZOBACTAM SODIUM 3.38 G: 3; .375 INJECTION, POWDER, FOR SOLUTION INTRAVENOUS at 20:23

## 2019-04-14 RX ADMIN — Medication 10 ML: at 04:02

## 2019-04-14 RX ADMIN — PIPERACILLIN SODIUM,TAZOBACTAM SODIUM 3.38 G: 3; .375 INJECTION, POWDER, FOR SOLUTION INTRAVENOUS at 04:02

## 2019-04-14 NOTE — PROGRESS NOTES
Bedside and Verbal shift change report given to Nain Naylor RN  (oncoming nurse) by Claudia Mckenzie RN (offgoing nurse). Report included the following information SBAR, Kardex, Intake/Output, MAR, Accordion and Recent Results.
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
## 2019-04-15 ENCOUNTER — APPOINTMENT (OUTPATIENT)
Dept: MRI IMAGING | Age: 74
DRG: 682 | End: 2019-04-15
Attending: NURSE PRACTITIONER
Payer: MEDICARE

## 2019-04-15 LAB
GLUCOSE BLD STRIP.AUTO-MCNC: 140 MG/DL (ref 65–100)
GLUCOSE BLD STRIP.AUTO-MCNC: 154 MG/DL (ref 65–100)
GLUCOSE BLD STRIP.AUTO-MCNC: 162 MG/DL (ref 65–100)
GLUCOSE BLD STRIP.AUTO-MCNC: 186 MG/DL (ref 65–100)
SERVICE CMNT-IMP: ABNORMAL

## 2019-04-15 PROCEDURE — 74011000258 HC RX REV CODE- 258: Performed by: INTERNAL MEDICINE

## 2019-04-15 PROCEDURE — 70551 MRI BRAIN STEM W/O DYE: CPT

## 2019-04-15 PROCEDURE — 97530 THERAPEUTIC ACTIVITIES: CPT

## 2019-04-15 PROCEDURE — 74011250636 HC RX REV CODE- 250/636: Performed by: INTERNAL MEDICINE

## 2019-04-15 PROCEDURE — 97535 SELF CARE MNGMENT TRAINING: CPT

## 2019-04-15 PROCEDURE — 74011636637 HC RX REV CODE- 636/637: Performed by: INTERNAL MEDICINE

## 2019-04-15 PROCEDURE — 65270000029 HC RM PRIVATE

## 2019-04-15 PROCEDURE — 74011250637 HC RX REV CODE- 250/637: Performed by: INTERNAL MEDICINE

## 2019-04-15 PROCEDURE — 82962 GLUCOSE BLOOD TEST: CPT

## 2019-04-15 RX ORDER — HYDROCODONE BITARTRATE AND ACETAMINOPHEN 5; 325 MG/1; MG/1
1 TABLET ORAL
Qty: 30 TAB | Refills: 0 | Status: SHIPPED | OUTPATIENT
Start: 2019-04-15 | End: 2019-05-15

## 2019-04-15 RX ORDER — AMOXICILLIN AND CLAVULANATE POTASSIUM 875; 125 MG/1; MG/1
1 TABLET, FILM COATED ORAL EVERY 12 HOURS
Qty: 28 TAB | Refills: 0 | Status: SHIPPED | OUTPATIENT
Start: 2019-04-15 | End: 2019-04-29

## 2019-04-15 RX ADMIN — PIPERACILLIN SODIUM,TAZOBACTAM SODIUM 3.38 G: 3; .375 INJECTION, POWDER, FOR SOLUTION INTRAVENOUS at 13:14

## 2019-04-15 RX ADMIN — HEPARIN SODIUM 5000 UNITS: 5000 INJECTION INTRAVENOUS; SUBCUTANEOUS at 06:36

## 2019-04-15 RX ADMIN — TAMSULOSIN HYDROCHLORIDE 0.4 MG: 0.4 CAPSULE ORAL at 08:38

## 2019-04-15 RX ADMIN — DEXTROSE MONOHYDRATE, SODIUM CHLORIDE, AND POTASSIUM CHLORIDE: 50; 4.5; 2.98 INJECTION, SOLUTION INTRAVENOUS at 09:53

## 2019-04-15 RX ADMIN — INSULIN LISPRO 2 UNITS: 100 INJECTION, SOLUTION INTRAVENOUS; SUBCUTANEOUS at 08:38

## 2019-04-15 RX ADMIN — HEPARIN SODIUM 5000 UNITS: 5000 INJECTION INTRAVENOUS; SUBCUTANEOUS at 16:36

## 2019-04-15 RX ADMIN — INSULIN LISPRO 2 UNITS: 100 INJECTION, SOLUTION INTRAVENOUS; SUBCUTANEOUS at 16:37

## 2019-04-15 RX ADMIN — INSULIN LISPRO 2 UNITS: 100 INJECTION, SOLUTION INTRAVENOUS; SUBCUTANEOUS at 13:14

## 2019-04-15 RX ADMIN — PIPERACILLIN SODIUM,TAZOBACTAM SODIUM 3.38 G: 3; .375 INJECTION, POWDER, FOR SOLUTION INTRAVENOUS at 04:23

## 2019-04-15 RX ADMIN — PIPERACILLIN SODIUM,TAZOBACTAM SODIUM 3.38 G: 3; .375 INJECTION, POWDER, FOR SOLUTION INTRAVENOUS at 20:54

## 2019-04-15 RX ADMIN — Medication 10 ML: at 04:24

## 2019-04-15 RX ADMIN — HEPARIN SODIUM 5000 UNITS: 5000 INJECTION INTRAVENOUS; SUBCUTANEOUS at 23:03

## 2019-04-15 NOTE — PROGRESS NOTES
4/15/2019   CARE MANAGEMENT NOTE:  CM reviewed EMR for clinical updates over the weekend. Per chart, pt will discharge on p.o abx so no need for IV treatment. CM spoke with pt's DIL this a.m re: previous SNF choices and possible acceptance. Transition Plan of Care: 1. [facility] and [address] denied pt 2/2 no beds available 2. Additional info was sent to [facility] for review and await their decision. CM will continue to explore SNF for short tern rehab. Long term plan is home hospice after completion of rehab. Melly

## 2019-04-15 NOTE — PROGRESS NOTES
Problem: Mobility Impaired (Adult and Pediatric) Goal: *Acute Goals and Plan of Care (Insert Text) Description Physical Therapy Goals Re-Assessment 4/15/19 1. Goal met, progress to MOD A within 7 days. 2. Met, Progress to transfer from bed to chair with MOD A x1 using least restrictive device within 7 days. 3. Met, Progress to MOD A x1 for sit-stand within 7 days 4. Continue goal for an additional 7 days. Initiated 4/7/2019 1. Patient will move from supine to sit and sit to supine  in bed with maximal assistance within 7 day(s). 2.  Patient will transfer from bed to chair and chair to bed with maximal assistance using the least restrictive device within 7 day(s). 3.  Patient will perform sit to stand with maximal assistance within 7 day(s). 4.  Patient will ambulate with maximal assistance for 50 feet with the least restrictive device within 7 day(s). Outcome: Progressing Towards Goal 
Note: PHYSICAL THERAPY TREATMENT: WEEKLY REASSESSMENT Patient: Yan Saxena [de-identified]74 y.o. male) Date: 4/15/2019 Diagnosis: Syncope [R55] Syncope Precautions: Fall Chart, physical therapy assessment, plan of care and goals were reviewed. ASSESSMENT: 
Patient received supine in bed, eager to participate. He still has decreased Right LE strength but he does have 3/5 strength in knee extensors, trace (1/5) for ankle DF/PF. He demonstrated no buckling with upright activity today. He was able to ambulate short distance with decreased step length and decreased weight bearing on Right. Patient returned to sitting up in chair. Patient continues to have confusion, he states he has not been out of bed. Patient patient has been treated by PT and OT since admission and has transferred out of bed to chair. He is alert and oriented x4 but the periods of situation confusion are present.  
Patient's progression toward goals since last assessment: Patient still requires 2 people for transfers and mobility due to continued LE weakness and decreased activity tolerance. Patient with very medical complex case and has poor endurance. He underwent a Head MRI today which was negative for an acute infarct. He has decreased Right LE strength, please see MMT below. He continues to benefit from SNF at discharge if he can tolerance, otherwise he is transitioning to hospice care. PLAN: 
Goals have been updated based on progression since last assessment. Patient continues to benefit from skilled intervention to address the above impairments. Continue to follow the patient 5 times a week to address goals. Planned Interventions: 
?              Bed Mobility Training             ? Neuromuscular Re-Education ? Transfer Training                   ? Orthotic/Prosthetic Training 
? Gait Training                         ? Modalities ? Therapeutic Exercises           ? Edema Management/Control ? Therapeutic Activities            ? Patient and Family Training/Education ? Other (comment): 
Discharge Recommendations: [facility] Hallman Further Equipment Recommendations for Discharge: TBD at SNF SUBJECTIVE:  
Patient stated ? this leg was working until . ? OBJECTIVE DATA SUMMARY:  
Critical Behavior: 
Neurologic State: Alert Orientation Level: Oriented X4 Cognition: Follows commands Safety/Judgement: Awareness of environment Strength:  
Strength: Grossly decreased, non-functional 
  
  
RLE Strength 
R Hip Flexion: 2- 
R Knee Flexion: 3 
R Knee Extension: 3 
R Ankle Dorsiflexion: 1 
R Ankle Plantar Flexion: 2- 
  
  
LLE Strength L Hip Flexion: 3 L Knee Flexion: 3 L Knee Extension: 3 L Ankle Dorsiflexion: 3 L Ankle Plantar Flexion: 3 Functional Mobility Training: 
Bed Mobility: 
Rolling: Moderate assistance Supine to Sit: Moderate assistance Transfers: Sit to Stand: Moderate assistance;Assist x2 Stand to Sit: Moderate assistance;Assist x2 Bed to Chair: Moderate assistance;Assist x2 Balance: 
Sitting: Impaired Sitting - Static: Good (unsupported) Sitting - Dynamic: Fair (occasional) Standing: Impaired Standing - Static: Constant support Standing - Dynamic : Constant support Ambulation/Gait Training: 
Distance (ft): 8 Feet (ft) Assistive Device: Walker, rolling;Gait belt Ambulation - Level of Assistance: Moderate assistance;Assist x2 Gait Abnormalities: Decreased step clearance; Step to gait Therapeutic Exercises:  
Barthel Index: 
Bathin Bladder: 0 Bowels: 0 Groomin Dressin Feedin Mobility: 5 Stairs: 0 Toilet Use: 0 Transfer (Bed to Chair and Back): 5 Total: 20/100 Percentage of impairment  
0% 1-19% 20-39% 40-59% 60-79% 80-99% 100% Barthel Score 0-100 100 99-80 79-60 59-40 20-39 1-19 
 0 The Barthel ADL Index: Guidelines 1. The index should be used as a record of what a patient does, not as a record of what a patient could do. 2. The main aim is to establish degree of independence from any help, physical or verbal, however minor and for whatever reason. 3. The need for supervision renders the patient not independent. 4. A patient's performance should be established using the best available evidence. Asking the patient, friends/relatives and nurses are the usual sources, but direct observation and common sense are also important. However direct testing is not needed. 5. Usually the patient's performance over the preceding 24-48 hours is important, but occasionally longer periods will be relevant. 6. Middle categories imply that the patient supplies over 50 per cent of the effort. 7. Use of aids to be independent is allowed. Apurva Sanders., Barthel, D.W. ([id number]). Functional evaluation: the Barthel Index. [address] [facility] (14)2. GOVIND Funk, Elver Jaimes., [place] Alas., [place], [address] (1999). Measuring the change indisability after inpatient rehabilitation; comparison of the responsiveness of the Barthel Index and Functional [place] Measure. Journal of Neurology, Neurosurgery, and Psychiatry, 66(4), 736-995. KUNAL Kuo, ROSSY Brice, & Elvin Villa M.A. (2004.) Assessment of post-stroke quality of life in cost-effectiveness studies: The usefulness of the Barthel Index and the EuroQoL-5D. [facility], 13, 392-49 Pain: 
Pain Scale 1: Numeric (0 - 10) Pain Intensity 1: 0 Activity Tolerance: No complications Please refer to the flowsheet for vital signs taken during this treatment. After treatment:  
?  Patient left in no apparent distress sitting up in chair ? Patient left in no apparent distress in bed 
? Call bell left within reach ? Nursing notified ? Caregiver present ? Chair alarm activated COMMUNICATION/COLLABORATION:  
The patient?s plan of care was discussed with: Registered Nurse Mauri Brandon, PT, DPT Time Calculation: 27 mins Problem: Mobility Impaired (Adult and Pediatric) Goal: *Acute Goals and Plan of Care (Insert Text) Description Physical Therapy Goals Re-Assessment 4/15/19 1. Goal met, progress to MOD A within 7 days. 2. Met, Progress to transfer from bed to chair with MOD A x1 using least restrictive device within 7 days. 3. Met, Progress to MOD A x1 for sit-stand within 7 days 4. Continue goal for an additional 7 days. Initiated 4/7/2019 1. Patient will move from supine to sit and sit to supine  in bed with maximal assistance within 7 day(s). 2.  Patient will transfer from bed to chair and chair to bed with maximal assistance using the least restrictive device within 7 day(s). 3.  Patient will perform sit to stand with maximal assistance within 7 day(s). 4.  Patient will ambulate with maximal assistance for 50 feet with the least restrictive device within 7 day(s). Outcome: Progressing Towards Goal 
Note: PHYSICAL THERAPY TREATMENT: WEEKLY REASSESSMENT Patient: Iliana Perez [de-identified]74 y.o. male) Date: 4/15/2019 Diagnosis: Syncope [R55] Syncope Precautions: Fall Chart, physical therapy assessment, plan of care and goals were reviewed. ASSESSMENT: 
Patient received supine in bed, eager to participate. He still has decreased Right LE strength but he does have 3/5 strength in knee extensors, trace (1/5) for ankle DF/PF. He demonstrated no buckling with upright activity today. He was able to ambulate short distance with decreased step length and decreased weight bearing on Right. Patient returned to sitting up in chair. Patient continues to have confusion, he states he has not been out of bed. Patient patient has been treated by PT and OT since admission and has transferred out of bed to chair. He is alert and oriented x4 but the periods of situation confusion are present. Patient's progression toward goals since last assessment: Patient still requires 2 people for transfers and mobility due to continued LE weakness and decreased activity tolerance. Patient with very medical complex case and has poor endurance. He underwent a Head MRI today which was negative for an acute infarct. He has decreased Right LE strength, please see MMT below. He continues to benefit from SNF at discharge if he can tolerance, otherwise he is transitioning to hospice care. PLAN: 
Goals have been updated based on progression since last assessment. Patient continues to benefit from skilled intervention to address the above impairments. Continue to follow the patient 5 times a week to address goals. Planned Interventions: 
?              Bed Mobility Training             ? Neuromuscular Re-Education ? Transfer Training                   ? Orthotic/Prosthetic Training 
? Gait Training                         ? Modalities ? Therapeutic Exercises           ? Edema Management/Control ? Therapeutic Activities            ? Patient and Family Training/Education ? Other (comment): 
Discharge Recommendations: [facility]osea Hallman Further Equipment Recommendations for Discharge: TBD at [facility] SUBJECTIVE:  
Patient stated ? this leg was working until . ? OBJECTIVE DATA SUMMARY:  
Critical Behavior: 
Neurologic State: Alert Orientation Level: Oriented X4 Cognition: Follows commands Safety/Judgement: Awareness of environment Strength:  
Strength: Grossly decreased, non-functional 
  
  
RLE Strength 
R Hip Flexion: 2- 
R Knee Flexion: 3 
R Knee Extension: 3 
R Ankle Dorsiflexion: 1 
R Ankle Plantar Flexion: 2- 
  
  
LLE Strength L Hip Flexion: 3 L Knee Flexion: 3 L Knee Extension: 3 L Ankle Dorsiflexion: 3 L Ankle Plantar Flexion: 3 Functional Mobility Training: 
Bed Mobility: 
Rolling: Moderate assistance Supine to Sit: Moderate assistance Transfers: 
Sit to Stand: Moderate assistance;Assist x2 Stand to Sit: Moderate assistance;Assist x2 Bed to Chair: Moderate assistance;Assist x2 Balance: 
Sitting: Impaired Sitting - Static: Good (unsupported) Sitting - Dynamic: Fair (occasional) Standing: Impaired Standing - Static: Constant support Standing - Dynamic : Constant support Ambulation/Gait Training: 
Distance (ft): 8 Feet (ft) Assistive Device: Walker, rolling;Gait belt Ambulation - Level of Assistance: Moderate assistance;Assist x2 Gait Abnormalities: Decreased step clearance; Step to gait Therapeutic Exercises:  
Barthel Index: 
Bathin Bladder: 0 Bowels: 0 Groomin Dressin Feedin Mobility: 5 Stairs: 0 Toilet Use: 0 Transfer (Bed to Chair and Back): 5 Total: 20/100 Percentage of impairment  
0% 1-19% 20-39% 40-59% 60-79% 80-99% 100% Barthel Score 0-100 100 99-80 79-60 59-40 20-39 1-19 
 0 The Barthel ADL Index: Guidelines 1. The index should be used as a record of what a patient does, not as a record of what a patient could do. 2. The main aim is to establish degree of independence from any help, physical or verbal, however minor and for whatever reason. 3. The need for supervision renders the patient not independent. 4. A patient's performance should be established using the best available evidence. Asking the patient, friends/relatives and nurses are the usual sources, but direct observation and common sense are also important. However direct testing is not needed. 5. Usually the patient's performance over the preceding 24-48 hours is important, but occasionally longer periods will be relevant. 6. Middle categories imply that the patient supplies over 50 per cent of the effort. 7. Use of aids to be independent is allowed. Rebeca Bruner., Barthel, D.W. ([id number]). Functional evaluation: the Barthel Index. [address] [facility] (14)2. Parisa Ban paul [place], J.J.M.F, Cordell Nugent., Griffin Anne., [place], [address] [place] [address] (1999). Measuring the change indisability after inpatient rehabilitation; comparison of the responsiveness of the Barthel Index and Functional [place] Measure. Journal of Neurology, Neurosurgery, and Psychiatry, 66(4), 487-241. Antoine Kaufman, N.J.A, ROSSY Brice, & Gavin Pires MDionneA. (2004.) Assessment of post-stroke quality of life in cost-effectiveness studies: The usefulness of the Barthel Index and the EuroQoL-5D. [facility], 13, 348-57 Pain: 
Pain Scale 1: Numeric (0 - 10) Pain Intensity 1: 0 Activity Tolerance: No complications Please refer to the flowsheet for vital signs taken during this treatment. After treatment:  
?  Patient left in no apparent distress sitting up in chair ?  Patient left in no apparent distress in bed 
? Call bell left within reach ? Nursing notified ? Caregiver present ? Chair alarm activated COMMUNICATION/COLLABORATION:  
The patient?s plan of care was discussed with: Registered Nurse Ru Banuelos PT, DPT Time Calculation: 27 mins

## 2019-04-15 NOTE — PROGRESS NOTES
Occupational Therapy Note: 
 
Chart reviewed. Attempted to see pt for therapy session. Pt currently being transported off floor for testing. Will continue to follow and attempt again as able. Thank you.  
 
Cat Rushing OTR/MARKELL
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
## 2019-04-15 NOTE — PROGRESS NOTES
Problem: Self Care Deficits Care Plan (Adult) Goal: *Acute Goals and Plan of Care (Insert Text) Description Occupational Therapy Goals Initiated 4/7/2019; Reviewed 4/15/19 for weekly reassessment, none met continue all 1. Patient will perform grooming with supervision/set-up in unsupported sit within 7 day(s). 2.  Patient will perform upper body dressing with supervision/set-up within 7 day(s). 3.  Patient will perform lower body dressing with moderate assistance using AE PRN within 7 day(s). 4.  Patient will perform toilet transfers with moderate assistance to [facility] within 7 day(s). 5.  Patient will perform all aspects of toileting with moderate assistance  within 7 day(s). 6.  Patient will participate in upper extremity therapeutic exercise/activities with supervision/set-up for 5 minutes within 7 day(s). Outcome: Progressing Towards Goal 
  
OCCUPATIONAL THERAPY TREATMENT: WEEKLY REASSESSMENT Patient: Jose Daniel Bonilla [de-identified]74 y.o. male) Date: 4/15/2019 Diagnosis: Syncope [R55] Syncope Precautions: Fall Chart, occupational therapy assessment, plan of care, and goals were reviewed. ASSESSMENT: 
Cleared by RN to see pt for weekly reassessment. Pt received seated in chair, agreeable to participate. Performed grooming ADLs with setup in supported sit, mildly fatigued after task. Required mod A x2 to stand from recliner chair to RW, multiple attempts and blocking at R foot needed as pt's R foot had tendency to slide forward. Difficulty bringing R foot further back in preparation to stand due to edema and RLE pain. Pt requested to return to bed once standing due to RLE pain, mod A x2 throughout and cues for coordination, pt taking short steps with little foot clearance. Returned to supine with max A, call bell in reach and needs met. VSS during session.  At this time pt requires setup-min A for UB ADLs, max-total A for LB ADLs, and assist x2 for mobility due to decreased balance and endurance, LE weakness and decreased ROM, and impaired functional mobility. Recommend SNF at discharge. Progression toward goals: ?            Improving appropriately and progressing toward goals ? Improving slowly and progressing toward goals ? Not making progress toward goals and plan of care will be adjusted PLAN: 
Goals have been updated based on progression since last assessment. Patient continues to benefit from skilled intervention to address the above impairments. Continue to follow patient 5 times a week to address goals. Planned Interventions: 
?                    Self Care Training                  ? Therapeutic Activities ? Functional Mobility Training    ? Cognitive Retraining 
? Therapeutic Exercises           ? Endurance Activities ? Balance Training                   ? Neuromuscular Re-Education ? Visual/Perceptual Training     ? Home Safety Training 
? Patient Education                 ? Family Training/Education ? Other (comment): 
Discharge Recommendations: Hosea Hallman Further Equipment Recommendations for Discharge: TBD SUBJECTIVE:  
Patient stated ? I'd like to brush my teeth. ? OBJECTIVE DATA SUMMARY:  
Cognitive/Behavioral Status: 
Neurologic State: Alert Orientation Level: Oriented X4 Cognition: Follows commands Perception: Appears intact Perseveration: No perseveration noted Safety/Judgement: Awareness of environment; Fall prevention Functional Mobility and Transfers for ADLs: 
Bed Mobility: 
Sit to supine: Maximum assistance Transfers: 
Sit to Stand: Moderate assistance;Assist x2; Additional time; Adaptive equipment(RW, from recliner) Bed to Chair: Moderate assistance;Assist x2; Additional time; Adaptive equipment Balance: Sitting: Impaired Sitting - Static: Good (unsupported) Sitting - Dynamic: Fair (occasional) Standing: Impaired; With support Standing - Static: Constant support; Fair 
Standing - Dynamic : Constant support;Poor; [address] ADL Intervention: 
  
 
Grooming Washing Face: Supervision/set-up(seated in chair) Brushing Teeth: Supervision/set-up(seated in chair) Brushing/Combing Hair: Supervision/set-up(seated in chair) Lower Body Dressing Assistance Socks: Total assistance (dependent) Cognitive Retraining Safety/Judgement: Awareness of environment; Fall prevention Pain: 
Pain Scale 1: Numeric (0 - 10) Pain Intensity 1: 0 Activity Tolerance:  
Fair Please refer to the flowsheet for vital signs taken during this treatment. After treatment:  
? Patient left in no apparent distress sitting up in chair ? Patient left in no apparent distress in bed 
? Call bell left within reach ? Nursing notified ? Caregiver present ? Bed alarm activated COMMUNICATION/COLLABORATION:  
The patient?s plan of care was discussed with: Physical Therapist and Registered Nurse Evelio Caban OT Time Calculation: 25 mins

## 2019-04-15 NOTE — PROGRESS NOTES
[facility] [place] [id number] [facility], [address] [facility] [address] [facility] W: [phone]  F: [phone] Reason for Visit:  
Mordecai Jeans is a 74 y.o. male who is seen in consultation at the request of Dr. Lele Jacobson 
 for evaluation of prostate cancer with mets. Hematology / Oncology Treatment History:  
 
Diagnosis: Prosate cancer, diagnosed 2015.  
  
Stage: Now metastatic 
  
Pathology: 9/6/18 Sigmoid colon ulcer, biopsy:  
Adenocarcinoma, strongly favor prostate primary (see Comment). Stains weakly for PSAP. 
  
Prior Treatment: Brachytherapy and EBRT in 12/2015.  
  
Current Treatment: [employer] started on 1/28/19. Lupron started earlier. Oncologic History: 
Patient was admitted to the hospital in 9/2018 for persistent diarrhea, evidence of a sigmoid mass. He had been suffering with persistent diarrhea, 50-70-lb weight loss for approx 4-5 months. He underwent 2 recent colonoscopies (one in July and one in Aug 2018) by Dr. Jennifer Patel at [facility]. He states the first colonoscopy was not clear due to poor prep. The second colonoscopy reportedly showed abnormal rectal and sigmoid mucosa with a possible mass. This was biopsied and was benign. The patient then underwent a CT scan which was notable for R-sided hydronephrosis from extrinsic compression of the right ureter. The patient's urologist, Dr. Evaristo Ochoa, attempted to pass a ureteral stent but was unsuccessful. The patient was then admitted to [facility] for weakness, persistent diarrhea and lower abdominal pain. Abd/pelvis CT on 9/4/18 showed a distal sigmoid mass with possible invasion of the posterior bladder wall as well as R hydronephrosis. He was evaluated by Dr. Mayank Arias in general surgery and underwent diverting loop colostomy given the symptoms caused by the sigmoid mass. He underwent colonoscopy by Dr. Marvella Fabry in GI with finding of a large sigmoid mass, which was biopsied.  Pathology revealed an adenocarcinoma, but morphology was more consistent with prostate cancer than colorectal cancer. He was seen by Dr. Antonette Casillas in Urology during hospital stay who recommended percutaneous nephrostomy tube, placed by IR on 9/7. Given the biopsy findings, Dr. Antonette Casillas recommended further evaluation with prostate/pelvic MRI. Nuclear med bone scan negative for any suspicious bony lesions. After hospital discharge, he fell and was admitted to /[facility]. He stayed there for 3 days and was discharged to rehab, where he stayed for 4 weeks. His pathology was reviewed by an outside facility with opinion that this was a poorly differentiated prostate cancer. Pt was started on Zytiga + Prednisone in 1/2019. History of Present Illness: Mr. Katy Joshua is a 75 y/o male with h/o prostate cancer who is admitted to the hospital after passing out and woke up after spending approximately 1 day on the ground unconscious. This is his second hospitalization for similar presentation. He was found with diarrhea which had leaked out of his colostomy bag, soiling his clothes. Patient has a complicated oncologic history with a presumed metastatic prostate cancer causing direct extension into colon and surrounding structures, causing obstruction of ureters. Pt required diverting colostomy and R nephrostomy tube. PSA has been low and not markedly elevated. After starting Lupron, Zytiga + Prednisone for prostate cancer, PSA declined slightly, but on most recent check was increased to 3-4 range. ED labs notable for K 2.1, Cr 1.75, CK 10,665. Patient was started on IVF. He states he cannot recall how he ended up on the floor. He simply remembers sitting and watching basketball. Denies any fevers or chills  at home. Denies SOB. Appetite has been poor for several months. Lives by himself. No family at bedside.   
 
Interval History:  
Continues with pain to rt leg; able to move it a little better today  But still unable to dorsi or plantar flex rt foot. Appetite remains decreased. Denies N/V. Reports \"very tired today\" Had a lot of visitors yesterday. Thinks his family has found a facility but he is unsure. Now questioning whether he wants additional rehab or wants to go home. Will see how he feels after resting today. No family at bedside Current Facility-Administered Medications Medication Dose Route Frequency  peppermint spirit solution   Other PRN  piperacillin-tazobactam (ZOSYN) 3.375 g in 0.9% sodium chloride (MBP/ADV) 100 mL  3.375 g IntraVENous Q8H  
 ibuprofen (MOTRIN) tablet 400 mg  400 mg Oral Q6H PRN  
 acetaminophen (TYLENOL) tablet 650 mg  650 mg Oral Q6H PRN  
 dextrose 5% - 0.45% NaCl with KCl 40 mEq/L infusion   IntraVENous CONTINUOUS  
 sodium chloride (NS) flush 5-40 mL  5-40 mL IntraVENous Q8H  
 sodium chloride (NS) flush 5-40 mL  5-40 mL IntraVENous PRN  
 naloxone (NARCAN) injection 0.4 mg  0.4 mg IntraVENous PRN  
 insulin lispro (HUMALOG) injection   SubCUTAneous AC&HS  
 glucose chewable tablet 16 g  4 Tab Oral PRN  
 dextrose (D50W) injection syrg 12.5-25 g  12.5-25 g IntraVENous PRN  
 glucagon (GLUCAGEN) injection 1 mg  1 mg IntraMUSCular PRN  
 tamsulosin (FLOMAX) capsule 0.4 mg  0.4 mg Oral DAILY  heparin (porcine) injection 5,000 Units  5,000 Units SubCUTAneous Q8H  
 HYDROcodone-acetaminophen (NORCO) 5-325 mg per tablet 1 Tab  1 Tab Oral Q4H PRN Allergies Allergen Reactions  Ciprofloxacin Rash Review of Systems: A complete review of systems was obtained, negative except as described above. Physical Exam:  
 
Visit Vitals /69 (BP 1 Location: Right arm, BP Patient Position: At rest) Pulse 90 Temp 97.8 °F (36.6 °C) Resp 18 Ht 5' 9\" (1.753 m) Wt 104.6 kg (230 lb 9.6 oz) SpO2 96% BMI 34.05 kg/m² ECOG PS: 2-3 General: elderly; No distress Eyes: PERRLA, anicteric sclerae HENT: Atraumatic with normal appearance of ears and nose; OP clear Neck: Supple; no thyromegaly Lymphatic: No cervical, supraclavicular, or axillary adenopathy Respiratory: anteriorly CTAB, normal respiratory effort CV: Normal rate, regular rhythm, no murmurs, 2+ pitting edema in BLE ; R>L 
GI: Colostomy noted with liquid brown stool noted; Soft, nontender, nondistended, no masses, no hepatomegaly, no splenomegaly : nephrostomy tube noted MS:Digits without clubbing or cyanosis. Skin: No rashes, ecchymoses, or petechiae. Normal temperature, turgor, and texture. Neuro/Psych: Alert, oriented, appropriate affect, normal judgment/insight. R leg numbness to below knee to foot. Hyperesthesia with sharp object to R foot, but numbness otherwise. 1/5 strength with R foot dorsiflexion, plantarflexion Results:  
 
Lab Results Component Value Date/Time WBC 11.6 (H) 04/09/2019 06:00 AM  
 HGB 8.2 (L) 04/09/2019 06:00 AM  
 HCT 27.6 (L) 04/09/2019 06:00 AM  
 PLATELET 653 19/74/1848 06:00 AM  
 MCV 88.5 04/09/2019 06:00 AM  
 ABS. NEUTROPHILS 12.4 (H) 04/06/2019 01:01 AM  
 
Lab Results Component Value Date/Time Sodium 144 04/09/2019 06:00 AM  
 Potassium 3.4 (L) 04/09/2019 06:00 AM  
 Chloride 111 (H) 04/09/2019 06:00 AM  
 CO2 26 04/09/2019 06:00 AM  
 Glucose 117 (H) 04/09/2019 06:00 AM  
 BUN 13 04/09/2019 06:00 AM  
 Creatinine 1.09 04/09/2019 06:00 AM  
 GFR est AA >60 04/09/2019 06:00 AM  
 GFR est non-AA >60 04/09/2019 06:00 AM  
 Calcium 8.2 (L) 04/09/2019 06:00 AM  
 Glucose (POC) 154 (H) 04/15/2019 06:38 AM  
 
Lab Results Component Value Date/Time Bilirubin, total 0.4 04/06/2019 01:01 AM  
 ALT (SGPT) 91 (H) 04/06/2019 01:01 AM  
 AST (SGOT) 345 (H) 04/06/2019 01:01 AM  
 Alk. phosphatase 64 04/06/2019 01:01 AM  
 Protein, total 5.4 (L) 04/06/2019 01:01 AM  
 Albumin 2.1 (L) 04/06/2019 01:01 AM  
 Globulin 3.3 04/06/2019 01:01 AM  
 
Lab Results Component Value Date/Time Reticulocyte count 0.8 09/05/2018 12:54 PM  
 Iron % saturation 15 (L) 09/05/2018 12:54 PM  
 TIBC 172 (L) 09/05/2018 12:54 PM  
 Ferritin 213 09/05/2018 12:54 PM  
 Vitamin B12 726 09/05/2018 12:54 PM  
 Folate 9.5 09/05/2018 12:54 PM  
 Lipase 69 (L) 04/05/2019 03:00 AM  
 
Lab Results Component Value Date/Time INR 1.1 04/05/2019 03:00 AM  
 aPTT 20.6 (L) 04/05/2019 03:00 AM  
 
Lab Results Component Value Date/Time CEA 1.1 09/05/2018 12:54 PM  
 Prostate Specific Ag 4.4 (H) 03/07/2019 09:57 AM  
 
4/5/2019 XR CHEST IMPRESSION: 
No acute process. 4/5/2019 CT HEAD WO CONT IMPRESSION No acute process. 4/5/2019 CT CHEST ABD PELV  WO CONT 1. No significant change in large pelvic soft tissue mass surrounding the 
prostate, inseparable from the rectum, as well as the posterior right bladder 
wall. Bilateral hydronephrosis left greater than right similar to prior study. Unchanged position of right nephrostomy tube. 2. No acute process in the chest. 
Outside imaging performed January 15, 2019 at [facility] or provided for 
comparison. Addendum: Comparison is performed. In the interval from January 15, 2019 to April 1, 2019 there is been additional 
interval progression enlarged distal sigmoid colon/rectal mass. Enlarged nodular 
pelvic masses. Slightly progressed left-sided hydroureteronephrosis as well. 4/5 DUPLEX LE doppler Bilateral lower extremity venous duplex negative for deep venous thrombosis or thrombophlebitis int he veins visualized 4/09/2019 MRI LUMB SPINE W WO CONT IMPRESSION: 
  
1. Fluid collection in the left paraspinal musculature from L3 to L5 most likely 
related to infection. A necrotic prostate cancer metastasis would be unusual to 
the soft tissues. 2. Mild spondylosis in the lower lumbar spine. 4/09/2019 MRI PELV W WO CONT IMPRESSION:  
  
1. No mass effect on the lumbosacral plexus, sciatic nerves, or femoral nerves. 2. Large rectal mass has increased since September, unchanged since 4 days ago. Close approximation to the lumbosacral plexus without direct involvement. 3. New peripherally enhancing collections in the musculature involve the right 
gluteus alannah, right gluteus medius, right obturator externus, right rectus 
femoris, and left gluteus alannah muscles. Differential diagnosis is multifocal 
myositis or multifocal intramuscular abscesses more likely than new necrotic 
tumor metastasis. Assessment and Recommendations:  
Morgan Perkins is a 74 y.o. male with h/o prostate cancer admitted with unexplained syncope. 1. Metastatic prostate cancer, castration resistant: Was localized at diagnosis, treated with brachytherapy in 12/2015. Now with metastatic disease which likely started in seminal vesicles and progressed with local extension into sigmoid colon. The pathology is more consistent with prostate adenocarcinoma than colon cancer and no mass seen on first 2 colonoscopies. CEA normal. Chest CT negative. Given large sigmoid mass at risk of causing obstruction, patient underwent diverting loop colostomy on 9/7/18. . Patient has a bulky sigmoid mass causing hydronephrosis requiring utereral stent and colon obstruction, added Zytiga to the Lupron to help obtain better and quicker response. Also based on the STAMPEDE trial, adding Zytiga to treatment in the first line setting improves overall survival (3yr OS 83% vs 76%). In the future, patient may be able to undergo ureteral stent placement and colostomy reversal. Recent CT 4/2019 shows evidence of disease progression on Lupron, Zytiga, Prednisone along with recent increase in PSA 4.4 on 3/7/19. While metastatic prostate cancer has several different treatment options, this patient's disease is atypical, quite aggressive, poorly differentiated. He and his family have been adamant about treatment, but will readdress with most current evidence of disease progression. -- Have discussed with patient in detail that his current treatment with Candace Edwardsda is not working. Other treatment options include switching to alternate pill such as Enzalutamide, which is unlikely to work. Another option is Docetaxel chemotherapy. Patient's performance status is likely too poor to consider this and would pose a high risk of infection. Home hospice is another option if the patient wishes to focus on comfort and quality of life rather than treatment. -- Family discussion detailed below --Additional family meeting with palliative team; plan is to go to SNF and then home with hospice with family and caregivers to be there for support. 2. JAGDEEP / Rhabdomyolysis: CT scan revealing bilateral hydronephrosis (likely due to disease progression). JAGDEEP is 2/2 to decreased oral intake and diarrhea. CK up to 10,665 --> 13,471 --> 5911--> 1,505  Nephrology and Urology have evaluated pt. JAGDEEP and Rhabdo are improving. 
-- Receiving IVFs 3. Falls/ syncope: Unclear etiology and 2nd occurrence. 4/5 Echo EF 56-60% Now with rt leg weakness 4. UTI: Urine culture growing Klebsiella and Pseudomonas, similar to last urine cs. Afebrile  Zosyn 5. Right hydronephrosis: 2/2 extrinsic compression of the R distal ureter from the prostate vs sigmoid mass. IR placed percutaneous nephrostomy tube on 9/7/18. This was replaced during hospital admission in 2/2019. Follows with Dr. Sherron Cohen as outpatient and was seen by Urology here. 6. Normocytic anemia: Likely a combination of anemia of chronic disease due to underlying malignancy. Previously no evidence of iron deficiency. Normal VitB12, folate. 7. BLE edema: Followed by  lymphedema clinic as outpatient; compression garment removed due to pain. 8. RLE [place]/weakness: Pt unable to to dorsi/ plantar-flex rt foot; with decreased in sensation from below the knee area 
-- MRI of L-spine and pelvis reveals multifocal intramuscular abscesses --consulted neurology for eval: recommend MRI of brain 9. Gluteal Multifocal intramuscular abscesses Noted on MRI of pelvis 4/9 Concern for likely source of fevers Discussed with hospitalist: abx being changed to zosyn 10. Goals of care/ dispo -- Pt made DNR and DDNR completed --Family would like to pursue SNF placement with plan to bring home with support of hospice once he has completed rehab or can no longer can be at the facility. Discussed with CM Plan was reviewed with Dr Kailash Burns Signed By: Xochilt So NP

## 2019-04-15 NOTE — PROGRESS NOTES
Please complete MRI History and Safety Screening Form for this patient Using [employer] only under Orders Requiring a Screening Form: 
Example: 
Orders Requiring a Screening Form Procedure                    Order Status                      Form Status MRI EXAM                   Active                                In Progress Click on blue hyperlink as shown above to launch MRI screening form Answer all questions completely including Weight, Surgery, and Implant History. Document MUST be \"eSigned\" using a \"Signature Pad\" by the person completing form.  (patient and RN or MD completing form with the patient) Patient cannot be scanned until this form is completed and reviewed in MRI to ensure patient is SAFE and eligible for MRI. Call MRI when this has been successfully completed at [phone]. This must be done under [employer]. Do not use the Nursing Flowsheet.

## 2019-04-15 NOTE — PROGRESS NOTES
Bedside and Verbal shift change report given to [address] [place] [address]  (oncoming nurse) by Claudia Mckenzie RN (offgoing nurse). Report included the following information SBAR, Kardex, Intake/Output, MAR and [employer].

## 2019-04-15 NOTE — CONSULTS
[facility]: [id number] [address] Neurology  [facility]  [phone]        Name:   Ara Carrillo   Medical record #: [id number]  Admission Date: 4/5/2019   Who Consulted: Dr. Malik Goss  Reason for Consult:  LE Weakness    HISTORY OF PRESENT ILLNESS:   This is a 74 y.o. male with  has a past medical history of Diabetes mellitus ([address] Prostate cancer ([facility] [address] and Sleep apnea. who is admitted for JAGDEEP, rhabdo and syncope. .  The Neurology Service is asked to evaluate for lower ext weakness. After admission, Mr. Michelle Hoskins was successfully treated with IVF. While in the hospital he was noted to have increased RLE weakness and recurrent fevers. MRI showed several gluteal abscesses. We are consulted to see if there is another reason for the weakness and if there is anything further than can be done to alleviate the symptoms. He reports that his right leg suddenly became weak three weeks ago. Clinical Data:  Imaging review:   MRI:  Fluid collection in the left paraspinal musculature from L3 to L5 most likely related to infection. A necrotic prostate cancer metastasis would be unusual to the soft tissues. Assessment/Plan:   1. Right lower ext weakness :    · BP Goal: Less than 160  · Neurochecks:  Every 4 hours   · Last vascular duplex on BLE on 4/5/2019  · MRI brain to rule out stroke  · Outpt EMG  ·     2. Mobility:   · Has not been OOB. · PT/OT to eval for rehab    3. Diet:    · Does not needs SLP    4. VTE prophylaxis  · Per primary team       Thank you for allowing the Neurology Service the pleasure of participating in the care of your patient. This patient will be discussed with my collaborating care team physician Dr. Chaim Block and he may have further recommendations regarding this patient's care. Attending Attestation:     I have reviewed the documentation provided by the nurse practitioner, Mrs. Art Wilcox, and we have discussed her findings and the clinical impression. I have formulated with her the proposed management plans for this patient. Additionally,  I have personally evaluated the patient to verify the history and to confirm physical findings. Below are my additional comments:  Pt relates he was active, shopping, ambulating, etc PTA  Had acute right leg weakness he said and abruptly could not move leg  Has numbness as well  Says no weakness UE weakness  Hx reviewed in relation to metastatic prostate CA and abcesses etc    He is pleasant  Awake, alert and oriented  Normal speech and language  Intact CN 2-12  No nystagmus  No pronation or drift  Resists fully in the uppers bilaterally  Left LE full throughout  Right LE full to direct except 0/5 AT and gastroc  Globally hyporeflexic  No ataxia    Lumbar film reviewed and no sig root issue seen    Will get MRI brain given his hx of acute change in right LE weakness  Increase PT efforts  If MRI brain unrevealing could get EMG as OP  Recheck for DVT given in bed x 10 days  Patricia Kapoor MD                         Review of Systems: 10 point ROS was performed. Pertinent positives listed in HPI. Negative ROS is as follows. Pt denies: angina, palpitations,  vision loss, slurred speech, aphasia, confusion, fever, chills, falls, headache, diplopia, back pain, neck pain, prior episodes of vertigo, hallucinations, new medications or dosage changes. PHYSICAL EXAM:     Visit Vitals  /69 (BP 1 Location: Right arm, BP Patient Position: At rest)   Pulse 90   Temp 97.8 °F (36.6 °C)   Resp 18   Ht 5' 9\" (1.753 m)   Wt 104.6 kg (230 lb 9.6 oz)   SpO2 96%   BMI 34.05 kg/m²      O2 Device: Room air    Temp (24hrs), Av.1 °F (36.7 °C), Min:97.8 °F (36.6 °C), Max:98.6 °F (37 °C)    No intake/output data recorded.  1901 - 04/15 0700  In: -   Out: 5900 [Urine:5425]     General:  Alert, cooperative, no acute distress. Lungs:   Clear to auscultation bilaterally. No crackles/wheezes.    Heart:  Abdominal: Regular rate and rhythm, No murmur, click, rub or gallop. Soft and nondistended   Skin: Skin color, texture, turgor normal.      NEUROLOGICAL EXAM:    Appearance:  Well developed, well nourished,  and is n no acute distress. Mental Status: Oriented to time, place and person. Fully attentive. No aphasia. Full fund of knowledge. Normal recent and remote memory. Cranial Nerves:   Intact visual fields. PERRL, EOM's full, no nystagmus, no ptosis. Facial sensation is normal. Facial movement is symmetric. Palate is midline. Normal sternocleidomastoid strength. Tongue is midline. Reflexes:   Deep tendon reflexes 2+/4 and symmetrical.   Sensory:   Normal to temperature and vibration. Gait:  Not tested. Tremor:   No tremor noted. Cerebellar:  No cerebellar signs present. Neurovascular:  Normal heart sounds and regular rhythm. No carotid bruits. Motor: No pronator drift of either outstretched arm.          Deltoid Biceps Triceps Wrist Extension Finger Abduction   L 5 5 5 5 5   R 5 5 5 5 5      Hip Flexion Hip Extension Knee Flexion Knee Extension Ankle Dorsiflexion Ankle Plantarflexion   L 5 5 5 5 5 5   R 5 5 5 5 2 2        Reflexes:     Biceps Triceps Plantar Patellar Achilles   L 0 0 0 0 0   R 0 0 0 0 0     Past Medical History:   Diagnosis Date    Diabetes mellitus (HCC)     Prostate cancer ([facility] [address]     Sleep apnea      Past Surgical History:   Procedure Laterality Date    FLEXIBLE SIGMOIDOSCOPY N/A 9/6/2018    SIGMOIDOSCOPY FLEXIBLE performed by Anupama Guerrero MD at [facility] 56 HX HIP REPLACEMENT Left     HX HIP REPLACEMENT Right     HX OTHER SURGICAL      bladder stent    IR CHANGE NEPHROSTOMY PYELOS TUBE RT  2/1/2019     Family History   Problem Relation Age of Onset    Cancer Mother         colon    Hypertension Mother     Heart Disease Mother     Cancer Father     Diabetes Sister     Cancer Brother     Diabetes Sister      Social History     Socioeconomic History    Marital status: SINGLE     Spouse name: Not on file    Number of children: Not on file    Years of education: Not on file    Highest education level: Not on file   Occupational History    Not on file   Social Needs    Financial resource strain: Not on file    Food insecurity:     Worry: Not on file     Inability: Not on file    Transportation needs:     Medical: Not on file     Non-medical: Not on file   Tobacco Use    Smoking status: Never Smoker    Smokeless tobacco: Never Used   Substance and Sexual Activity    Alcohol use: No    Drug use: No    Sexual activity: Never   Lifestyle    Physical activity:     Days per week: Not on file     Minutes per session: Not on file    Stress: Not on file   Relationships    Social connections:     Talks on phone: Not on file     Gets together: Not on file     Attends [employer] service: Not on file     Active member of club or organization: Not on file     Attends meetings of clubs or organizations: Not on file     Relationship status: Not on file    Intimate partner violence:     Fear of current or ex partner: Not on file     Emotionally abused: Not on file     Physically abused: Not on file     Forced sexual activity: Not on file   Other Topics Concern     Service Not Asked    Blood Transfusions Not Asked    Caffeine Concern Not Asked    Occupational Exposure Not Asked   Carletha Cordia Hazards Not Asked    Sleep Concern Not Asked    Stress Concern Not Asked    Weight Concern Not Asked    Special Diet Not Asked    Back Care Not Asked    Exercise Not Asked    Bike Helmet Not Asked   [address] Not Asked    Self-Exams Not Asked   Social History Narrative    Not on file       Allergies: Allergies   Allergen Reactions    Ciprofloxacin Rash       Outpatient Meds  No current facility-administered medications on file prior to encounter.       Current Outpatient Medications on File Prior to Encounter   Medication Sig Dispense Refill    leuprolide acetate (LEUPROLIDE, 6 MONTH, SC) by SubCUTAneous route. Due in 5/2019 per OP oncology notes      traZODone (DESYREL) 50 mg tablet Take 50 mg by mouth nightly as needed for Sleep.  abiraterone (ZYTIGA) 250 mg tab Take 4 Tabs by mouth daily. 120 Tab 3    predniSONE (DELTASONE) 5 mg tablet Take 1 Tab by mouth daily. 90 Tab 3    aspirin delayed-release 81 mg tablet Take 81 mg by mouth daily.  potassium chloride (K-DUR, KLOR-CON) 20 mEq tablet Take 20 mEq by mouth two (2) times a day.  tamsulosin (FLOMAX) 0.4 mg capsule Take 0.4 mg by mouth daily.          Inpatient Meds    Current Facility-Administered Medications:     peppermint spirit solution, , Other, PRN, Chico Broussard MD    piperacillin-tazobactam (ZOSYN) 3.375 g in 0.9% sodium chloride (MBP/ADV) 100 mL, 3.375 g, IntraVENous, Q8H, Justine Broussard MD, Last Rate: 25 mL/hr at 04/15/19 0423, 3.375 g at 04/15/19 0423    ibuprofen (MOTRIN) tablet 400 mg, 400 mg, Oral, Q6H PRN, Camacho Flaherty MD, 400 mg at 04/11/19 1845    acetaminophen (TYLENOL) tablet 650 mg, 650 mg, Oral, Q6H PRN, Lucy Villagran MD, 650 mg at 04/09/19 2257    dextrose 5% - 0.45% NaCl with KCl 40 mEq/L infusion, , IntraVENous, CONTINUOUS, Camacho Flaherty MD, Last Rate: 100 mL/hr at 04/14/19 1209    sodium chloride (NS) flush 5-40 mL, 5-40 mL, IntraVENous, Q8H, Camacho Flaherty MD, 10 mL at 04/15/19 0424    sodium chloride (NS) flush 5-40 mL, 5-40 mL, IntraVENous, PRN, Camacho Flaherty MD    naloxone [facility]) injection 0.4 mg, 0.4 mg, IntraVENous, PRN, Camacho Flaherty MD    insulin lispro (HUMALOG) injection, , SubCUTAneous, AC&HS, Camacho Flaherty MD, 2 Units at 04/15/19 9219    glucose chewable tablet 16 g, 4 Tab, Oral, PRN, Camacho Flaherty MD    dextrose (D50W) injection syrg 12.5-25 g, 12.5-25 g, IntraVENous, PRN, Camacho Flaherty MD    glucagon [facility]) injection 1 mg, 1 mg, IntraMUSCular, PRN, Camacho Flaherty MD    tamsulosin (FLOMAX) capsule 0.4 mg, 0.4 mg, Oral, DAILY, Sandor Espinosa MD, 0.4 mg at 04/15/19 8016    heparin (porcine) injection 5,000 Units, 5,000 Units, SubCUTAneous, Q8H, Sandor Espinosa MD, 5,000 Units at 04/15/19 0636    HYDROcodone-acetaminophen ([facility]) 5-325 mg per tablet 1 Tab, 1 Tab, Oral, Q4H PRN, Sandor Espinosa MD, 1 Tab at 04/12/19 1824    Recent Results (from the past 24 hour(s))   GLUCOSE, POC    Collection Time: 04/14/19 11:59 AM   Result Value Ref Range    Glucose (POC) 209 (H) 65 - 100 mg/dL    Performed by Mariza Pierson (PCT)    GLUCOSE, POC    Collection Time: 04/14/19  4:04 PM   Result Value Ref Range    Glucose (POC) 148 (H) 65 - 100 mg/dL    Performed by Mariza Pierson (PCT)    GLUCOSE, POC    Collection Time: 04/14/19  9:27 PM   Result Value Ref Range    Glucose (POC) 149 (H) 65 - 100 mg/dL    Performed by Kris Bradford ([facility])    GLUCOSE, POC    Collection Time: 04/15/19  6:38 AM   Result Value Ref Range    Glucose (POC) 154 (H) 65 - 100 mg/dL    Performed by Kris Bradford ([facility])        Care Plan discussed with:  Patient x   Family    RN    Care Manager    Consultant/Specialist:       Macie Dooley, JOSE ANGELP-BC

## 2019-04-16 VITALS
WEIGHT: 230.6 LBS | RESPIRATION RATE: 18 BRPM | HEIGHT: 69 IN | BODY MASS INDEX: 34.16 KG/M2 | TEMPERATURE: 98.6 F | SYSTOLIC BLOOD PRESSURE: 153 MMHG | HEART RATE: 92 BPM | DIASTOLIC BLOOD PRESSURE: 74 MMHG | OXYGEN SATURATION: 99 %

## 2019-04-16 LAB
GLUCOSE BLD STRIP.AUTO-MCNC: 136 MG/DL (ref 65–100)
GLUCOSE BLD STRIP.AUTO-MCNC: 153 MG/DL (ref 65–100)
GLUCOSE BLD STRIP.AUTO-MCNC: 155 MG/DL (ref 65–100)
SERVICE CMNT-IMP: ABNORMAL

## 2019-04-16 PROCEDURE — 82962 GLUCOSE BLOOD TEST: CPT

## 2019-04-16 PROCEDURE — 74011250636 HC RX REV CODE- 250/636: Performed by: INTERNAL MEDICINE

## 2019-04-16 PROCEDURE — 74011636637 HC RX REV CODE- 636/637: Performed by: INTERNAL MEDICINE

## 2019-04-16 PROCEDURE — 74011000258 HC RX REV CODE- 258: Performed by: INTERNAL MEDICINE

## 2019-04-16 PROCEDURE — 74011250637 HC RX REV CODE- 250/637: Performed by: INTERNAL MEDICINE

## 2019-04-16 RX ADMIN — PIPERACILLIN SODIUM,TAZOBACTAM SODIUM 3.38 G: 3; .375 INJECTION, POWDER, FOR SOLUTION INTRAVENOUS at 04:16

## 2019-04-16 RX ADMIN — PIPERACILLIN SODIUM,TAZOBACTAM SODIUM 3.38 G: 3; .375 INJECTION, POWDER, FOR SOLUTION INTRAVENOUS at 11:35

## 2019-04-16 RX ADMIN — Medication 10 ML: at 13:21

## 2019-04-16 RX ADMIN — HEPARIN SODIUM 5000 UNITS: 5000 INJECTION INTRAVENOUS; SUBCUTANEOUS at 08:48

## 2019-04-16 RX ADMIN — INSULIN LISPRO 2 UNITS: 100 INJECTION, SOLUTION INTRAVENOUS; SUBCUTANEOUS at 11:44

## 2019-04-16 RX ADMIN — HEPARIN SODIUM 5000 UNITS: 5000 INJECTION INTRAVENOUS; SUBCUTANEOUS at 16:35

## 2019-04-16 RX ADMIN — TAMSULOSIN HYDROCHLORIDE 0.4 MG: 0.4 CAPSULE ORAL at 08:48

## 2019-04-16 RX ADMIN — DEXTROSE MONOHYDRATE, SODIUM CHLORIDE, AND POTASSIUM CHLORIDE: 50; 4.5; 2.98 INJECTION, SOLUTION INTRAVENOUS at 08:48

## 2019-04-16 RX ADMIN — INSULIN LISPRO 2 UNITS: 100 INJECTION, SOLUTION INTRAVENOUS; SUBCUTANEOUS at 17:31

## 2019-04-16 NOTE — PROGRESS NOTES
[facility] [place] at [facility] 88 [address] [facility], [address] [facility] [address] [facility] W: [phone]  F: [phone] Reason for Visit:  
Maggi Shoemaker is a 74 y.o. male who is seen in consultation at the request of Dr. Rito Méndez 
 for evaluation of prostate cancer with mets. Hematology / Oncology Treatment History:  
 
Diagnosis: Prosate cancer, diagnosed 2015.  
  
Stage: Now metastatic 
  
Pathology: 9/6/18 Sigmoid colon ulcer, biopsy:  
Adenocarcinoma, strongly favor prostate primary (see Comment). Stains weakly for PSAP. 
  
Prior Treatment: Brachytherapy and EBRT in 12/2015.  
  
Current Treatment: Chantal Grow started on 1/28/19. Lupron started earlier. Oncologic History: 
Patient was admitted to the hospital in 9/2018 for persistent diarrhea, evidence of a sigmoid mass. He had been suffering with persistent diarrhea, 50-70-lb weight loss for approx 4-5 months. He underwent 2 recent colonoscopies (one in July and one in Aug 2018) by Dr. Ted Pitt at [address] [place] [address]. He states the first colonoscopy was not clear due to poor prep. The second colonoscopy reportedly showed abnormal rectal and sigmoid mucosa with a possible mass. This was biopsied and was benign. The patient then underwent a CT scan which was notable for R-sided hydronephrosis from extrinsic compression of the right ureter. The patient's urologist, Dr. Neville Juares, attempted to pass a ureteral stent but was unsuccessful. The patient was then admitted to [facility] for weakness, persistent diarrhea and lower abdominal pain. Abd/pelvis CT on 9/4/18 showed a distal sigmoid mass with possible invasion of the posterior bladder wall as well as R hydronephrosis. He was evaluated by Dr. Mary Lou Baires in general surgery and underwent diverting loop colostomy given the symptoms caused by the sigmoid mass. He underwent colonoscopy by Dr. Erlinda Roberts in GI with finding of a large sigmoid mass, which was biopsied.  Pathology revealed an adenocarcinoma, but morphology was more consistent with prostate cancer than colorectal cancer. He was seen by Dr. Jerry Aceves in Urology during hospital stay who recommended percutaneous nephrostomy tube, placed by IR on 9/7. Given the biopsy findings, Dr. Jerry Aceves recommended further evaluation with prostate/pelvic MRI. Nuclear med bone scan negative for any suspicious bony lesions. After hospital discharge, he fell and was admitted to /[facility]. He stayed there for 3 days and was discharged to rehab, where he stayed for 4 weeks. His pathology was reviewed by an outside facility with opinion that this was a poorly differentiated prostate cancer. Pt was started on Zytiga + Prednisone in 1/2019. History of Present Illness: Mr. Claudia Akbar is a 73 y/o male with h/o prostate cancer who is admitted to the hospital after passing out and woke up after spending approximately 1 day on the ground unconscious. This is his second hospitalization for similar presentation. He was found with diarrhea which had leaked out of his colostomy bag, soiling his clothes. Patient has a complicated oncologic history with a presumed metastatic prostate cancer causing direct extension into colon and surrounding structures, causing obstruction of ureters. Pt required diverting colostomy and R nephrostomy tube. PSA has been low and not markedly elevated. After starting Lupron, Zytiga + Prednisone for prostate cancer, PSA declined slightly, but on most recent check was increased to 3-4 range. ED labs notable for K 2.1, Cr 1.75, CK 10,665. Patient was started on IVF. He states he cannot recall how he ended up on the floor. He simply remembers sitting and watching basketball. Denies any fevers or chills  at home. Denies SOB. Appetite has been poor for several months. Lives by himself. No family at bedside. Interval History:  
 
Cleaning up; leg remains the same.  Understands that there is no cause that could be found on brain MRI. Awaiting placement. Continues to tolerate diet. Pain controlled. No family at bedside Current Facility-Administered Medications Medication Dose Route Frequency  peppermint spirit solution   Other PRN  piperacillin-tazobactam (ZOSYN) 3.375 g in 0.9% sodium chloride (MBP/ADV) 100 mL  3.375 g IntraVENous Q8H  
 ibuprofen (MOTRIN) tablet 400 mg  400 mg Oral Q6H PRN  
 acetaminophen (TYLENOL) tablet 650 mg  650 mg Oral Q6H PRN  
 dextrose 5% - 0.45% NaCl with KCl 40 mEq/L infusion   IntraVENous CONTINUOUS  
 sodium chloride (NS) flush 5-40 mL  5-40 mL IntraVENous Q8H  
 sodium chloride (NS) flush 5-40 mL  5-40 mL IntraVENous PRN  
 naloxone (NARCAN) injection 0.4 mg  0.4 mg IntraVENous PRN  
 insulin lispro (HUMALOG) injection   SubCUTAneous AC&HS  
 glucose chewable tablet 16 g  4 Tab Oral PRN  
 dextrose (D50W) injection syrg 12.5-25 g  12.5-25 g IntraVENous PRN  
 glucagon (GLUCAGEN) injection 1 mg  1 mg IntraMUSCular PRN  
 tamsulosin (FLOMAX) capsule 0.4 mg  0.4 mg Oral DAILY  heparin (porcine) injection 5,000 Units  5,000 Units SubCUTAneous Q8H  
 HYDROcodone-acetaminophen (NORCO) 5-325 mg per tablet 1 Tab  1 Tab Oral Q4H PRN Allergies Allergen Reactions  Ciprofloxacin Rash Review of Systems: A complete review of systems was obtained, negative except as described above. Physical Exam:  
 
Visit Vitals /72 (BP 1 Location: Left arm, BP Patient Position: At rest) Pulse 90 Temp 98.5 °F (36.9 °C) Resp 16 Ht 5' 9\" (1.753 m) Wt 104.6 kg (230 lb 9.6 oz) SpO2 97% BMI 34.05 kg/m² ECOG PS: 2-3 General: elderly; No distress Eyes: PERRLA, anicteric sclerae HENT: Atraumatic with normal appearance of ears and nose; OP clear Neck: Supple; no thyromegaly Lymphatic: No cervical, supraclavicular, or axillary adenopathy Respiratory: anteriorly CTAB, normal respiratory effort CV: Normal rate, regular rhythm, no murmurs, 2+ pitting edema in BLE ; R>L 
GI: Colostomy noted with liquid brown stool noted; Soft, nontender, nondistended, no masses, no hepatomegaly, no splenomegaly : nephrostomy tube noted MS:Digits without clubbing or cyanosis. Skin: No rashes, ecchymoses, or petechiae. Normal temperature, turgor, and texture. Neuro/Psych: Alert, oriented, appropriate affect, normal judgment/insight. R leg numbness to below knee to foot. Hyperesthesia with sharp object to R foot, but numbness otherwise. 1/5 strength with R foot dorsiflexion, plantarflexion Results:  
 
Lab Results Component Value Date/Time WBC 11.6 (H) 04/09/2019 06:00 AM  
 HGB 8.2 (L) 04/09/2019 06:00 AM  
 HCT 27.6 (L) 04/09/2019 06:00 AM  
 PLATELET 389 23/32/4063 06:00 AM  
 MCV 88.5 04/09/2019 06:00 AM  
 ABS. NEUTROPHILS 12.4 (H) 04/06/2019 01:01 AM  
 
Lab Results Component Value Date/Time Sodium 144 04/09/2019 06:00 AM  
 Potassium 3.4 (L) 04/09/2019 06:00 AM  
 Chloride 111 (H) 04/09/2019 06:00 AM  
 CO2 26 04/09/2019 06:00 AM  
 Glucose 117 (H) 04/09/2019 06:00 AM  
 BUN 13 04/09/2019 06:00 AM  
 Creatinine 1.09 04/09/2019 06:00 AM  
 GFR est AA >60 04/09/2019 06:00 AM  
 GFR est non-AA >60 04/09/2019 06:00 AM  
 Calcium 8.2 (L) 04/09/2019 06:00 AM  
 Glucose (POC) 136 (H) 04/16/2019 07:13 AM  
 
Lab Results Component Value Date/Time Bilirubin, total 0.4 04/06/2019 01:01 AM  
 ALT (SGPT) 91 (H) 04/06/2019 01:01 AM  
 AST (SGOT) 345 (H) 04/06/2019 01:01 AM  
 Alk. phosphatase 64 04/06/2019 01:01 AM  
 Protein, total 5.4 (L) 04/06/2019 01:01 AM  
 Albumin 2.1 (L) 04/06/2019 01:01 AM  
 Globulin 3.3 04/06/2019 01:01 AM  
 
Lab Results Component Value Date/Time  Reticulocyte count 0.8 09/05/2018 12:54 PM  
 Iron % saturation 15 (L) 09/05/2018 12:54 PM  
 TIBC 172 (L) 09/05/2018 12:54 PM  
 Ferritin 213 09/05/2018 12:54 PM  
 Vitamin B12 726 09/05/2018 12:54 PM  
 Folate 9.5 09/05/2018 12:54 PM  
 Lipase 69 (L) 04/05/2019 03:00 AM  
 
Lab Results Component Value Date/Time INR 1.1 04/05/2019 03:00 AM  
 aPTT 20.6 (L) 04/05/2019 03:00 AM  
 
Lab Results Component Value Date/Time CEA 1.1 09/05/2018 12:54 PM  
 Prostate Specific Ag 4.4 (H) 03/07/2019 09:57 AM  
 
4/5/2019 XR CHEST IMPRESSION: 
No acute process. 4/5/2019 CT HEAD WO CONT IMPRESSION No acute process. 4/5/2019 CT CHEST ABD PELV  WO CONT 1. No significant change in large pelvic soft tissue mass surrounding the 
prostate, inseparable from the rectum, as well as the posterior right bladder 
wall. Bilateral hydronephrosis left greater than right similar to prior study. Unchanged position of right nephrostomy tube. 2. No acute process in the chest. 
Outside imaging performed January 15, 2019 at [facility] or provided for 
comparison. Addendum: Comparison is performed. In the interval from January 15, 2019 to April 1, 2019 there is been additional 
interval progression enlarged distal sigmoid colon/rectal mass. Enlarged nodular 
pelvic masses. Slightly progressed left-sided hydroureteronephrosis as well. 4/5 DUPLEX LE doppler Bilateral lower extremity venous duplex negative for deep venous thrombosis or thrombophlebitis int he veins visualized 4/09/2019 MRI LUMB SPINE W WO CONT IMPRESSION: 
  
1. Fluid collection in the left paraspinal musculature from L3 to L5 most likely 
related to infection. A necrotic prostate cancer metastasis would be unusual to 
the soft tissues. 2. Mild spondylosis in the lower lumbar spine. 4/09/2019 MRI PELV W WO CONT IMPRESSION:  
  
1. No mass effect on the lumbosacral plexus, sciatic nerves, or femoral nerves. 2. Large rectal mass has increased since September, unchanged since 4 days ago. Close approximation to the lumbosacral plexus without direct involvement. 3. New peripherally enhancing collections in the musculature involve the right gluteus alnanah, right gluteus medius, right obturator externus, right rectus 
femoris, and left gluteus alannah muscles. Differential diagnosis is multifocal 
myositis or multifocal intramuscular abscesses more likely than new necrotic 
tumor metastasis. 4/16/2019 Brain MRI IMPRESSION: Atrophy and nonspecific white matter disease. No acute findings or 
masses . Assessment and Recommendations:  
Betito Hawk is a 74 y.o. male with h/o prostate cancer admitted with unexplained syncope. 1. Metastatic prostate cancer, castration resistant: Was localized at diagnosis, treated with brachytherapy in 12/2015. Now with metastatic disease which likely started in seminal vesicles and progressed with local extension into sigmoid colon. The pathology is more consistent with prostate adenocarcinoma than colon cancer and no mass seen on first 2 colonoscopies. CEA normal. Chest CT negative. Given large sigmoid mass at risk of causing obstruction, patient underwent diverting loop colostomy on 9/7/18. . Patient has a bulky sigmoid mass causing hydronephrosis requiring utereral stent and colon obstruction, added Zytiga to the Lupron to help obtain better and quicker response. Also based on the STAMPEDE trial, adding Zytiga to treatment in the first line setting improves overall survival (3yr OS 83% vs 76%). In the future, patient may be able to undergo ureteral stent placement and colostomy reversal. Recent CT 4/2019 shows evidence of disease progression on Lupron, Zytiga, Prednisone along with recent increase in PSA 4.4 on 3/7/19. While metastatic prostate cancer has several different treatment options, this patient's disease is atypical, quite aggressive, poorly differentiated. He and his family have been adamant about treatment, but will readdress with most current evidence of disease progression.   
-- Have discussed with patient in detail that his current treatment with Raffy Tanner is not working. Other treatment options include switching to alternate pill such as Enzalutamide, which is unlikely to work. Another option is Docetaxel chemotherapy. Patient's performance status is likely too poor to consider this and would pose a high risk of infection. Home hospice is another option if the patient wishes to focus on comfort and quality of life rather than treatment. -- Family discussion detailed below --Additional family meeting with palliative team; plan is to go to SNF and then home with hospice with family and caregivers to be there for support. 2. JAGDEEP / Rhabdomyolysis: CT scan revealing bilateral hydronephrosis (likely due to disease progression). JAGDEEP is 2/2 to decreased oral intake and diarrhea. CK up to 10,665 --> 13,471 --> 5911--> 1,505  Nephrology and Urology have evaluated pt. JAGDEEP and Rhabdo are improving. 
-- Receiving IVFs 3. Falls/ syncope: Unclear etiology and 2nd occurrence. 4/5 Echo EF 56-60% Now with rt leg weakness: MRI brain negative 4. UTI: Urine culture growing Klebsiella and Pseudomonas, similar to last urine cs. Afebrile  Zosyn 5. Right hydronephrosis: 2/2 extrinsic compression of the R distal ureter from the prostate vs sigmoid mass. IR placed percutaneous nephrostomy tube on 9/7/18. This was replaced during hospital admission in 2/2019. Follows with Dr. La Parsons as outpatient and was seen by Urology here. 6. Normocytic anemia: Likely a combination of anemia of chronic disease due to underlying malignancy. Previously no evidence of iron deficiency. Normal VitB12, folate. 7. BLE edema: Followed by  lymphedema clinic as outpatient; compression garment removed due to pain. 8. RLE [place]/weakness: Pt unable to to dorsi/ plantar-flex rt foot; with decreased in sensation from below the knee area 
-- MRI of L-spine and pelvis reveals multifocal intramuscular abscesses --consulted neurology for eval:  MRI of brain negative for stroke or brain mets 
 
9. Gluteal Multifocal intramuscular abscesses Noted on MRI of pelvis 4/9 Concern for likely source of fevers Discussed with hospitalist: abx changed to zosyn; now afebrile 10. Goals of care/ dispo -- Pt made DNR and DDNR completed --Family would like to pursue SNF placement with plan to bring home with support of hospice once he has completed rehab or can no longer can be at the facility. Discussed with CM Plan was reviewed with Dr Filipe Blake Signed By: Constance Murray NP
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
## 2019-04-16 NOTE — PROGRESS NOTES
Patient is alert and oriented, discharge instructions also reviewed with patient who verbalizes understanding. Report given to receiving Nurse Rod Nevarez at [facility].
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
## 2019-04-16 NOTE — PROGRESS NOTES
Music Therapy Assessment [address] Mordecai Jeans [id number]    
1945  74 y.o.  male Patient Telephone Number: [phone] (home) Yazidi Affiliation: [place]  
Language: [place] Patient Active Problem List  
 Diagnosis Date Noted  Sleep apnea 04/05/2019  Diabetes mellitus ([facility] 04/05/2019  Hypokalemia 04/05/2019  Rhabdomyolysis 04/05/2019  Syncope 04/05/2019  JAGDEEP (acute kidney injury) ([facility] 04/05/2019  Pyuria 04/05/2019  Burn 04/05/2019  Debilitated patient 04/05/2019  Prostate cancer ([facility] 01/11/2019  Pelvic mass 09/04/2018  Severe obesity with body mass index (BMI) of 35.0 to 39.9 with serious comorbidity ([facility] 06/14/2018 Date: 4/16/2019            Total Time (in minutes): 15          SFM  MED SURG 2 Mental Status:   [x] Alert [  ] Sulema Osiel [  ]  Confused  [  ] Minimally responsive Communication Status: [  ] Impaired Speech [  ] Nonverbal -N/A Physical Status:   [  ] Oxygen in use  [  ] Hard of Hearing [  ] Vision Impaired [  ] Ambulatory  [  ] Ambulatory with assistance [  ] Non-ambulatory -N/A Music Preferences, Background: Pt had difficulty answering MT's question about these. 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 
  
Clinical Problem to be addressed: Decrease feelings of stress. Goal(s) met in session: N/A: Please see Session Observations below. Physical/Pain management (Scale of 1-10): Pre-session rating ___________    Post-session rating __________  
[  ] Increased relaxation   [  ] Regulated breathing patterns [  ] Decreased muscle tension   [  ] Minimized physical distress Emotional/Psychological: 
[  ] Increased self-expression   [  ] Decreased aggressive behavior [  ] Decreased sadness   [  ] Discussed healthy coping skills [  ] Improved mood    [  ] Decreased withdrawn behavior Social: 
[  ] Decreased feelings of isolation/loneliness [  ] Positive social interaction [  ] Provided support and/or comfort for family/friends Spiritual: 
[  ] Spiritual support    [  ] Expressed peace [  ] Expressed carine    [  ] Discussed beliefs Techniques Utilized (Check all that apply): N/A: Please see Session Observations below. [  ] Procedural support MT [  ] Music for relaxation [  ] Patient preferred music 
[  ] Lexi analysis  [  ] Song choice  [  ] Music for validation [  ] Entrainment  [  ] Progressive muscle relax. [  ] Guided visualization [  ] Candi Jones  [  ] Patient instrument playing [  ] Lien Abbasi writing [  ] Anival Outhouse along   [  ] Dexter Rene  [  ] Sensory stimulation [  ] Active Listening  [  ] Music for spiritual support [  ] Making of CDs as gifts Session Observations:  F/up visit; Patient (pt) was alert lying in bed. He remembered this music therapist and music therapy intern (MT team) from a music therapy session last week. When asked how he was feeling, pt said he was apprehensive about being discharged. He said he was nervous to go to a facility for rehab, but said he was willing to give this a try. MT team provided active listening and words of support, and then offered music. Pt declined this, saying he felt like he had too much he was anticipating with his upcoming discharge to add this to his day. Pt thanked MT team for the visit today. UZMA Salvador (Music Therapist-Board Certified) Spiritual Care Department Referral-based service

## 2019-04-16 NOTE — PROGRESS NOTES
[facility]: [facility] [address] [phone] Name:   Betito Hawk Medical record #: [id number] Admission Date: 4/5/2019 Who Consulted: Dr. Pardo Brochhoang Reason for Consult:    LE Weakness Subjective:  
Overnight events: 
 
None Objective: EEG: 
 
Assessment/Plan: 1. Right lower ext weakness :   
· BP Goal: Less than 160 · Neurochecks:  Every 4 hours · Last vascular duplex on BLE on 4/5/2019 · MRI brain negative for stroke · Outpt EMG  
  
2. Mobility:  
· Has not been OOB. · PT/OT to eval for rehab 
  
3. Diet:   
· Does not needs SLP 
  
4. VTE prophylaxis · Per primary team  
  
  
Thank you for allowing the Neurology Service the pleasure of participating in the care of your patient. This patient will be discussed with my collaborating care team physician Dr. Signa Spatz and he may have further recommendations regarding this patient's care. Attending Attestation:  
  
I have reviewed the documentation provided by the nurse practitioner, Mrs. Lorenzana Jose, and we have discussed her findings and the clinical impression. I have formulated with her the proposed management plans for this patient. Additionally,  I have personally evaluated the patient to verify the history and to confirm physical findings. Below are my additional comments: 
Pt relates he was active, shopping, ambulating, etc PTA Had acute right leg weakness he said and abruptly could not move leg Has numbness as well Says no weakness UE weakness Hx reviewed in relation to metastatic prostate CA and abcesses etc 
  
He is pleasant Awake, alert and oriented Normal speech and language Intact CN 2-12 No nystagmus No pronation or drift Resists fully in the uppers bilaterally Left LE full throughout Right LE full to direct except 0/5 AT and gastroc Globally hyporeflexic No ataxia 
  
Lumbar film reviewed and no sig root issue seen 
  
 Will get MRI brain given his hx of acute change in right LE weakness Increase PT efforts If MRI brain unrevealing could get EMG as OP Recheck for DVT given in bed x 10 days John Walsh MD 
  
   
 
 
He is pleasant Awake, alert and oriented Normal speech and language Intact CN 2-12 No nystagmus No pronation or drift Resists fully in the uppers bilaterally Left LE full throughout Right LE full to direct except 0/5 AT and gastroc Globally hyporeflexic No ataxia Current Facility-Administered Medications Medication Dose Route Frequency Provider Last Rate Last Dose  peppermint spirit solution   Other PRN Shanique Broussard MD      
 piperacillin-tazobactam (ZOSYN) 3.375 g in 0.9% sodium chloride (MBP/ADV) 100 mL  3.375 g IntraVENous Q8H Shanique Broussard MD 25 mL/hr at 04/16/19 0416 3.375 g at 04/16/19 0416  ibuprofen (MOTRIN) tablet 400 mg  400 mg Oral Q6H PRN Dixon Razo MD   400 mg at 04/11/19 1845  acetaminophen (TYLENOL) tablet 650 mg  650 mg Oral Q6H PRN Patricio Le MD   650 mg at 04/09/19 2257  dextrose 5% - 0.45% NaCl with KCl 40 mEq/L infusion   IntraVENous CONTINUOUS Dixon Razo MD   Stopped at 04/15/19 2217  sodium chloride (NS) flush 5-40 mL  5-40 mL IntraVENous Q8H Dixon Razo MD   10 mL at 04/15/19 0424  sodium chloride (NS) flush 5-40 mL  5-40 mL IntraVENous PRN Dixon Razo MD      
 naloxone [facility]) injection 0.4 mg  0.4 mg IntraVENous PRN Dixon Razo MD      
 insulin lispro (HUMALOG) injection   SubCUTAneous AC&HS Dixon Razo MD   Stopped at 04/15/19 2200  
 glucose chewable tablet 16 g  4 Tab Oral PRN Dixon Razo MD      
 dextrose (D50W) injection syrg 12.5-25 g  12.5-25 g IntraVENous PRN Dixon Razo MD      
 glucagon [facility]) injection 1 mg  1 mg IntraMUSCular PRN Dixon Razo MD      
 [facility]) capsule 0.4 mg  0.4 mg Oral DAILY Dixon Razo, MD   0.4 mg at 04/15/19 0763  heparin (porcine) injection 5,000 Units  5,000 Units SubCUTAneous Q8H Iris Cole MD   5,000 Units at 04/15/19 2303  
 HYDROcodone-acetaminophen (NORCO) 5-325 mg per tablet 1 Tab  1 Tab Oral Q4H PRN Iris Cole MD   1 Tab at [id number] Recent Results (from the past 24 hour(s)) GLUCOSE, POC Collection Time: 04/15/19  1:06 PM  
Result Value Ref Range Glucose (POC) 162 (H) 65 - 100 mg/dL Performed by Feli Chappell GLUCOSE, POC Collection Time: 04/15/19  4:23 PM  
Result Value Ref Range Glucose (POC) 140 (H) 65 - 100 mg/dL Performed by Terry Chappell (PCT) GLUCOSE, POC Collection Time: 04/15/19  9:47 PM  
Result Value Ref Range Glucose (POC) 186 (H) 65 - 100 mg/dL Performed by Ramirez Botello (PCT) GLUCOSE, POC Collection Time: 19  7:13 AM  
Result Value Ref Range Glucose (POC) 136 (H) 65 - 100 mg/dL Performed by Ramirez Botello (PCT) Visit Vitals /72 (BP 1 Location: Left arm, BP Patient Position: At rest) Pulse 90 Temp 98.5 °F (36.9 °C) Resp 16 Ht 5' 9\" (1.753 m) Wt 104.6 kg (230 lb 9.6 oz) SpO2 97% BMI 34.05 kg/m² O2 Device: Room air Temp (24hrs), Av.3 °F (36.8 °C), Min:97.5 °F (36.4 °C), Max:99 °F (37.2 °C) No intake/output data recorded.  1901 -  0700 In: -  
Out: Jazmin Middleton [[id number]] Care Plan discussed with: 
Patient x Family RN Care Manager Consultant/Specialist:    
 
 
Macie Benavidez, [facility]

## 2019-04-16 NOTE — WOUND CARE
Wound care follow up to reassess the right heel due to patient discomfort. Alert, no distress Assessment Right posterior heel- dark red and purple discoloration - less than 1 cm area, no edema or redness- present on admission. Unable to discern etiology, patient has frequent falls prior to admission. Heels off loaded with pillows and tolerated. On low air loss surface. Will follow Jun Rodney

## 2019-04-16 NOTE — PROGRESS NOTES
4/16/2019   CM ADDENDUM:  Family has declined [facility] as they were going to skill pt for one week before discharge home. [facility] and pt has been accepted. Second letter to Medicare was signed and placed onto pt's chart. AVS is available via [employer] and CM faxed a copy to SNF along with today's MAR, RX, and DMAS 95. A hard copy will accompany pt via [place] w/c [facility] which is set up at 5 p.m. 
RN, please call report to [facility] at [phone]. Melly 4/16/2019   CARE MANAGEMENT NOTE:  CM continues to seek SNF for short term rehab. Transition Plan of Care: 1. [facility] and [address] have denied pt 2/2 no beds available. 1. [facility] pt for admission contingent on confirmation from son that they will care for pt after a short rehab stay - \"pt is a poor rehab candidate. \" 2. Await return call from [facility] CM continues to follow for definitive discharge plan. Melly

## 2019-04-16 NOTE — PALLIATIVE CARE DISCHARGE
Goals of Care/Treatment Preferences The Palliative Medicine team was consulted as part of your/your loved one's care in the hospital. Our team is a supportive service; we strive to relieve suffering and improve quality of life. We reviewed advance care planning information, which includes the following: 
Patient's Healthcare Decision Maker is[de-identified] Named in scanned ACP document Confirm Advance Directive: Yes, on file Does the patient have other document types: Do Not Resuscitate Patient/Health Care Proxy Stated Goals: Other (comment)(exploring all options) Attempting rehab at facility and then possibly Hospice after that is complete We reviewed / discussed your code status as: DNR You have a Durable Do Not Resuscitate Order in place, which should travel with you. When you are in a facility, this form should be placed on your chart. Once you are home, it is recommended that the [facility] form be placed in a visible location such as on the refrigerator or bedroom door. Full Code means perform CPR in the event of cardiac arrest. 
    DNR means do NOT perform CPR in the event of cardiac arrest. 
    Partial Code means you have specific preferences, please discuss with your healthcare team. 
    Anselmo Nab means this issue was not addressed / resolved during your stay Medical Interventions: Full interventions Other Instructions:  
  
 
Because of the importance of this information, we are providing you with a printed copy to share with other healthcare providers after this hospitalization is complete.

## 2019-04-16 NOTE — PROGRESS NOTES
Hospital to [address] 74 y.o.   male [address]   Room: 523/01    [facility] 5M1 MED SURG 1  Unit Phone# :  [phone] [address] 
[address] [facility] [address] [address] Dept: [phone] Loc: [phone] SITUATION Admitted:  4/5/2019         Attending Provider:  nAel Martini MD    
 
Consultations:  IP CONSULT TO HEMATOLOGY 
IP CONSULT TO NEPHROLOGY 
IP CONSULT TO PALLIATIVE CARE - PROVIDER 
IP CONSULT TO UROLOGY 
IP CONSULT TO NEUROLOGY 
 
PCP:  Lee Freeman MD   [phone] Treatment Team: Attending Provider: Anel Martini MD; Consulting Provider: Teresa Coles MD; Consulting Provider: Prince Shraddha NP; Consulting Provider: Priscilla Murillo MD; Utilization Review: Nir Allred; Care Manager: Ita Aguilera; Consulting Provider: Elfego Khan MD 
 
Admitting Dx:  Syncope [R55] Principal Problem: Syncope * No surgery found * of  
  
BY: * Surgery not found *             ON: * No surgery found * Code Status: DNR Advance Directives:  
Advance Care Planning 4/11/2019 Patient's Healthcare Decision Maker is: Named in scanned ACP document Confirm Advance Directive Yes, on file Does the patient have other document types Do Not Resuscitate (Send w/patient) Yes Not W Pt  
 
 
Isolation:  There are currently no Active Isolations       MDRO: No current active infections Pain Medications given:      Last dose Special Equipment needed: no  Type of equipment: 
 
 
(Not currently on dialysis) (Not currently on dialysis) (Not currently on dialysis) BACKGROUND Allergies: Allergies Allergen Reactions  Ciprofloxacin Rash Past Medical History:  
Diagnosis Date  Diabetes mellitus ([facility]  Prostate cancer ([facility]  Sleep apnea Past Surgical History:  
Procedure Laterality Date  Luis Orona N/A 2018 SIGMOIDOSCOPY FLEXIBLE performed by Yanna Lindsey MD at [address] Left  HX HIP REPLACEMENT Right  HX OTHER SURGICAL    
 bladder stent  IR CHANGE NEPHROSTOMY PYELOS TUBE RT  2019 Medications Prior to Admission Medication Sig  
 leuprolide acetate (LEUPROLIDE, 6 MONTH, SC) by SubCUTAneous route. Due in 2019 per OP oncology notes  traZODone (DESYREL) 50 mg tablet Take 50 mg by mouth nightly as needed for Sleep.  [] hydroCHLOROthiazide (HYDRODIURIL) 25 mg tablet Take 1 Tab by mouth daily for 10 days.  abiraterone (ZYTIGA) 250 mg tab Take 4 Tabs by mouth daily.  predniSONE (DELTASONE) 5 mg tablet Take 1 Tab by mouth daily.  aspirin delayed-release 81 mg tablet Take 81 mg by mouth daily.  potassium chloride (K-DUR, KLOR-CON) 20 mEq tablet Take 20 mEq by mouth two (2) times a day.  tamsulosin (FLOMAX) 0.4 mg capsule Take 0.4 mg by mouth daily. Hard scripts included in transfer packet no Vaccinations: There is no immunization history on file for this patient. Readmission Risks:    Known Risks: The Charlson CoMorbitiy Index tool is an evidenced based tool that has more automatic generated information. The tool looks at many different items such as the age of the patient, how many times they were admitted in the last calendar year, current length of stay in the hospital and their diagnosis. All of these items are pulled automatically from information documented in the chart from various places and will generate a score that predicts whether a patient is at low (less than 13), medium (13-20) or high (21 or greater) risk of being readmitted. ASSESSMENT Temp: 98.6 °F (37 °C) (19 1504) Pulse (Heart Rate): 92 (19 1504) Resp Rate: 18 (19 1504)           BP: 153/74 (19 1504) O2 Sat (%): 99 % (04/16/19 1504) Weight: 104.6 kg (230 lb 9.6 oz)    Height: 5' 9\" (175.3 cm) (04/05/19 1437) If above not within 1 hour of discharge: 
 
BP:_____  P:____  R:____ T:_____ O2 Sat: ___%  O2: ______ Active Orders Diet DIET REGULAR Orientation: oriented to time, place, person and situation Active Behaviors: None Active Lines/Drains:  (Peg Tube / Richard / CL or S/L?): no default no:[id number]::\"no\"} nephrostomy tube, colostomy Urinary Status: Urostomy, Incontinent     Last BM: Last Bowel Movement Date: 04/16/19 Skin Integrity: Abrasion Wound Abdomen Anterior-Dressing Status : Clean, dry, and intact Mobility: Very limited Weight Bearing Status: WBAT (Weight Bearing as Tolerated) Gait Training Assistive Device: Walker, rolling, Gait belt Ambulation - Level of Assistance: Moderate assistance, Assist x2 Distance (ft): 8 Feet (ft) Lab Results Component Value Date/Time Glucose 117 (H) 04/09/2019 06:00 AM  
 Hemoglobin A1c 6.6 (H) 04/06/2019 01:01 AM  
 INR 1.1 04/05/2019 03:00 AM  
 HGB 8.2 (L) 04/09/2019 06:00 AM  
 HGB 8.5 (L) 04/07/2019 12:22 AM  
  
  RECOMMENDATION See After Visit Summary (AVS) for: · Discharge instructions · [facility] · Special equipment needed (entered pre-discharge by Care Management) · Medication Reconciliation · Follow up Appointment(s) Report given/sent by:  Dakotah Agudelo Verbal report given to: Nurse Alina Mercedes Estimated discharge time:  4/16/2019 at 1700

## 2019-04-16 NOTE — PROGRESS NOTES
Guido We-07-A [address] [facility] [address]  
[address] [phone] Fax Weak legs R>L Worked well with therapy No new complaint MRI brain reviewed and no acute Visit Vitals /72 (BP 1 Location: Left arm, BP Patient Position: At rest) Pulse 90 Temp 98.5 °F (36.9 °C) Resp 16 Ht 5' 9\" (1.753 m) Wt 104.6 kg (230 lb 9.6 oz) SpO2 97% BMI 34.05 kg/m² Awake, alert and oriented Normal speech and language Imp/plan Leg weakness likely multifactorial 
Cont with PT and OT and planned rehab Could get EMG as OP if no improvement Will see again PRN Thanks Eric Mena MD
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
## 2019-04-17 ENCOUNTER — PATIENT OUTREACH (OUTPATIENT)
Dept: CASE MANAGEMENT | Age: 74
End: 2019-04-17

## 2019-04-17 NOTE — PROGRESS NOTES
Community Care Team documentation for patient in [facility] Initial Follow Up Patient was discharged to [facility]. Information included in this progress note has been provided to SNF. Hospital Admission and Diagnosis:  [facility]  4/5-4/16 Gluteal Abscess/SIRS  
 
RRAT Score:  16 Advance Care Planning:  Advance Directive and DDNR on file PCP : Karlene Skaggs MD  
 
Spoke with SNF team. Ensured patient arrived to SNF safely with admission packet in order. Provided needed hospital follow up appointments:  Neurology Olamide Aggarwal MD as needed; Urology Dr. Yessica Marroquin. DDNR and advance directive provided to SNF by CCT. PT/OT to eval today. Per chart, patient lives  alone. His son and daughter-in-law have moved from [place] to be closer to him and they live approximately 15 minutes away. Family provides transportation to appointments. Unlikely for patient to be able to return home alone. Family willing to look into all options to include nursing home, skilled nursing, and even assisted living. Possibly home with hospice after SNF stay. SNF looking into remaining skilled Medicare days. Community Care Team will follow up weekly with [facility] until discharge. Medications were not reconciled and general patient assessment was not completed during this [facility] outreach.

## 2019-04-24 ENCOUNTER — PATIENT OUTREACH (OUTPATIENT)
Dept: CASE MANAGEMENT | Age: 74
End: 2019-04-24

## 2019-04-24 NOTE — PROGRESS NOTES
Community Care Team Documentation for Patient in [facility] Subsequent Follow up Patient remains at [facility] ([facility]). See previous [facility] Team notes. PCP : Hillary Nieto MD 
 
Spoke with SNF team.  PT/OT continue. Currently completing upper body bathing and dressing with contact guard and increased time. Dependent with ADLs. Max assist of 1-2 persons with transfers. Not ambulating at this time. LOS TBD. Palliative in hospital met with family and discussed discharging with Hospice from SNF. Family has not mentioned hospice to SNF. Plan for son and DIL to move in with patient upon SNF discharge. Medications were not reconciled and general patient assessment was not completed during this skilled nursing facility outreach.

## 2019-05-01 ENCOUNTER — PATIENT OUTREACH (OUTPATIENT)
Dept: CASE MANAGEMENT | Age: 74
End: 2019-05-01

## 2019-05-01 NOTE — PROGRESS NOTES
Community Care Team Documentation for Patient in [facility] Subsequent Follow up Patient remains at [facility] ([facility]). See previous [facility] Team notes. PCP : Mic Aaron MD 
 
Spoke with SNF team. PT/OT continue. Currently ambulating 15ft with RW and contact guard once up. Mod assist with stand pivot and sit to stand transfers. Propelling self in [facility] 50ft with occasional cues and rest breaks. Therapy projected to continue through 5/14. Care plan meeting to be held next week to discuss disposition and hospice. Plan for son and DIL to move in with patient upon SNF discharge. Medications were not reconciled and general patient assessment was not completed during this skilled nursing facility outreach.

## 2019-05-08 ENCOUNTER — PATIENT OUTREACH (OUTPATIENT)
Dept: CASE MANAGEMENT | Age: 74
End: 2019-05-08

## 2019-05-08 NOTE — PROGRESS NOTES
Community Care Team Documentation for Patient in [facility] Subsequent Follow up Patient remains at [facility] ([facility]). See previous [facility] Team notes. PCP : Abelardo García MD 
 
Spoke with SNF team. PT/OT continue. Currently contact guard assist with bed mobility. Min assist with transfers. Ambulating 100-200ft RW with contact guard assist. Min assist with upper body bathing and dressing. Max assist with lower body bathing and dressing. Care plan meeting to be scheduled tomorrow 5/9 to be held next week to discuss disposition and hospice. Plan for son and DIL to move in with patient upon SNF discharge. Medications were not reconciled and general patient assessment was not completed during this skilled nursing facility outreach.

## 2019-05-15 ENCOUNTER — HOSPITAL ENCOUNTER (OUTPATIENT)
Dept: INTERVENTIONAL RADIOLOGY/VASCULAR | Age: 74
Discharge: HOME | End: 2019-05-15
Attending: RADIOLOGY | Admitting: RADIOLOGY
Payer: MEDICARE

## 2019-05-15 ENCOUNTER — PATIENT OUTREACH (OUTPATIENT)
Dept: CASE MANAGEMENT | Age: 74
End: 2019-05-15

## 2019-05-15 VITALS
SYSTOLIC BLOOD PRESSURE: 133 MMHG | HEIGHT: 69 IN | DIASTOLIC BLOOD PRESSURE: 54 MMHG | TEMPERATURE: 98.1 F | BODY MASS INDEX: 32.58 KG/M2 | HEART RATE: 94 BPM | WEIGHT: 220 LBS | OXYGEN SATURATION: 98 % | RESPIRATION RATE: 16 BRPM

## 2019-05-15 DIAGNOSIS — N13.30 HYDRONEPHROSIS: ICD-10-CM

## 2019-05-15 PROCEDURE — 77030002996 HC SUT SLK J&J -A

## 2019-05-15 PROCEDURE — C1729 CATH, DRAINAGE: HCPCS

## 2019-05-15 PROCEDURE — C1769 GUIDE WIRE: HCPCS

## 2019-05-15 PROCEDURE — 74011250636 HC RX REV CODE- 250/636: Performed by: RADIOLOGY

## 2019-05-15 PROCEDURE — 77030010548 HC BG WND DRN ARMD -B

## 2019-05-15 PROCEDURE — 99153 MOD SED SAME PHYS/QHP EA: CPT

## 2019-05-15 PROCEDURE — 50435 EXCHANGE NEPHROSTOMY CATH: CPT

## 2019-05-15 PROCEDURE — 99152 MOD SED SAME PHYS/QHP 5/>YRS: CPT

## 2019-05-15 PROCEDURE — 77030011229 HC DIL VESL COON COOK -A

## 2019-05-15 PROCEDURE — 74011636320 HC RX REV CODE- 636/320: Performed by: RADIOLOGY

## 2019-05-15 RX ORDER — CEFAZOLIN SODIUM/WATER 2 G/20 ML
2 SYRINGE (ML) INTRAVENOUS
Status: COMPLETED | OUTPATIENT
Start: 2019-05-15 | End: 2019-05-15

## 2019-05-15 RX ORDER — FENTANYL CITRATE 50 UG/ML
100 INJECTION, SOLUTION INTRAMUSCULAR; INTRAVENOUS
Status: DISCONTINUED | OUTPATIENT
Start: 2019-05-15 | End: 2019-05-15 | Stop reason: HOSPADM

## 2019-05-15 RX ORDER — LIDOCAINE HYDROCHLORIDE 10 MG/ML
30 INJECTION, SOLUTION EPIDURAL; INFILTRATION; INTRACAUDAL; PERINEURAL ONCE
Status: COMPLETED | OUTPATIENT
Start: 2019-05-15 | End: 2019-05-15

## 2019-05-15 RX ORDER — SODIUM CHLORIDE 9 MG/ML
25 INJECTION, SOLUTION INTRAVENOUS CONTINUOUS
Status: DISCONTINUED | OUTPATIENT
Start: 2019-05-15 | End: 2019-05-15 | Stop reason: HOSPADM

## 2019-05-15 RX ORDER — MIDAZOLAM HYDROCHLORIDE 1 MG/ML
2 INJECTION, SOLUTION INTRAMUSCULAR; INTRAVENOUS
Status: DISCONTINUED | OUTPATIENT
Start: 2019-05-15 | End: 2019-05-15 | Stop reason: HOSPADM

## 2019-05-15 RX ADMIN — MIDAZOLAM 0.5 MG: 1 INJECTION INTRAMUSCULAR; INTRAVENOUS at 14:32

## 2019-05-15 RX ADMIN — MIDAZOLAM 1 MG: 1 INJECTION INTRAMUSCULAR; INTRAVENOUS at 14:24

## 2019-05-15 RX ADMIN — FENTANYL CITRATE 25 MCG: 50 INJECTION, SOLUTION INTRAMUSCULAR; INTRAVENOUS at 14:36

## 2019-05-15 RX ADMIN — FENTANYL CITRATE 50 MCG: 50 INJECTION, SOLUTION INTRAMUSCULAR; INTRAVENOUS at 14:22

## 2019-05-15 RX ADMIN — LIDOCAINE HYDROCHLORIDE 1 ML: 10 INJECTION, SOLUTION EPIDURAL; INFILTRATION; INTRACAUDAL; PERINEURAL at 14:32

## 2019-05-15 RX ADMIN — IOPAMIDOL 10 ML: 612 INJECTION, SOLUTION INTRAVENOUS at 14:40

## 2019-05-15 RX ADMIN — MIDAZOLAM 0.5 MG: 1 INJECTION INTRAMUSCULAR; INTRAVENOUS at 14:37

## 2019-05-15 RX ADMIN — FENTANYL CITRATE 25 MCG: 50 INJECTION, SOLUTION INTRAMUSCULAR; INTRAVENOUS at 14:29

## 2019-05-15 RX ADMIN — Medication 2 G: at 13:25

## 2019-05-15 NOTE — PROGRESS NOTES
Community Care Team Documentation for Patient in [facility] Subsequent Follow up Patient remains at [facility] ([facility]). See previous [facility] Team notes. PCP : Linda Larson MD 
 
Spoke with SNF team. PT/OT continue. Currently mod assist with bed mobility. Mod to max assist with bed to [facility] transfer. Barrier: Confusion and agitation. Pt to attend Nephrology follow up today for nephrostomy. Pending Nephrology follow up, plan to discharge in one week with [facility]. Medications were not reconciled and general patient assessment was not completed during this skilled nursing facility outreach.

## 2019-05-15 NOTE — H&P
Interventional Radiology History and Physical (Outpatient)    5/15/2019    Patient: Mushtaq Farley 74 y.o. male     Referring Physician:  Julio Marquez MD    Chief Complaint: need PCN change    History of Present Illness: pelvic mass with obstruction    History:  Past Medical History:   Diagnosis Date    Diabetes mellitus ([facility]     Prostate cancer ([facility]     Sleep apnea      Family History   Problem Relation Age of Onset    Cancer Mother         colon    Hypertension Mother     Heart Disease Mother     Cancer Father     Diabetes Sister     Cancer Brother     Diabetes Sister      Social History     Socioeconomic History    Marital status: SINGLE     Spouse name: Not on file    Number of children: Not on file    Years of education: Not on file    Highest education level: Not on file   Occupational History    Not on file   Social Needs    Financial resource strain: Not on file    Food insecurity:     Worry: Not on file     Inability: Not on file    Transportation needs:     Medical: Not on file     Non-medical: Not on file   Tobacco Use    Smoking status: Never Smoker    Smokeless tobacco: Never Used   Substance and Sexual Activity    Alcohol use: No    Drug use: No    Sexual activity: Never   Lifestyle    Physical activity:     Days per week: Not on file     Minutes per session: Not on file    Stress: Not on file   Relationships    Social connections:     Talks on phone: Not on file     Gets together: Not on file     Attends [employer] service: Not on file     Active member of club or organization: Not on file     Attends meetings of clubs or organizations: Not on file     Relationship status: Not on file    Intimate partner violence:     Fear of current or ex partner: Not on file     Emotionally abused: Not on file     Physically abused: Not on file     Forced sexual activity: Not on file   Other Topics Concern   [address] Service Not Asked    Blood Transfusions Not Asked    Caffeine Concern Not Asked    Occupational Exposure Not Asked   [place] [employer] Not Asked    Sleep Concern Not Asked    Stress Concern Not Asked    Weight Concern Not Asked    Special Diet Not Asked    Back Care Not Asked    Exercise Not Asked    Bike Helmet Not Asked   [address] Not Asked    Self-Exams Not Asked   Social History Narrative    Not on file       Allergies: Allergies   Allergen Reactions    Ciprofloxacin Rash       Prior to Admission Medications:  Prior to Admission medications    Medication Sig Start Date End Date Taking? Authorizing Provider   HYDROcodone-acetaminophen (NORCO) 5-325 mg per tablet Take 1 Tab by mouth every four (4) hours as needed for Pain for up to 30 days. Max Daily Amount: 6 Tabs. 4/15/19 5/15/19  Cristian Dumont MD   lactobacillus acidoph-pectin (ACIDOPHILUS-PECTIN) 75 million cell -100 mg cap capsule Take 1 Cap by mouth daily. 4/15/19   Cristian Dumont MD   traZODone (DESYREL) 50 mg tablet Take 50 mg by mouth nightly as needed for Sleep. Provider, Historical   tamsulosin (FLOMAX) 0.4 mg capsule Take 0.4 mg by mouth daily. Provider, Historical       Physical Exam:    Blood pressure 119/52, pulse 89, temperature 98.1 °F (36.7 °C), resp. rate 16, height 5' 9\" (1.753 m), weight 99.8 kg (220 lb), SpO2 95 %. General: somewhat lethargic, cooperative, no distress, appears weak  Heart: rrr  Lungs: clear to auscultation bilaterally  Abdomen: soft  Neuro: grossly intact  Extremities: extremities wnl    Plan of Care/Planned Procedure:  Risks, benefits, and alternatives reviewed with patient and he agrees to proceed with the procedure.      Melania White MD

## 2019-05-15 NOTE — ROUTINE PROCESS
Pt verbalized understanding of instructions. Pt discharged to home with belongings and instructions copy with daughter-in-law. Right nephrostomy tube draining without problems. Right flank dsg dry and intact.

## 2019-05-15 NOTE — DISCHARGE INSTRUCTIONS
[address] Nephrostomy Tube Discharge Instructions    General Information:   A nephrostomy is a tube inserted through your skin and into the kidney. The purpose of the tube is to drain urine outside of the body. This is done in the event fo a block or a damaged ureter. Most of these tubes are usually changed every 2-3 months. However, some patients may need to have their tubes changed more often. Home Care Instructions: You can resume your regular diet. Do not drink alcohol, drive, or make any important legal decisions in the next 24 hours. Do not lift anything heavier than a gallon of milk or do anything strenuous for the next 24 hours. You should not shower for 24 hours. You should not bathe or swim while you have this drain in place. You should clean the tube and change the dressing at least every 48 hours and more as needed. Keep the dressing clean and dry. Keep up with how much drainage you get from the tube and report this to your doctor. Call If:   You should call your Physician and/or the Radiology Nurse if you have any bleeding other than a small spot on your bandage. Call if you have any signs of infection, fever, or increased pain at the site of the tube. Call if you should have leakage around the tube, a change in the appearance of the urine draining from the tube, increased pain in the lower back, or no drainage from the tube for 12 hours. Call if the tube has pulled back, or has been pulled out. Call your managing MD if you have flank pain, fever, or chills. Call if your urine output drops. Follow-Up Instructions:  Please see your ordering doctor as he/she has requested. Drink extra water for the next 2 days. To Reach Us:  Side effects of sedation medications and other medications used today have been reviewed. Notify us of nausea, itching, hives, dizziness, or anything else out of the ordinary.        Should you experience any of these significant changes, please call [phone] between the hours of 7:30 am and 10 pm or [phone] after hours.  After hours, ask the  to page the [address] Technologist, and describe the problem to the technologist.           Patient Signature:  Date: 5/15/2019  Discharging Nurse: Rochelle Medel RN

## 2019-05-16 NOTE — PROGRESS NOTES
Reviewed sedation plan to include medicating under conscious sedation using versed and fentanyl IV. Reviewed potential side effects with patient and possible interactions with patient's current meds     Pt educated on moderate sedation and its purpose; medications and side effects described and patient verbalized understanding.

## 2019-05-23 ENCOUNTER — PATIENT OUTREACH (OUTPATIENT)
Dept: CASE MANAGEMENT | Age: 74
End: 2019-05-23

## 2019-05-24 NOTE — PROGRESS NOTES
Community Care Team Documentation for Patient in [facility]  Discharge Note    Patient has been discharged from [id number] [facility]   ([facility]). See previous [facility] Team notes. PCP : Payton Del Rosario MD    Spoke with SNF team.  Patient was discharged home on 5/22 with [facility] (pt/family choice). Son and daughter-in-law moving in with patient. Community Care Team will sign off at this time. Medications were not reconciled and general patient assessment was not completed during this skilled nursing facility outreach.      Brian Gage, MSN, RN, ACNS-BC, [facility]  Nurse Navigator, [place] [phone]

## 2019-09-03 NOTE — TELEPHONE ENCOUNTER
CPAP supplies ordered    Orders Placed This Encounter    AMB SUPPLY ORDER     Diagnosis: Obstructive Sleep Apnea ICD-10 Code (G47.33)       CPAP mask and supplies-  Patient preference, headgear, tubing, and filter;  heated humidifier; wireless modem. Remote monitoring enrollment.       Kelly Fraga MD, Darnell Garay  Diplomate, American Board of Sleep Medicine

## 2025-02-10 NOTE — PROGRESS NOTES
Order faxed, patient informed.
Instructions (Optional): 1.5 cm.
X Size Of Lesion In Cm (Optional): 0
Introduction Text (Please End With A Colon): The following procedure was deferred:
Detail Level: Simple

## (undated) DEVICE — CONTAINER SPEC 20 ML LID NEUT BUFF FORMALIN 10 % POLYPR STS

## (undated) DEVICE — KENDALL RADIOLUCENT FOAM MONITORING ELECTRODE -RECTANGULAR SHAPE: Brand: KENDALL

## (undated) DEVICE — REM POLYHESIVE ADULT PATIENT RETURN ELECTRODE: Brand: VALLEYLAB

## (undated) DEVICE — NEEDLE INSUF L120MM DIA2MM DISP FOR PNEUMOPERI ENDOPATH

## (undated) DEVICE — 1200 GUARD II KIT W/5MM TUBE W/O VAC TUBE: Brand: GUARDIAN

## (undated) DEVICE — SUTURE MCRYL SZ 4-0 L27IN ABSRB UD L19MM PS-2 1/2 CIR PRIM Y426H

## (undated) DEVICE — STERILE POLYISOPRENE POWDER-FREE SURGICAL GLOVES: Brand: PROTEXIS

## (undated) DEVICE — Device

## (undated) DEVICE — TOWEL SURG W17XL27IN STD BLU COT NONFENESTRATED PREWASHED

## (undated) DEVICE — SURGICAL PROCEDURE KIT GEN LAPAROSCOPY LF

## (undated) DEVICE — APPLICATOR BNDG 1MM ADH PREMIERPRO EXOFIN

## (undated) DEVICE — INFECTION CONTROL KIT SYS

## (undated) DEVICE — BAG SPEC BIOHZRD 10 X 10 IN --

## (undated) DEVICE — SET GRAV CK VLV NEEDLESS ST 3 GANGED 4WAY STPCOCK HI FLO 10

## (undated) DEVICE — 3M™ CUROS™ DISINFECTING CAP FOR NEEDLELESS CONNECTORS 270/CARTON 20 CARTONS/CASE CFF1-270: Brand: CUROS™

## (undated) DEVICE — SIMPLICITY FLUFF UNDERPAD 23X36, MODERATE: Brand: SIMPLICITY

## (undated) DEVICE — SOL IRRIGATION INJ NACL 0.9% 500ML BTL

## (undated) DEVICE — MEDI-VAC YANK SUCT HNDL W/TPRD BULBOUS TIP: Brand: CARDINAL HEALTH

## (undated) DEVICE — CANN NASAL O2 CAPNOGRAPHY AD -- FILTERLINE

## (undated) DEVICE — LIGHT HANDLE: Brand: DEVON

## (undated) DEVICE — KENDALL SCD EXPRESS SLEEVES, KNEE LENGTH, MEDIUM: Brand: KENDALL SCD

## (undated) DEVICE — SUTURE VCRL SZ 2-0 L27IN ABSRB UD L26MM SH 1/2 CIR J417H

## (undated) DEVICE — TUBING INSUFLTN 10FT LUER -- CONVERT TO ITEM 368568

## (undated) DEVICE — KIT COLON W/ 1.1OZ LUB AND 2 END

## (undated) DEVICE — SUTURE MCRYL SZ 3-0 L27IN ABSRB UD L26MM SH 1/2 CIR Y416H

## (undated) DEVICE — SOLIDIFIER MEDC 1200ML -- CONVERT TO 356117

## (undated) DEVICE — MEDI-VAC NON-CONDUCTIVE SUCTION TUBING: Brand: CARDINAL HEALTH

## (undated) DEVICE — DEVON™ KNEE AND BODY STRAP 60" X 3" (1.5 M X 7.6 CM): Brand: DEVON

## (undated) DEVICE — MARYLAND JAW LAPAROSCOPIC SEALER/DIVIDER COATED: Brand: LIGASURE

## (undated) DEVICE — SUTURE SZ 0 27IN 5/8 CIR UR-6  TAPER PT VIOLET ABSRB VICRYL J603H

## (undated) DEVICE — DISPOSABLE GRASPER CARTRIDGE: Brand: DIRECT DRIVE REPOSABLE GRASPERS

## (undated) DEVICE — 3000CC GUARDIAN II: Brand: GUARDIAN

## (undated) DEVICE — UNIVERSAL FIXATION CANNULA: Brand: VERSAONE

## (undated) DEVICE — FORCEPS BX L240CM JAW DIA2.8MM L CAP W/ NDL MIC MESH TOOTH

## (undated) DEVICE — TUBING FLTR PLUME AWAY EVAC W/ SUCT DEV DISP PUREVIEW

## (undated) DEVICE — COLOSTOMY/ILEOSTOMY KIT, FLEXWEAR: Brand: NEW IMAGE

## (undated) DEVICE — (D)PREP SKN CHLRAPRP APPL 26ML -- CONVERT TO ITEM 371833

## (undated) DEVICE — NEEDLE HYPO 22GA L1.5IN BLK S STL HUB POLYPR SHLD REG BVL

## (undated) DEVICE — BLADELESS OPTICAL TROCAR WITH FIXATION CANNULA: Brand: VERSAPORT